# Patient Record
Sex: MALE | Race: WHITE | Employment: OTHER | URBAN - METROPOLITAN AREA
[De-identification: names, ages, dates, MRNs, and addresses within clinical notes are randomized per-mention and may not be internally consistent; named-entity substitution may affect disease eponyms.]

---

## 2024-06-16 ENCOUNTER — ANCILLARY PROCEDURE (OUTPATIENT)
Dept: ULTRASOUND IMAGING | Facility: HOSPITAL | Age: 59
End: 2024-06-16
Attending: STUDENT IN AN ORGANIZED HEALTH CARE EDUCATION/TRAINING PROGRAM
Payer: MEDICAID

## 2024-06-16 ENCOUNTER — HOSPITAL ENCOUNTER (INPATIENT)
Facility: HOSPITAL | Age: 59
LOS: 5 days | Discharge: HOME | End: 2024-06-21
Attending: STUDENT IN AN ORGANIZED HEALTH CARE EDUCATION/TRAINING PROGRAM | Admitting: FAMILY MEDICINE
Payer: MEDICAID

## 2024-06-16 ENCOUNTER — APPOINTMENT (OUTPATIENT)
Dept: RADIOLOGY | Facility: HOSPITAL | Age: 59
End: 2024-06-16
Attending: STUDENT IN AN ORGANIZED HEALTH CARE EDUCATION/TRAINING PROGRAM
Payer: MEDICAID

## 2024-06-16 DIAGNOSIS — I25.10 CORONARY ARTERY DISEASE INVOLVING NATIVE CORONARY ARTERY OF NATIVE HEART WITHOUT ANGINA PECTORIS: ICD-10-CM

## 2024-06-16 DIAGNOSIS — I50.20 SYSTOLIC CONGESTIVE HEART FAILURE, UNSPECIFIED HF CHRONICITY (H): ICD-10-CM

## 2024-06-16 DIAGNOSIS — I50.20 HEART FAILURE WITH REDUCED EJECTION FRACTION, NYHA CLASS II (H): ICD-10-CM

## 2024-06-16 DIAGNOSIS — J45.901 EXACERBATION OF ASTHMA, UNSPECIFIED ASTHMA SEVERITY, UNSPECIFIED WHETHER PERSISTENT: ICD-10-CM

## 2024-06-16 DIAGNOSIS — R06.02 SOB (SHORTNESS OF BREATH): Primary | ICD-10-CM

## 2024-06-16 DIAGNOSIS — I10 PRIMARY HYPERTENSION: ICD-10-CM

## 2024-06-16 DIAGNOSIS — I50.21 ACUTE SYSTOLIC CONGESTIVE HEART FAILURE (H): ICD-10-CM

## 2024-06-16 DIAGNOSIS — I50.9 CONGESTIVE HEART FAILURE, UNSPECIFIED HF CHRONICITY, UNSPECIFIED HEART FAILURE TYPE (H): ICD-10-CM

## 2024-06-16 LAB
ANION GAP SERPL CALCULATED.3IONS-SCNC: 9 MMOL/L (ref 7–15)
BASOPHILS # BLD AUTO: 0 10E3/UL (ref 0–0.2)
BASOPHILS NFR BLD AUTO: 0 %
BUN SERPL-MCNC: 16.3 MG/DL (ref 8–23)
CALCIUM SERPL-MCNC: 8.8 MG/DL (ref 8.8–10.2)
CHLORIDE SERPL-SCNC: 99 MMOL/L (ref 98–107)
CREAT SERPL-MCNC: 0.95 MG/DL (ref 0.67–1.17)
DEPRECATED HCO3 PLAS-SCNC: 28 MMOL/L (ref 22–29)
EGFRCR SERPLBLD CKD-EPI 2021: >90 ML/MIN/1.73M2
EOSINOPHIL # BLD AUTO: 0.2 10E3/UL (ref 0–0.7)
EOSINOPHIL NFR BLD AUTO: 2 %
ERYTHROCYTE [DISTWIDTH] IN BLOOD BY AUTOMATED COUNT: 14.1 % (ref 10–15)
GLUCOSE SERPL-MCNC: 140 MG/DL (ref 70–99)
HCT VFR BLD AUTO: 46.4 % (ref 40–53)
HGB BLD-MCNC: 15.1 G/DL (ref 13.3–17.7)
IMM GRANULOCYTES # BLD: 0 10E3/UL
IMM GRANULOCYTES NFR BLD: 0 %
LYMPHOCYTES # BLD AUTO: 1.2 10E3/UL (ref 0.8–5.3)
LYMPHOCYTES NFR BLD AUTO: 11 %
MCH RBC QN AUTO: 29.2 PG (ref 26.5–33)
MCHC RBC AUTO-ENTMCNC: 32.5 G/DL (ref 31.5–36.5)
MCV RBC AUTO: 90 FL (ref 78–100)
MONOCYTES # BLD AUTO: 0.8 10E3/UL (ref 0–1.3)
MONOCYTES NFR BLD AUTO: 7 %
NEUTROPHILS # BLD AUTO: 9.4 10E3/UL (ref 1.6–8.3)
NEUTROPHILS NFR BLD AUTO: 80 %
NRBC # BLD AUTO: 0 10E3/UL
NRBC BLD AUTO-RTO: 0 /100
NT-PROBNP SERPL-MCNC: 8796 PG/ML (ref 0–900)
PLATELET # BLD AUTO: 185 10E3/UL (ref 150–450)
POTASSIUM SERPL-SCNC: 4.1 MMOL/L (ref 3.4–5.3)
POTASSIUM SERPL-SCNC: 4.4 MMOL/L (ref 3.4–5.3)
RBC # BLD AUTO: 5.18 10E6/UL (ref 4.4–5.9)
SODIUM SERPL-SCNC: 136 MMOL/L (ref 135–145)
TROPONIN T SERPL HS-MCNC: 45 NG/L
TROPONIN T SERPL HS-MCNC: 50 NG/L
WBC # BLD AUTO: 11.7 10E3/UL (ref 4–11)

## 2024-06-16 PROCEDURE — 250N000011 HC RX IP 250 OP 636: Mod: JZ | Performed by: STUDENT IN AN ORGANIZED HEALTH CARE EDUCATION/TRAINING PROGRAM

## 2024-06-16 PROCEDURE — 250N000009 HC RX 250: Performed by: STUDENT IN AN ORGANIZED HEALTH CARE EDUCATION/TRAINING PROGRAM

## 2024-06-16 PROCEDURE — 83880 ASSAY OF NATRIURETIC PEPTIDE: CPT | Performed by: STUDENT IN AN ORGANIZED HEALTH CARE EDUCATION/TRAINING PROGRAM

## 2024-06-16 PROCEDURE — 94640 AIRWAY INHALATION TREATMENT: CPT

## 2024-06-16 PROCEDURE — 85025 COMPLETE CBC W/AUTO DIFF WBC: CPT | Performed by: STUDENT IN AN ORGANIZED HEALTH CARE EDUCATION/TRAINING PROGRAM

## 2024-06-16 PROCEDURE — 120N000001 HC R&B MED SURG/OB

## 2024-06-16 PROCEDURE — 36415 COLL VENOUS BLD VENIPUNCTURE: CPT | Performed by: STUDENT IN AN ORGANIZED HEALTH CARE EDUCATION/TRAINING PROGRAM

## 2024-06-16 PROCEDURE — 96374 THER/PROPH/DIAG INJ IV PUSH: CPT

## 2024-06-16 PROCEDURE — 84484 ASSAY OF TROPONIN QUANT: CPT | Performed by: STUDENT IN AN ORGANIZED HEALTH CARE EDUCATION/TRAINING PROGRAM

## 2024-06-16 PROCEDURE — 80048 BASIC METABOLIC PNL TOTAL CA: CPT | Performed by: STUDENT IN AN ORGANIZED HEALTH CARE EDUCATION/TRAINING PROGRAM

## 2024-06-16 PROCEDURE — 71046 X-RAY EXAM CHEST 2 VIEWS: CPT

## 2024-06-16 PROCEDURE — 999N000157 HC STATISTIC RCP TIME EA 10 MIN

## 2024-06-16 PROCEDURE — 84132 ASSAY OF SERUM POTASSIUM: CPT

## 2024-06-16 PROCEDURE — 250N000013 HC RX MED GY IP 250 OP 250 PS 637: Performed by: STUDENT IN AN ORGANIZED HEALTH CARE EDUCATION/TRAINING PROGRAM

## 2024-06-16 PROCEDURE — 76604 US EXAM CHEST: CPT

## 2024-06-16 PROCEDURE — 93005 ELECTROCARDIOGRAM TRACING: CPT | Performed by: STUDENT IN AN ORGANIZED HEALTH CARE EDUCATION/TRAINING PROGRAM

## 2024-06-16 PROCEDURE — 250N000013 HC RX MED GY IP 250 OP 250 PS 637

## 2024-06-16 PROCEDURE — 250N000011 HC RX IP 250 OP 636: Mod: JZ

## 2024-06-16 PROCEDURE — 99285 EMERGENCY DEPT VISIT HI MDM: CPT | Mod: 25

## 2024-06-16 RX ORDER — PROCHLORPERAZINE MALEATE 10 MG
10 TABLET ORAL EVERY 6 HOURS PRN
Status: DISCONTINUED | OUTPATIENT
Start: 2024-06-16 | End: 2024-06-21

## 2024-06-16 RX ORDER — CALCIUM CARBONATE 500 MG/1
1000 TABLET, CHEWABLE ORAL 4 TIMES DAILY PRN
Status: DISCONTINUED | OUTPATIENT
Start: 2024-06-16 | End: 2024-06-16

## 2024-06-16 RX ORDER — FUROSEMIDE 10 MG/ML
40 INJECTION INTRAMUSCULAR; INTRAVENOUS DAILY
Status: DISCONTINUED | OUTPATIENT
Start: 2024-06-16 | End: 2024-06-18

## 2024-06-16 RX ORDER — ONDANSETRON 4 MG/1
4 TABLET, ORALLY DISINTEGRATING ORAL EVERY 6 HOURS PRN
Status: DISCONTINUED | OUTPATIENT
Start: 2024-06-16 | End: 2024-06-21

## 2024-06-16 RX ORDER — ACETAMINOPHEN 650 MG/1
650 SUPPOSITORY RECTAL EVERY 4 HOURS PRN
Status: DISCONTINUED | OUTPATIENT
Start: 2024-06-16 | End: 2024-06-18

## 2024-06-16 RX ORDER — LIDOCAINE 40 MG/G
CREAM TOPICAL
Status: DISCONTINUED | OUTPATIENT
Start: 2024-06-16 | End: 2024-06-21

## 2024-06-16 RX ORDER — METOPROLOL SUCCINATE 25 MG/1
25 TABLET, EXTENDED RELEASE ORAL DAILY
Status: DISCONTINUED | OUTPATIENT
Start: 2024-06-16 | End: 2024-06-19

## 2024-06-16 RX ORDER — AMOXICILLIN 250 MG
1 CAPSULE ORAL 2 TIMES DAILY PRN
Status: DISCONTINUED | OUTPATIENT
Start: 2024-06-16 | End: 2024-06-21

## 2024-06-16 RX ORDER — NITROGLYCERIN 0.4 MG/1
0.4 TABLET SUBLINGUAL EVERY 5 MIN PRN
Status: DISCONTINUED | OUTPATIENT
Start: 2024-06-16 | End: 2024-06-21

## 2024-06-16 RX ORDER — ACETAMINOPHEN 325 MG/1
650 TABLET ORAL EVERY 4 HOURS PRN
Status: DISCONTINUED | OUTPATIENT
Start: 2024-06-16 | End: 2024-06-18

## 2024-06-16 RX ORDER — PROCHLORPERAZINE 25 MG
25 SUPPOSITORY, RECTAL RECTAL EVERY 12 HOURS PRN
Status: DISCONTINUED | OUTPATIENT
Start: 2024-06-16 | End: 2024-06-21

## 2024-06-16 RX ORDER — ALBUTEROL SULFATE 0.83 MG/ML
5 SOLUTION RESPIRATORY (INHALATION) ONCE
Status: COMPLETED | OUTPATIENT
Start: 2024-06-16 | End: 2024-06-16

## 2024-06-16 RX ORDER — ALBUTEROL SULFATE 90 UG/1
2 AEROSOL, METERED RESPIRATORY (INHALATION) EVERY 4 HOURS PRN
Status: DISCONTINUED | OUTPATIENT
Start: 2024-06-16 | End: 2024-06-21 | Stop reason: HOSPADM

## 2024-06-16 RX ORDER — FUROSEMIDE 10 MG/ML
40 INJECTION INTRAMUSCULAR; INTRAVENOUS ONCE
Status: COMPLETED | OUTPATIENT
Start: 2024-06-16 | End: 2024-06-16

## 2024-06-16 RX ORDER — ASPIRIN 81 MG/1
81 TABLET ORAL DAILY
Status: ON HOLD | COMMUNITY
End: 2024-07-20

## 2024-06-16 RX ORDER — ONDANSETRON 2 MG/ML
4 INJECTION INTRAMUSCULAR; INTRAVENOUS EVERY 6 HOURS PRN
Status: DISCONTINUED | OUTPATIENT
Start: 2024-06-16 | End: 2024-06-21

## 2024-06-16 RX ORDER — AMOXICILLIN 250 MG
2 CAPSULE ORAL 2 TIMES DAILY PRN
Status: DISCONTINUED | OUTPATIENT
Start: 2024-06-16 | End: 2024-06-21

## 2024-06-16 RX ORDER — METHYLPREDNISOLONE SODIUM SUCCINATE 125 MG/2ML
125 INJECTION, POWDER, LYOPHILIZED, FOR SOLUTION INTRAMUSCULAR; INTRAVENOUS ONCE
Status: COMPLETED | OUTPATIENT
Start: 2024-06-16 | End: 2024-06-16

## 2024-06-16 RX ADMIN — NITROGLYCERIN 0.4 MG: 0.4 TABLET, ORALLY DISINTEGRATING SUBLINGUAL at 16:07

## 2024-06-16 RX ADMIN — ALBUTEROL SULFATE 5 MG: 2.5 SOLUTION RESPIRATORY (INHALATION) at 14:26

## 2024-06-16 RX ADMIN — FUROSEMIDE 40 MG: 10 INJECTION, SOLUTION INTRAMUSCULAR; INTRAVENOUS at 20:40

## 2024-06-16 RX ADMIN — METHYLPREDNISOLONE SODIUM SUCCINATE 125 MG: 125 INJECTION, POWDER, FOR SOLUTION INTRAMUSCULAR; INTRAVENOUS at 14:12

## 2024-06-16 RX ADMIN — FUROSEMIDE 40 MG: 10 INJECTION, SOLUTION INTRAMUSCULAR; INTRAVENOUS at 14:55

## 2024-06-16 RX ADMIN — METOPROLOL SUCCINATE 25 MG: 25 TABLET, EXTENDED RELEASE ORAL at 20:39

## 2024-06-16 ASSESSMENT — ACTIVITIES OF DAILY LIVING (ADL)
ADLS_ACUITY_SCORE: 35
ADLS_ACUITY_SCORE: 20
ADLS_ACUITY_SCORE: 35
ADLS_ACUITY_SCORE: 35
ADLS_ACUITY_SCORE: 22
ADLS_ACUITY_SCORE: 33
ADLS_ACUITY_SCORE: 20
ADLS_ACUITY_SCORE: 20
ADLS_ACUITY_SCORE: 22

## 2024-06-16 ASSESSMENT — COLUMBIA-SUICIDE SEVERITY RATING SCALE - C-SSRS
1. IN THE PAST MONTH, HAVE YOU WISHED YOU WERE DEAD OR WISHED YOU COULD GO TO SLEEP AND NOT WAKE UP?: NO
2. HAVE YOU ACTUALLY HAD ANY THOUGHTS OF KILLING YOURSELF IN THE PAST MONTH?: NO
6. HAVE YOU EVER DONE ANYTHING, STARTED TO DO ANYTHING, OR PREPARED TO DO ANYTHING TO END YOUR LIFE?: NO

## 2024-06-16 NOTE — H&P
"[facility]    History and Physical - Hospitalist Service       Date of Admission:  6/16/2024    Assessment & Plan      Gonsalo Blakely is a 59 year old male w/PMHx of mild intermittent asthma, silicosis, and inconsistent healthcare admitted on 6/16/2024 for 2mo of worsening dyspnea, fth new CHF.     CHF exacerbation   Patient presents with 2mo of worsen dyspnea and BLE swelling. He is a  and within this past week, his has worked as much as 12h every day, worsening his sx and prompting him to come in today. He was tachycardic to 103 and satting in the low 90s on RA on admission. His breathing has not improved with more frequent use of his inhaler. Initial workup notable for BNP 8796, trop 45 and 50, and WBC 11.7. EKG notable for non-specific T-wave inversions, no signs of acute ischemic changes. CXR showed bronchial inflammation, but no other acute processes. Diuresed w/lasix 40mg IV in ED with output if ~1L and some improvements in sx. Continues to have crackles diffusely throughout lung bases and tachycardic on presentation on the floor. Family hx notable for CHF in father and MI in brother. He has several risk factors for CHF, including tobacco use and HTN. No hx of arrhythmia, but cannot r/o. In the ED there were concerns for asthma exacerbation, so he was given solumedrol and albuterol, but this seems less likely at this time.   - Oxygen as needed  - Metoprolol 25mg PO d  - Lasix 40mg IV d  - No ACEi/ARB d/t allergy   - Cardiac tele   - Echo  - Strict I/O's  - Daily weights     Leukocytosis   WBC 11.7 on admission. Afebrile. No additional si/sx of infection at this time. Will hold off on further infectious workup and abx.   - Continue to monitor fever curve   - Trend CBC    Lack of healthcare   Patient reports previously following with \"Dr. Curtis\" in the past. They had a good relationship, but Dr. Curtis retired and he has not re-established care elsewhere since " then. It has been several years since he last seen a PCP.   - Offered for patient to establish cares at [facility]. Patient will consider. His wife goes to \[facility]\"" and may consider going here.     Chronic conditions:  HTN  /118 on admission. Was diagnosed with this in the past and was on lisinopril, but developed a cough, so he was taken off of it. Resolved w/several lifestyle changes, which is why no additional medications were trialed.   - Metoprolol per above     Mild intermittent asthma   Silicosis   Patient only on albuterol and had not had to use it until the last few months. He works on old [employer] and reports being exposed to asbestos. He does wear PPE. Does not follow with pulmonology.   - Continue PTA albuterol           Diet: Fluid restriction 1800 ML FLUID  Combination Diet 2 gm NA Diet; No Caffeine Diet, Low Saturated Fat Na <2400mg Diet    DVT Prophylaxis: Pneumatic Compression Devices  Vincent Catheter: Not present  Lines: None     Cardiac Monitoring: ACTIVE order. Indication: Acute decompensated heart failure (48 hours)  Code Status: Full Code    Clinically Significant Risk Factors Present on Admission                # Drug Induced Platelet Defect: home medication list includes an antiplatelet medication                          Disposition Plan     Medically Ready for Discharge: Anticipated in 2-4 Days         The patient's care will be discussed w/primary medicine team in the AM.     Corey Beltran MD  Hospitalist Service  [facility]  Securely message with [employer] (more info)  Text page via [employer] Paging/Directory     ______________________________________________________________________    Chief Complaint   Dyspnea, BLE swelling    History is obtained from the patient and wife     History of Present Illness   Gonsalo Blakely is a 59 year old male w/PMHx of mild intermittent asthma, silicosis, and inconsistent healthcare admitted on 6/16/2024 for 2mo of  worsening dyspnea, fth CHF.     Initially started as LOREDO 2mo ago. He thought it was related to dust/mold at work. 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   LOREDO progressed - had trouble going up and down stairs, walking from house to garage. Eventually developed even dyspnea at rest most recently. Always worst at the end of the day and again attributed it to work. Endorses fatigue and BLE swelling during this time as well.   Reports diaphoresis, heart palpitations, and abdominal bloating. Denies chest pain, n/v, fevers/chills, constipation, and urinary changes.   FMHx:  Heart failure, cancer - dad  MI - brother   Smokes 3 cigarettes/d. Denies EtOH and other substance use.       Past Medical History    History reviewed. No pertinent past medical history.    Past Surgical History   History reviewed. No pertinent surgical history.    Prior to Admission Medications   Prior to Admission Medications   Prescriptions Last Dose Informant Patient Reported? Taking?   aspirin 81 MG EC tablet 6/14/2024  Yes Yes   Sig: Take 81 mg by mouth daily      Facility-Administered Medications: None        Review of Systems    The 10 point Review of Systems is negative other than noted in the HPI or here.      Physical Exam   Vital Signs: Temp: 97.8  F (36.6  C) Temp src: Oral BP: (!) 162/99 Pulse: 105   Resp: 22 SpO2: 92 % O2 Device: Nasal cannula Oxygen Delivery: 1 LPM  Weight: 186 lbs 0 oz    Physical Exam  Constitutional:       General: He is not in acute distress.     Appearance: Normal appearance. He is not toxic-appearing.   HENT:      Head: Normocephalic and atraumatic.   Neck:      Comments: JVD present   Cardiovascular:      Rate and Rhythm: Regular rhythm. Tachycardia present.      Heart sounds: No murmur heard.     No gallop.   Pulmonary:      Breath sounds: No wheezing or rales.      Comments: Mild increased WOB - gets breathless with talking. 1L NC O2. Diffuse crackles.  Chest:      Chest wall: No tenderness.    Abdominal:      General: There is distension.      Palpations: Abdomen is soft.      Tenderness: There is no abdominal tenderness.   Musculoskeletal:      Comments: BLE 1+ pitting edema    Neurological:      General: No focal deficit present.      Mental Status: He is alert and oriented to person, place, and time.             Data   ------------------------- PAST 24 HR DATA REVIEWED -----------------------------------------------

## 2024-06-16 NOTE — ED NOTES
[facility] ED Handoff Report    ED Chief Complaint: SOB leg swelling    ED Diagnosis:  (I50.20) Heart failure with reduced ejection fraction, NYHA class II (H)  Comment: None  Plan: See provider's note    (J45.901) Exacerbation of asthma, unspecified asthma severity, unspecified whether persistent  Comment: Has had SOB for a few months now getting worse  Plan: See provider's note       PMH:  History reviewed. No pertinent past medical history.     Code Status:  No Order     Falls Risk: No Band: Not applicable    Current Living Situation/Residence: lives with a significant other     Elimination Status: Continent: Yes     Activity Level: Independent    Patients Preferred Language:  English     Needed: No    Vital Signs:  BP (!) 180/117   Pulse 103   Temp 97.9  F (36.6  C) (Oral)   Resp 22   Wt 84.4 kg (186 lb)   SpO2 99%   BMI 24.54 kg/m       Cardiac Rhythm: Sinus no ectopy    Pain Score: 4/10    Is the Patient Confused:  No    Last Food or Drink: 06/16/24    Focused Assessment:  He comes in with a two month history of SOB that has been getting progressively worse. Also endorses chest tightness associated with the SOB. Found to have CHF due to poor ejection fraction. Still smokes cigarettes but less now than before. His chest tightness is better now 4 out fo 10 whereas 10 out of 10 when he came in after the neb treatment. He is alert oriented and able to make his needs known.    Tests Performed: Done: Labs and Imaging    Treatments Provided:  See MAR    Family Dynamics/Concerns: No    Family Updated On Visitor Policy: Yes    Plan of Care Communicated to Family: Yes    Who Was Updated about Plan of Care: Wife who has left at this time.    Belongings Checklist Done and Signed by Patient: No    Belongings Sent with Patient: Cell phone     Medications sent with patient: None    Covid: symptomatic, Not tested    Additional Information: None    RN: Lacho 6/16/2024 3:00 PM

## 2024-06-16 NOTE — PHARMACY-ADMISSION MEDICATION HISTORY
Pharmacy Intern Admission Medication History    Admission medication history is complete. The information provided in this note is only as accurate as the sources available at the time of the update.    Information Source(s): Patient and CareEverywhere/SureScripts via in-person    Pertinent Information: Patient takes only ASA 81 mg daily.     Changes made to PTA medication list:  Added: ASA  Deleted: Albuterol  Changed: None    Allergies reviewed with patient and updates made in EHR: yes    Medication History Completed By: Sean Villafuerte 6/16/2024 2:51 PM    PTA Med List   Medication Sig Last Dose    aspirin 81 MG EC tablet Take 81 mg by mouth daily 6/14/2024

## 2024-06-16 NOTE — ED PROVIDER NOTES
EMERGENCY DEPARTMENT ENCOUNTER      NAME: Gonsalo Blakely  AGE: 59 year old male  YOB: 1965  MRN: [id number]  EVALUATION DATE & TIME: 6/16/2024 12:27 PM    PCP: No Ref-Primary, Physician    ED PROVIDER: Micha Mcneill MD      Chief Complaint   Patient presents with    Shortness of Breath    Leg Swelling         FINAL IMPRESSION:  1. Heart failure with reduced ejection fraction, NYHA class II (H)    2. Exacerbation of asthma, unspecified asthma severity, unspecified whether persistent          ED COURSE & MEDICAL DECISION MAKING:    Pertinent Labs & Imaging studies reviewed. (See chart for details)  59 year old male presents to the Emergency Department for evaluation of shortness of breath.    ED Course as of 06/16/24 1523   Sun Jun 16, 2024   1333 EKG shows a sinus tachycardia 103 beats a minute, normal axis, normal intervals, no ST elevation but does have nonspecific ST changes in the lateral precordial leads as well as 3 and 2   1421 Patient is a 59-year-old male with history of tobacco use and asthma who presents emergency department for evaluation shortness of breath.  Patient states he has been having progressive shortness of breath for least the past several weeks and probably past several months.  Has had somewhat chronic cough as well.  Notes some intermittent feelings of chest pain as well.  Has noted some swelling in his bilateral feet.  Denies any fevers.  No history of blood clots.    On exam patient appears dyspneic and has bilateral expiratory and inspiratory wheezes.  No obvious rails.  Trace bilateral pedal edema.  Borderline tachycardic.    Initial differential includes but not limited to asthma/COPD exacerbation, heart failure, ACS or pneumonia.  Will obtain EKG, troponin as well as chest x-ray.  Will treat with albuterol and first dose steroids.       Labs reviewed and interpreted myself.  CBC shows mild leukocytosis at 11.7.  No significant metabolic derangements.  BNP is markedly  elevated at 8700 and troponin is elevated at 45.  Will repeat to further evaluate for significant increase.    My interpretation of chest x-ray do not see any signs of pneumonia or pulmonary edema.    Given patient's markedly elevated BNP did perform a bedside echocardiogram.  EF is significantly reduced and he has a plethoric IVC.  No B-lines suggest pulmonary edema.    At this point time suspect combination of asthma exacerbation as well as acute heart failure.  Although no hypoxia at this point in time or need for supplemental O2 or overt signs of pulmonary edema on chest x-ray or POCUS do believe the patient would benefit from diuresis and 40 mg Lasix ordered.  Additionally will give a dose of sublingual nitroglycerin as he is hypertensive here to see if this helps with his symptoms.  Also received a dose of prednisone and albuterol in the emergency department.    Discussed with hospitalist team who agrees to meet the patient this point in time for ongoing management.      1:30 PM I met and evaluated the patient.       Medical Decision Making  Obtained supplemental history:Supplemental history obtained?: Family Member/Significant Other  Reviewed external records: External records reviewed?: No  Care impacted by chronic illness:Other: asthma  Care significantly affected by social determinants of health:Access to Medical Care - weekend shift.  Did you consider but not order tests?: Work up considered but not performed and documented in chart, if applicable  Did you interpret images independently?: Independent interpretation of ECG and images noted in documentation, when applicable.  Consultation discussion with other provider:Did you involve another provider (consultant, , pharmacy, etc.)?: I discussed the care with another health care provider, see documentation for details.  Admit.     At the conclusion of the encounter I discussed the results of all of the tests and the disposition. The questions were  answered. The patient or family acknowledged understanding and was agreeable with the care plan.     31 minutes of critical care time     MEDICATIONS GIVEN IN THE EMERGENCY:  Medications   nitroGLYcerin (NITROSTAT) sublingual tablet 0.4 mg (has no administration in time range)   lidocaine 1 % 0.1-1 mL (has no administration in time range)   lidocaine (LMX4) cream (has no administration in time range)   sodium chloride (PF) 0.9% PF flush 3 mL (has no administration in time range)   sodium chloride (PF) 0.9% PF flush 3 mL (has no administration in time range)   senna-docusate (SENOKOT-S/PERICOLACE) 8.6-50 MG per tablet 1 tablet (has no administration in time range)     Or   senna-docusate (SENOKOT-S/PERICOLACE) 8.6-50 MG per tablet 2 tablet (has no administration in time range)   calcium carbonate (TUMS) chewable tablet 1,000 mg (has no administration in time range)   acetaminophen (TYLENOL) tablet 650 mg (has no administration in time range)     Or   acetaminophen (TYLENOL) Suppository 650 mg (has no administration in time range)   ondansetron (ZOFRAN ODT) ODT tab 4 mg (has no administration in time range)     Or   ondansetron (ZOFRAN) injection 4 mg (has no administration in time range)   prochlorperazine (COMPAZINE) injection 10 mg (has no administration in time range)     Or   prochlorperazine (COMPAZINE) tablet 10 mg (has no administration in time range)     Or   prochlorperazine (COMPAZINE) suppository 25 mg (has no administration in time range)   albuterol (PROVENTIL) neb solution 5 mg (5 mg Nebulization $Given 6/16/24 1426)   methylPREDNISolone sodium succinate (solu-MEDROL) injection 125 mg (125 mg Intravenous $Given 6/16/24 1412)   furosemide (LASIX) injection 40 mg (40 mg Intravenous $Given 6/16/24 1455)       NEW PRESCRIPTIONS STARTED AT TODAY'S ER VISIT  New Prescriptions    No medications on file        =================================================================    HPI    Patient information was  obtained from: Patient, wife    Use of : N/A     Gonsalo Blakely is a 59 year old male with a pertinent history of asthma who presents to this ED by private car for evaluation of shortness of breath.    Patient reports a 2-month history of gradually worsening shortness of breath with occasional chest tightness.  He was dry heaving last week, no episodes of emesis since.  Also having productive cough, taking Mucinex and an as needed albuterol inhaler with minimal relief. Noting new bilateral foot swelling.    Per wife, patient felt subjectively feverish yesterday, no measured.    REVIEW OF SYSTEMS   Refer to the [facility]    PAST MEDICAL HISTORY:  History reviewed. No pertinent past medical history.    PAST SURGICAL HISTORY:  History reviewed. No pertinent surgical history.    CURRENT MEDICATIONS:    aspirin 81 MG EC tablet      ALLERGIES:  No Known Allergies    FAMILY HISTORY:  No family history on file.    SOCIAL HISTORY:   Social History     Socioeconomic History    Marital status: Single   Tobacco Use    Smoking status: Former    Tobacco comments:     quit recently       VITALS:  BP (!) 181/117   Pulse 105   Temp 97.9  F (36.6  C) (Oral)   Resp 22   Wt 84.4 kg (186 lb)   SpO2 93%   BMI 24.54 kg/m      PHYSICAL EXAM    Constitutional: Well developed, Well nourished, NAD,  HENT: Normocephalic, Atraumatic,  mucous membranes moist,   Neck- trachea midline, No stridor.    Eyes:EOMI, Conjunctiva normal, No discharge.   Respiratory: Inspiratory and expiratory wheezing bilaterally, No respiratory distress.  Cardiovascular: Normal heart rate, Regular rhythm,  No murmurs,   Abdominal: Soft, No tenderness, No rebound or guarding.     Musculoskeletal: no deformity or malalignment, trace pedal edema.  Integument: Warm, Dry, No erythema, No rash.   Neurologic: Alert & oriented x 3   Psychiatric: Affect normal, Cooperative.      LAB:  All pertinent labs reviewed and interpreted.  Results for orders placed or  performed during the hospital encounter of 06/16/24   XR Chest 2 Views    Impression    IMPRESSION: Lungs are clear. No pleural effusion. Mild peribronchial thickening in both hilar regions could represent nonspecific bronchial inflammation. Cardiac silhouette and pulmonary vascularity appear more prominent though could relate to technique.     Basic metabolic panel   Result Value Ref Range    Sodium 136 135 - 145 mmol/L    Potassium 4.4 3.4 - 5.3 mmol/L    Chloride 99 98 - 107 mmol/L    Carbon Dioxide (CO2) 28 22 - 29 mmol/L    Anion Gap 9 7 - 15 mmol/L    Urea Nitrogen 16.3 8.0 - 23.0 mg/dL    Creatinine 0.95 0.67 - 1.17 mg/dL    GFR Estimate >90 >60 mL/min/1.73m2    Calcium 8.8 8.8 - 10.2 mg/dL    Glucose 140 (H) 70 - 99 mg/dL   Nt probnp inpatient   Result Value Ref Range    N terminal Pro BNP Inpatient 8,796 (H) 0 - 900 pg/mL   Result Value Ref Range    Troponin T, High Sensitivity 45 (H) <=22 ng/L   CBC with platelets and differential   Result Value Ref Range    WBC Count 11.7 (H) 4.0 - 11.0 10e3/uL    RBC Count 5.18 4.40 - 5.90 10e6/uL    Hemoglobin 15.1 13.3 - 17.7 g/dL    Hematocrit 46.4 40.0 - 53.0 %    MCV 90 78 - 100 fL    MCH 29.2 26.5 - 33.0 pg    MCHC 32.5 31.5 - 36.5 g/dL    RDW 14.1 10.0 - 15.0 %    Platelet Count 185 150 - 450 10e3/uL    % Neutrophils 80 %    % Lymphocytes 11 %    % Monocytes 7 %    % Eosinophils 2 %    % Basophils 0 %    % Immature Granulocytes 0 %    NRBCs per 100 WBC 0 <1 /100    Absolute Neutrophils 9.4 (H) 1.6 - 8.3 10e3/uL    Absolute Lymphocytes 1.2 0.8 - 5.3 10e3/uL    Absolute Monocytes 0.8 0.0 - 1.3 10e3/uL    Absolute Eosinophils 0.2 0.0 - 0.7 10e3/uL    Absolute Basophils 0.0 0.0 - 0.2 10e3/uL    Absolute Immature Granulocytes 0.0 <=0.4 10e3/uL    Absolute NRBCs 0.0 10e3/uL       RADIOLOGY:  Reviewed all pertinent imaging. Please see official radiology report.  XR Chest 2 Views   Final Result   IMPRESSION: Lungs are clear. No pleural effusion. Mild peribronchial  thickening in both hilar regions could represent nonspecific bronchial inflammation. Cardiac silhouette and pulmonary vascularity appear more prominent though could relate to technique.         POC US ECHO LIMITED    (Results Pending)       EKG:    Performed at: 12:33    Impression: EKG shows a sinus tachycardia 103 beats a minute, normal axis, normal intervals, no ST elevation but does have nonspecific ST changes in the lateral precordial leads as well as III and II.    I have independently reviewed and interpreted the EKG(s) documented above.    PROCEDURES:         PROCEDURE:    Emergency Department Limited Bedside Screening Cardiac Ultrasound   INDICATIONS: shortness of breath   PROCEDURE PROVIDER: Micha Mcneill    WINDOW AND FINDINGS:    SUB-XYPHOID     :      Cardiac activity: Hypokinetic  Pericardial effusion: No clinically significant pericardial effusion  Signs of Tamponade physiology: Absent  Plethoric IVC with minimal respiratory variation, significantly reduced ejection fraction   PARASTERNAL    :  Cardiac activity: Hypokinetic  Pericardial effusion: No clinically significant pericardial effusion  Signs of Tamponade physiology: Absent     Apical 4 Some difficulty obtaining standard apical 4 view but did obtain apical 5, left side ejection fraction appears reduced, RV nondilated   IMAGES SAVED AND STORED FOR ARCHIVE AND REVIEW: Yes        [facility] Documentation:   CMS Diagnoses:               I, Primo Chávez, am serving as a scribe to document services personally performed by Micha Mcneill MD based on my observation and the provider's statements to me. I, Micha Mcneill MD, attest that Primo Chávez is acting in a scribe capacity, has observed my performance of the services and has documented them in accordance with my direction.    Micha Mcneill MD  [facility] EMERGENCY DEPARTMENT  [address] [place] [id number]  [phone]      Micha Mcneill MD  06/16/24  [id number]

## 2024-06-16 NOTE — ED TRIAGE NOTES
Patient arrives by private car for evaluation of increasing shortness of breath x 2 weeks. Bilateral leg swelling.

## 2024-06-16 NOTE — ED NOTES
Patient is ready to go to the floor 425. Alerted the P4 charge nurse he was ready and the note was in and that no nurse was assigned as of this moment.

## 2024-06-17 ENCOUNTER — APPOINTMENT (OUTPATIENT)
Dept: CARDIOLOGY | Facility: HOSPITAL | Age: 59
End: 2024-06-17
Payer: MEDICAID

## 2024-06-17 ENCOUNTER — APPOINTMENT (OUTPATIENT)
Dept: OCCUPATIONAL THERAPY | Facility: HOSPITAL | Age: 59
End: 2024-06-17
Payer: MEDICAID

## 2024-06-17 ENCOUNTER — TELEPHONE (OUTPATIENT)
Dept: CARDIOLOGY | Facility: CLINIC | Age: 59
End: 2024-06-17

## 2024-06-17 DIAGNOSIS — I50.9 ACUTE DECOMPENSATED HEART FAILURE (H): Primary | ICD-10-CM

## 2024-06-17 LAB
ALBUMIN SERPL BCG-MCNC: 4.2 G/DL (ref 3.5–5.2)
ALP SERPL-CCNC: 100 U/L (ref 40–150)
ALT SERPL W P-5'-P-CCNC: 33 U/L (ref 0–70)
ANION GAP SERPL CALCULATED.3IONS-SCNC: 7 MMOL/L (ref 7–15)
AST SERPL W P-5'-P-CCNC: 31 U/L (ref 0–45)
BILIRUB DIRECT SERPL-MCNC: 0.26 MG/DL (ref 0–0.3)
BILIRUB SERPL-MCNC: 1.1 MG/DL
BUN SERPL-MCNC: 20 MG/DL (ref 8–23)
CALCIUM SERPL-MCNC: 9.5 MG/DL (ref 8.8–10.2)
CHLORIDE SERPL-SCNC: 98 MMOL/L (ref 98–107)
CHOLEST SERPL-MCNC: 217 MG/DL
CREAT SERPL-MCNC: 1.19 MG/DL (ref 0.67–1.17)
DEPRECATED HCO3 PLAS-SCNC: 37 MMOL/L (ref 22–29)
EGFRCR SERPLBLD CKD-EPI 2021: 69 ML/MIN/1.73M2
ERYTHROCYTE [DISTWIDTH] IN BLOOD BY AUTOMATED COUNT: 13.9 % (ref 10–15)
GLUCOSE SERPL-MCNC: 153 MG/DL (ref 70–99)
HCT VFR BLD AUTO: 50.5 % (ref 40–53)
HDLC SERPL-MCNC: 72 MG/DL
HGB BLD-MCNC: 16.5 G/DL (ref 13.3–17.7)
LDLC SERPL CALC-MCNC: 133 MG/DL
LVEF ECHO: NORMAL
MCH RBC QN AUTO: 29.3 PG (ref 26.5–33)
MCHC RBC AUTO-ENTMCNC: 32.7 G/DL (ref 31.5–36.5)
MCV RBC AUTO: 90 FL (ref 78–100)
NONHDLC SERPL-MCNC: 145 MG/DL
PLATELET # BLD AUTO: 216 10E3/UL (ref 150–450)
POTASSIUM SERPL-SCNC: 5.3 MMOL/L (ref 3.4–5.3)
PROT SERPL-MCNC: 6.9 G/DL (ref 6.4–8.3)
RBC # BLD AUTO: 5.63 10E6/UL (ref 4.4–5.9)
SODIUM SERPL-SCNC: 142 MMOL/L (ref 135–145)
TRIGL SERPL-MCNC: 61 MG/DL
WBC # BLD AUTO: 12.6 10E3/UL (ref 4–11)

## 2024-06-17 PROCEDURE — 97110 THERAPEUTIC EXERCISES: CPT | Mod: GO

## 2024-06-17 PROCEDURE — 97165 OT EVAL LOW COMPLEX 30 MIN: CPT | Mod: GO

## 2024-06-17 PROCEDURE — 97535 SELF CARE MNGMENT TRAINING: CPT | Mod: GO

## 2024-06-17 PROCEDURE — 99223 1ST HOSP IP/OBS HIGH 75: CPT | Performed by: INTERNAL MEDICINE

## 2024-06-17 PROCEDURE — 80048 BASIC METABOLIC PNL TOTAL CA: CPT

## 2024-06-17 PROCEDURE — 93306 TTE W/DOPPLER COMPLETE: CPT | Mod: 26 | Performed by: INTERNAL MEDICINE

## 2024-06-17 PROCEDURE — 93306 TTE W/DOPPLER COMPLETE: CPT

## 2024-06-17 PROCEDURE — 120N000001 HC R&B MED SURG/OB

## 2024-06-17 PROCEDURE — 83718 ASSAY OF LIPOPROTEIN: CPT | Performed by: INTERNAL MEDICINE

## 2024-06-17 PROCEDURE — 99223 1ST HOSP IP/OBS HIGH 75: CPT | Mod: AI

## 2024-06-17 PROCEDURE — 85027 COMPLETE CBC AUTOMATED: CPT

## 2024-06-17 PROCEDURE — 250N000013 HC RX MED GY IP 250 OP 250 PS 637: Performed by: INTERNAL MEDICINE

## 2024-06-17 PROCEDURE — 250N000011 HC RX IP 250 OP 636: Mod: JZ

## 2024-06-17 PROCEDURE — 36415 COLL VENOUS BLD VENIPUNCTURE: CPT

## 2024-06-17 PROCEDURE — 82248 BILIRUBIN DIRECT: CPT | Performed by: INTERNAL MEDICINE

## 2024-06-17 RX ORDER — LOSARTAN POTASSIUM 25 MG/1
25 TABLET ORAL DAILY
Status: DISCONTINUED | OUTPATIENT
Start: 2024-06-17 | End: 2024-06-21 | Stop reason: HOSPADM

## 2024-06-17 RX ORDER — LISINOPRIL 5 MG/1
5 TABLET ORAL DAILY
Status: DISCONTINUED | OUTPATIENT
Start: 2024-06-17 | End: 2024-06-17

## 2024-06-17 RX ADMIN — LOSARTAN POTASSIUM 25 MG: 25 TABLET, FILM COATED ORAL at 14:50

## 2024-06-17 RX ADMIN — FUROSEMIDE 40 MG: 10 INJECTION, SOLUTION INTRAMUSCULAR; INTRAVENOUS at 08:33

## 2024-06-17 ASSESSMENT — ACTIVITIES OF DAILY LIVING (ADL)
ADLS_ACUITY_SCORE: 22
ADLS_ACUITY_SCORE: 20
ADLS_ACUITY_SCORE: 22
DEPENDENT_IADLS:: INDEPENDENT
ADLS_ACUITY_SCORE: 20
ADLS_ACUITY_SCORE: 22
ADLS_ACUITY_SCORE: 20
ADLS_ACUITY_SCORE: 20
ADLS_ACUITY_SCORE: 22
ADLS_ACUITY_SCORE: 20
ADLS_ACUITY_SCORE: 22
ADLS_ACUITY_SCORE: 20
ADLS_ACUITY_SCORE: 22
ADLS_ACUITY_SCORE: 20

## 2024-06-17 NOTE — PROGRESS NOTES
Occupational Therapy      06/17/24 1015   Appointment Info   Signing Clinician's Name / Credentials (OT) Latricia Joy OTR/L   Living Environment   People in Home significant other   Current Living Arrangements house   Living Environment Comments Patient independent with ADLs/IADLs works full time in construction   Self-Care   Equipment Currently Used at Home none   Fall history within last six months no   General Information   Onset of Illness/Injury or Date of Surgery 06/16/24   Referring Physician Corey Beltran MD   Patient/Family Therapy Goal Statement (OT) get home   Additional Occupational Profile Info/Pertinent History of Current Problem Gonsalo Blakely is a 59 year old male w/PMHx of mild intermittent asthma, silicosis, and inconsistent healthcare admitted on 6/16/2024 for 2mo of worsening dyspnea, fth new CHF.   Existing Precautions/Restrictions fall   Cognitive Status Examination   Orientation Status orientation to person, place and time   Range of Motion Comprehensive   General Range of Motion no range of motion deficits identified   Strength Comprehensive (MMT)   General Manual Muscle Testing (MMT) Assessment no strength deficits identified   Bed Mobility   Bed Mobility supine-sit;sit-supine   Supine-Sit [place] (Bed Mobility) independent   Sit-Supine [place] (Bed Mobility) independent   Transfers   Transfers sit-stand transfer   Sit-Stand Transfer   Sit-Stand [place] (Transfers) independent   Activities of Daily Living   BADL Assessment/Intervention toileting;lower body dressing   Lower Body Dressing Assessment/Training   [place] Level (Lower Body Dressing) independent   Toileting   [place] Level (Toileting) independent   Clinical Impression   Criteria for Skilled Therapeutic Interventions Met (OT) Yes, treatment indicated   OT Diagnosis decreased endurance for ADL   Influenced by the following impairments CHF   OT Problem List-Impairments impacting ADL problems related  to;activity tolerance impaired   Assessment of Occupational Performance 1-3 Performance Deficits   Identified Performance Deficits endurance for ADLs   Planned Therapy Interventions (OT) home program guidelines;progressive activity/exercise;risk factor education   Clinical Decision Making Complexity (OT) problem focused assessment/low complexity   Risk & Benefits of therapy have been explained evaluation/treatment results reviewed;care plan/treatment goals reviewed   OT Total Evaluation Time   OT Eval, Low Complexity Minutes (06252) 15   OT Goals   Therapy Frequency (OT) Daily   OT Predicted Duration/Target Date for Goal Attainment 06/24/24   OT Goals Cardiac Phase 1   OT: Understanding of cardiac education to maximize quality of life, condition management, and health outcomes Patient;Verbalize   OT: Functional/aerobic ambulation tolerance with stable cardiovascular response in order to return to home and community environment Independent;Greater than 300 feet   OT: Navigation of stairs simulating home set up with stable cardiovascular response in order to return to home and community environment Independent;Greater than 10 stairs   Self-Care/Home Management   Self-Care/Home Mgmt/ADL, Compensatory, Meal Prep Minutes (26703) 10   Treatment Detail/Skilled Intervention see cardiac education   Therapeutic Procedures/Exercise   Therapeutic Procedure: strength, endurance, ROM, flexibillity minutes (84240) 15   Symptoms Noted During/After Treatment none   Treatment Detail/Skilled Intervention see ambulation section   Treatment Time Includes (CR Only) See specific exercise details intervention group(s);Monitoring of vital signs (see vital signs flowsheet for details)   Ambulation   Workload 400   Symptoms Denies symptoms   Cardiovascular Response Normal   Exercise Details ambulates independently   Vital Signs Details WNL-ambulating on 1L   Cardiac Education   Education Provided Daily weights;Diagnosis;Signs and  symptoms;Stop light tool   Education Packet Given to Patient Yes   All Patient Education Handouts Reviewed with Patient and/or Family Yes   OT Discharge Planning   OT Plan Stairs, ambulation, review HF education   OT Discharge Recommendation (DC Rec) home   OT Rationale for DC Rec Patient indepedent with ADLs, safe to discharge home   OT Brief overview of current status indepedent with ADLs   Total Session Time   Timed Code Treatment Minutes 25   Total Session Time (sum of timed and untimed services) 40

## 2024-06-17 NOTE — CONSULTS
Care Management Initial Consult    General Information  Assessment completed with: Patient,    Type of CM/SW Visit: Initial Assessment    Primary Care Provider verified and updated as needed: No   Readmission within the last 30 days: no previous admission in last 30 days      Reason for Consult: financial concerns, other (see comments) (no insurance)  Advance Care Planning: Advance Care Planning Reviewed: no concerns identified          Communication Assessment  Patient's communication style: spoken language (English or Bilingual)    Hearing Difficulty or Deaf: no   Wear Glasses or Blind: yes    Cognitive  Cognitive/Neuro/Behavioral: WDL                      Living Environment:   People in home: significant other  Marcy  Current living Arrangements: house      Able to return to prior arrangements: yes       Family/Social Support:  Care provided by: self  Provides care for: no one     Significant Other       Marcy  Description of Support System: Supportive, Involved         Current Resources:   Patient receiving home care services: No     Community Resources: None  Equipment currently used at home: none  Supplies currently used at home: None    Employment/Financial:  Employment Status: employed full-time        Financial Concerns: insurance, none   Referral to Financial Worker: Yes       Does the patient's insurance plan have a 3 day qualifying hospital stay waiver?  No    Lifestyle & Psychosocial Needs:  Social Determinants of Health     Food Insecurity: Not on file   Depression: Not on file   Housing Stability: Not on file   Tobacco Use: Medium Risk (6/16/2024)    Patient History     Smoking Tobacco Use: Former     Smokeless Tobacco Use: Unknown     Passive Exposure: Not on file   Financial Resource Strain: Not on file   Alcohol Use: Not on file   Transportation Needs: Not on file   Physical Activity: Not on file   Interpersonal Safety: Not on file   Stress: Not on file   Social Connections: Not on file   Health  Literacy: Not on file       Functional Status:  Prior to admission patient needed assistance:   Dependent ADLs:: Independent  Dependent IADLs:: Independent       Mental Health Status:          Chemical Dependency Status:                Values/Beliefs:  Spiritual, Cultural Beliefs, Confucianism Practices, Values that affect care:                 Additional Information:  Assessed. Pt lives with his SOMarcy. He is independent with ADLs and IADLs. Works full time. No home care, community services or equipment in the home. No active insurance but pt is working on this. Referral sent to [facility]. Goal is to discharge home. No CM needs anticipated.    RNCM to follow for medical progression, recommendations, and final discharge plan.      Yolanda Mata RN

## 2024-06-17 NOTE — CONSULTS
Thank you, Dr. Beltran, for asking the [facility] Heart Care team to see Mr. Gonsalo Blakely for evaluation of LOREDO and new onset CHF.      Assessment/Recommendations   Assessment/Plan:  Ischemic cardiomyopathy, acute systolic congestive heart failure: The patient developed dyspnea on exertion over last 2 months, gradually getting worse.  He developed bilateral leg edema over last several days.  proBNP is significantly elevated to 8796.  Echocardiogram was reported akinesis in the inferior wall, moderately reduced LV systolic function, LVEF of 35 to 40%.  We discussed the evaluation and management.  Most likely the patient's cardiomyopathy is caused by ischemia.  We discussed the coronary angiogram with possible PCI.  The patient agreed with the plan.  The patient already ate lunch, schedule coronary angiogram with possible PCI tomorrow.  The patient agreed with the plan.  Continue IV Lasix 40 mg daily, start metoprolol XL 25 mg daily, losartan 25 mg daily.  Continue to optimize CHF medication per ACC guideline.  Lipid profile and liver function test requested, start the statins as indicated.  Dyspnea on exertion: This could be angina equivalent.  The patient had akinesis EEE inferior wall, reduced LVEF to 35 to 40%.  His father had CAD.  The patient has smoked more than 20 years.  Discussed further evaluation, discussed echo findings.  As mentioned above, coronary angiogram with possible PCI tomorrow.  Chronic obstructive pulmonary disease: His breathing sounds are moderately reduced due to chronic smoking.  He still smokes 2 to 3 cigarettes a day.  Advised smoking sensation.  Please see primary team of management of chronic obstructive pulmonary disease.    Clinically Significant Risk Factors Present on Admission                # Drug Induced Platelet Defect: home medication list includes an antiplatelet medication    # Acute heart failure with reduced ejection fraction: last echo with EF <40% and  receiving IV diuretics          History of Present Illness/Subjective    It is my pleasure to see Gonsalo Blakely at [facility] for evaluation of LOREDO and newly diagnosed CHF.  Gonsalo Blakely is a 59 year old male with a medical history of long history of smoking.    The patient developed dyspnea on exertion over last 2 months, gradually getting worse, which limited his routine activities.  He also developed bilateral leg edema over last several days, improved with IV diuresis.  He denies chest pain, palpitations, dizziness, orthopnea, PND.  He had no fever chills cough.  He said he had done remodeling to his house recently and mild aspirate some dust.    On admission, his ECG showed sinus rhythm of with T wave abnormality in inferior and lateral leads.  Troponin was 45 and 50, flat.  proBNP level is significantly elevated to 8796.       Physical Examination Review of Systems   BP (!) 143/85 (BP Location: Left arm)   Pulse 94   Temp 97.7  F (36.5  C) (Oral)   Resp 20   Wt 84.4 kg (186 lb)   SpO2 90%   BMI 24.54 kg/m    Body mass index is 24.54 kg/m .  Wt Readings from Last 3 Encounters:   06/16/24 84.4 kg (186 lb)       Intake/Output Summary (Last 24 hours) at 6/17/2024 1320  Last data filed at 6/17/2024 1300  Gross per 24 hour   Intake 560 ml   Output 5080 ml   Net -4520 ml       General Appearance:   Awake, Alert, No acute distress.   HEENT:  No scleral icterus; the mucous membranes were moist.   Neck: No cervical bruits. No JVD. No thyromegaly.    Chest: The spine was straight. The chest was symmetric.   Lungs:   Diminished breathing sounds. No crackles.  No wheezing.   Cardiovascular:   Regular rhythm and rate, normal first and second heart sounds with no murmurs. No rubs or gallops.    Abdomen:  Soft. No tenderness. Bowels sounds are present   Extremities: Equal tibial pulses. Trace bilateral leg edema.   Skin: No rashes. Warm, Dry.   Musculoskeletal: No tenderness.    Neurologic: Oriented x 3. Mood and affect are appropriate.     A 12 point comprehensive review of systems was negative except as noted.        Medical History  Surgical History Family History Social History   History reviewed. No pertinent past medical history. History reviewed. No pertinent surgical history. Reviewed: father had MI at age 60s. Social History     Socioeconomic History    Marital status: Single     Spouse name: Not on file    Number of children: Not on file    Years of education: Not on file    Highest education level: Not on file   Occupational History    Not on file   Tobacco Use    Smoking status: Former    Smokeless tobacco: Not on file    Tobacco comments:     quit recently   Substance and Sexual Activity    Alcohol use: Not on file    Drug use: Not on file    Sexual activity: Not on file   Other Topics Concern    Not on file   Social History Narrative    Not on file     Social Determinants of Health     Financial Resource Strain: Not on file   Food Insecurity: Not on file   Transportation Needs: Not on file   Physical Activity: Not on file   Stress: Not on file   Social Connections: Not on file   Interpersonal Safety: Not on file   Housing Stability: Not on file          Medications  Allergies   Scheduled Meds:  Current Facility-Administered Medications   Medication Dose Route Frequency Provider Last Rate Last Admin    furosemide (LASIX) injection 40 mg  40 mg Intravenous Daily Luciana Mireles MD   40 mg at 06/17/24 0833    lisinopril (ZESTRIL) tablet 5 mg  5 mg Oral Daily Lorna Samaniego MD        [Held by provider] metoprolol succinate ER (TOPROL XL) 24 hr tablet 25 mg  25 mg Oral Daily Lorna Samaniego MD   25 mg at 06/16/24 2039    sodium chloride (PF) 0.9% PF flush 3 mL  3 mL Intracatheter Q8H Lilli Guzman MD   3 mL at 06/16/24 1609     Continuous Infusions:  Current Facility-Administered Medications   Medication Dose Route Frequency Provider Last Rate Last Admin    - MEDICATION INSTRUCTIONS -    Does not apply DOES NOT GO TO Corey Roque MD        Reason ACE/ARB/ARNI order not selected   Other DOES NOT GO TO Corey Roque MD         PRN Meds:.  Current Facility-Administered Medications   Medication Dose Route Frequency Provider Last Rate Last Admin    - MEDICATION INSTRUCTIONS -   Does not apply DOES NOT GO TO Corey Roque MD        acetaminophen (TYLENOL) tablet 650 mg  650 mg Oral Q4H PRN Lilli Guzman MD        Or    acetaminophen (TYLENOL) Suppository 650 mg  650 mg Rectal Q4H PRN Lilli Guzman MD        albuterol (PROVENTIL HFA/VENTOLIN HFA) inhaler  2 puff Inhalation Q4H PRN Corey Beltran MD        lidocaine (LMX4) cream   Topical Q1H PRN Lilli Guzman MD        lidocaine 1 % 0.1-1 mL  0.1-1 mL Other Q1H PRN Lilli Guzman MD        nitroGLYcerin (NITROSTAT) sublingual tablet 0.4 mg  0.4 mg Sublingual Q5 Min PRN Micha Mcneill MD   0.4 mg at 06/16/24 1607    ondansetron (ZOFRAN ODT) ODT tab 4 mg  4 mg Oral Q6H PRN Lilli Guzman MD        Or    ondansetron (ZOFRAN) injection 4 mg  4 mg Intravenous Q6H PRN Lilli Guzman MD        prochlorperazine (COMPAZINE) injection 10 mg  10 mg Intravenous Q6H PRN Lilli Guzman MD        Or    prochlorperazine (COMPAZINE) tablet 10 mg  10 mg Oral Q6H PRN Lilli Guzman MD        Or    prochlorperazine (COMPAZINE) suppository 25 mg  25 mg Rectal Q12H PRN Lilli Guzman MD        Reason ACE/ARB/ARNI order not selected   Other DOES NOT GO TO Corey Roque MD        senna-docusate (SENOKOT-S/PERICOLACE) 8.6-50 MG per tablet 1 tablet  1 tablet Oral BID PRN Lilli Guzman MD        Or    senna-docusate (SENOKOT-S/PERICOLACE) 8.6-50 MG per tablet 2 tablet  2 tablet Oral BID PRN Lilli Guzman MD        sodium chloride (PF) 0.9% PF flush 3 mL  3 mL Intracatheter q1 min prn Lilli Guzman MD   3 mL at 06/16/24 2042    Allergies   Allergen Reactions    Ace Inhibitors Cough         Lab Results    Chemistry/lipid CBC Cardiac Enzymes/BNP/TSH/INR   Lab Results   Component  Value Date    BUN 20.0 06/17/2024     06/17/2024    CO2 37 (H) 06/17/2024    Lab Results   Component Value Date    WBC 12.6 (H) 06/17/2024    HGB 16.5 06/17/2024    HCT 50.5 06/17/2024    MCV 90 06/17/2024     06/17/2024    Lab Results   Component Value Date    TROPONINI <0.01 01/12/2019

## 2024-06-17 NOTE — PLAN OF CARE
"  Problem: Adult Inpatient Plan of Care  Goal: Plan of Care Review  Description: The Plan of Care Review/Shift note should be completed every shift.  The Outcome Evaluation is a brief statement about your assessment that the patient is improving, declining, or no change.  This information will be displayed automatically on your shift  note.  Outcome: Progressing     Problem: Adult Inpatient Plan of Care  Goal: Patient-Specific Goal (Individualized)  Description: You can add care plan individualizations to a care plan. Examples of Individualization might be:  \"Parent requests to be called daily at 9am for status\", \"I have a hard time hearing out of my right ear\", or \"Do not touch me to wake me up as it startles  me\".  Outcome: Progressing     Problem: Comorbidity Management  Goal: Maintenance of Asthma Control  Outcome: Progressing   Goal Outcome Evaluation:       No verbal or nonverbal expressions of pain, frequent cough, O2 sats 94% 1 L, telemetry NSR, compliant with 1800 ml fluid restrictions, continue IV Lasix.                 "

## 2024-06-17 NOTE — PROGRESS NOTES
[facility]    Progress Note - Hospitalist Service       Date of Admission:  6/16/2024    Assessment & Plan   Gonsalo Blakely is a 59 year old male admitted on 6/16/2024. He has a history of  mild intermittent asthma, silicosis, HTN, and inconsistent healthcare admitted and is admitted for CHF.     CHF exacerbation   Patient presents with months of worsening dyspnea and BLE swelling. He is a  and within this past week, his has worked as much as 12h every day, worsening his symptoms and prompting him to present to the ER. Initial workup notable for BNP 8796, trop 45 and 50, and WBC 11.7. EKG notable for non-specific T-wave inversions, no signs of acute ischemic changes. CXR showed bronchial inflammation, but no other acute processes. Received dose of 40mg IV in ED with good output. On exam, noted to have crackles diffusely throughout lung bases.  - given new onset HF with reduced EF and severe inferior wall hypokinesis,  will consult cardiology, appreciate recommendations  - Echo - showing HFrEF with EF of 35-40% with severe inferior wall hypokinesis   - Oxygen as needed  - Continue diuresis with Lasix 40mg IV d  - Metoprolol 25mg PO daily  - Cardiac tele  - Strict I/O's  - Daily weights      Leukocytosis   WBC 11.7 on admission. Afebrile. No additional si/sx of infection at this time. Will hold off on further infectious workup and abx.   - Continue to monitor fever curve   - Trend CBC    Chronic conditions:  HTN  /118 on admission. Was diagnosed with this in the past and was on lisinopril, but developed a cough, so he was taken off of it. He notes his BP improved with lifestyle changes, which is why no additional medications were trialed.   - Metoprolol per above   - likely also add ARB upon discharge     Mild intermittent asthma   Silicosis   Patient only on albuterol and had not had to use it until the last few months. 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 He does wear PPE. Does not follow with pulmonology.   - Continue PTA albuterol          Diet: Fluid restriction 1800 ML FLUID  Combination Diet 2 gm NA Diet; No Caffeine Diet, Low Saturated Fat Na <2400mg Diet    DVT Prophylaxis: Pneumatic Compression Devices - consider pharmacologic ppx pending possible procedure  Vincent Catheter: Not present  Fluids: PO   Lines: None     Cardiac Monitoring: ACTIVE order. Indication: Acute decompensated heart failure (48 hours)  Code Status: Full Code      Clinically Significant Risk Factors Present on Admission                # Drug Induced Platelet Defect: home medication list includes an antiplatelet medication    # Acute heart failure with reduced ejection fraction: last echo with EF <40% and receiving IV diuretics                       Disposition Plan      Expected Discharge Date: 06/18/2024    Discharge Delays: IV Medication - consider oral or Home Infusion  PT Disposition recs needed  OT Disposition recs needed  Destination: home          The patient's care was discussed with the Attending Physician, Dr. Foss .    Amelia He, DO  Hospitalist Service  [facility]  Securely message with [employer] (more info)  Text page via [employer] Paging/Directory   ______________________________________________________________________    Interval History   No acute events overnight. Doing well this morning, feels much better. Has had good output and notes he was up all night urinating. Feels swelling has much improved. Just had echo completed this morning.     Physical Exam   Vital Signs: Temp: 97.9  F (36.6  C) Temp src: Oral BP: (!) 168/118 Pulse: 103   Resp: 20 SpO2: 94 % O2 Device: Nasal cannula Oxygen Delivery: 1 LPM  Weight: 186 lbs 0 oz    GEN: Pleasant male, in no acute distress.   HEENT: Normocephalic, atraumatic.   NECK: Supple. No cervical or supraclavicular adenopathy.   PULM: Non-labored breathing. No use of accessory muscles.  Expiratory wheezing, basilar crackles.   CV: Regular rate and rhythm. Normal S1, S2. No rubs, murmurs, or gallops.   ABDOMEN: Normoactive bowel sounds. Non-tender to palpation. Non-distended.   EXTREMITES:  No clubbing, cyanosis, or edema.    SKIN: No rash on limited skin exam  NEURO:  Awake. Oriented to person, place, time and situation. Moving all extremities.    PSYCH: Calm. Appropriate affect, insight, judgment.       Medical Decision Making             Data   ------------------------- PAST 24 HR DATA REVIEWED -----------------------------------------------    I have personally reviewed the following data over the past 24 hrs:    12.6 (H)  \   16.5   / 216     142 98 20.0 /  153 (H)   5.3 37 (H) 1.19 (H) \     Trop: 50 (H) BNP: 8,796 (H)       Imaging results reviewed over the past 24 hrs:   Recent Results (from the past 24 hour(s))   XR Chest 2 Views    Narrative    EXAM: XR CHEST 2 VIEWS  LOCATION: [facility]  DATE: 2024    INDICATION: Cough and shortness of breath, evaluate for PNA.  COMPARISON: 2019 PA and lateral CXR and 2019 portable AP CXR.      Impression    IMPRESSION: Lungs are clear. No pleural effusion. Mild peribronchial thickening in both hilar regions could represent nonspecific bronchial inflammation. Cardiac silhouette and pulmonary vascularity appear more prominent though could relate to technique.     Echocardiogram Complete   Result Value    LVEF  35-40% (moderately reduced)    Narrative    [id number]  [id number]  [id number]  [id number]^ARETHA^[place] [address]     Name: LICO PULIDO  MRN: [id number]  : 1965  Study Date: 2024 08:58 AM  Age: 59 yrs  Gender: Male  Patient Location: [facility]  Reason For Study: CHF  Ordering Physician: CASSY CARIAS  Performed By: BILL     BSA: 2.1 m2  Height: 73 in  Weight: 186 lb  HR: 103  BP: 168/118  mmHg  ______________________________________________________________________________  Procedure  Complete Portable Echo Adult.  ______________________________________________________________________________  Interpretation Summary     Left ventricular function is decreased. The ejection fraction is 35-40%  (moderately reduced).  There is mild-moderate global hypokinesia of the left ventricle.  There is severe inferior wall hypokinesis.  Normal right ventricle size and systolic function.  The left atrium is mildly dilated.  The right atrium is mildly dilated.  There is moderate trileaflet aortic sclerosis.  IVC diameter and respiratory changes fall into an intermediate range  suggesting an RA pressure of 8 mmHg.  No hemodynamically significant valvular abnormalities on 2D or color flow  imaging.  ______________________________________________________________________________  Left Ventricle  The left ventricle is normal in size. Left ventricular function is decreased.  The ejection fraction is 35-40% (moderately reduced). There is mild concentric  left ventricular hypertrophy. Left ventricular diastolic function is abnormal.  There is mild-moderate global hypokinesia of the left ventricle. There is  severe inferior wall hypokinesis.     Right Ventricle  Normal right ventricle size and systolic function.     Atria  The left atrium is mildly dilated. The right atrium is mildly dilated. There  is no color Doppler evidence of an atrial shunt.     Mitral Valve  Mitral valve leaflets appear normal. There is mild (1+) mitral regurgitation.     Tricuspid Valve  Tricuspid valve leaflets appear normal. There is no evidence of tricuspid  stenosis or clinically significant tricuspid regurgitation.     Aortic Valve  There is moderate trileaflet aortic sclerosis. No hemodynamically significant  valvular aortic stenosis.     Pulmonic Valve  The pulmonic valve is not well seen, but is grossly normal. This degree of  valvular  regurgitation is within normal limits.     Vessels  The aorta root is normal. Normal size ascending aorta. IVC diameter and  respiratory changes fall into an intermediate range suggesting an RA pressure  of 8 mmHg.     Pericardium  There is no pericardial effusion.     ______________________________________________________________________________  MMode/2D Measurements & Calculations  IVSd: 1.1 cm  LVIDd: 5.3 cm  LVIDs: 4.4 cm  LVPWd: 1.5 cm  FS: 18.6 %     LV mass(C)d: 292.2 grams  LV mass(C)dI: 140.1 grams/m2  Ao root diam: 3.3 cm  LA dimension: 2.8 cm  LA/Ao: 0.84  LVOT diam: 2.0 cm  LVOT area: 3.0 cm2  Ao root diam index Ht(cm/m): 1.8  Ao root diam index BSA (cm/m2): 1.6  EF Biplane: 40.8 %  LA Volume Indexed (AL/bp): 26.9 ml/m2  RV Base: 4.0 cm  RWT: 0.55  TAPSE: 2.0 cm     Time Measurements  MM HR: 97.0 BPM     Doppler Measurements & Calculations  MV E max george: 96.4 cm/sec  MV A max george: 65.1 cm/sec  MV E/A: 1.5  MV max P.3 mmHg  MV mean P.7 mmHg  MV V2 VTI: 30.0 cm  MVA(VTI): 1.2 cm2  MV dec slope: 608.4 cm/sec2  MV dec time: 0.16 sec  Ao V2 max: 132.7 cm/sec  Ao max P.0 mmHg  Ao V2 mean: 97.9 cm/sec  Ao mean P.4 mmHg  Ao V2 VTI: 22.4 cm  KALEY(I,D): 1.6 cm2  KALEY(V,D): 1.5 cm2  LV V1 max P.8 mmHg  LV V1 max: 67.3 cm/sec  LV V1 VTI: 12.2 cm  SV(LVOT): 36.7 ml  SI(LVOT): 17.6 ml/m2  PA acc time: 0.08 sec  AV George Ratio (DI): 0.51  KALEY Index (cm2/m2): 0.79  E/E' av.9  Lateral E/e': 12.1     Medial E/e': 21.6  RV S George: 12.2 cm/sec     ______________________________________________________________________________  Report approved by: Valerio Reed 2024 10:00 AM

## 2024-06-17 NOTE — PLAN OF CARE
Problem: Adult Inpatient Plan of Care  Goal: Plan of Care Review  Description: The Plan of Care Review/Shift note should be completed every shift.  The Outcome Evaluation is a brief statement about your assessment that the patient is improving, declining, or no change.  This information will be displayed automatically on your shift  note.  Outcome: Progressing  Goal: Absence of Hospital-Acquired Illness or Injury  Intervention: Identify and Manage Fall Risk  Recent Flowsheet Documentation  Taken 6/16/2024 2000 by Jignesh Luna RN  Safety Promotion/Fall Prevention:   activity supervised   safety round/check completed   increased rounding and observation   increase visualization of patient  Intervention: Prevent Skin Injury  Recent Flowsheet Documentation  Taken 6/16/2024 2000 by Jignesh Luna RN  Body Position: supine, head elevated     Problem: Comorbidity Management  Goal: Maintenance of Asthma Control  Outcome: Progressing  Goal: Maintenance of Heart Failure Symptom Control  Outcome: Progressing  Goal: Blood Pressure in Desired Range  Outcome: Progressing     Problem: Pain Acute  Goal: Optimal Pain Control and Function  Outcome: Progressing     Problem: Gas Exchange Impaired  Goal: Optimal Gas Exchange  Outcome: Progressing   Goal Outcome Evaluation:       Pt alert & oriented. Independent in room. 1800 fluid restriction. Telemetry showing between sinus rhythm and sinus tach. Denies any pain. No dizziness/lightheadedness. No nausea/vomitting. IV lasix as scheduled. Oxygen in use at 1-2LPM. Pulse ox continuous monitoring, O2 sats above 90%. Vital Signs stable. Will continue to monitor.

## 2024-06-17 NOTE — CONSULTS
NUTRITION EDUCATION      REASON FOR ASSESSMENT:  Consult - Heart Failure - Dietitian to instruct patient on 2 gram sodium diet     NUTRITION HISTORY:  Information obtained from pt/chart    Pt with new dx of CHF with exacerbation sx x 2 months      Pt has not had education on Low sodium diet in the past but does have some knowledge of high sodium foods.   Pt works construction    Eats 3 meals/day and snacks  Breakfast: toast with butter and jelly and coffee  Lunch: Packed lunch of sandwich and chips and Gatorade or pop  Supper: Pt cooks supper-grills meat and potatoes, homemade spaghetti  Snacks: salted nuts    Pt rarely eats out. Once a month at a restaurant and maybe once a month fast food at work  Pt reports he seasons his meat when cooking but rarely adds salt to food he is eating    CURRENT DIET:  2 g Na low saturated fat <2400 mg Na 1800 ml fluid  restriction    NUTRITION DIAGNOSIS:  Food- and nutrition-related knowledge deficit R/t new Dx CHF evidenced by no prior low Na diet ed    INTERVENTIONS:    Nutrition Prescription:  <2400 mg Na per day    Implementation:      *  Nutrition Education (Content):   A)  Provided handout Heart Failure nutrition therapy, tips for eating out low sodium    B)  Discussed Recommended and not recommended foods. Cutting portions in half when eating out. Using herbs instead of salt for seasoning. How to read a nutrition label and recommended Na amounts      *  Nutrition Education (Application):   A)  Discussed current eating habits and recommended alternative food choices      *  Anticipate good compliance - pt motivated to not have another exacerbation and reports his wife is very supportive of diet changes      *  Diet Education - refer to Education Flowsheet    Goals:      *  Patient will verbalize understanding of diet       *  All of the above goals met during the education session    Follow Up/Monitoring:      *  Provided RD contact information for future questions      *   Recommended Out-Patient Nutrition Referral, if further diet instructions are needed

## 2024-06-18 LAB
ABO/RH(D): NORMAL
ANION GAP SERPL CALCULATED.3IONS-SCNC: 6 MMOL/L (ref 7–15)
ANTIBODY SCREEN: NEGATIVE
ATRIAL RATE - MUSE: 103 BPM
BUN SERPL-MCNC: 25.7 MG/DL (ref 8–23)
CALCIUM SERPL-MCNC: 9.2 MG/DL (ref 8.8–10.2)
CHLORIDE SERPL-SCNC: 96 MMOL/L (ref 98–107)
CREAT SERPL-MCNC: 1.1 MG/DL (ref 0.67–1.17)
DEPRECATED HCO3 PLAS-SCNC: 35 MMOL/L (ref 22–29)
DIASTOLIC BLOOD PRESSURE - MUSE: NORMAL MMHG
EGFRCR SERPLBLD CKD-EPI 2021: 76 ML/MIN/1.73M2
ERYTHROCYTE [DISTWIDTH] IN BLOOD BY AUTOMATED COUNT: 13.9 % (ref 10–15)
GLUCOSE SERPL-MCNC: 130 MG/DL (ref 70–99)
HCT VFR BLD AUTO: 49.4 % (ref 40–53)
HGB BLD-MCNC: 16.3 G/DL (ref 13.3–17.7)
INTERPRETATION ECG - MUSE: NORMAL
MCH RBC QN AUTO: 29.5 PG (ref 26.5–33)
MCHC RBC AUTO-ENTMCNC: 33 G/DL (ref 31.5–36.5)
MCV RBC AUTO: 89 FL (ref 78–100)
P AXIS - MUSE: 76 DEGREES
PLATELET # BLD AUTO: 216 10E3/UL (ref 150–450)
POTASSIUM SERPL-SCNC: 4.3 MMOL/L (ref 3.4–5.3)
PR INTERVAL - MUSE: 162 MS
QRS DURATION - MUSE: 92 MS
QT - MUSE: 376 MS
QTC - MUSE: 492 MS
R AXIS - MUSE: 76 DEGREES
RBC # BLD AUTO: 5.53 10E6/UL (ref 4.4–5.9)
SODIUM SERPL-SCNC: 137 MMOL/L (ref 135–145)
SPECIMEN EXPIRATION DATE: NORMAL
SYSTOLIC BLOOD PRESSURE - MUSE: NORMAL MMHG
T AXIS - MUSE: 205 DEGREES
VENTRICULAR RATE- MUSE: 103 BPM
WBC # BLD AUTO: 15.7 10E3/UL (ref 4–11)

## 2024-06-18 PROCEDURE — 99232 SBSQ HOSP IP/OBS MODERATE 35: CPT | Performed by: INTERNAL MEDICINE

## 2024-06-18 PROCEDURE — B2111ZZ FLUOROSCOPY OF MULTIPLE CORONARY ARTERIES USING LOW OSMOLAR CONTRAST: ICD-10-PCS | Performed by: INTERNAL MEDICINE

## 2024-06-18 PROCEDURE — C1894 INTRO/SHEATH, NON-LASER: HCPCS | Performed by: INTERNAL MEDICINE

## 2024-06-18 PROCEDURE — 80048 BASIC METABOLIC PNL TOTAL CA: CPT

## 2024-06-18 PROCEDURE — 86900 BLOOD TYPING SEROLOGIC ABO: CPT | Performed by: NURSE PRACTITIONER

## 2024-06-18 PROCEDURE — 255N000002 HC RX 255 OP 636: Performed by: INTERNAL MEDICINE

## 2024-06-18 PROCEDURE — 120N000001 HC R&B MED SURG/OB

## 2024-06-18 PROCEDURE — 258N000003 HC RX IP 258 OP 636: Performed by: INTERNAL MEDICINE

## 2024-06-18 PROCEDURE — 93458 L HRT ARTERY/VENTRICLE ANGIO: CPT | Performed by: INTERNAL MEDICINE

## 2024-06-18 PROCEDURE — 93458 L HRT ARTERY/VENTRICLE ANGIO: CPT | Mod: 26 | Performed by: INTERNAL MEDICINE

## 2024-06-18 PROCEDURE — 4A023N7 MEASUREMENT OF CARDIAC SAMPLING AND PRESSURE, LEFT HEART, PERCUTANEOUS APPROACH: ICD-10-PCS | Performed by: INTERNAL MEDICINE

## 2024-06-18 PROCEDURE — 250N000011 HC RX IP 250 OP 636: Mod: JZ | Performed by: INTERNAL MEDICINE

## 2024-06-18 PROCEDURE — 250N000011 HC RX IP 250 OP 636: Mod: JZ

## 2024-06-18 PROCEDURE — 272N000001 HC OR GENERAL SUPPLY STERILE: Performed by: INTERNAL MEDICINE

## 2024-06-18 PROCEDURE — 250N000013 HC RX MED GY IP 250 OP 250 PS 637: Performed by: INTERNAL MEDICINE

## 2024-06-18 PROCEDURE — 85027 COMPLETE CBC AUTOMATED: CPT

## 2024-06-18 PROCEDURE — 99232 SBSQ HOSP IP/OBS MODERATE 35: CPT | Mod: GC

## 2024-06-18 PROCEDURE — 250N000013 HC RX MED GY IP 250 OP 250 PS 637: Performed by: NURSE PRACTITIONER

## 2024-06-18 PROCEDURE — 36415 COLL VENOUS BLD VENIPUNCTURE: CPT | Performed by: NURSE PRACTITIONER

## 2024-06-18 PROCEDURE — C1887 CATHETER, GUIDING: HCPCS | Performed by: INTERNAL MEDICINE

## 2024-06-18 PROCEDURE — 36415 COLL VENOUS BLD VENIPUNCTURE: CPT

## 2024-06-18 RX ORDER — FENTANYL CITRATE 50 UG/ML
INJECTION, SOLUTION INTRAMUSCULAR; INTRAVENOUS
Status: DISCONTINUED | OUTPATIENT
Start: 2024-06-18 | End: 2024-06-18 | Stop reason: HOSPADM

## 2024-06-18 RX ORDER — ASPIRIN 81 MG/1
81 TABLET ORAL DAILY
Status: DISCONTINUED | OUTPATIENT
Start: 2024-06-19 | End: 2024-06-21 | Stop reason: HOSPADM

## 2024-06-18 RX ORDER — OXYCODONE HYDROCHLORIDE 5 MG/1
5 TABLET ORAL EVERY 4 HOURS PRN
Status: DISCONTINUED | OUTPATIENT
Start: 2024-06-18 | End: 2024-06-21 | Stop reason: HOSPADM

## 2024-06-18 RX ORDER — LIDOCAINE 40 MG/G
CREAM TOPICAL
Status: DISCONTINUED | OUTPATIENT
Start: 2024-06-18 | End: 2024-06-18 | Stop reason: HOSPADM

## 2024-06-18 RX ORDER — ASPIRIN 81 MG/1
81 TABLET, CHEWABLE ORAL DAILY
Status: DISCONTINUED | OUTPATIENT
Start: 2024-06-18 | End: 2024-06-18

## 2024-06-18 RX ORDER — OXYCODONE HYDROCHLORIDE 5 MG/1
10 TABLET ORAL EVERY 4 HOURS PRN
Status: DISCONTINUED | OUTPATIENT
Start: 2024-06-18 | End: 2024-06-21 | Stop reason: HOSPADM

## 2024-06-18 RX ORDER — ATORVASTATIN CALCIUM 40 MG/1
80 TABLET, FILM COATED ORAL AT BEDTIME
Status: DISCONTINUED | OUTPATIENT
Start: 2024-06-18 | End: 2024-06-21 | Stop reason: HOSPADM

## 2024-06-18 RX ORDER — ASPIRIN 325 MG
325 TABLET ORAL ONCE
Status: COMPLETED | OUTPATIENT
Start: 2024-06-18 | End: 2024-06-18

## 2024-06-18 RX ORDER — HYDRALAZINE HYDROCHLORIDE 20 MG/ML
10 INJECTION INTRAMUSCULAR; INTRAVENOUS
Status: DISCONTINUED | OUTPATIENT
Start: 2024-06-18 | End: 2024-06-21 | Stop reason: HOSPADM

## 2024-06-18 RX ORDER — IODIXANOL 320 MG/ML
INJECTION, SOLUTION INTRAVASCULAR
Status: DISCONTINUED | OUTPATIENT
Start: 2024-06-18 | End: 2024-06-18 | Stop reason: HOSPADM

## 2024-06-18 RX ORDER — ACETAMINOPHEN 325 MG/1
650 TABLET ORAL EVERY 4 HOURS PRN
Status: DISCONTINUED | OUTPATIENT
Start: 2024-06-18 | End: 2024-06-21 | Stop reason: HOSPADM

## 2024-06-18 RX ORDER — SODIUM CHLORIDE 9 MG/ML
INJECTION, SOLUTION INTRAVENOUS CONTINUOUS
Status: DISCONTINUED | OUTPATIENT
Start: 2024-06-18 | End: 2024-06-18 | Stop reason: HOSPADM

## 2024-06-18 RX ORDER — SPIRONOLACTONE 25 MG/1
25 TABLET ORAL DAILY
Status: DISCONTINUED | OUTPATIENT
Start: 2024-06-18 | End: 2024-06-21 | Stop reason: HOSPADM

## 2024-06-18 RX ORDER — SODIUM CHLORIDE 9 MG/ML
75 INJECTION, SOLUTION INTRAVENOUS CONTINUOUS
Status: ACTIVE | OUTPATIENT
Start: 2024-06-18 | End: 2024-06-18

## 2024-06-18 RX ORDER — FUROSEMIDE 20 MG
20 TABLET ORAL DAILY
Status: DISCONTINUED | OUTPATIENT
Start: 2024-06-18 | End: 2024-06-21 | Stop reason: HOSPADM

## 2024-06-18 RX ORDER — FENTANYL CITRATE 50 UG/ML
25 INJECTION, SOLUTION INTRAMUSCULAR; INTRAVENOUS
Status: CANCELLED | OUTPATIENT
Start: 2024-06-18

## 2024-06-18 RX ORDER — ATORVASTATIN CALCIUM 80 MG/1
80 TABLET, FILM COATED ORAL DAILY
Qty: 90 TABLET | Refills: 3 | Status: SHIPPED | OUTPATIENT
Start: 2024-06-18 | End: 2024-09-23

## 2024-06-18 RX ORDER — FENTANYL CITRATE 50 UG/ML
25 INJECTION, SOLUTION INTRAMUSCULAR; INTRAVENOUS
Status: DISCONTINUED | OUTPATIENT
Start: 2024-06-18 | End: 2024-06-18

## 2024-06-18 RX ORDER — ATORVASTATIN CALCIUM 40 MG/1
40 TABLET, FILM COATED ORAL EVERY EVENING
Status: DISCONTINUED | OUTPATIENT
Start: 2024-06-18 | End: 2024-06-18

## 2024-06-18 RX ORDER — ASPIRIN 81 MG/1
243 TABLET, CHEWABLE ORAL ONCE
Status: DISCONTINUED | OUTPATIENT
Start: 2024-06-18 | End: 2024-06-18 | Stop reason: HOSPADM

## 2024-06-18 RX ORDER — NALOXONE HYDROCHLORIDE 0.4 MG/ML
0.4 INJECTION, SOLUTION INTRAMUSCULAR; INTRAVENOUS; SUBCUTANEOUS
Status: ACTIVE | OUTPATIENT
Start: 2024-06-18 | End: 2024-06-19

## 2024-06-18 RX ORDER — NALOXONE HYDROCHLORIDE 0.4 MG/ML
0.2 INJECTION, SOLUTION INTRAMUSCULAR; INTRAVENOUS; SUBCUTANEOUS
Status: ACTIVE | OUTPATIENT
Start: 2024-06-18 | End: 2024-06-19

## 2024-06-18 RX ORDER — ASPIRIN 325 MG
325 TABLET ORAL ONCE
Status: DISCONTINUED | OUTPATIENT
Start: 2024-06-18 | End: 2024-06-18 | Stop reason: HOSPADM

## 2024-06-18 RX ORDER — ATROPINE SULFATE 0.1 MG/ML
0.5 INJECTION INTRAVENOUS
Status: DISCONTINUED | OUTPATIENT
Start: 2024-06-18 | End: 2024-06-18

## 2024-06-18 RX ORDER — DIAZEPAM 5 MG
10 TABLET ORAL ONCE
Status: COMPLETED | OUTPATIENT
Start: 2024-06-18 | End: 2024-06-18

## 2024-06-18 RX ORDER — FLUMAZENIL 0.1 MG/ML
0.2 INJECTION, SOLUTION INTRAVENOUS
Status: ACTIVE | OUTPATIENT
Start: 2024-06-18 | End: 2024-06-19

## 2024-06-18 RX ADMIN — ASPIRIN 325 MG ORAL TABLET 325 MG: 325 PILL ORAL at 13:52

## 2024-06-18 RX ADMIN — SODIUM CHLORIDE 10 ML/HR: 9 INJECTION, SOLUTION INTRAVENOUS at 20:22

## 2024-06-18 RX ADMIN — ATORVASTATIN CALCIUM 80 MG: 40 TABLET, FILM COATED ORAL at 21:08

## 2024-06-18 RX ADMIN — DIAZEPAM 10 MG: 10 TABLET ORAL at 13:53

## 2024-06-18 RX ADMIN — LOSARTAN POTASSIUM 25 MG: 25 TABLET, FILM COATED ORAL at 08:32

## 2024-06-18 RX ADMIN — FUROSEMIDE 40 MG: 10 INJECTION, SOLUTION INTRAMUSCULAR; INTRAVENOUS at 08:32

## 2024-06-18 ASSESSMENT — ACTIVITIES OF DAILY LIVING (ADL)
ADLS_ACUITY_SCORE: 20

## 2024-06-18 ASSESSMENT — EJECTION FRACTION: EF_VALUE: .23

## 2024-06-18 NOTE — PLAN OF CARE
"  Problem: Adult Inpatient Plan of Care  Goal: Plan of Care Review  Description: The Plan of Care Review/Shift note should be completed every shift.  The Outcome Evaluation is a brief statement about your assessment that the patient is improving, declining, or no change.  This information will be displayed automatically on your shift  note.  Outcome: Progressing     Problem: Adult Inpatient Plan of Care  Goal: Patient-Specific Goal (Individualized)  Description: You can add care plan individualizations to a care plan. Examples of Individualization might be:  \"Parent requests to be called daily at 9am for status\", \"I have a hard time hearing out of my right ear\", or \"Do not touch me to wake me up as it startles  me\".  Outcome: Progressing     Problem: Comorbidity Management  Goal: Maintenance of Asthma Control  Outcome: Progressing     Problem: Comorbidity Management  Goal: Blood Pressure in Desired Range  Outcome: Progressing     Problem: Pain Acute  Goal: Optimal Pain Control and Function  Outcome: Progressing     Problem: Gas Exchange Impaired  Goal: Optimal Gas Exchange  Outcome: Progressing   Goal Outcome Evaluation:       Pt is alert and oriented x4. /97, O2 sat 91% at 2LPM. Denies pain. Coughing frequently, dry and non productive. NPO maintained. Tele reading still Sinus tach. /min. Sleeping between cares.                 "

## 2024-06-18 NOTE — DISCHARGE INSTRUCTIONS
Interventional Cardiology  Coronary Angiogram/Angioplasty/Stent/Atherectomy Discharge  Instructions - Radial (wrist) Approach   The instructions below are to help you understand how to take care of yourself. There is also information about when to call the doctor or emergency services.  **Do not stop your aspirin or platelet inhibitor unless directed by your Cardiologist.  These medications help to prevent platelets in your blood from sticking together and forming a clot.   Examples of these medications are: Ticagrelor (Brilinta), Clopidogrel (Plavix), Prasugrel (Effient)    For 24 hours after procedure:  Do not subject hand/arm to any forceful movements for 24 hours, such as supporting weight when rising from a chair or bed.  Do not drive a car for 24 hours.  The dressing on the puncture site may be removed after 24 hours and left open to air. If minor oozing, you may apply a Band-Aid and remove after 12 hours.   You may shower on the day after your procedure. Do not take a tub bath or wash dishes (no soaking wrist) with the puncture site in water for 3 days after the procedure.    For 48 hours following the procedure:  Do not operate a lawnmower, motorcycle, chainsaw or all-terrain vehicle.  Do not lift anything heavier than 5-10 pounds with affected arm for 5 days.  Avoid excessive bending (flexion/extension) wrist movement.  Do not engage in vigorous exercise (i.e. tennis, golf) using the affected arm for 5 days after discharge.  You may return to work in 72 hours if no complications and no heavy lifting.    If bleeding should occur following discharge:  Sit down and apply firm pressure with your thumb against the puncture site and fingers against back of wrist for 10 minutes.  If the bleeding stops, continue to rest, keeping your wrist still for 2 hours. Notify your doctor as soon as possible.  If bleeding does not stop after 10 minutes or if there is a large amount of bleeding or spurting, call 911  immediately. Do not drive yourself to the hospital.           Contact the Heart Clinic at [phone] if you develop:  Fever over 100.4, that lasts more than one day  Redness, heat, or pus at the puncture site  Change in color or temperature of the hand or arm    Expect mild tingling of hand and tenderness at the puncture site for up to 3 days. You may take Tylenol or a pain medicine recommended by your doctor.                      Your Procedural Physician was: Dr. Roman Florence the phone number is: [phone].    [facility]:  [phone]  If you are calling after hours, please listen to the entire voicemail, a live  will answer at the end of the message  Please make appointment to see DR Samaniego at Cardiology Clinic in 3 months for heart follow up-at [phone]  [facility] does not contact you please call them for appointment to be seen before Surgery on Wednesday.[phone].

## 2024-06-18 NOTE — PROGRESS NOTES
[facility]    Progress Note - Hospitalist Service       Date of Admission:  6/16/2024    Assessment & Plan   Gonsalo Blakely is a 59 year old male admitted on 6/16/2024. He has a history of mild intermittent asthma, ?silicosis, HTN, and inconsistent healthcare admitted and is admitted for acute systolic CHF.     Acute systolic CHF  Ischemic cardiomyopathy  Patient presents with months of worsening dyspnea and BLE swelling. He is a  and within this past week, his has worked as much as 12h every day, worsening his symptoms and prompting him to present to the ER. Initial workup notable for BNP 8796, trop 45 and 50, and WBC 11.7. EKG notable for non-specific T-wave inversions, no signs of acute ischemic changes. CXR showed bronchial inflammation, but no other acute processes. Diuresing well with IV Lasix.   - cardiology consulted, appreciate recommendations  - Echo - showing HFrEF with EF of 35-40% with severe inferior wall hypokinesis   - discussed coronary angiogram with possible PCI - planned for 6/18/24  - lipids -  > initiate atorvastatin 40 mg  - LFTs - within normal limits  - transition to PO diuresis 20 mg Lasix  - continue Metoprolol 25mg  - initiate losartan 25 mg, spironolactone 25 mg   - strict I/O's, daily weights    Leukocytosis   WBC 11.7 on admission. Afebrile. No additional si/sx of infection at this time. Will hold off on further infectious workup and abx. Has remained afebrile, hemodynamically stable. Has had slight up-trend to WBC now to 15.7.   - Trend CBC    Chronic conditions:  HTN  /118 on admission. Was diagnosed with this in the past and was on lisinopril, but developed a cough, so he was taken off of it. He notes his BP improved with lifestyle changes, which is why no additional medications were trialed.   - Losartan, Metoprolol per above      Mild intermittent asthma   ?Silicosis vs asbestosis  Patient only on albuterol and had  not had to use it until the last few months. 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 Does not follow with pulmonology.   - Continue PTA albuterol          Diet: Fluid restriction 1800 ML FLUID  NPO per Anesthesia Guidelines for Procedure/Surgery Except for: Meds    DVT Prophylaxis: Pneumatic Compression Devices - consider pharmacologic ppx pending possible procedure  Vincent Catheter: Not present  Fluids: PO   Lines: None     Cardiac Monitoring: ACTIVE order. Indication: Acute decompensated heart failure (48 hours)  Code Status: Full Code      Clinically Significant Risk Factors                   # Acute heart failure with reduced ejection fraction: last echo with EF <40% and receiving IV diuretics                          Disposition Plan      Expected Discharge Date: 06/19/2024    Discharge Delays: IV Medication - consider oral or Home Infusion  PT Disposition recs needed  Procedure Pending (enter procedure & time in comments)  Destination: home          The patient's care was discussed with the Attending Physician, Dr. Foss .    Amelia He, DO  Hospitalist Service  [facility]  Securely message with [employer] (more info)  Text page via [employer] Paging/Directory   ______________________________________________________________________    Interval History   No acute events overnight. Doing well this morning. Anticipating angiogram today. Feels like swelling is much improved - has continued to have good output.     Physical Exam   Vital Signs: Temp: 98.5  F (36.9  C) Temp src: Oral BP: 136/78 Pulse: 92   Resp: 18 SpO2: 95 % O2 Device: Nasal cannula Oxygen Delivery: 2 LPM  Weight: 186 lbs 0 oz    GEN: Pleasant male, in no acute distress.   HEENT: Normocephalic, atraumatic.   NECK: Supple. No cervical or supraclavicular adenopathy.   PULM: Non-labored breathing. No use of accessory muscles. Expiratory wheezing, crackles appreciated in bilateral lower lung  fields.    CV: Regular rate and rhythm. Normal S1, S2. No rubs, murmurs, or gallops.   ABDOMEN: Normoactive bowel sounds. Non-tender to palpation. Non-distended.   EXTREMITES:  No clubbing, cyanosis, or edema.    SKIN: No rash on limited skin exam  NEURO:  Awake. Oriented to person, place, time and situation. Moving all extremities.    PSYCH: Calm. Appropriate affect, insight, judgment.       Medical Decision Making             Data   ------------------------- PAST 24 HR DATA REVIEWED -----------------------------------------------    I have personally reviewed the following data over the past 24 hrs:    15.7 (H)  \   16.3   / 216     137 96 (L) 25.7 (H) /  130 (H)   4.3 35 (H) 1.10 \     ALT: N/A AST: N/A AP: N/A TBILI: N/A   ALB: N/A TOT PROTEIN: N/A LIPASE: N/A       Imaging results reviewed over the past 24 hrs:   No results found for this or any previous visit (from the past 24 hour(s)).

## 2024-06-18 NOTE — PLAN OF CARE
Patient is A&Ox4. He is cooperative with cares.  Denies pain. Patient is independent in room.  Tele reads sinus tach.  Patient is NPO after midnight.  Problem: Adult Inpatient Plan of Care  Goal: Plan of Care Review  Description: The Plan of Care Review/Shift note should be completed every shift.  The Outcome Evaluation is a brief statement about your assessment that the patient is improving, declining, or no change.  This information will be displayed automatically on your shift  note.  Outcome: Progressing  Flowsheets (Taken 6/17/2024 2244)  Plan of Care Reviewed With: patient  Overall Patient Progress: no change  Goal: Optimal Comfort and Wellbeing  Outcome: Progressing     Problem: Comorbidity Management  Goal: Blood Pressure in Desired Range  Outcome: Progressing     Problem: Gas Exchange Impaired  Goal: Optimal Gas Exchange  Outcome: Progressing     Problem: Adult Inpatient Plan of Care  Goal: Absence of Hospital-Acquired Illness or Injury  Intervention: Identify and Manage Fall Risk  Recent Flowsheet Documentation  Taken 6/17/2024 1600 by Betzy Frances, RN  Safety Promotion/Fall Prevention:   activity supervised   assistive device/personal items within reach   clutter free environment maintained   nonskid shoes/slippers when out of bed  Intervention: Prevent Skin Injury  Recent Flowsheet Documentation  Taken 6/17/2024 1600 by Betzy Frances, RN  Body Position: position changed independently   Goal Outcome Evaluation:      Plan of Care Reviewed With: patient    Overall Patient Progress: no changeOverall Patient Progress: no change

## 2024-06-18 NOTE — PROGRESS NOTES
Pt from [facility] to [facility] for angiogram, poss PCI.Labs noted, checklist complete. Ready for procedure. Pt aware of emergency that has delayed his procedure start time. Family at bedside.

## 2024-06-18 NOTE — PLAN OF CARE
"  Problem: Adult Inpatient Plan of Care  Goal: Plan of Care Review  Description: The Plan of Care Review/Shift note should be completed every shift.  The Outcome Evaluation is a brief statement about your assessment that the patient is improving, declining, or no change.  This information will be displayed automatically on your shift  note.  Outcome: Progressing     Problem: Adult Inpatient Plan of Care  Goal: Patient-Specific Goal (Individualized)  Description: You can add care plan individualizations to a care plan. Examples of Individualization might be:  \"Parent requests to be called daily at 9am for status\", \"I have a hard time hearing out of my right ear\", or \"Do not touch me to wake me up as it startles  me\".  Outcome: Progressing     Problem: Adult Inpatient Plan of Care  Goal: Absence of Hospital-Acquired Illness or Injury  Outcome: Progressing     Problem: Adult Inpatient Plan of Care  Goal: Absence of Hospital-Acquired Illness or Injury  Intervention: Prevent Skin Injury  Recent Flowsheet Documentation  Taken 6/18/2024 0849 by Thomas Botello, RN  Body Position: sitting up in bed  Taken 6/18/2024 0800 by Thomas Botello, RN  Body Position: supine   Goal Outcome Evaluation:       No verbal or nonverbal expressions of pain, 95% 2 L O2, Pulse 105, able to express needs, Telemetry tachy, spouse at bedside, NPO for angiogram.                 "

## 2024-06-18 NOTE — PROGRESS NOTES
"Pt c/o muscle cramps x 2. Was found to have gotten out of foot of bed  to relieve them w/o using call light after receiving Valium 10 mg.earlier. Pt felt very \"high\", first slow to state where he was and waiting to go to the emergency room. Currently alert and oriented x 4. Reported to Radha PAUL.  And at handoff to Tahmina RN. Family present.  "

## 2024-06-18 NOTE — PROGRESS NOTES
Assessment/Plan:  Ischemic cardiomyopathy, acute systolic congestive heart failure: The patient developed dyspnea on exertion over last 2 months, gradually getting worse.  He developed bilateral leg edema over last several days.  proBNP is significantly elevated to 8796.  Echocardiogram was reported akinesis in the inferior wall, moderately reduced LV systolic function, LVEF of 35 to 40%.  Coronary angiogram with possible PCI today.  Discontinue IV Lasix 40 mg daily, start oral Lasix 20 mg daily and spironolactone 25 mg daily.  Start metoprolol XL 25 mg daily, losartan 25 mg daily.    Dyslipidemia: LDL is 133.  Start atorvastatin 40 mg at bedtime.  Repeat lipid profile and liver function in 2 months.  Dyspnea on exertion: This could be angina equivalent.  The patient had akinesis EEE inferior wall, reduced LVEF to 35 to 40%.  Coronary angiogram with possible PCI today.      Chronic obstructive pulmonary disease: His breathing sounds are moderately reduced due to chronic smoking.  He still smokes 2 to 3 cigarettes a day.  The patient has smoked more than 20 years.  Most likely the patient has emphysema, deferred to primary team evaluation.       Subjective:  Interval History:  Has no complaints of chest pain. Improved shortness of breath.  Leg edema is resolved.  No acute events overnight.    Current Medications:  Scheduled Meds:  Current Facility-Administered Medications   Medication Dose Route Frequency Provider Last Rate Last Admin    aspirin (ASA) chewable tablet 81 mg  81 mg Oral Daily Lorna Samaniego MD        atorvastatin (LIPITOR) tablet 40 mg  40 mg Oral QPM Lorna Samaniego MD        furosemide (LASIX) injection 40 mg  40 mg Intravenous Daily Luciana Mireles MD   40 mg at 06/18/24 0832    losartan (COZAAR) tablet 25 mg  25 mg Oral Daily Lorna Samaniego MD   25 mg at 06/18/24 0832    metoprolol succinate ER (TOPROL XL) 24 hr tablet 25 mg  25 mg Oral Daily Lorna Samaniego MD   25 mg at 06/16/24 2039    sodium chloride  (PF) 0.9% PF flush 3 mL  3 mL Intracatheter Q8H Lilli Guzman MD   3 mL at 06/18/24 0614     Continuous Infusions:  Current Facility-Administered Medications   Medication Dose Route Frequency Provider Last Rate Last Admin    - MEDICATION INSTRUCTIONS -   Does not apply DOES NOT GO TO Corey Roque MD        Reason ACE/ARB/ARNI order not selected   Other DOES NOT GO TO Corey Roque MD         PRN Meds:.  Current Facility-Administered Medications   Medication Dose Route Frequency Provider Last Rate Last Admin    - MEDICATION INSTRUCTIONS -   Does not apply DOES NOT GO TO Corey Roque MD        acetaminophen (TYLENOL) tablet 650 mg  650 mg Oral Q4H PRN Lilli Guzman MD        Or    acetaminophen (TYLENOL) Suppository 650 mg  650 mg Rectal Q4H PRN Lilli Guzman MD        albuterol (PROVENTIL HFA/VENTOLIN HFA) inhaler  2 puff Inhalation Q4H PRCorey Albright MD        lidocaine (LMX4) cream   Topical Q1H PRN Lilli Guzman MD        lidocaine 1 % 0.1-1 mL  0.1-1 mL Other Q1H PRN Lilli Guzman MD        nitroGLYcerin (NITROSTAT) sublingual tablet 0.4 mg  0.4 mg Sublingual Q5 Min PRN Micha Mcneill MD   0.4 mg at 06/16/24 1607    ondansetron (ZOFRAN ODT) ODT tab 4 mg  4 mg Oral Q6H PRN Lilli Guzman MD        Or    ondansetron (ZOFRAN) injection 4 mg  4 mg Intravenous Q6H PRN Lilli Guzman MD        prochlorperazine (COMPAZINE) injection 10 mg  10 mg Intravenous Q6H PRN Lilli Guzman MD        Or    prochlorperazine (COMPAZINE) tablet 10 mg  10 mg Oral Q6H PRN Lilli Guzman MD        Or    prochlorperazine (COMPAZINE) suppository 25 mg  25 mg Rectal Q12H PRN Lilli Guzman MD        Reason ACE/ARB/ARNI order not selected   Other DOES NOT GO TO Corey Roque MD        senna-docusate (SENOKOT-S/PERICOLACE) 8.6-50 MG per tablet 1 tablet  1 tablet Oral BID PRN Lilli Guzman MD        Or    senna-docusate (SENOKOT-S/PERICOLACE) 8.6-50 MG per tablet 2 tablet  2 tablet Oral BID PRN Lilli Guzman MD        sodium chloride  (PF) 0.9% PF flush 3 mL  3 mL Intracatheter q1 min prLilli Miller MD   3 mL at 06/16/24 2042       Objective:   Vital signs in last 24 hours:  Temp:  [97.7  F (36.5  C)-98.5  F (36.9  C)] 98.5  F (36.9  C)  Pulse:  [] 92  Resp:  [18-20] 18  BP: (136-164)/() 136/78  SpO2:  [90 %-95 %] 95 %  Weight:   [unfilled]     Physical Exam:  General Appearance:   Awake, Alert, No acute distress.   HEENT:  No scleral icterus; the mucous membranes were moist.   Neck: No cervical bruits. No jugular venous distention   Lungs:   Diminished breathing sounds. No crackles. No wheezing.   Cardiovascular:   RRR, normal first and second heart sounds with no murmurs. No rubs or gallops.     Abdomen:  Soft. No tenderness. Bowels sounds are present   Extremities: No leg edema. Equal posterior tibial pulsese.   Skin: Warm, Dry. No rashes.   Neurologic: Mood and affect are appropriate. No focal deficits.         Cardiographics:   Report: personally reviewed. .      Tele monitoring -   Sinus rhythm    Echocardiogram 6-:  Left ventricular function is decreased. The ejection fraction is 35-40%  (moderately reduced).  There is mild-moderate global hypokinesia of the left ventricle.  There is severe inferior wall hypokinesis.  Normal right ventricle size and systolic function.  The left atrium is mildly dilated.  The right atrium is mildly dilated.  There is moderate trileaflet aortic sclerosis.  IVC diameter and respiratory changes fall into an intermediate range  suggesting an RA pressure of 8 mmHg.  No hemodynamically significant valvular abnormalities on 2D or color flow  imaging.      Lab Results:   personally reviewed.     Lab Results   Component Value Date     06/18/2024    CO2 35 06/18/2024    CO2 26 01/12/2019    BUN 25.7 06/18/2024    BUN 14 01/12/2019     Lab Results   Component Value Date    TROPONINI <0.01 01/12/2019     Lab Results   Component Value Date    WBC 15.7 06/18/2024    HGB 16.3 06/18/2024    HCT  49.4 06/18/2024    MCV 89 06/18/2024     06/18/2024     Lab Results   Component Value Date    CHOL 217 06/17/2024    TRIG 61 06/17/2024    HDL 72 06/17/2024     06/17/2024

## 2024-06-18 NOTE — PRE-PROCEDURE
GENERAL PRE-PROCEDURE:   Procedure:  Coronary angiogram with possible PCI  Date/Time:  6/18/2024 1:27 PM    Written consent obtained?: Yes    Risks and benefits: Risks, benefits and alternatives were discussed    Consent given by:  Patient  Patient states understanding of procedure being performed: Yes    Patient's understanding of procedure matches consent: Yes    Procedure consent matches procedure scheduled: Yes    Expected level of sedation:  Moderate  Appropriately NPO:  Yes  ASA Class:  4 (HFrEF, new inferior WMA on echocardiogram, cardiomyopathy; EF 35-40%, dyslipidemia, COPD)  Mallampati  :  Grade 1- soft palate, uvula, tonsillar pillars, and posterior pharyngeal wall visible  Lungs:  Lungs clear with good breath sounds bilaterally  Heart:  Normal heart sounds and rate  History & Physical reviewed:  History and physical reviewed and updates made (see comment)  H&P Comments:  Clinically Significant Risk Factors Present on Admission    Cardiovascular : HFrEF, new inferior WMA on echocardiogram, cardiomyopathy; EF 35-40%, dyslipidemia    Fluid & Electrolyte Disorders : Not present on admission    Gastroenterology : Not present on admission    Hematology/Oncology : Not present on admission    Nephrology : Not present on admission    Neurology : Not present on admission    Pulmonology :  COPD    Systemic : Not present on admission    Statement of review:  I have reviewed the lab findings, diagnostic data, medications, and the plan for sedation

## 2024-06-19 ENCOUNTER — APPOINTMENT (OUTPATIENT)
Dept: ULTRASOUND IMAGING | Facility: HOSPITAL | Age: 59
End: 2024-06-19
Attending: PHYSICIAN ASSISTANT
Payer: MEDICAID

## 2024-06-19 LAB
ANION GAP SERPL CALCULATED.3IONS-SCNC: 8 MMOL/L (ref 7–15)
BUN SERPL-MCNC: 26.5 MG/DL (ref 8–23)
CALCIUM SERPL-MCNC: 9 MG/DL (ref 8.8–10.2)
CHLORIDE SERPL-SCNC: 97 MMOL/L (ref 98–107)
CREAT SERPL-MCNC: 1.11 MG/DL (ref 0.67–1.17)
DEPRECATED HCO3 PLAS-SCNC: 33 MMOL/L (ref 22–29)
EGFRCR SERPLBLD CKD-EPI 2021: 76 ML/MIN/1.73M2
ERYTHROCYTE [DISTWIDTH] IN BLOOD BY AUTOMATED COUNT: 14 % (ref 10–15)
GLUCOSE SERPL-MCNC: 127 MG/DL (ref 70–99)
HCT VFR BLD AUTO: 51.6 % (ref 40–53)
HGB BLD-MCNC: 16.8 G/DL (ref 13.3–17.7)
MCH RBC QN AUTO: 28.5 PG (ref 26.5–33)
MCHC RBC AUTO-ENTMCNC: 32.6 G/DL (ref 31.5–36.5)
MCV RBC AUTO: 88 FL (ref 78–100)
PLATELET # BLD AUTO: 228 10E3/UL (ref 150–450)
POTASSIUM SERPL-SCNC: 4.5 MMOL/L (ref 3.4–5.3)
RBC # BLD AUTO: 5.89 10E6/UL (ref 4.4–5.9)
SODIUM SERPL-SCNC: 138 MMOL/L (ref 135–145)
WBC # BLD AUTO: 10.3 10E3/UL (ref 4–11)

## 2024-06-19 PROCEDURE — 120N000001 HC R&B MED SURG/OB

## 2024-06-19 PROCEDURE — 85027 COMPLETE CBC AUTOMATED: CPT | Performed by: INTERNAL MEDICINE

## 2024-06-19 PROCEDURE — 250N000013 HC RX MED GY IP 250 OP 250 PS 637: Performed by: INTERNAL MEDICINE

## 2024-06-19 PROCEDURE — 80048 BASIC METABOLIC PNL TOTAL CA: CPT | Performed by: INTERNAL MEDICINE

## 2024-06-19 PROCEDURE — 99232 SBSQ HOSP IP/OBS MODERATE 35: CPT | Mod: GC

## 2024-06-19 PROCEDURE — 36415 COLL VENOUS BLD VENIPUNCTURE: CPT | Performed by: INTERNAL MEDICINE

## 2024-06-19 PROCEDURE — 250N000011 HC RX IP 250 OP 636: Mod: JZ

## 2024-06-19 PROCEDURE — 93880 EXTRACRANIAL BILAT STUDY: CPT

## 2024-06-19 PROCEDURE — 99255 IP/OBS CONSLTJ NEW/EST HI 80: CPT | Mod: FS | Performed by: THORACIC SURGERY (CARDIOTHORACIC VASCULAR SURGERY)

## 2024-06-19 PROCEDURE — 99232 SBSQ HOSP IP/OBS MODERATE 35: CPT | Performed by: INTERNAL MEDICINE

## 2024-06-19 RX ORDER — ENOXAPARIN SODIUM 100 MG/ML
40 INJECTION SUBCUTANEOUS EVERY 24 HOURS
Status: DISCONTINUED | OUTPATIENT
Start: 2024-06-19 | End: 2024-06-21 | Stop reason: HOSPADM

## 2024-06-19 RX ORDER — METOPROLOL SUCCINATE 25 MG/1
25 TABLET, EXTENDED RELEASE ORAL ONCE
Status: COMPLETED | OUTPATIENT
Start: 2024-06-19 | End: 2024-06-19

## 2024-06-19 RX ORDER — METOPROLOL SUCCINATE 50 MG/1
50 TABLET, EXTENDED RELEASE ORAL DAILY
Status: DISCONTINUED | OUTPATIENT
Start: 2024-06-20 | End: 2024-06-21 | Stop reason: HOSPADM

## 2024-06-19 RX ADMIN — ATORVASTATIN CALCIUM 80 MG: 40 TABLET, FILM COATED ORAL at 20:58

## 2024-06-19 RX ADMIN — METOPROLOL SUCCINATE 25 MG: 25 TABLET, EXTENDED RELEASE ORAL at 12:44

## 2024-06-19 RX ADMIN — ASPIRIN 81 MG: 81 TABLET, COATED ORAL at 08:43

## 2024-06-19 RX ADMIN — LOSARTAN POTASSIUM 25 MG: 25 TABLET, FILM COATED ORAL at 08:43

## 2024-06-19 RX ADMIN — ENOXAPARIN SODIUM 40 MG: 40 INJECTION SUBCUTANEOUS at 11:47

## 2024-06-19 RX ADMIN — SPIRONOLACTONE 25 MG: 25 TABLET ORAL at 08:44

## 2024-06-19 RX ADMIN — FUROSEMIDE 20 MG: 20 TABLET ORAL at 08:44

## 2024-06-19 RX ADMIN — METOPROLOL SUCCINATE 25 MG: 25 TABLET, EXTENDED RELEASE ORAL at 08:44

## 2024-06-19 ASSESSMENT — ACTIVITIES OF DAILY LIVING (ADL)
ADLS_ACUITY_SCORE: 22
ADLS_ACUITY_SCORE: 20
ADLS_ACUITY_SCORE: 22
ADLS_ACUITY_SCORE: 20
ADLS_ACUITY_SCORE: 22
ADLS_ACUITY_SCORE: 20
ADLS_ACUITY_SCORE: 22
ADLS_ACUITY_SCORE: 20
ADLS_ACUITY_SCORE: 22
ADLS_ACUITY_SCORE: 20
ADLS_ACUITY_SCORE: 22

## 2024-06-19 NOTE — CONSULTS
"      Cardiothoracic Surgery Consult    Date of Service: 6/19/2024    REFERRING CARDIOLOGIST: Dr. Florence    REASON FOR CONSULTATION: Consideration for surgical revascularization     HISTORY OF PRESENT ILLNESS: Gonsalo Blakely is a 59 year old year-old male who has a PMH of asthma and HTN who presented to [facility] ED on 06/16 for a 2-month history of shortness of breath which was not improving with use of his home inhaler. He was ultimately diagnosed with ischemic cardiomyopathy/CHF exacerbation. Echo with EF 35-40% (visually appears less). BNP elevated significantly. Troponin very slightly elevated. Coronary angiogram performed yesterday demonstrates severe multi-vessel coronary artery disease as below. He is requiring 2L supplemental oxygen in the hospital; does not use O2 at home. He is being diuresed. CV Surgery is consulted for possible coronary artery bypass surgery.    Patient reports a 2-month history of worsening shortness of breath, particularly impacting his job as a . He does not confirm or deny chest pain when he thinks about it. Leg swelling earlier this admission, now resolved. No nausea, dizziness, palpitations. The patient is left-handed. He is a current smoker of 2-3 cigarettes per day, down from ~1 pack a couple of years ago (20 year smoking history total). Reports feeling \"better\" in the hospital.    The patient's coronary artery disease risk factors include HTN, +family history of CAD, smoking. Patient is generally very active. Patient denies history of bleeding/clotting issues and issues with anesthesia in the past (shoulder surgery). He denies surgery on his chest and legs.      PAST MEDICAL HISTORY:   History reviewed. No pertinent past medical history.    PAST SURGICAL HISTORY:   Past Surgical History:   Procedure Laterality Date    CV CORONARY ANGIOGRAM N/A 6/18/2024    Procedure: CV CORONARY ANGIOGRAM;  Surgeon: Roman Florence MD;  Location: [facility] CATH [facility] " CV    CV LEFT HEART CATH N/A 6/18/2024    Procedure: Left Heart Catheterization;  Surgeon: Roman Florence MD;  Location: [facility] CV       ALLERGIES:   Allergies   Allergen Reactions    Ace Inhibitors Cough          CURRENT MEDICATIONS:  Current Facility-Administered Medications   Medication Dose Route Frequency Provider Last Rate Last Admin    - MEDICATION INSTRUCTIONS -   Does not apply DOES NOT GO TO Roman Cool MD        acetaminophen (TYLENOL) tablet 650 mg  650 mg Oral Q4H PRN Roman Florence MD        albuterol (PROVENTIL HFA/VENTOLIN HFA) inhaler  2 puff Inhalation Q4H PRN Roman Florence MD        aspirin EC tablet 81 mg  81 mg Oral Daily Roman Florence MD   81 mg at 06/19/24 0843    atorvastatin (LIPITOR) tablet 80 mg  80 mg Oral At Bedtime Roman Florence MD   80 mg at 06/18/24 2108    enoxaparin ANTICOAGULANT (LOVENOX) injection 40 mg  40 mg Subcutaneous Q24H Amelia He DO        furosemide (LASIX) tablet 20 mg  20 mg Oral Daily Roman Florence MD   20 mg at 06/19/24 0844    HOLD:  Metformin and metformin containing medications if patient received IV contrast with acute kidney injury or severe chronic kidney disease (stage IV or stage V; i.e., eGFR less than 30)   Does not apply HOLD Roman Florence MD        hydrALAZINE (APRESOLINE) injection 10 mg  10 mg Intravenous Once PRN Roman Florence MD        lidocaine (LMX4) cream   Topical Q1H PRN Roman Florence MD        lidocaine 1 % 0.1-1 mL  0.1-1 mL Other Q1H PRN Roman Florence MD        losartan (COZAAR) tablet 25 mg  25 mg Oral Daily Roman Florence MD   25 mg at 06/19/24 0843    metoprolol succinate ER (TOPROL XL) 24 hr tablet 25 mg  25 mg Oral Daily Roman Florence MD   25 mg at 06/19/24 0844    nitroGLYcerin (NITROSTAT) sublingual tablet 0.4 mg  0.4 mg Sublingual Q5 Min PRN Roman Florence MD   0.4 mg at 06/16/24 1607    ondansetron (ZOFRAN ODT) ODT tab 4 mg  4 mg Oral Q6H PRN  Roman Florence MD        Or    ondansetron (ZOFRAN) injection 4 mg  4 mg Intravenous Q6H PRRoman Grider MD        oxyCODONE (ROXICODONE) tablet 5 mg  5 mg Oral Q4H PRN Roman Florence MD        Or    oxyCODONE (ROXICODONE) tablet 10 mg  10 mg Oral Q4H PRN Roman Florence MD        prochlorperazine (COMPAZINE) injection 10 mg  10 mg Intravenous Q6H PRN Roman Florence MD        Or    prochlorperazine (COMPAZINE) tablet 10 mg  10 mg Oral Q6H PRN Roman Florence MD        Or    prochlorperazine (COMPAZINE) suppository 25 mg  25 mg Rectal Q12H PRN Roman Florence MD        Reason ACE/ARB/ARNI order not selected   Other DOES NOT GO TO Roman Cool MD        senna-docusate (SENOKOT-S/PERICOLACE) 8.6-50 MG per tablet 1 tablet  1 tablet Oral BID PRN Roman Florence MD        Or    senna-docusate (SENOKOT-S/PERICOLACE) 8.6-50 MG per tablet 2 tablet  2 tablet Oral BID PRRoman Grider MD        sodium chloride (PF) 0.9% PF flush 3 mL  3 mL Intracatheter Q8H Roman Florence MD   3 mL at 06/19/24 0414    sodium chloride (PF) 0.9% PF flush 3 mL  3 mL Intracatheter q1 min prRoman Grider MD   3 mL at 06/16/24 2042    spironolactone (ALDACTONE) tablet 25 mg  25 mg Oral Daily Roman Florence MD   25 mg at 06/19/24 0844         FAMILY HISTORY:   No family history on file.      SOCIAL HISTORY:   Social History     Socioeconomic History    Marital status: Single     Spouse name: Not on file    Number of children: Not on file    Years of education: Not on file    Highest education level: Not on file   Occupational History    Not on file   Tobacco Use    Smoking status: Every Day     Types: Cigarettes    Smokeless tobacco: Not on file    Tobacco comments:     quit recently   Substance and Sexual Activity    Alcohol use: Yes     Comment: 2 beers per month    Drug use: Never    Sexual activity: Not on file   Other Topics Concern    Not on file   Social History Narrative    Not on  "file     Social Determinants of Health     Financial Resource Strain: Not on file   Food Insecurity: Not on file   Transportation Needs: Not on file   Physical Activity: Not on file   Stress: Not on file   Social Connections: Not on file   Interpersonal Safety: Not on file   Housing Stability: Not on file       REVIEW OF SYSTEMS:  CONSTITUTIONAL: No weight loss, fever   SKIN: No rash  CARDIOVASCULAR: No chest pain or chest discomfort. No palpitations or edema.   RESPIRATORY: No shortness of breath on O2 or cough.  GASTROINTESTINAL: No nausea, vomiting or diarrhea. No abdominal pain.  NEUROLOGICAL: No headache, dizziness, syncope  HEMATOLOGIC: No anemia, bleeding or bruising.     PHYSICAL EXAMINATION:   Vitals: BP (!) 160/95 (BP Location: Left arm)   Pulse 103   Temp 97.6  F (36.4  C) (Oral)   Resp 20   Ht 1.854 m (6' 1\")   Wt 75.6 kg (166 lb 9.6 oz)   SpO2 90%   BMI 21.98 kg/m    GENERAL:  Well developed and well nourished   CARDIOVASCULAR: RRR on monitor; no appreciable edema.  RESPIRATIONS: Non-labored breathing on 2L NC.  ABDOMEN: Soft, non-distended, non-tender  EXTREMITIES: No deformity, no edema  NEUROLOGIC: Intact and symmetric with no focal deficits.   PSYCHIATRIC: Alert and oriented x3, pleasant     LABORATORY STUDIES:   Lab Results   Component Value Date    WBC 10.3 06/19/2024    HGB 16.8 06/19/2024    HCT 51.6 06/19/2024    MCV 88 06/19/2024     06/19/2024      Lab Results   Component Value Date    BUN 26.5 (H) 06/19/2024     06/19/2024    CO2 33 (H) 06/19/2024     No results found for: \"HGBA1C\"    EKG 6/16/2024:   Sinus tachycardia   Possible Left atrial enlargement   Nonspecific T wave abnormality   Abnormal ECG   When compared with ECG of 12-JAN-2019 00:31,   Nonspecific T wave abnormality now evident in Inferior leads   Nonspecific T wave abnormality now evident in Anterolateral leads     CARDIAC CATHETERIZATION  6/18/2024:   Left Main   The vessel was visualized by selective " angiography. There was 0% vessel disease.      Left Anterior Descending   The vessel was visualized by selective angiography. There was 75% vessel disease.      Ramus Intermedius   The vessel was visualized by selective angiography. There was 85% vessel disease.      Left Circumflex   The vessel was visualized by selective angiography. There was 45% vessel disease.      Right Coronary Artery   The vessel was visualized by selective angiography. There was 90% vessel disease.          TRANSTHORACIC ECHOCARDIOGRAM 6/16/2024:   Left ventricular function is decreased. The ejection fraction is 35-40%  (moderately reduced).  There is mild-moderate global hypokinesia of the left ventricle.  There is severe inferior wall hypokinesis.  Normal right ventricle size and systolic function.  The left atrium is mildly dilated.  The right atrium is mildly dilated.  There is moderate trileaflet aortic sclerosis.  IVC diameter and respiratory changes fall into an intermediate range  suggesting an RA pressure of 8 mmHg.  No hemodynamically significant valvular abnormalities on 2D or color flow  imaging.    CAROTID ULTRASOUND:  Pending    CXR 06/16/2024:  Lungs are clear. No pleural effusion. Mild peribronchial thickening in both hilar regions could represent nonspecific bronchial inflammation. Cardiac silhouette and pulmonary vascularity appear more prominent though could relate to technique.     IMPRESSION AND PLAN: Gonsalo Blakely is a 59 year old with severe multi-vessel coronary artery disease and ischemic cardiomyopathy}. Echocardiography demonstrates left ventricular systolic dysfunction with an LVEF of 35-40%. After review of the above information, we believe that he is a reasonable surgical candidate. Our team discussed the technical details of coronary artery bypass grafting with the patient, as well as the expected postoperative course and recovery. The risks include but are not limited to bleeding, infection, stroke, heart  or graft failure, dysrhythmia, respiratory failure, kidney or liver injury, bowel or limb ischemia, and death. His calculated STS risk for mortality is ~1%. The calculated risk of major morbidity or mortality is 7% with risk of permanent stroke being <1%. This was calculated without carotid ultrasound; would need to adjust for significant carotid disease.     - Tentative plan for CABG pending patient consent and further testing.  - Timing TBD as patient is currently being medically optimized   - Remainder of cares per primary team.       Thank you very much for this referral.       Patient and plan discussed with attending.      STS RISK:        _______  Justine Rey PA-C  Cardiothoracic Surgery  [phone]

## 2024-06-19 NOTE — PROGRESS NOTES
Assessment/Plan:  Ischemic cardiomyopathy, acute systolic congestive heart failure: The patient responded to IV diuresis well.  Continue oral Lasix 20 mg daily and spironolactone 25 mg daily.  Increase metoprolol XL to 50 mg daily, continue losartan 25 mg daily.  Continue to optimize his CHF medications based on ACC guideline.  Multiple vessel coronary artery disease: He had a coronary angiogram yesterday which was reported to multiple vessel disease, LAD proximal segment 50%, mid to distal 75%, 80 to 90% in D1 and D2, 85% in proximal ramus, 90% in right posterior lateral branch.  CV surgery service was consulted for CABG.  Continue aspirin, atorvastatin and metoprolol.  Dyslipidemia: LDL is 133.  Continue atorvastatin 80 mg at bedtime.  Repeat lipid profile and liver function in 2 months.  Chronic obstructive pulmonary disease: His breathing sounds are moderately reduced due to chronic smoking.  He still smokes 2 to 3 cigarettes a day.  The patient has smoked more than 20 years.  Most likely the patient has emphysema, deferred to primary team evaluation.       Subjective:  Interval History:  Has no complaints of chest pain. Improved shortness of breath.  Leg edema is resolved.  No acute events overnight.    Current Medications:  Scheduled Meds:  Current Facility-Administered Medications   Medication Dose Route Frequency Provider Last Rate Last Admin    aspirin EC tablet 81 mg  81 mg Oral Daily Roman Florence MD   81 mg at 06/19/24 0843    atorvastatin (LIPITOR) tablet 80 mg  80 mg Oral At Bedtime Roman Florence MD   80 mg at 06/18/24 2108    enoxaparin ANTICOAGULANT (LOVENOX) injection 40 mg  40 mg Subcutaneous Q24H Amelia He DO   40 mg at 06/19/24 1147    furosemide (LASIX) tablet 20 mg  20 mg Oral Daily Roman Florence MD   20 mg at 06/19/24 0844    losartan (COZAAR) tablet 25 mg  25 mg Oral Daily Roman Florence MD   25 mg at 06/19/24 0843    metoprolol succinate ER (TOPROL XL) 24 hr  tablet 50 mg  50 mg Oral Daily Lorna Samaniego MD        sodium chloride (PF) 0.9% PF flush 3 mL  3 mL Intracatheter Q8H Roman Florence MD   3 mL at 06/19/24 0414    spironolactone (ALDACTONE) tablet 25 mg  25 mg Oral Daily Roman Florence MD   25 mg at 06/19/24 0844     Continuous Infusions:  Current Facility-Administered Medications   Medication Dose Route Frequency Provider Last Rate Last Admin    - MEDICATION INSTRUCTIONS -   Does not apply DOES NOT GO TO Roman Cool MD        Reason ACE/ARB/ARNI order not selected   Other DOES NOT GO TO Roman Cool MD         PRN Meds:.  Current Facility-Administered Medications   Medication Dose Route Frequency Provider Last Rate Last Admin    - MEDICATION INSTRUCTIONS -   Does not apply DOES NOT GO TO Roman Cool MD        acetaminophen (TYLENOL) tablet 650 mg  650 mg Oral Q4H PRN Roman Florence MD        albuterol (PROVENTIL HFA/VENTOLIN HFA) inhaler  2 puff Inhalation Q4H PRN Roman Florence MD        HOLD:  Metformin and metformin containing medications if patient received IV contrast with acute kidney injury or severe chronic kidney disease (stage IV or stage V; i.e., eGFR less than 30)   Does not apply HOLD Roman Florence MD        hydrALAZINE (APRESOLINE) injection 10 mg  10 mg Intravenous Once PRN Roman Florence MD        lidocaine (LMX4) cream   Topical Q1H PRN Roman Florence MD        lidocaine 1 % 0.1-1 mL  0.1-1 mL Other Q1H PRN Roman Florecne MD        nitroGLYcerin (NITROSTAT) sublingual tablet 0.4 mg  0.4 mg Sublingual Q5 Min PRN Roman Florence MD   0.4 mg at 06/16/24 1607    ondansetron (ZOFRAN ODT) ODT tab 4 mg  4 mg Oral Q6H PRN Roman Florence MD        Or    ondansetron (ZOFRAN) injection 4 mg  4 mg Intravenous Q6H PRN Roman Florence MD        oxyCODONE (ROXICODONE) tablet 5 mg  5 mg Oral Q4H PRN Roman Florence MD        Or    oxyCODONE (ROXICODONE) tablet 10 mg  10 mg Oral Q4H PRN  Roman Florence MD        prochlorperazine (COMPAZINE) injection 10 mg  10 mg Intravenous Q6H PRRoman Grider MD        Or    prochlorperazine (COMPAZINE) tablet 10 mg  10 mg Oral Q6H PRRoman Grider MD        Or    prochlorperazine (COMPAZINE) suppository 25 mg  25 mg Rectal Q12H PRRoman Grider MD        Reason ACE/ARB/ARNI order not selected   Other DOES NOT GO TO Roman Cool MD        senna-docusate (SENOKOT-S/PERICOLACE) 8.6-50 MG per tablet 1 tablet  1 tablet Oral BID Roman Greene MD        Or    senna-docusate (SENOKOT-S/PERICOLACE) 8.6-50 MG per tablet 2 tablet  2 tablet Oral BID Roman Greene MD        sodium chloride (PF) 0.9% PF flush 3 mL  3 mL Intracatheter q1 min prRoman Grider MD   3 mL at 06/16/24 2042       Objective:   Vital signs in last 24 hours:  Temp:  [97.6  F (36.4  C)-98.4  F (36.9  C)] 97.7  F (36.5  C)  Pulse:  [] 87  Resp:  [14-28] 20  BP: (115-160)/(58-95) 130/82  SpO2:  [90 %-96 %] 92 %  Weight:   [unfilled]     Physical Exam:  General Appearance:   Awake, Alert, No acute distress.   HEENT:  No scleral icterus; the mucous membranes were moist.   Neck: No cervical bruits. No jugular venous distention   Lungs:   Diminished breathing sounds. No crackles. No wheezing.   Cardiovascular:   RRR, normal first and second heart sounds with no murmurs. No rubs or gallops.     Abdomen:  Soft. No tenderness. Bowels sounds are present   Extremities: No leg edema. Equal posterior tibial pulsese.   Skin: Warm, Dry. No rashes.   Neurologic: Mood and affect are appropriate. No focal deficits.         Cardiographics:   Report: personally reviewed. .      Tele monitoring -   Sinus rhythm    Echocardiogram 6-:  Left ventricular function is decreased. The ejection fraction is 35-40%  (moderately reduced).  There is mild-moderate global hypokinesia of the left ventricle.  There is severe inferior wall hypokinesis.  Normal right  ventricle size and systolic function.  The left atrium is mildly dilated.  The right atrium is mildly dilated.  There is moderate trileaflet aortic sclerosis.  IVC diameter and respiratory changes fall into an intermediate range  suggesting an RA pressure of 8 mmHg.  No hemodynamically significant valvular abnormalities on 2D or color flow imaging.    Coronary angiogram on 6-:  LM:no obstruction  LAD:proximal 50% narrowing, 75% mid-distal, large D1 and D2 w/ 80-90% narrowing  RI: large vessel, 85% proximal narrowing  Lcx:40-50% narrowing  RCA:dominant, rPLV w/ a 90% narrowing    Lab Results:   personally reviewed.     Lab Results   Component Value Date     06/18/2024    CO2 35 06/18/2024    CO2 26 01/12/2019    BUN 25.7 06/18/2024    BUN 14 01/12/2019     Lab Results   Component Value Date    TROPONINI <0.01 01/12/2019     Lab Results   Component Value Date    WBC 15.7 06/18/2024    HGB 16.3 06/18/2024    HCT 49.4 06/18/2024    MCV 89 06/18/2024     06/18/2024     Lab Results   Component Value Date    CHOL 217 06/17/2024    TRIG 61 06/17/2024    HDL 72 06/17/2024     06/17/2024

## 2024-06-19 NOTE — PROGRESS NOTES
Patient is kept comfortable during post-procedure stay.VSS. Patient remains on 02 @ 2 lpm. O2 saturations between 92-94% at 2 liters. Denies pain. Right radial access site remains dry & free from signs of bleeding even post TR band removal. Tolerated food and fluids. Dr. Florence was able to speak with patient and family post procedure. In-basket message sent to CV surgery to set up appointment for CV consult.  Verbalized no concerns.  Transferred to  in stable condition. Report given to Betzy ANDERSON.

## 2024-06-19 NOTE — PLAN OF CARE
"Patient is A&Ox4. He arrived on unit from IR at 8 PM.  Wrist board removed at 9 pm. Barbeau test done. CMS intact. Patient denies pain. On 2L O2 via NC.  Eating and drinking and denies nausea.  Problem: Adult Inpatient Plan of Care  Goal: Patient-Specific Goal (Individualized)  Description: You can add care plan individualizations to a care plan. Examples of Individualization might be:  \"Parent requests to be called daily at 9am for status\", \"I have a hard time hearing out of my right ear\", or \"Do not touch me to wake me up as it startles  me\".  Outcome: Progressing     Problem: Adult Inpatient Plan of Care  Goal: Absence of Hospital-Acquired Illness or Injury  Intervention: Identify and Manage Fall Risk  Recent Flowsheet Documentation  Taken 6/18/2024 2100 by Betzy Frances RN  Safety Promotion/Fall Prevention:   activity supervised   assistive device/personal items within reach  Intervention: Prevent Skin Injury  Recent Flowsheet Documentation  Taken 6/18/2024 2100 by Betzy Frances RN  Body Position: supine, head elevated     Problem: Comorbidity Management  Goal: Maintenance of Asthma Control  Intervention: Maintain Asthma Symptom Control  Recent Flowsheet Documentation  Taken 6/18/2024 2100 by Betzy Frances RN  Medication Review/Management: medications reviewed  Goal: Maintenance of Heart Failure Symptom Control  Intervention: Maintain Heart Failure Management  Recent Flowsheet Documentation  Taken 6/18/2024 2100 by Betzy Frances RN  Medication Review/Management: medications reviewed  Intervention: Maintain Heart Failure Management  Recent Flowsheet Documentation  Taken 6/18/2024 2100 by Betzy Frances RN  Medication Review/Management: medications reviewed  Goal: Blood Pressure in Desired Range  Intervention: Maintain Blood Pressure Management  Recent Flowsheet Documentation  Taken 6/18/2024 2100 by Betzy Frances RN  Medication Review/Management: medications reviewed     Problem: Pain Acute  Goal: " Optimal Pain Control and Function  Intervention: Prevent or Manage Pain  Recent Flowsheet Documentation  Taken 6/18/2024 2100 by Betzy Frances, RN  Medication Review/Management: medications reviewed     Problem: Gas Exchange Impaired  Goal: Optimal Gas Exchange  Intervention: Optimize Oxygenation and Ventilation  Recent Flowsheet Documentation  Taken 6/18/2024 2100 by Betzy Frances, RN  Head of Bed (HOB) Positioning: HOB at 15 degrees   Goal Outcome Evaluation:      Plan of Care Reviewed With: patient    Overall Patient Progress: no changeOverall Patient Progress: no change

## 2024-06-19 NOTE — PLAN OF CARE
"  Problem: Adult Inpatient Plan of Care  Goal: Plan of Care Review  Description: The Plan of Care Review/Shift note should be completed every shift.  The Outcome Evaluation is a brief statement about your assessment that the patient is improving, declining, or no change.  This information will be displayed automatically on your shift  note.  Outcome: Progressing     Problem: Adult Inpatient Plan of Care  Goal: Patient-Specific Goal (Individualized)  Description: You can add care plan individualizations to a care plan. Examples of Individualization might be:  \"Parent requests to be called daily at 9am for status\", \"I have a hard time hearing out of my right ear\", or \"Do not touch me to wake me up as it startles  me\".  Outcome: Progressing     Problem: Adult Inpatient Plan of Care  Goal: Absence of Hospital-Acquired Illness or Injury  Intervention: Identify and Manage Fall Risk  Recent Flowsheet Documentation  Taken 6/19/2024 0800 by Thomas Botello, RN  Safety Promotion/Fall Prevention:   activity supervised   assistive device/personal items within reach     Problem: Adult Inpatient Plan of Care  Goal: Absence of Hospital-Acquired Illness or Injury  Intervention: Prevent Skin Injury  Recent Flowsheet Documentation  Taken 6/19/2024 0849 by Thomas Botello, RN  Body Position: sitting up in bed  Taken 6/19/2024 0800 by Thomas Botello, RN  Body Position: supine   Goal Outcome Evaluation:         No verbal or nonverbal expressions of pain, telemetry Sinus Tachy, CMS intact, LS congested expiratory wheezing, NC 2 L.                "

## 2024-06-19 NOTE — PROGRESS NOTES
[facility]    Progress Note - Hospitalist Service       Date of Admission:  6/16/2024    Assessment & Plan   Gonsalo Blakely is a 59 year old male admitted on 6/16/2024. He has a history of mild intermittent asthma, ?silicosis, HTN, and inconsistent healthcare admitted and is admitted for acute systolic CHF.     Acute systolic CHF  Ischemic cardiomyopathy  Multi-vessel CAD   Patient presents with months of worsening dyspnea and BLE swelling. He is a  and within this past week, his has worked as much as 12h every day, worsening his symptoms and prompting him to present to the ER. Initial workup notable for BNP 8796, trop 45 and 50, and WBC 11.7. EKG notable for non-specific T-wave inversions, no signs of acute ischemic changes. CXR showed bronchial inflammation, but no other acute processes.   - cardiology consulted, appreciate recommendations  - Echo - showing HFrEF with EF of 35-40% with severe inferior wall hypokinesis   - coronary angiogram 6/18/24 - found to have multivessel CAD   - CT surgery consulted    - continue ASA 81 mg  - lipids -  - atorvastatin 80 mg  - LFTs - within normal limits  - transition to PO diuresis 20 mg Lasix  - Metoprolol 25mg, Losartan 25 mg, spironolactone 25 mg   - strict I/O's, daily weights    Leukocytosis, improved  WBC 11.7 on admission. Afebrile. No additional si/sx of infection at this time. Will hold off on further infectious workup and abx. Has remained afebrile, hemodynamically stable. Highest WBC 15.7. Now normalized.   - Trend CBC    Hypertension  /118 on admission. Was diagnosed with this in the past and was on lisinopril, but developed a cough, so he was taken off of it. He notes his BP improved with lifestyle changes, which is why no additional medications were trialed.   - Losartan, Metoprolol per above      Mild intermittent asthma   ?Silicosis vs asbestosis  Patient only on albuterol and had not had to use  it until the last few months. 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 Does not follow with pulmonology.   - Continue PTA albuterol          Diet: Fluid restriction 1800 ML FLUID  Combination Diet 2 gm NA Diet; No Caffeine Diet, Low Saturated Fat Na <2400mg Diet    DVT Prophylaxis: Lovenox  Vincent Catheter: Not present  Fluids: PO   Lines: None     Cardiac Monitoring: ACTIVE order. Indication: Post- PCI/Angiogram (24 hours)  Code Status: Full Code      Clinically Significant Risk Factors                   # Acute heart failure with reduced ejection fraction: last echo with EF <40% and receiving IV diuretics                          Disposition Plan      Expected Discharge Date: 06/20/2024    Discharge Delays: Oxygen Needs - Arrange Home O2  Destination: home          The patient's care was discussed with the Attending Physician, Dr. Foss .    Amelia He, DO  Hospitalist Service  [facility]  Securely message with [employer] (more info)  Text page via [email or web address] Paging/Directory   ______________________________________________________________________    Interval History   No acute events overnight. Had some questions this morning about angiogram and plan moving forward. Unfortunately was delayed and didn't have angiogram until yesterday evening.     Significant other present this morning, and we were able to discuss angiogram results with photos that he had. Plan for CT surgery to see today.     Patient overwhelmed with all of the new diagnoses - processing appropriately, but overwhelmed.  Down 20 lbs from admission.     Physical Exam   Vital Signs: Temp: 97.6  F (36.4  C) Temp src: Oral BP: (!) 160/95 Pulse: 103   Resp: 20 SpO2: 90 % O2 Device: Nasal cannula Oxygen Delivery: 2 LPM  Weight: 166 lbs 9.6 oz    GEN: Pleasant male, in no acute distress.   HEENT: Normocephalic, atraumatic.   NECK: Supple. No cervical or supraclavicular adenopathy.   PULM:  Non-labored breathing. No use of accessory muscles. Expiratory wheezing.     CV: Regular rate and rhythm. Normal S1, S2. No rubs, murmurs, or gallops.   ABDOMEN: Normoactive bowel sounds. Non-tender to palpation. Non-distended.  EXTREMITES:  No clubbing, cyanosis, or edema.    SKIN: No rash on limited skin exam  NEURO:  Awake. Oriented to person, place, time and situation. Moving all extremities.    PSYCH: Calm. Appropriate affect, insight, judgment.       Medical Decision Making             Data   ------------------------- PAST 24 HR DATA REVIEWED -----------------------------------------------    I have personally reviewed the following data over the past 24 hrs:    10.3  \   16.8   / 228     138 97 (L) 26.5 (H) /  127 (H)   4.5 33 (H) 1.11 \       Imaging results reviewed over the past 24 hrs:   Recent Results (from the past 24 hour(s))   Cardiac Catheterization    Narrative    Images from the original result were not included.  Gonsalo Blakely is a 59 year old old male with HFrEF, new inferior WMA   on echocardiogram, cardiomyopathy; EF 35-40%, dyslipidemia, COPD who is   here for w/up of cardiomyopathy.    - multi-vessel native CAD; elevated L-sided filling pressures  - will stop to obtain CTS consult  - continue ASA 81mg daily indefinitely, add atorva 80  - continue aggressive risk factor modification          Findings:  LM:no obstruction  LAD:proximal 50% narrowing, 75% mid-distal, large D1 and D2 w/ 80-90%   narrowing  RI: large vessel, 85% proximal narrowing  Lcx:40-50% narrowing  RCA:dominant, rPLV w/ a 90% narrowing    LVEDP:23    Access:  R Radial artery    Closure:   Vasc Band

## 2024-06-19 NOTE — PROGRESS NOTES
Care Management Follow Up    Length of Stay (days): 3    Expected Discharge Date: 06/21/2024     Concerns to be Addressed: 2L O2    Patient plan of care discussed at interdisciplinary rounds: Yes    Anticipated Discharge Disposition:  home     Anticipated Discharge Services:    Anticipated Discharge DME:      Patient/family educated on Medicare website which has current facility and service quality ratings:    Education Provided on the Discharge Plan:    Patient/Family in Agreement with the Plan:      Referrals Placed by CM/SW:  none at this time  Private pay costs discussed: Not applicable    Additional Information:  Social history:  Pt lives with his SO, Marcy. He is independent with ADLs and IADLs. Works full time. No home care, community services or equipment in the home. No active insurance but pt is working on this. Referral sent to Financial SW. Goal is to discharge home.     Therapy recommendation is home.    Chart reviewed: pt not medically ready to discharge. Pt had angio yesterday. Remains on 2L O2. Possible plan for CABG.    RNCM to follow for medical progression, recommendations, and final discharge plan.      Yolanda Mata RN

## 2024-06-19 NOTE — PLAN OF CARE
Problem: Comorbidity Management  Goal: Maintenance of Heart Failure Symptom Control  Outcome: Progressing  Intervention: Maintain Heart Failure Management  Recent Flowsheet Documentation  Taken 6/19/2024 0100 by Carlee Paulino RN  Medication Review/Management: medications reviewed  Intervention: Maintain Heart Failure Management  Recent Flowsheet Documentation  Taken 6/19/2024 0100 by Carlee Paulino RN  Medication Review/Management: medications reviewed     Problem: Pain Acute  Goal: Optimal Pain Control and Function  Outcome: Progressing  Intervention: Prevent or Manage Pain  Recent Flowsheet Documentation  Taken 6/19/2024 0100 by Carlee Paulino RN  Medication Review/Management: medications reviewed   Goal Outcome Evaluation:  Pt slept between cares this shift.  Pleasant despite interruptions for assessments.  Denied pain this shift.  CMS intact in right arm.  No bleeding or bruising noted.  Pulse palpable.  Covered with bandaid. Pt with noted congested sounding cough.  Pt reports not new finding and has not worsened.  Lung sounds with expiratory wheezes.  Voiding in urinal.                         Tazorac Pregnancy And Lactation Text: This medication is not safe during pregnancy. It is unknown if this medication is excreted in breast milk.

## 2024-06-20 LAB
ANION GAP SERPL CALCULATED.3IONS-SCNC: 6 MMOL/L (ref 7–15)
BUN SERPL-MCNC: 25.4 MG/DL (ref 8–23)
CALCIUM SERPL-MCNC: 9.1 MG/DL (ref 8.8–10.2)
CHLORIDE SERPL-SCNC: 99 MMOL/L (ref 98–107)
CREAT SERPL-MCNC: 1.05 MG/DL (ref 0.67–1.17)
DEPRECATED HCO3 PLAS-SCNC: 34 MMOL/L (ref 22–29)
EGFRCR SERPLBLD CKD-EPI 2021: 81 ML/MIN/1.73M2
ERYTHROCYTE [DISTWIDTH] IN BLOOD BY AUTOMATED COUNT: 14 % (ref 10–15)
GLUCOSE SERPL-MCNC: 134 MG/DL (ref 70–99)
HCT VFR BLD AUTO: 53.6 % (ref 40–53)
HGB BLD-MCNC: 17.3 G/DL (ref 13.3–17.7)
MCH RBC QN AUTO: 28.8 PG (ref 26.5–33)
MCHC RBC AUTO-ENTMCNC: 32.3 G/DL (ref 31.5–36.5)
MCV RBC AUTO: 89 FL (ref 78–100)
PLATELET # BLD AUTO: 231 10E3/UL (ref 150–450)
POTASSIUM SERPL-SCNC: 4.5 MMOL/L (ref 3.4–5.3)
RBC # BLD AUTO: 6 10E6/UL (ref 4.4–5.9)
SODIUM SERPL-SCNC: 139 MMOL/L (ref 135–145)
WBC # BLD AUTO: 9.4 10E3/UL (ref 4–11)

## 2024-06-20 PROCEDURE — 99232 SBSQ HOSP IP/OBS MODERATE 35: CPT | Performed by: INTERNAL MEDICINE

## 2024-06-20 PROCEDURE — 250N000013 HC RX MED GY IP 250 OP 250 PS 637: Performed by: INTERNAL MEDICINE

## 2024-06-20 PROCEDURE — 250N000011 HC RX IP 250 OP 636: Mod: JZ

## 2024-06-20 PROCEDURE — 36415 COLL VENOUS BLD VENIPUNCTURE: CPT | Performed by: INTERNAL MEDICINE

## 2024-06-20 PROCEDURE — 120N000001 HC R&B MED SURG/OB

## 2024-06-20 PROCEDURE — 85027 COMPLETE CBC AUTOMATED: CPT | Performed by: INTERNAL MEDICINE

## 2024-06-20 PROCEDURE — 99232 SBSQ HOSP IP/OBS MODERATE 35: CPT | Mod: GC

## 2024-06-20 PROCEDURE — 80048 BASIC METABOLIC PNL TOTAL CA: CPT | Performed by: INTERNAL MEDICINE

## 2024-06-20 RX ADMIN — ENOXAPARIN SODIUM 40 MG: 40 INJECTION SUBCUTANEOUS at 11:50

## 2024-06-20 RX ADMIN — ASPIRIN 81 MG: 81 TABLET, COATED ORAL at 08:43

## 2024-06-20 RX ADMIN — ATORVASTATIN CALCIUM 80 MG: 40 TABLET, FILM COATED ORAL at 20:57

## 2024-06-20 RX ADMIN — METOPROLOL SUCCINATE 50 MG: 50 TABLET, EXTENDED RELEASE ORAL at 08:43

## 2024-06-20 RX ADMIN — SPIRONOLACTONE 25 MG: 25 TABLET ORAL at 08:44

## 2024-06-20 RX ADMIN — EMPAGLIFLOZIN 10 MG: 10 TABLET, FILM COATED ORAL at 13:11

## 2024-06-20 RX ADMIN — LOSARTAN POTASSIUM 25 MG: 25 TABLET, FILM COATED ORAL at 08:44

## 2024-06-20 RX ADMIN — FUROSEMIDE 20 MG: 20 TABLET ORAL at 08:44

## 2024-06-20 ASSESSMENT — ACTIVITIES OF DAILY LIVING (ADL)
ADLS_ACUITY_SCORE: 20
ADLS_ACUITY_SCORE: 22
ADLS_ACUITY_SCORE: 22
ADLS_ACUITY_SCORE: 20
ADLS_ACUITY_SCORE: 22
ADLS_ACUITY_SCORE: 22
ADLS_ACUITY_SCORE: 20
ADLS_ACUITY_SCORE: 22
ADLS_ACUITY_SCORE: 20
ADLS_ACUITY_SCORE: 22
ADLS_ACUITY_SCORE: 20
ADLS_ACUITY_SCORE: 22
ADLS_ACUITY_SCORE: 20
ADLS_ACUITY_SCORE: 22
ADLS_ACUITY_SCORE: 20
ADLS_ACUITY_SCORE: 20
ADLS_ACUITY_SCORE: 22
ADLS_ACUITY_SCORE: 20
ADLS_ACUITY_SCORE: 20

## 2024-06-20 NOTE — PLAN OF CARE
Problem: Adult Inpatient Plan of Care  Goal: Plan of Care Review  Outcome: Progressing  Flowsheets (Taken 6/19/2024 2152)  Plan of Care Reviewed With: patient     Problem: Adult Inpatient Plan of Care  Goal: Patient-Specific Goal (Individualized)  Outcome: Progressing     Problem: Adult Inpatient Plan of Care  Goal: Absence of Hospital-Acquired Illness or Injury  Outcome: Progressing  Intervention: Identify and Manage Fall Risk  Recent Flowsheet Documentation  Taken 6/19/2024 1545 by Adegun, Oluwadamilola, RN  Safety Promotion/Fall Prevention:   assistive device/personal items within reach   clutter free environment maintained   nonskid shoes/slippers when out of bed  Intervention: Prevent Skin Injury  Recent Flowsheet Documentation  Taken 6/19/2024 1545 by Adegun, Oluwadamilola, RN  Body Position: position changed independently     Problem: Adult Inpatient Plan of Care  Goal: Optimal Comfort and Wellbeing  Outcome: Progressing     Problem: Comorbidity Management  Goal: Maintenance of Asthma Control  Outcome: Progressing  Intervention: Maintain Asthma Symptom Control  Recent Flowsheet Documentation  Taken 6/19/2024 1545 by Adegun, Oluwadamilola, RN  Medication Review/Management: medications reviewed     Problem: Comorbidity Management  Goal: Maintenance of Heart Failure Symptom Control  Intervention: Maintain Heart Failure Management  Recent Flowsheet Documentation  Taken 6/19/2024 1545 by Adegun, Oluwadamilola, RN  Medication Review/Management: medications reviewed     Problem: Comorbidity Management  Goal: Blood Pressure in Desired Range  Outcome: Progressing  Intervention: Maintain Blood Pressure Management  Recent Flowsheet Documentation  Taken 6/19/2024 1545 by Adegun, Oluwadamilola, RN  Medication Review/Management: medications reviewed     Problem: Pain Acute  Goal: Optimal Pain Control and Function  Outcome: Progressing  Intervention: Prevent or Manage Pain  Recent Flowsheet Documentation  Taken 6/19/2024  1545 by Adegun, Oluwadamilola, RN  Medication Review/Management: medications reviewed     Problem: Gas Exchange Impaired  Goal: Optimal Gas Exchange  Outcome: Progressing  Intervention: Optimize Oxygenation and Ventilation  Recent Flowsheet Documentation  Taken 6/19/2024 2062 by Adegun, Oluwadamilola, RN  Head of Bed (HOB) Positioning: HOB at 20 degrees     Problem: Cardiac Catheterization (Diagnostic/Interventional)  Goal: Absence of Bleeding  Outcome: Progressing     Problem: Cardiac Catheterization (Diagnostic/Interventional)  Goal: Stable Heart Rate and Rhythm  Outcome: Progressing     Problem: Cardiac Catheterization (Diagnostic/Interventional)  Goal: Absence of Embolism Signs and Symptoms  Outcome: Progressing     Problem: Cardiac Catheterization (Diagnostic/Interventional)  Goal: Optimal Pain Control and Function  Outcome: Progressing   Goal Outcome Evaluation: Pt is alert and oriented, able to make needs known. VSS, on O2 2L via NC, denies pain. Pt had US bilateral carotid. Pt on tele, NSR on this shift. Pt on 1800ml fluid rxr, pt voiding appropriately.       Plan of Care Reviewed With: patient

## 2024-06-20 NOTE — PLAN OF CARE
Problem: Adult Inpatient Plan of Care  Goal: Optimal Comfort and Wellbeing  Outcome: Progressing  Intervention: Monitor Pain and Promote Comfort  Recent Flowsheet Documentation  Taken 6/20/2024 0810 by Mario Coley, RN  Pain Management Interventions:   repositioned   rest     Problem: Comorbidity Management  Goal: Blood Pressure in Desired Range  Outcome: Progressing  Intervention: Maintain Blood Pressure Management  Recent Flowsheet Documentation  Taken 6/20/2024 0900 by Mario Coley, RN  Medication Review/Management: medications reviewed   Goal Outcome Evaluation:    Patient A&Ox4, vitally stable on room air. Initially, patient was on 2L nasal cannula, but patient maintaining oxygen saturations on room air currently. Denies pain. Denies dizziness, shortness of breath, and light headedness. Patient ambulating up ad sil independently in room and in halls. Remote telemetry discontinued. Ate 100% of meals. Voiding appropriately.

## 2024-06-20 NOTE — PLAN OF CARE
Cardiac Rehab Discharge Summary    Reason for therapy discharge:    Discharged to remains in hospital    Progress towards therapy goal(s). See goals on Care Plan in [facility] electronic health record for goal details.  Goals met    Therapy recommendation(s):    Still deciding on CABG this hospital stay. Re-order appropriate after surgery.

## 2024-06-20 NOTE — PLAN OF CARE
Problem: Adult Inpatient Plan of Care  Goal: Optimal Comfort and Wellbeing  Outcome: Progressing   Goal Outcome Evaluation:  Quiet night for this pt.  Denied pain.  Reported he slept fairly well.  Right wrist cms intact.  Continues on oxygen 2 liters with sats in the mid 90's.  Still has a congested sounding cough which pt states is productive though nurse has not witnessed.  Lung sounds with expiratory wheezing though improved from yesterday.

## 2024-06-20 NOTE — PROGRESS NOTES
Assessment/Plan:  Ischemic cardiomyopathy, acute systolic congestive heart failure: The patient responded to IV diuresis well.  Continue oral Lasix 20 mg daily and spironolactone 25 mg daily.  Increased metoprolol XL to 50 mg daily, continue losartan 25 mg daily.  Add Jardiance 10 mg daily.  Continue to optimize his CHF medications based on ACC guideline.  Multiple vessel coronary artery disease: He had a coronary angiogram yesterday which was reported to multiple vessel disease, LAD proximal segment 50%, mid to distal 75%, 80 to 90% in D1 and D2, 85% in proximal ramus, 90% in right posterior lateral branch.  CV surgery service was consulted for CABG.  Continue aspirin, atorvastatin and metoprolol.  Dyslipidemia: LDL is 133.  Continue atorvastatin 80 mg at bedtime.  Repeat lipid profile and liver function in 2 months.  Chronic obstructive pulmonary disease: Stable.       Subjective:  Interval History:  Has no complaints of chest pain. Improved shortness of breath.  Leg edema is resolved.  No acute events overnight.    Current Medications:  Scheduled Meds:  Current Facility-Administered Medications   Medication Dose Route Frequency Provider Last Rate Last Admin    aspirin EC tablet 81 mg  81 mg Oral Daily Roman Florence MD   81 mg at 06/20/24 0843    atorvastatin (LIPITOR) tablet 80 mg  80 mg Oral At Bedtime Roman Florence MD   80 mg at 06/19/24 2058    empagliflozin (JARDIANCE) tablet 10 mg  10 mg Oral Daily Lorna Samaniego MD        enoxaparin ANTICOAGULANT (LOVENOX) injection 40 mg  40 mg Subcutaneous Q24H Amelia He DO   40 mg at 06/20/24 1150    furosemide (LASIX) tablet 20 mg  20 mg Oral Daily Roman Florence MD   20 mg at 06/20/24 0844    losartan (COZAAR) tablet 25 mg  25 mg Oral Daily Roman Florence MD   25 mg at 06/20/24 0844    metoprolol succinate ER (TOPROL XL) 24 hr tablet 50 mg  50 mg Oral Daily Lorna Samaniego MD   50 mg at 06/20/24 0843    sodium chloride (PF) 0.9% PF flush 3 mL  3 mL  Intracatheter Q8H Roman Florence MD   3 mL at 06/20/24 0553    spironolactone (ALDACTONE) tablet 25 mg  25 mg Oral Daily Roman Florence MD   25 mg at 06/20/24 0844     Continuous Infusions:  Current Facility-Administered Medications   Medication Dose Route Frequency Provider Last Rate Last Admin    - MEDICATION INSTRUCTIONS -   Does not apply DOES NOT GO TO Roman Cool MD        Reason ACE/ARB/ARNI order not selected   Other DOES NOT GO TO Roman Cool MD         Objective:   Vital signs in last 24 hours:  Temp:  [97.9  F (36.6  C)-98.5  F (36.9  C)] 98.3  F (36.8  C)  Pulse:  [71-93] 91  Resp:  [16-20] 18  BP: (116-162)/(68-98) 145/90  SpO2:  [92 %-98 %] 95 %  Weight:   [unfilled]     Physical Exam:  General Appearance:   Awake, Alert, No acute distress.   HEENT:  No scleral icterus; the mucous membranes were moist.   Neck: No cervical bruits. No jugular venous distention   Lungs:   Diminished breathing sounds. No crackles. No wheezing.   Cardiovascular:   RRR, normal first and second heart sounds with no murmurs. No rubs or gallops.     Abdomen:  Soft. No tenderness. Bowels sounds are present   Extremities: No leg edema. Equal posterior tibial pulsese.   Skin: Warm, Dry. No rashes.   Neurologic: Mood and affect are appropriate. No focal deficits.         Cardiographics:   Report: personally reviewed. .      Tele monitoring -   Sinus rhythm    Echocardiogram 6-:  Left ventricular function is decreased. The ejection fraction is 35-40%  (moderately reduced).  There is mild-moderate global hypokinesia of the left ventricle.  There is severe inferior wall hypokinesis.  Normal right ventricle size and systolic function.  The left atrium is mildly dilated.  The right atrium is mildly dilated.  There is moderate trileaflet aortic sclerosis.  IVC diameter and respiratory changes fall into an intermediate range  suggesting an RA pressure of 8 mmHg.  No hemodynamically significant  valvular abnormalities on 2D or color flow imaging.    Coronary angiogram on 6-:  LM:no obstruction  LAD:proximal 50% narrowing, 75% mid-distal, large D1 and D2 w/ 80-90% narrowing  RI: large vessel, 85% proximal narrowing  Lcx:40-50% narrowing  RCA:dominant, rPLV w/ a 90% narrowing    Lab Results:   personally reviewed.     Lab Results   Component Value Date     06/18/2024    CO2 35 06/18/2024    CO2 26 01/12/2019    BUN 25.7 06/18/2024    BUN 14 01/12/2019     Lab Results   Component Value Date    TROPONINI <0.01 01/12/2019     Lab Results   Component Value Date    WBC 15.7 06/18/2024    HGB 16.3 06/18/2024    HCT 49.4 06/18/2024    MCV 89 06/18/2024     06/18/2024     Lab Results   Component Value Date    CHOL 217 06/17/2024    TRIG 61 06/17/2024    HDL 72 06/17/2024     06/17/2024

## 2024-06-20 NOTE — PROGRESS NOTES
[facility]    Progress Note - Hospitalist Service       Date of Admission:  6/16/2024    Assessment & Plan   Gonsalo Blakely is a 59 year old male admitted on 6/16/2024. He has a history of mild intermittent asthma, ?silicosis, HTN, and inconsistent healthcare admitted and is admitted for acute systolic CHF found to have multivessel CAD and now undergoing work up for potential CABG.     Acute systolic CHF  Ischemic cardiomyopathy  Multi-vessel CAD   Patient presents with months of worsening dyspnea and BLE swelling. He is a  and within this past week, his has worked as much as 12h every day, worsening his symptoms and prompting him to present to the ER. Initial workup notable for BNP 8796, trop 45 and 50, and WBC 11.7. EKG notable for non-specific T-wave inversions, no signs of acute ischemic changes. CXR showed bronchial inflammation, but no other acute processes.  - cardiology consulted, appreciate recommendations  - Echo - showing HFrEF with EF of 35-40% with severe inferior wall hypokinesis   - coronary angiogram 6/18/24 - found to have multivessel CAD   - CT surgery consulted    - continue ASA 81 mg  - lipids -  - atorvastatin 80 mg  - Metoprolol 50mg, Losartan 25 mg, spironolactone 25 mg, Lasix 20 mg  - strict I/O's, daily weights    Leukocytosis, improved  WBC 11.7 on admission. Afebrile. No additional si/sx of infection at this time. Will hold off on further infectious workup and abx. Has remained afebrile, hemodynamically stable. Highest WBC 15.7. Now normalized.   - Trend CBC    Hypertension  /118 on admission. Was diagnosed with this in the past and was on lisinopril, but developed a cough, so he was taken off of it. He notes his BP improved with lifestyle changes, which is why no additional medications were trialed.   - Losartan, Metoprolol as above      Mild intermittent asthma   ?Silicosis vs asbestosis  Patient only on albuterol and had  not had to use it until the last few months. 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   - Continue PTA albuterol PRN        Diet: Fluid restriction 1800 ML FLUID  Combination Diet 2 gm NA Diet; No Caffeine Diet, Low Saturated Fat Na <2400mg Diet    DVT Prophylaxis: Lovenox  Vincent Catheter: Not present  Fluids: PO   Lines: None     Cardiac Monitoring: None  Code Status: Full Code      Clinically Significant Risk Factors                   # Acute heart failure with reduced ejection fraction: last echo with EF <40% and receiving IV diuretics                          Disposition Plan      Expected Discharge Date: 06/24/2024    Discharge Delays: Oxygen Needs - Arrange Home O2  Procedure Pending (enter procedure & time in comments)  Destination: home          The patient's care was discussed with the Attending Physician, Dr. Foss .    Amelia He DO  Hospitalist Service  [facility]  Securely message with [email or web address] (more info)  Text page via [employer] Paging/Directory   ______________________________________________________________________    Interval History   No acute events overnight. No reported chest pain or shortness of breath. Fluid status has improved significantly since admission, and patient notices significant difference. Patient wanting to walk the halls and get some fresh air for which I encouraged. Awaiting further recs/plans from CT surgery.     Physical Exam   Vital Signs: Temp: 98.3  F (36.8  C) Temp src: Oral BP: (!) 145/90 Pulse: 91   Resp: 18 SpO2: 95 % O2 Device: None (Room air) Oxygen Delivery: 2 LPM  Weight: 166 lbs 9.6 oz    GEN: Pleasant male, in no acute distress.   HEENT: Normocephalic, atraumatic.   NECK: Supple. No cervical or supraclavicular adenopathy.   PULM: Non-labored breathing. No use of accessory muscles. Expiratory wheezing on appreciated in right upper lung.   CV: Regular rate and rhythm. Normal S1, S2. No rubs, murmurs,  or gallops.   ABDOMEN: Normoactive bowel sounds. Non-tender to palpation. Non-distended.  EXTREMITES:  No clubbing, cyanosis, or edema.    SKIN: No rash on limited skin exam  NEURO:  Awake. Oriented to person, place, time and situation. Moving all extremities.    PSYCH: Calm. Appropriate affect, insight, judgment.       Medical Decision Making             Data   ------------------------- PAST 24 HR DATA REVIEWED -----------------------------------------------    I have personally reviewed the following data over the past 24 hrs:    9.4  \   17.3   / 231     139 99 25.4 (H) /  134 (H)   4.5 34 (H) 1.05 \       Imaging results reviewed over the past 24 hrs:   Recent Results (from the past 24 hour(s))   US Carotid Bilateral    Narrative    EXAM: US CAROTID BILATERAL  LOCATION: [facility]  DATE: 6/19/2024    INDICATION: pre op CABG  COMPARISON: None.  TECHNIQUE: Duplex exam performed utilizing 2D gray-scale imaging, Doppler interrogation with color-flow and spectral waveform analysis. The percent diameter stenosis is determined using Updated Recommendations for Carotid Stenosis Interpretation Criteria   from [employer].    FINDINGS:    RIGHT: Mild plaque at the bifurcation. The peak systolic velocity in the ICA is less than 180 cm/sec, consistent with less than 50% stenosis. Normal velocities in the ECA. Antegrade flow within the vertebral artery.     LEFT: Mild plaque at the bifurcation. The peak systolic velocity in the ICA is less than 180 cm/sec, consistent with less than 50% stenosis. Normal velocities in the ECA. Antegrade flow within the vertebral artery.    VELOCITY CHART:  CCA   Right: 71/19 cm/s   Left: 103/20 cm/s  ICA   Right: 100/48 cm/s   Left: 63/25 cm/s  ECA   Right: 86 cm/s   Left: 99 cm/s  ICA/CCA PSV Ratio   Right: 1.4   Left: 0.6      Impression    IMPRESSION:  1.  Mild plaque formation, velocities consistent with less than 50% stenosis in the right internal carotid  artery.  2.  Mild plaque formation, velocities consistent with less than 50% stenosis in the left internal carotid artery.  3.  Flow within the vertebral arteries is antegrade.

## 2024-06-21 ENCOUNTER — PATIENT OUTREACH (OUTPATIENT)
Dept: CARE COORDINATION | Facility: CLINIC | Age: 59
End: 2024-06-21

## 2024-06-21 VITALS
RESPIRATION RATE: 18 BRPM | DIASTOLIC BLOOD PRESSURE: 82 MMHG | SYSTOLIC BLOOD PRESSURE: 121 MMHG | HEIGHT: 73 IN | HEART RATE: 88 BPM | OXYGEN SATURATION: 93 % | BODY MASS INDEX: 21.79 KG/M2 | TEMPERATURE: 98.1 F | WEIGHT: 164.4 LBS

## 2024-06-21 LAB
ANION GAP SERPL CALCULATED.3IONS-SCNC: 6 MMOL/L (ref 7–15)
BUN SERPL-MCNC: 25 MG/DL (ref 8–23)
CALCIUM SERPL-MCNC: 9.3 MG/DL (ref 8.8–10.2)
CHLORIDE SERPL-SCNC: 99 MMOL/L (ref 98–107)
CREAT SERPL-MCNC: 1.08 MG/DL (ref 0.67–1.17)
DEPRECATED HCO3 PLAS-SCNC: 33 MMOL/L (ref 22–29)
EGFRCR SERPLBLD CKD-EPI 2021: 78 ML/MIN/1.73M2
ERYTHROCYTE [DISTWIDTH] IN BLOOD BY AUTOMATED COUNT: 13.8 % (ref 10–15)
GLUCOSE SERPL-MCNC: 127 MG/DL (ref 70–99)
HCT VFR BLD AUTO: 53.9 % (ref 40–53)
HGB BLD-MCNC: 17.4 G/DL (ref 13.3–17.7)
MCH RBC QN AUTO: 28.5 PG (ref 26.5–33)
MCHC RBC AUTO-ENTMCNC: 32.3 G/DL (ref 31.5–36.5)
MCV RBC AUTO: 88 FL (ref 78–100)
PLATELET # BLD AUTO: 235 10E3/UL (ref 150–450)
POTASSIUM SERPL-SCNC: 4.7 MMOL/L (ref 3.4–5.3)
RBC # BLD AUTO: 6.1 10E6/UL (ref 4.4–5.9)
SODIUM SERPL-SCNC: 138 MMOL/L (ref 135–145)
WBC # BLD AUTO: 8.7 10E3/UL (ref 4–11)

## 2024-06-21 PROCEDURE — 99238 HOSP IP/OBS DSCHRG MGMT 30/<: CPT | Mod: GC

## 2024-06-21 PROCEDURE — 250N000011 HC RX IP 250 OP 636: Mod: JZ

## 2024-06-21 PROCEDURE — 36415 COLL VENOUS BLD VENIPUNCTURE: CPT | Performed by: INTERNAL MEDICINE

## 2024-06-21 PROCEDURE — 99232 SBSQ HOSP IP/OBS MODERATE 35: CPT | Performed by: INTERNAL MEDICINE

## 2024-06-21 PROCEDURE — 250N000013 HC RX MED GY IP 250 OP 250 PS 637: Performed by: INTERNAL MEDICINE

## 2024-06-21 PROCEDURE — 85027 COMPLETE CBC AUTOMATED: CPT | Performed by: INTERNAL MEDICINE

## 2024-06-21 PROCEDURE — 80048 BASIC METABOLIC PNL TOTAL CA: CPT | Performed by: INTERNAL MEDICINE

## 2024-06-21 RX ORDER — LOSARTAN POTASSIUM 25 MG/1
25 TABLET ORAL DAILY
Qty: 90 TABLET | Refills: 0 | Status: ON HOLD | OUTPATIENT
Start: 2024-06-22 | End: 2024-07-20

## 2024-06-21 RX ORDER — SPIRONOLACTONE 25 MG/1
25 TABLET ORAL DAILY
Qty: 90 TABLET | Refills: 0 | Status: SHIPPED | OUTPATIENT
Start: 2024-06-22 | End: 2024-09-23

## 2024-06-21 RX ORDER — METOPROLOL SUCCINATE 50 MG/1
50 TABLET, EXTENDED RELEASE ORAL DAILY
Qty: 90 TABLET | Refills: 0 | Status: ON HOLD | OUTPATIENT
Start: 2024-06-22 | End: 2024-07-20

## 2024-06-21 RX ORDER — FUROSEMIDE 20 MG
20 TABLET ORAL DAILY
Qty: 90 TABLET | Refills: 0 | Status: ON HOLD | OUTPATIENT
Start: 2024-06-22 | End: 2024-07-20

## 2024-06-21 RX ADMIN — ENOXAPARIN SODIUM 40 MG: 40 INJECTION SUBCUTANEOUS at 12:22

## 2024-06-21 RX ADMIN — SPIRONOLACTONE 25 MG: 25 TABLET ORAL at 08:22

## 2024-06-21 RX ADMIN — LOSARTAN POTASSIUM 25 MG: 25 TABLET, FILM COATED ORAL at 08:22

## 2024-06-21 RX ADMIN — ASPIRIN 81 MG: 81 TABLET, COATED ORAL at 08:22

## 2024-06-21 RX ADMIN — EMPAGLIFLOZIN 10 MG: 10 TABLET, FILM COATED ORAL at 08:26

## 2024-06-21 RX ADMIN — FUROSEMIDE 20 MG: 20 TABLET ORAL at 08:22

## 2024-06-21 RX ADMIN — METOPROLOL SUCCINATE 50 MG: 50 TABLET, EXTENDED RELEASE ORAL at 08:22

## 2024-06-21 ASSESSMENT — ACTIVITIES OF DAILY LIVING (ADL)
DEPENDENT_IADLS:: INDEPENDENT
ADLS_ACUITY_SCORE: 20

## 2024-06-21 NOTE — PROGRESS NOTES
[facility]    Progress Note - Hospitalist Service       Date of Admission:  6/16/2024    Assessment & Plan   Gonsalo Blakely is a 59 year old male admitted on 6/16/2024. He has a history of mild intermittent asthma, ?silicosis, HTN, and inconsistent healthcare admitted and is admitted for acute systolic CHF found to have multivessel CAD and now undergoing work up for potential CABG.     Acute systolic CHF  Ischemic cardiomyopathy  Multi-vessel CAD   Patient presents with months of worsening dyspnea and BLE swelling. He is a  and within this past week, his has worked as much as 12h every day, worsening his symptoms and prompting him to present to the ER. Initial workup notable for BNP 8796, trop 45 and 50, and WBC 11.7. EKG notable for non-specific T-wave inversions, no signs of acute ischemic changes. CXR showed bronchial inflammation, but no other acute processes.  - cardiology consulted, appreciate recommendations  - Echo - showing HFrEF with EF of 35-40% with severe inferior wall hypokinesis   - coronary angiogram 6/18/24 - found to have multivessel CAD  - CT surgery consulted > patient medically optimized - plan to discuss with surgeon for CAGB date (Wednesday/Thursday next week)   - continue ASA 81 mg  - lipids -  - atorvastatin 80 mg  - Metoprolol 50mg, Losartan 25 mg, spironolactone 25 mg, Lasix 20 mg, Jardiance 10 mg  - strict I/O's, daily weights    Leukocytosis, improved  WBC 11.7 on admission. Afebrile. No additional si/sx of infection at this time. Will hold off on further infectious workup and abx. Has remained afebrile, hemodynamically stable. Highest WBC 15.7. Now normalized.   - Trend CBC    Hypertension  /118 on admission. Was diagnosed with this in the past and was on lisinopril, but developed a cough, so he was taken off of it. He notes his BP improved with lifestyle changes, which is why no additional medications were trialed.  -  continue GDMT as above     Mild intermittent asthma   ?Silicosis vs asbestosis  Patient only on albuterol and had not had to use it until the last few months. 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   - Continue PTA albuterol PRN        Diet: Fluid restriction 1800 ML FLUID  Combination Diet 2 gm NA Diet; No Caffeine Diet, Low Saturated Fat Na <2400mg Diet    DVT Prophylaxis: Lovenox  Vincent Catheter: Not present  Fluids: PO   Lines: None     Cardiac Monitoring: None  Code Status: Full Code      Clinically Significant Risk Factors                   # Chronic heart failure with reduced ejection fraction: last echo with EF <40%                          Disposition Plan      Expected Discharge Date: 06/24/2024    Discharge Delays: Oxygen Needs - Arrange Home O2  Procedure Pending (enter procedure & time in comments)  Destination: home          The patient's care was discussed with the Attending Physician, Dr. Calle .    Amelia He, DO  Hospitalist Service  [facility]  Securely message with [employer] (more info)  Text page via [employer] Paging/Directory   ______________________________________________________________________    Interval History   No acute events overnight. No reported chest pain or shortness of breath. No concerns. Inquiring about CT surgery and plan moving forward. Was able to get up more yesterday and walk around and get some fresh air.      Physical Exam   Vital Signs: Temp: 98.1  F (36.7  C) Temp src: Oral BP: 121/82 Pulse: 88   Resp: 18 SpO2: 93 % O2 Device: None (Room air)    Weight: 164 lbs 6.4 oz    GEN: Pleasant male, in no acute distress.   HEENT: Normocephalic, atraumatic.   NECK: Supple. No cervical or supraclavicular adenopathy.   PULM: Non-labored breathing. No use of accessory muscles. Mild expiratory wheezing on appreciated in right upper lung.   CV: Regular rate and rhythm. Normal S1, S2. No rubs, murmurs, or gallops.    ABDOMEN: Normoactive bowel sounds. Non-tender to palpation. Non-distended.  EXTREMITES:  No clubbing, cyanosis, or edema.    SKIN: No rash on limited skin exam  NEURO:  Awake. Oriented to person, place, time and situation. Moving all extremities.    PSYCH: Calm. Appropriate affect, insight, judgment.       Medical Decision Making             Data   ------------------------- PAST 24 HR DATA REVIEWED -----------------------------------------------    I have personally reviewed the following data over the past 24 hrs:    8.7  \   17.4   / 235     138 99 25.0 (H) /  127 (H)   4.7 33 (H) 1.08 \       Imaging results reviewed over the past 24 hrs:   No results found for this or any previous visit (from the past 24 hour(s)).

## 2024-06-21 NOTE — PROGRESS NOTES
Care Management Discharge Note    Discharge Date: 06/21/2024       Discharge Disposition:  Return home with s/o    Discharge Services:  Assist of S/O prn    Discharge DME: Per Care Team    Discharge Transportation: S/O providing    Private pay costs discussed: Not applicable as Pt declines any home health care.    Does the patient's insurance plan have a 3 day qualifying hospital stay waiver?  No    PAS Confirmation Code:  N/A  Patient/family educated on Medicare website which has current facility and service quality ratings:  N/A as Pt declines any home health care    Education Provided on the Discharge Plan:  Yes  Persons Notified of Discharge Plans: Pt and S/O  Patient/Family in Agreement with the Plan:  Yes    Handoff Referral Completed: Prn    Additional Information:  CM informed Pt discharging home today.  Noted Pt is self pay and working on insurance issue identified per previous CM with Financial SW referral sent.  CM met with Pt and s/o Marcy.   Pt was previously independent with ADLs and IADLs.   Works full time and had No home care, community services or equipment in the home previously.   Pt is not interested in having Home skilled nurse upon discharge and prior to the scheduled surgery.  Per MD, no oxygen needs at discharge.    No further CM services.    Daxa Coley RN

## 2024-06-21 NOTE — PLAN OF CARE
Problem: Adult Inpatient Plan of Care  Goal: Plan of Care Review  Description: The Plan of Care Review/Shift note should be completed every shift.  The Outcome Evaluation is a brief statement about your assessment that the patient is improving, declining, or no change.  This information will be displayed automatically on your shift  note.  Outcome: Adequate for Care Transition   Goal Outcome Evaluation:  Pt is alert and oriented-pleasant and cooperative with cares. Vitals are stable. Pt denies chest pain. Weight is down-no swelling of legs noted. Pt has been voiding well -tolerated cardiac diet. Up independently in halls. Angio site dry and intact to right wrist. Pt understands need to check weight every day.

## 2024-06-21 NOTE — PLAN OF CARE
Goal Outcome Evaluation:  Pt  given discharge instructions- and followup phone numbers for Cardiology and Cardio Thoracic Surgery and he will call them.

## 2024-06-21 NOTE — PLAN OF CARE
Problem: Adult Inpatient Plan of Care  Goal: Plan of Care Review  Description: The Plan of Care Review/Shift note should be completed every shift.  The Outcome Evaluation is a brief statement about your assessment that the patient is improving, declining, or no change.  This information will be displayed automatically on your shift  note.  Outcome: Progressing  Goal: Absence of Hospital-Acquired Illness or Injury  Intervention: Identify and Manage Fall Risk  Recent Flowsheet Documentation  Taken 6/21/2024 0004 by Jignesh Luna RN  Safety Promotion/Fall Prevention:   assistive device/personal items within reach   clutter free environment maintained   nonskid shoes/slippers when out of bed   safety round/check completed  Intervention: Prevent Skin Injury  Recent Flowsheet Documentation  Taken 6/21/2024 0004 by Jignesh Luna RN  Body Position: position changed independently     Problem: Comorbidity Management  Goal: Maintenance of Heart Failure Symptom Control  Outcome: Progressing  Intervention: Maintain Heart Failure Management  Recent Flowsheet Documentation  Taken 6/21/2024 0004 by Jignesh Luna RN  Medication Review/Management: medications reviewed  Intervention: Maintain Heart Failure Management  Recent Flowsheet Documentation  Taken 6/21/2024 0004 by Jignesh Luna RN  Medication Review/Management: medications reviewed     Problem: Comorbidity Management  Goal: Blood Pressure in Desired Range  Outcome: Progressing  Intervention: Maintain Blood Pressure Management  Recent Flowsheet Documentation  Taken 6/21/2024 0004 by Jignesh Luna RN  Medication Review/Management: medications reviewed     Problem: Pain Acute  Goal: Optimal Pain Control and Function  Outcome: Progressing  Intervention: Prevent or Manage Pain  Recent Flowsheet Documentation  Taken 6/21/2024 0004 by Jignesh Luna RN  Medication Review/Management: medications reviewed   Goal  Outcome Evaluation:       Pt alert & oriented. Independent in room. Denies pain. No shortness of breath. 1800ml fluid restriction. On room air, Vital Signs stable. Will continue to monitor.

## 2024-06-21 NOTE — PLAN OF CARE
Problem: Cardiac Catheterization (Diagnostic/Interventional)  Goal: Anesthesia/Sedation Recovery  Outcome: Met  Goal: Absence of Vascular Access Complication  Outcome: Met   Goal Outcome Evaluation:             No problems from anesthesia 3 days ago and vascular access doing well with no complications

## 2024-06-21 NOTE — PROGRESS NOTES
Clinic Care Coordination Contact  Transitions of Care Outreach    Chief Complaint   Patient presents with    Clinic Care Coordination - Post Hospital     New Patient/TCM-Hospital Follow up       Most Recent Admission Date:    Most Recent Admission Diagnosis:      Most Recent Discharge Date:    Most Recent Discharge Diagnosis: Bronchitis - J40  Mild intermittent reactive airway disease with acute exacerbation - J45.21     Transitions of Care Assessment                       Follow up Plan          Future Appointments   Date Time Provider Department Center   7/2/2024  8:20 AM [facility]   7/12/2024  2:50 PM Trneton Ennis MD [facility]       Outpatient Plan as outlined on AVS reviewed with patient.      For any urgent concerns, please contact our 24 hour nurse triage line: [phone]       EULOGIO Rivera

## 2024-06-21 NOTE — PLAN OF CARE
"  Problem: Adult Inpatient Plan of Care  Goal: Plan of Care Review  Description: The Plan of Care Review/Shift note should be completed every shift.  The Outcome Evaluation is a brief statement about your assessment that the patient is improving, declining, or no change.  This information will be displayed automatically on your shift  note.  Outcome: Progressing  Flowsheets (Taken 6/20/2024 2206)  Plan of Care Reviewed With: patient     Problem: Adult Inpatient Plan of Care  Goal: Patient-Specific Goal (Individualized)  Description: You can add care plan individualizations to a care plan. Examples of Individualization might be:  \"Parent requests to be called daily at 9am for status\", \"I have a hard time hearing out of my right ear\", or \"Do not touch me to wake me up as it startles  me\".  Outcome: Progressing     Problem: Adult Inpatient Plan of Care  Goal: Absence of Hospital-Acquired Illness or Injury  Outcome: Progressing  Intervention: Identify and Manage Fall Risk  Recent Flowsheet Documentation  Taken 6/20/2024 1603 by Adegun, Oluwadamilola, RN  Safety Promotion/Fall Prevention:   assistive device/personal items within reach   clutter free environment maintained   nonskid shoes/slippers when out of bed  Intervention: Prevent Skin Injury  Recent Flowsheet Documentation  Taken 6/20/2024 1800 by Adegun, Oluwadamilola, RN  Body Position: (position changed independtly) other (see comments)  Taken 6/20/2024 1603 by Adegun, Oluwadamilola, RN  Body Position: position changed independently     Problem: Adult Inpatient Plan of Care  Goal: Optimal Comfort and Wellbeing  Outcome: Progressing     Problem: Comorbidity Management  Goal: Maintenance of Heart Failure Symptom Control  Outcome: Progressing  Intervention: Maintain Heart Failure Management  Recent Flowsheet Documentation  Taken 6/20/2024 1603 by Adegun, Oluwadamilola, RN  Medication Review/Management: medications reviewed  Intervention: Maintain Heart Failure " Management  Recent Flowsheet Documentation  Taken 6/20/2024 1603 by Adegun, Oluwadamilola, RN  Medication Review/Management: medications reviewed     Problem: Comorbidity Management  Goal: Blood Pressure in Desired Range  Outcome: Progressing  Intervention: Maintain Blood Pressure Management  Recent Flowsheet Documentation  Taken 6/20/2024 1603 by Adegun, Oluwadamilola, RN  Medication Review/Management: medications reviewed     Problem: Pain Acute  Goal: Optimal Pain Control and Function  Outcome: Progressing  Intervention: Prevent or Manage Pain  Recent Flowsheet Documentation  Taken 6/20/2024 1603 by Adegun, Oluwadamilola, RN  Medication Review/Management: medications reviewed     Problem: Gas Exchange Impaired  Goal: Optimal Gas Exchange  Outcome: Progressing  Intervention: Optimize Oxygenation and Ventilation  Recent Flowsheet Documentation  Taken 6/20/2024 1800 by Adegun, Oluwadamilola, RN  Head of Bed (HOB) Positioning: HOB at 20 degrees  Taken 6/20/2024 1603 by Adegun, Oluwadamilola, RN  Head of Bed (HOB) Positioning: HOB at 20 degrees     Problem: Cardiac Catheterization (Diagnostic/Interventional)  Goal: Absence of Bleeding  Outcome: Progressing     Problem: Cardiac Catheterization (Diagnostic/Interventional)  Goal: Optimal Pain Control and Function  Outcome: Progressing     Problem: Cardiac Catheterization (Diagnostic/Interventional)  Goal: Absence of Vascular Access Complication  Outcome: Progressing  Intervention: Prevent and Manage Access Complications  Recent Flowsheet Documentation  Taken 6/20/2024 1800 by Adegun, Oluwadamilola, RN  Head of Bed (HOB) Positioning: HOB at 20 degrees  Taken 6/20/2024 1603 by Adegun, Oluwadamilola, RN  Activity Management: activity adjusted per tolerance  Head of Bed (HOB) Positioning: HOB at 20 degrees   Goal Outcome Evaluation: Pt is alert and oriented, able to make needs known. VSS, on RA, denies SOB, pain. Pt on 1800m fluid restriction. Pt independent in the room.          Plan of Care Reviewed With: patient

## 2024-06-21 NOTE — DISCHARGE SUMMARY
[facility]  Discharge Summary - Medicine & Pediatrics       Date of Admission:  6/16/2024  Date of Discharge:  6/21/2024  Discharging Provider: Dr. Rock STOCK, Dr. Alva IBARRA  Discharge Service: Hospitalist Service    Discharge Diagnoses   The primary encounter diagnosis was SOB (shortness of breath). Diagnoses of Heart failure with reduced ejection fraction, NYHA class II (H), Exacerbation of asthma, unspecified asthma severity, unspecified whether persistent, Systolic congestive heart failure, unspecified HF chronicity (H), Primary hypertension, Coronary artery disease involving native coronary artery of native heart without angina pectoris, Acute systolic congestive heart failure (H), and Congestive heart failure, unspecified HF chronicity, unspecified heart failure type (H) were also pertinent to this visit.      Clinically Significant Risk Factors          Follow-ups Needed After Discharge   Follow-up Appointments     Follow-up and recommended labs and tests       Follow up with primary care provider, Physician No Ref-Primary, within 7   days for hospital follow- up.            Unresulted Labs Ordered in the Past 30 Days of this Admission       No orders found from 5/17/2024 to 6/17/2024.            Discharge Disposition   Discharged to home  Condition at discharge: Stable    Hospital Course   Gonsalo Blakely was admitted on 6/16/2024 for new onset heart failure.  The following problems were addressed during his hospitalization:    Acute systolic CHF  Ischemic cardiomyopathy  Multi-vessel CAD   Patient presented with months of worsening dyspnea and BLE swelling. He is a  and within this past week, his has worked as much as 12h every day, worsening his symptoms and prompting him to present to the ER. Initial workup notable for BNP 8796, trop 45 and 50, and WBC 11.7. EKG notable for non-specific T-wave inversions, no signs of acute ischemic changes. CXR showed bronchial  inflammation, but no other acute processes.  Cardiology was consulted.  Echocardiogram showed EF of 35 to 40% with severe inferior wall hypokinesis.  Patient underwent coronary angiogram and found to have multivessel coronary artery disease.  Cardiothoracic surgery was consulted, with plans for CABG. Patient to continue goal-directed medical therapy in anticipation of CABG sometime next (tentatively Wednesday or Thursday).  - cardiology follow up in 3 months  - continue Metoprolol 50mg, Losartan 25 mg, spironolactone 25 mg, Lasix 20 mg, Jardiance 10 mg  - continue atorvastatin 80mg     Leukocytosis, improved  WBC 11.7 on admission. Afebrile. No additional si/sx of infection at this time. Will hold off on further infectious workup and abx. Has remained afebrile, hemodynamically stable. Highest WBC 15.7. Normalized upon discharge.      Hypertension  /118 on admission. Was diagnosed with this in the past and was on lisinopril, but developed a cough, so he was taken off of it. He notes his BP improved with lifestyle changes, which is why no additional medications were trialed. Continue GDMT as above      Consultations This Hospital Stay   CORE CLINIC EVALUATION IP CONSULT  OCCUPATIONAL THERAPY ADULT IP CONSULT  NUTRITION SERVICES ADULT IP CONSULT  CARDIOLOGY IP CONSULT  CARE MANAGEMENT / SOCIAL WORK IP CONSULT  PHARMACY IP CONSULT  CARDIOTHORACIC SURGERY IP CONSULT  PHARMACY IP CONSULT  SMOKING CESSATION PROGRAM IP CONSULT    Code Status   Full Code       The patient was discussed with Dr. Alva He, [address] [address]  Phone: [phone]  Fax: [phone]  ______________________________________________________________________    Physical Exam   Vital Signs: Temp: 98.1  F (36.7  C) Temp src: Oral BP: 121/82 Pulse: 88   Resp: 18 SpO2: 93 % O2 Device: None (Room air)    Weight: 164 lbs 6.4 oz  GEN: Pleasant male, in no acute  distress.   HEENT: Normocephalic, atraumatic.   NECK: Supple. No cervical or supraclavicular adenopathy.   PULM: Non-labored breathing. No use of accessory muscles. Mild expiratory wheezing on appreciated in right upper lung.   CV: Regular rate and rhythm. Normal S1, S2. No rubs, murmurs, or gallops.   ABDOMEN: Normoactive bowel sounds. Non-tender to palpation. Non-distended.  EXTREMITES:  No clubbing, cyanosis, or edema.    SKIN: No rash on limited skin exam  NEURO:  Awake. Oriented to person, place, time and situation. Moving all extremities.    PSYCH: Calm. Appropriate affect, insight, judgment      Primary Care Physician   Amelia He    Discharge Orders      Medication Therapy Management Referral      Reason for your hospital stay    You were hospitalized for new onset heart failure. You underwent an angiogram which showed disease in multiple vessels. We consulted cardiology and cardiothoracic surgery who are recommended doing a CABG procedure. They will work on scheduling this for you and update you on timing.    We'd be happy to see you in clinic. Please call the [facility] at [phone].     Follow-up and recommended labs and tests     Follow up with primary care provider, Physician No Ref-Primary, within 7 days for hospital follow- up.     Activity    Your activity upon discharge: activity as tolerated     Diet    Follow this diet upon discharge: Orders Placed This Encounter      Combination Diet 2 gm NA Diet; No Caffeine Diet, Low Saturated Fat Na <2400mg Diet       Significant Results and Procedures   Most Recent 3 CBC's:  Recent Labs   Lab Test 06/21/24  0541 06/20/24  0505 06/19/24  0531   WBC 8.7 9.4 10.3   HGB 17.4 17.3 16.8   MCV 88 89 88    231 228     Most Recent 3 BMP's:  Recent Labs   Lab Test 06/21/24  0541 06/20/24  0505 06/19/24  0531    139 138   POTASSIUM 4.7 4.5 4.5   CHLORIDE 99 99 97*   CO2 33* 34* 33*   BUN 25.0* 25.4* 26.5*   CR 1.08 1.05 1.11   ANIONGAP 6*  6* 8   JAIME 9.3 9.1 9.0   * 134* 127*       Discharge Medications   Current Discharge Medication List        START taking these medications    Details   atorvastatin (LIPITOR) 80 MG tablet Take 1 tablet (80 mg) by mouth daily  Qty: 90 tablet, Refills: 3    Associated Diagnoses: Heart failure with reduced ejection fraction, NYHA class II (H); Exacerbation of asthma, unspecified asthma severity, unspecified whether persistent; SOB (shortness of breath); Systolic congestive heart failure, unspecified HF chronicity (H)      empagliflozin (JARDIANCE) 10 MG TABS tablet Take 1 tablet (10 mg) by mouth daily  Qty: 90 tablet, Refills: 1    Associated Diagnoses: Acute systolic congestive heart failure (H)      furosemide (LASIX) 20 MG tablet Take 1 tablet (20 mg) by mouth daily  Qty: 90 tablet, Refills: 0    Associated Diagnoses: Acute systolic congestive heart failure (H)      losartan (COZAAR) 25 MG tablet Take 1 tablet (25 mg) by mouth daily  Qty: 90 tablet, Refills: 0    Associated Diagnoses: Acute systolic congestive heart failure (H)      metoprolol succinate ER (TOPROL XL) 50 MG 24 hr tablet Take 1 tablet (50 mg) by mouth daily  Qty: 90 tablet, Refills: 0    Associated Diagnoses: Acute systolic congestive heart failure (H)      spironolactone (ALDACTONE) 25 MG tablet Take 1 tablet (25 mg) by mouth daily  Qty: 90 tablet, Refills: 0    Associated Diagnoses: Acute systolic congestive heart failure (H)           CONTINUE these medications which have NOT CHANGED    Details   aspirin 81 MG EC tablet Take 81 mg by mouth daily           Allergies   Allergies   Allergen Reactions    Ace Inhibitors Cough

## 2024-06-21 NOTE — PROGRESS NOTES
CVTS Progress Note    Please see CVTS consultation 6/19/2024 with Dr. Virk.     Briefly: Gonsalo Blakely is a 59 year old with severe multi-vessel coronary artery disease and newly found ischemic cardiomyopathy with acute heart failure, and leukocytosis. Has a history of progressive LOREDO, intermittent asthma, tobacco use and asbestos exposure, but does report using PPE. Echocardiography demonstrates left ventricular systolic dysfunction with an LVEF of 35-40%. He was deemed to be a reasonable surgical candidate after medical optimization.     Review of chart shows that he is medically optimized with normal WBC, afebrile, weight is down 10 kg from admission. Normotensive to mildly hypertensive, on good HF GDT and tolerating well.     Carotid US with <50% carotid stenosis.   CXR 6/16/24 with no acute findings.     Recent Results (from the past 24 hour(s))   Basic metabolic panel    Collection Time: 06/21/24  5:41 AM   Result Value Ref Range    Sodium 138 135 - 145 mmol/L    Potassium 4.7 3.4 - 5.3 mmol/L    Chloride 99 98 - 107 mmol/L    Carbon Dioxide (CO2) 33 (H) 22 - 29 mmol/L    Anion Gap 6 (L) 7 - 15 mmol/L    Urea Nitrogen 25.0 (H) 8.0 - 23.0 mg/dL    Creatinine 1.08 0.67 - 1.17 mg/dL    GFR Estimate 78 >60 mL/min/1.73m2    Calcium 9.3 8.8 - 10.2 mg/dL    Glucose 127 (H) 70 - 99 mg/dL   CBC with platelets    Collection Time: 06/21/24  5:41 AM   Result Value Ref Range    WBC Count 8.7 4.0 - 11.0 10e3/uL    RBC Count 6.10 (H) 4.40 - 5.90 10e6/uL    Hemoglobin 17.4 13.3 - 17.7 g/dL    Hematocrit 53.9 (H) 40.0 - 53.0 %    MCV 88 78 - 100 fL    MCH 28.5 26.5 - 33.0 pg    MCHC 32.3 31.5 - 36.5 g/dL    RDW 13.8 10.0 - 15.0 %    Platelet Count 235 150 - 450 10e3/uL      Recommendations:     Will review with surgeon and could possibly schedule for CABG next Wednesday or Thursday.   If patient discharges before then please let the CVTS office now and we will plan to see in clinic prior to surgery. Thank you.      Opal Burton, DANIELA, CNP  [facility] Cardiothoracic Surgery  Pgr [phone]

## 2024-06-21 NOTE — PROGRESS NOTES
Assessment/Plan:  Ischemic cardiomyopathy, acute systolic congestive heart failure: The patient responded to IV diuresis well.  Continue oral Lasix 20 mg daily and spironolactone 25 mg daily.  Increased metoprolol XL to 50 mg daily, continue losartan 25 mg daily.  Add Jardiance 10 mg daily.  Continue to optimize his CHF medications based on ACC guideline.  Multiple vessel coronary artery disease: He had a coronary angiogram yesterday which was reported to multiple vessel disease, LAD proximal segment 50%, mid to distal 75%, 80 to 90% in D1 and D2, 85% in proximal ramus, 90% in right posterior lateral branch.  CV surgery service was consulted for CABG.  Continue aspirin, atorvastatin and metoprolol.  Dyslipidemia: LDL is 133.  Continue atorvastatin 80 mg at bedtime.  Repeat lipid profile and liver function in 2 months.  Chronic obstructive pulmonary disease: Stable.     Patient is stable.  He has a planned CABG next Wednesday or Thursday.  He would like to go home.  CV surgery service is informed.  The patient is a scheduled to follow-up with me in cardiology clinic in 3 months.      Subjective:  Interval History:  Has no complaints of chest pain. Improved shortness of breath.  Leg edema is resolved.  No acute events overnight.  The patient states that he feels good enough to go home on the weekend and had a CABG next week.    Current Medications:  Scheduled Meds:  Current Facility-Administered Medications   Medication Dose Route Frequency Provider Last Rate Last Admin    aspirin EC tablet 81 mg  81 mg Oral Daily Roman Florence MD   81 mg at 06/21/24 0822    atorvastatin (LIPITOR) tablet 80 mg  80 mg Oral At Bedtime Roman Florence MD   80 mg at 06/20/24 2057    empagliflozin (JARDIANCE) tablet 10 mg  10 mg Oral Daily Lorna Samaniego MD   10 mg at 06/21/24 0826    enoxaparin ANTICOAGULANT (LOVENOX) injection 40 mg  40 mg Subcutaneous Q24H Amelia He DO   40 mg at 06/21/24 1222    furosemide (LASIX) tablet  20 mg  20 mg Oral Daily Roman Florence MD   20 mg at 06/21/24 0822    losartan (COZAAR) tablet 25 mg  25 mg Oral Daily Roman Florence MD   25 mg at 06/21/24 0822    metoprolol succinate ER (TOPROL XL) 24 hr tablet 50 mg  50 mg Oral Daily Lorna Samaniego MD   50 mg at 06/21/24 0822    sodium chloride (PF) 0.9% PF flush 3 mL  3 mL Intracatheter Q8H Roman Florence MD   3 mL at 06/20/24 2100    spironolactone (ALDACTONE) tablet 25 mg  25 mg Oral Daily Roman Florence MD   25 mg at 06/21/24 0822     Continuous Infusions:  Current Facility-Administered Medications   Medication Dose Route Frequency Provider Last Rate Last Admin    - MEDICATION INSTRUCTIONS -   Does not apply DOES NOT GO TO Roman Cool MD        Reason ACE/ARB/ARNI order not selected   Other DOES NOT GO TO Roman Cool MD         Objective:   Vital signs in last 24 hours:  Temp:  [98.1  F (36.7  C)-98.2  F (36.8  C)] 98.1  F (36.7  C)  Pulse:  [76-90] 88  Resp:  [18] 18  BP: (121-142)/(79-90) 121/82  SpO2:  [93 %-96 %] 93 %  Weight:   [unfilled]     Physical Exam:  General Appearance:   Awake, Alert, No acute distress.   HEENT:  No scleral icterus; the mucous membranes were moist.   Neck: No cervical bruits. No jugular venous distention   Lungs:   Diminished breathing sounds. No crackles. No wheezing.   Cardiovascular:   RRR, normal first and second heart sounds with no murmurs. No rubs or gallops.     Abdomen:  Soft. No tenderness. Bowels sounds are present   Extremities: No leg edema. Equal posterior tibial pulsese.   Skin: Warm, Dry. No rashes.   Neurologic: Mood and affect are appropriate. No focal deficits.         Cardiographics:   Report: personally reviewed. .      Tele monitoring -   Sinus rhythm    Echocardiogram 6-:  Left ventricular function is decreased. The ejection fraction is 35-40%  (moderately reduced).  There is mild-moderate global hypokinesia of the left ventricle.  There is severe inferior wall  hypokinesis.  Normal right ventricle size and systolic function.  The left atrium is mildly dilated.  The right atrium is mildly dilated.  There is moderate trileaflet aortic sclerosis.  IVC diameter and respiratory changes fall into an intermediate range  suggesting an RA pressure of 8 mmHg.  No hemodynamically significant valvular abnormalities on 2D or color flow imaging.    Coronary angiogram on 6-:  LM:no obstruction  LAD:proximal 50% narrowing, 75% mid-distal, large D1 and D2 w/ 80-90% narrowing  RI: large vessel, 85% proximal narrowing  Lcx:40-50% narrowing  RCA:dominant, rPLV w/ a 90% narrowing    Lab Results:   personally reviewed.     Lab Results   Component Value Date     06/18/2024    CO2 35 06/18/2024    CO2 26 01/12/2019    BUN 25.7 06/18/2024    BUN 14 01/12/2019     Lab Results   Component Value Date    TROPONINI <0.01 01/12/2019     Lab Results   Component Value Date    WBC 15.7 06/18/2024    HGB 16.3 06/18/2024    HCT 49.4 06/18/2024    MCV 89 06/18/2024     06/18/2024     Lab Results   Component Value Date    CHOL 217 06/17/2024    TRIG 61 06/17/2024    HDL 72 06/17/2024     06/17/2024

## 2024-06-24 ENCOUNTER — TELEPHONE (OUTPATIENT)
Dept: CARDIOLOGY | Facility: CLINIC | Age: 59
End: 2024-06-24

## 2024-06-24 ENCOUNTER — PREP FOR PROCEDURE (OUTPATIENT)
Dept: ADMINISTRATIVE | Facility: CLINIC | Age: 59
End: 2024-06-24

## 2024-06-24 ENCOUNTER — TELEPHONE (OUTPATIENT)
Dept: FAMILY MEDICINE | Facility: CLINIC | Age: 59
End: 2024-06-24

## 2024-06-24 DIAGNOSIS — I25.10 CAD (CORONARY ARTERY DISEASE): Primary | ICD-10-CM

## 2024-06-24 NOTE — TELEPHONE ENCOUNTER
Pt currently waiting to get insurance coverage for surgery. CAB with Dr. Virk to be scheduled on June 27 with IABP placed prior to surgery on 6/27.    Pt will call CV RN as soon as he secures insurance. Pt would like to have surgery ASAP, and he quit smoking on 6/16 when he was admitted to the hospital. Prior to be admitted he was only smoking 2 cigarettes a day.      ----- Message from Leeanna CANALES sent at 6/24/2024  9:20 AM CDT -----    Sounds good!   Thank you,  Leeanna    ----- Message -----  From: Eulalia Lucas RN  Sent: 6/24/2024   9:17 AM CDT  To: Leeanna Townsend    Thank you! He was on my list to call. [facility] wants to wait to do surgery until he quits smoking so I will call him and have that discussion.    Marina Parson    ----- Message -----  From: Leeanna Townsend  Sent: 6/24/2024   8:49 AM CDT  To: Eulalia Lucas RN    Pt is calling and said he was going to be scheduled for surgery for this week sometime. He was a little confused about things. He was in the hospital and discharged. Please reach out to pt at [phone] to clarify things and what is needed.  Thank you,  Leeanna

## 2024-06-24 NOTE — TELEPHONE ENCOUNTER
MTM referral from: Transitions of Care (recent hospital discharge or ED visit)    MT referral outreach attempt #1 on June 24, 2024 at 11:54 AM      Outcome: Patient is not interested at this time because nothing has changed    Use RANI for the carrier/Plan on the flowsheet          See Tano GUADARRAMA   [phone]

## 2024-06-25 ENCOUNTER — ALLIED HEALTH/NURSE VISIT (OUTPATIENT)
Dept: RESEARCH | Facility: CLINIC | Age: 59
End: 2024-06-25

## 2024-06-25 DIAGNOSIS — Z00.6 EXAMINATION OF PARTICIPANT OR CONTROL IN CLINICAL RESEARCH: Primary | ICD-10-CM

## 2024-06-25 LAB
ABO/RH(D): NORMAL
ANTIBODY SCREEN: NEGATIVE
SPECIMEN EXPIRATION DATE: NORMAL

## 2024-06-25 PROCEDURE — 99207 PR DROP WITH A PROCEDURE: CPT | Mod: 25

## 2024-06-25 ASSESSMENT — ACTIVITIES OF DAILY LIVING (ADL): DEPENDENT_IADLS:: INDEPENDENT

## 2024-06-25 NOTE — PROGRESS NOTES
Assessment & Plan     Hospital discharge follow-up  Encounter to establish care  Hospitalized 6/16 - 6/21 for new onset HFrEF, found to have severe coronary artery disease and had workup done for CAGB. Ultimately, was discharged with plans for outpatient CABG. See below for new diagnoses and therapy.     Systolic congestive heart failure, unspecified HF chronicity (H)  Ischemic cardiomyopathy  Coronary artery disease involving native coronary artery of native heart without angina pectoris  Acute systolic congestive heart failure (H)  Patient presented with months of worsening dyspnea and BLE swelling. Initial workup notable for BNP 8796, trop 45 and 50, and WBC 11.7. EKG notable for non-specific T-wave inversions, no signs of acute ischemic changes Echocardiogram showed EF of 35 to 40% with severe inferior wall hypokinesis. Patient underwent coronary angiogram and found to have multivessel coronary artery disease. Cardiothoracic surgery was consulted, with plans for CABG   - cardiology follow up - scheduled 7/12/24  - CT surgery planned CABG 7/15/24  - continue Metoprolol 50mg, Losartan 25 mg, spironolactone 25 mg, Lasix 20 mg, Jardiance 10 mg  - continue atorvastatin 80mg    SOB (shortness of breath)  Asthma, unspecified asthma severity, unspecified whether complicated, unspecified whether persistent  Patient with history of ?asthma vs. silicosis/asbestosis. 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 Currently has albuterol that he uses PRN. No prior reported PFTs. ACT of 9 today. Spent majority of visit on hospital follow up in anticipating of CABG in 2 weeks. Recommend follow up to further discuss asthma after procedure and recovery.  However, will place order for PFTs to be completed after planned procedure.   - General PFT Lab (Please always keep checked); Future  - Pulmonary Function Test; Future    Encounter for tobacco use cessation counseling  History of tobacco use. At most, was  smoking 1/2 packs per day. Had weaned down to 3 cigarettes/day prior to hospital admission. Since discharge from the hospital, has not been using any cigarettes.  Discussed options for treatment.  Patient feels that at this time he is able to have cessation of tobacco use without the need for any nicotine replacement or medications to help with cravings.  Discussed that if patient is struggling with cravings, can initiate treatment options.    Health Maintenance  Patient due for other preventative measures.  Discussed that first patient undergo CABG for known multivessel coronary disease in systolic heart failure.  Following procedure, plan to follow-up with patient to further discuss preventative measures to include: Colon cancer screening, lung cancer screening, immunizations, HIV/hepatitis C screening.        MED REC REQUIRED  Post Medication Reconciliation Status:     Nicotine/Tobacco Cessation  He reports that he quit smoking 7 days ago. His smoking use included cigarettes. He has been exposed to tobacco smoke. He quit smokeless tobacco use about 44 years ago.  His smokeless tobacco use included chew.  Nicotine/Tobacco Cessation Plan  Information offered: Patient not interested at this time      Return in about 4 weeks (around 7/30/2024) for Follow up.    Marty Brush is a 59 year old, presenting for the following health issues:  Hospital F/U         No data to display              HPI        Hospital Follow-up Visit:    Hospital/Nursing Home/IP Rehab Facility: [facility]  Date of Admission: 6/16/24  Date of Discharge: 6/21/24  Reason(s) for Admission: new onset HFrEF found to have multivessel CAD  Was the patient in the ICU or did the patient experience delirium during hospitalization?  No  Do you have any other stressors you would like to discuss with your provider? No    Problems taking medications regularly:  None  Medication changes since discharge: Metoprolol 50mg,  "Losartan 25 mg, spironolactone 25 mg, Lasix 20 mg, Jardiance 10 mg, Atorvastatin 80 mg  Problems adhering to non-medication therapy:  None    Summary of hospitalization:  [facility] discharge summary reviewed  Diagnostic Tests/Treatments reviewed.  Follow up needed: planned CABG with CT surgery  Other Healthcare Providers Involved in Patient s Care:         Specialist appointment - Cardiology, CT surgery   Update since discharge: stable.       Plan of care communicated with patient               Review of Systems  Constitutional, neuro, ENT, endocrine, pulmonary, cardiac, gastrointestinal, genitourinary, musculoskeletal, integument and psychiatric systems are negative, except as otherwise noted.      Objective    /82 (BP Location: Right arm, Patient Position: Sitting, Cuff Size: Adult Regular)   Pulse 72   Temp 97.9  F (36.6  C)   Resp 18   Ht 1.84 m (6' 0.44\")   Wt 77.2 kg (170 lb 1.9 oz)   BMI 22.79 kg/m    Body mass index is 22.79 kg/m .  Physical Exam   GENERAL: alert and no distress  NECK: no adenopathy, no asymmetry, masses, or scars  RESP: lungs clear to auscultation - no rales, rhonchi or wheezes  CV: regular rate and rhythm, normal S1 S2, no S3 or S4, no murmur, click or rub, no peripheral edema  SKIN: urticarial appearing rash under right arm along triceps area  ABDOMEN: soft, nontender, no hepatosplenomegaly, no masses and bowel sounds normal  MS: no gross musculoskeletal defects noted, no edema        Signed Electronically by: Amelia He, DO    "

## 2024-06-25 NOTE — PROGRESS NOTES
Surgery Clinical Trials Office Informed Consent Process Documentation  [employer] of Postoperative Complications and Near-Term Adverse Outcomes in Patients Undergoing Coronary Artery Bypass Graft Surgery  IRB#[id number]    ICF Version Date 05/16/2024  IRB Approval Date: 05/22/2024     Date of Consent : 06/25/24    The subject was screened and meets all of the inclusion criteria and none of the exclusion criteria is met.This note is documenting that the patient was contacted by the study team and consented to the study.    The subject was told:  -that the study involves research   -the purpose of the research study  -the expected duration of the study and the approximate number of subject sought  -of procedures that are identified as experimental  -of reasonably foreseeable risks or discomforts to the subject  -of any benefits to the subject or others that may be expected from the research  -of alternative procedures and/or treatment  -how the confidentiality of records would be maintained  -whether or not compensation and medical treatments are available should injury occur as a result of the study  -who to contact if they have questions related to the research study or questions regarding research subjects' rights  -that participation is completely voluntary and that their decision to or not to participate will have no impact on their relationships with the N and the staff    No study procedures were completed prior to the consent being obtained.  The use of historical information (lab or assessments) used for the purpose of the study was approved by subject.  The subject was fully aware that we would be reviewing their medical record for the study.  The subject demonstrated an understanding of what the study involved.  Specifically, how this study differed from standard of care at our center and what was required of the subject as part of the study.  The subject reviewed the consent form and was given the  opportunity to ask questions before signing.  Questions and concerns were answered by the study staff and/or study physician.    A copy of the signed informed consent document was offered to the subject:  [x] Yes [] No     The consent required the use of a :   [] Yes [x] No     A 'short form' consent was used:     [] Yes [x] No   If yes, provide name of witness:     This subject was previously mailed a consent form and contacted by the research team via phone:  [] Yes [x] No     An eConsent was used: [x] Yes [] No     Questions to Evaluate Subject Comprehension of Study:     Question: Adequate Response? If No, explain what actions were taken   What is being studied? [x] Yes  [] No   If you participate, what will be different than if you decide not to participate?  [x] Yes  [] No   How long will the study last; will you be required to return for visits? [x] Yes  [] No   What kinds of risks are there? [x] Yes  [] No      Do you understand that you can withdraw consent at any time and for any reason while participating in the study? [x] Yes  [] No      Study Coordinators:  Primo Lorenzana  [phone]  [email or web address]  Lennie Garcia  [phone]  [email or web address]     : Quan Castro        [phone]     [email or web address]   PI: Leonardo Zuñiga, PhD, [facility] [phone]  [email or web address]    Note:       Sign: Primo Lorenzana on 6/25/2024 at 2:31 PM

## 2024-06-25 NOTE — PROGRESS NOTES
Clinic Care Coordination Contact  Transitions of Care Outreach    Chief Complaint   Patient presents with    Clinic Care Coordination - Post Hospital     New Patient/TCM-Hospital Follow up       Most Recent Admission Date: 6/16/2024   Most Recent Admission Diagnosis: Heart failure with reduced ejection fraction, NYHA class II (H) - I50.20  Exacerbation of asthma, unspecified asthma severity, unspecified whether persistent - J45.901     Most Recent Discharge Date: 6/21/2024   Most Recent Discharge Diagnosis: Heart failure with reduced ejection fraction, NYHA class II (H) - I50.20  Exacerbation of asthma, unspecified asthma severity, unspecified whether persistent - J45.901  SOB (shortness of breath) - R06.02  Systolic congestive heart failure, unspecified HF chronicity (H) - I50.20  Primary hypertension - I10  Coronary artery disease involving native coronary artery of native heart without angina pectoris - I25.10  Acute systolic congestive heart failure (H) - I50.21  Congestive heart failure, unspecified HF chronicity, unspecified heart failure type (H) - I50.9     Transitions of Care Assessment    Discharge Assessment  How are you doing now that you are home?: Pt is doing better  How are your symptoms? (Red Flag symptoms escalate to triage hotline per guidelines): Improved  Do you know how to contact your clinic care team if you have future questions or changes to your health status? : Yes  Does the patient have their discharge instructions? : Yes  Does the patient have questions regarding their discharge instructions? : No  Were you started on any new medications or were there changes to any of your previous medications? : Yes  Does the patient have all of their medications?: Yes  Do you have questions regarding any of your medications? : No  Do you have all of your needed medical supplies or equipment (DME)?  (i.e. oxygen tank, CPAP, cane, etc.): No - What equipment or supplies are needed?                  Follow  up Plan     Discharge Follow-Up  Discharge follow up appointment scheduled in alignment with recommended follow up timeframe or Transitions of Risk Category? (Low = within 30 days; Moderate= within 14 days; High= within 7 days): Yes  Discharge Follow Up Appointment Date: 07/02/24  Discharge Follow Up Appointment Scheduled with?: Primary Care Provider    Future Appointments   Date Time Provider Department Center   7/2/2024  8:20 AM Amelia He DO [facility]   7/12/2024  2:50 PM Trenton Ennis MD [facility]       Outpatient Plan as outlined on AVS reviewed with patient.      For any urgent concerns, please contact our 24 hour nurse triage line: [phone]       EULOGIO Rivera

## 2024-06-26 ENCOUNTER — ANESTHESIA EVENT (OUTPATIENT)
Dept: SURGERY | Facility: HOSPITAL | Age: 59
End: 2024-06-26
Payer: MEDICAID

## 2024-06-26 ENCOUNTER — LAB (OUTPATIENT)
Dept: LAB | Facility: HOSPITAL | Age: 59
End: 2024-06-26
Payer: MEDICAID

## 2024-06-26 DIAGNOSIS — I25.10 CORONARY ARTERY DISEASE INVOLVING NATIVE CORONARY ARTERY OF NATIVE HEART, UNSPECIFIED WHETHER ANGINA PRESENT: ICD-10-CM

## 2024-06-26 DIAGNOSIS — I50.9 ACUTE DECOMPENSATED HEART FAILURE (H): ICD-10-CM

## 2024-06-26 DIAGNOSIS — I50.9 ACUTE DECOMPENSATED HEART FAILURE (H): Primary | ICD-10-CM

## 2024-06-26 LAB
ALBUMIN UR-MCNC: NEGATIVE MG/DL
APPEARANCE UR: CLEAR
APTT PPP: 28 SECONDS (ref 22–38)
BILIRUB UR QL STRIP: NEGATIVE
COLOR UR AUTO: ABNORMAL
GLUCOSE UR STRIP-MCNC: >1000 MG/DL
HBA1C MFR BLD: 7 %
HGB UR QL STRIP: NEGATIVE
INR PPP: 1.01 (ref 0.85–1.15)
KETONES UR STRIP-MCNC: NEGATIVE MG/DL
LEUKOCYTE ESTERASE UR QL STRIP: NEGATIVE
MAGNESIUM SERPL-MCNC: 2.2 MG/DL (ref 1.7–2.3)
NITRATE UR QL: NEGATIVE
PH UR STRIP: 5.5 [PH] (ref 5–7)
RBC URINE: 0 /HPF
SP GR UR STRIP: 1.02 (ref 1–1.03)
UROBILINOGEN UR STRIP-MCNC: <2 MG/DL
WBC URINE: <1 /HPF

## 2024-06-26 PROCEDURE — 83735 ASSAY OF MAGNESIUM: CPT

## 2024-06-26 PROCEDURE — 85610 PROTHROMBIN TIME: CPT

## 2024-06-26 PROCEDURE — 81003 URINALYSIS AUTO W/O SCOPE: CPT

## 2024-06-26 PROCEDURE — 86900 BLOOD TYPING SEROLOGIC ABO: CPT

## 2024-06-26 PROCEDURE — 84134 ASSAY OF PREALBUMIN: CPT

## 2024-06-26 PROCEDURE — 36415 COLL VENOUS BLD VENIPUNCTURE: CPT

## 2024-06-26 PROCEDURE — 85730 THROMBOPLASTIN TIME PARTIAL: CPT

## 2024-06-26 PROCEDURE — 83036 HEMOGLOBIN GLYCOSYLATED A1C: CPT

## 2024-06-26 RX ORDER — CEFAZOLIN SODIUM/WATER 2 G/20 ML
2 SYRINGE (ML) INTRAVENOUS SEE ADMIN INSTRUCTIONS
Status: CANCELLED | OUTPATIENT
Start: 2024-06-27

## 2024-06-26 RX ORDER — CHLORHEXIDINE GLUCONATE ORAL RINSE 1.2 MG/ML
10 SOLUTION DENTAL ONCE
Status: CANCELLED | OUTPATIENT
Start: 2024-06-27 | End: 2024-06-26

## 2024-06-26 RX ORDER — ASPIRIN 81 MG/1
81 TABLET, CHEWABLE ORAL
Status: CANCELLED | OUTPATIENT
Start: 2024-06-27

## 2024-06-26 RX ORDER — METHADONE HYDROCHLORIDE 10 MG/ML
20 INJECTION, SOLUTION INTRAMUSCULAR; INTRAVENOUS; SUBCUTANEOUS ONCE
Status: CANCELLED | OUTPATIENT
Start: 2024-06-27 | End: 2024-06-26

## 2024-06-26 RX ORDER — DEXMEDETOMIDINE HYDROCHLORIDE 4 UG/ML
.2-1.2 INJECTION, SOLUTION INTRAVENOUS CONTINUOUS
Status: CANCELLED | OUTPATIENT
Start: 2024-06-27

## 2024-06-26 RX ORDER — ASPIRIN 81 MG/1
162 TABLET, CHEWABLE ORAL
Status: CANCELLED | OUTPATIENT
Start: 2024-06-27

## 2024-06-26 RX ORDER — SODIUM CHLORIDE, SODIUM GLUCONATE, SODIUM ACETATE, POTASSIUM CHLORIDE AND MAGNESIUM CHLORIDE 526; 502; 368; 37; 30 MG/100ML; MG/100ML; MG/100ML; MG/100ML; MG/100ML
1000 INJECTION, SOLUTION INTRAVENOUS
Status: CANCELLED | OUTPATIENT
Start: 2024-06-27 | End: 2024-06-27

## 2024-06-26 RX ORDER — LIDOCAINE 40 MG/G
CREAM TOPICAL
Status: CANCELLED | OUTPATIENT
Start: 2024-06-26

## 2024-06-26 RX ORDER — DEXMEDETOMIDINE HYDROCHLORIDE 4 UG/ML
.2-.7 INJECTION, SOLUTION INTRAVENOUS CONTINUOUS
Status: CANCELLED | OUTPATIENT
Start: 2024-06-26

## 2024-06-26 RX ORDER — PHENYLEPHRINE HCL IN 0.9% NACL 50MG/250ML
.1-6 PLASTIC BAG, INJECTION (ML) INTRAVENOUS CONTINUOUS
Status: CANCELLED | OUTPATIENT
Start: 2024-06-27

## 2024-06-26 RX ORDER — CEFAZOLIN SODIUM/WATER 2 G/20 ML
2 SYRINGE (ML) INTRAVENOUS
Status: CANCELLED | OUTPATIENT
Start: 2024-06-27

## 2024-06-27 ENCOUNTER — HOSPITAL ENCOUNTER (OUTPATIENT)
Facility: HOSPITAL | Age: 59
Discharge: HOME | End: 2024-06-27
Attending: INTERNAL MEDICINE | Admitting: INTERNAL MEDICINE
Payer: MEDICAID

## 2024-06-27 ENCOUNTER — PREP FOR PROCEDURE (OUTPATIENT)
Dept: ADMINISTRATIVE | Facility: CLINIC | Age: 59
End: 2024-06-27

## 2024-06-27 ENCOUNTER — TELEPHONE (OUTPATIENT)
Dept: CARDIOLOGY | Facility: CLINIC | Age: 59
End: 2024-06-27

## 2024-06-27 ENCOUNTER — TELEPHONE (OUTPATIENT)
Dept: ADMINISTRATIVE | Facility: CLINIC | Age: 59
End: 2024-06-27

## 2024-06-27 ENCOUNTER — ANESTHESIA (OUTPATIENT)
Dept: SURGERY | Facility: HOSPITAL | Age: 59
End: 2024-06-27
Payer: MEDICAID

## 2024-06-27 VITALS
SYSTOLIC BLOOD PRESSURE: 140 MMHG | HEIGHT: 73 IN | OXYGEN SATURATION: 92 % | DIASTOLIC BLOOD PRESSURE: 80 MMHG | BODY MASS INDEX: 22.36 KG/M2 | RESPIRATION RATE: 16 BRPM | WEIGHT: 168.7 LBS | HEART RATE: 88 BPM | TEMPERATURE: 98 F

## 2024-06-27 DIAGNOSIS — I25.10 CORONARY ARTERY DISEASE INVOLVING NATIVE CORONARY ARTERY OF NATIVE HEART, UNSPECIFIED WHETHER ANGINA PRESENT: ICD-10-CM

## 2024-06-27 DIAGNOSIS — I25.10 CAD (CORONARY ARTERY DISEASE): Primary | ICD-10-CM

## 2024-06-27 DIAGNOSIS — I50.9 ACUTE DECOMPENSATED HEART FAILURE (H): ICD-10-CM

## 2024-06-27 DIAGNOSIS — I50.9 ACUTE DECOMPENSATED HEART FAILURE (H): Primary | ICD-10-CM

## 2024-06-27 LAB
BLD PROD TYP BPU: NORMAL
BLD PROD TYP BPU: NORMAL
BLOOD COMPONENT TYPE: NORMAL
BLOOD COMPONENT TYPE: NORMAL
CODING SYSTEM: NORMAL
CODING SYSTEM: NORMAL
CROSSMATCH: NORMAL
CROSSMATCH: NORMAL
GLUCOSE BLDC GLUCOMTR-MCNC: 85 MG/DL (ref 70–99)
PREALB SERPL-MCNC: 24.5 MG/DL (ref 20–40)
UNIT ABO/RH: NORMAL
UNIT ABO/RH: NORMAL
UNIT NUMBER: NORMAL
UNIT NUMBER: NORMAL
UNIT STATUS: NORMAL
UNIT STATUS: NORMAL
UNIT TYPE ISBT: 5100
UNIT TYPE ISBT: 5100

## 2024-06-27 PROCEDURE — 86923 COMPATIBILITY TEST ELECTRIC: CPT | Performed by: THORACIC SURGERY (CARDIOTHORACIC VASCULAR SURGERY)

## 2024-06-27 PROCEDURE — 999N000141 HC STATISTIC PRE-PROCEDURE NURSING ASSESSMENT: Performed by: THORACIC SURGERY (CARDIOTHORACIC VASCULAR SURGERY)

## 2024-06-27 RX ORDER — ASPIRIN 81 MG/1
162 TABLET, CHEWABLE ORAL
Status: CANCELLED | OUTPATIENT
Start: 2024-07-15

## 2024-06-27 RX ORDER — LIDOCAINE 40 MG/G
CREAM TOPICAL
Status: DISCONTINUED | OUTPATIENT
Start: 2024-06-27 | End: 2024-06-27 | Stop reason: HOSPADM

## 2024-06-27 RX ORDER — PHENYLEPHRINE HCL IN 0.9% NACL 50MG/250ML
.1-6 PLASTIC BAG, INJECTION (ML) INTRAVENOUS CONTINUOUS
Status: CANCELLED | OUTPATIENT
Start: 2024-07-15

## 2024-06-27 RX ORDER — SODIUM CHLORIDE, SODIUM LACTATE, POTASSIUM CHLORIDE, CALCIUM CHLORIDE 600; 310; 30; 20 MG/100ML; MG/100ML; MG/100ML; MG/100ML
INJECTION, SOLUTION INTRAVENOUS CONTINUOUS
Status: DISCONTINUED | OUTPATIENT
Start: 2024-06-27 | End: 2024-06-27 | Stop reason: HOSPADM

## 2024-06-27 RX ORDER — LIDOCAINE 40 MG/G
CREAM TOPICAL
Status: CANCELLED | OUTPATIENT
Start: 2024-06-27

## 2024-06-27 RX ORDER — CEFAZOLIN SODIUM/WATER 2 G/20 ML
2 SYRINGE (ML) INTRAVENOUS
Status: CANCELLED | OUTPATIENT
Start: 2024-07-15

## 2024-06-27 RX ORDER — DEXMEDETOMIDINE HYDROCHLORIDE 4 UG/ML
.2-1.2 INJECTION, SOLUTION INTRAVENOUS CONTINUOUS
Status: CANCELLED | OUTPATIENT
Start: 2024-07-15

## 2024-06-27 RX ORDER — ASPIRIN 81 MG/1
81 TABLET, CHEWABLE ORAL
Status: CANCELLED | OUTPATIENT
Start: 2024-07-15

## 2024-06-27 RX ORDER — METHADONE HYDROCHLORIDE 10 MG/ML
INJECTION, SOLUTION INTRAMUSCULAR; INTRAVENOUS; SUBCUTANEOUS
Status: DISCONTINUED
Start: 2024-06-27 | End: 2024-06-27 | Stop reason: WASHOUT

## 2024-06-27 RX ORDER — ASPIRIN 81 MG/1
162 TABLET, CHEWABLE ORAL
Status: DISCONTINUED | OUTPATIENT
Start: 2024-06-27 | End: 2024-06-27 | Stop reason: HOSPADM

## 2024-06-27 RX ORDER — MAGNESIUM SULFATE 4 G/50ML
4 INJECTION INTRAVENOUS ONCE
Status: DISCONTINUED | OUTPATIENT
Start: 2024-06-27 | End: 2024-06-27 | Stop reason: HOSPADM

## 2024-06-27 RX ORDER — SODIUM CHLORIDE, SODIUM GLUCONATE, SODIUM ACETATE, POTASSIUM CHLORIDE AND MAGNESIUM CHLORIDE 526; 502; 368; 37; 30 MG/100ML; MG/100ML; MG/100ML; MG/100ML; MG/100ML
1000 INJECTION, SOLUTION INTRAVENOUS
Status: CANCELLED | OUTPATIENT
Start: 2024-07-15 | End: 2024-07-15

## 2024-06-27 RX ORDER — CEFAZOLIN SODIUM/WATER 2 G/20 ML
2 SYRINGE (ML) INTRAVENOUS SEE ADMIN INSTRUCTIONS
Status: CANCELLED | OUTPATIENT
Start: 2024-07-15

## 2024-06-27 RX ORDER — CHLORHEXIDINE GLUCONATE ORAL RINSE 1.2 MG/ML
10 SOLUTION DENTAL ONCE
Status: CANCELLED | OUTPATIENT
Start: 2024-07-15 | End: 2024-06-27

## 2024-06-27 RX ORDER — CHLORHEXIDINE GLUCONATE ORAL RINSE 1.2 MG/ML
10 SOLUTION DENTAL ONCE
Status: DISCONTINUED | OUTPATIENT
Start: 2024-06-27 | End: 2024-06-27 | Stop reason: HOSPADM

## 2024-06-27 RX ORDER — ASPIRIN 81 MG/1
81 TABLET, CHEWABLE ORAL
Status: DISCONTINUED | OUTPATIENT
Start: 2024-06-27 | End: 2024-06-27 | Stop reason: HOSPADM

## 2024-06-27 ASSESSMENT — LIFESTYLE VARIABLES: TOBACCO_USE: 1

## 2024-06-27 ASSESSMENT — ACTIVITIES OF DAILY LIVING (ADL)
ADLS_ACUITY_SCORE: 20
ADLS_ACUITY_SCORE: 20

## 2024-06-27 ASSESSMENT — COPD QUESTIONNAIRES: COPD: 1

## 2024-06-27 NOTE — ANESTHESIA PREPROCEDURE EVALUATION
Anesthesia Pre-Procedure Evaluation    Patient: Gonsalo Blakely   MRN: [id number] : 1965        Procedure : Procedure(s):  CORONARY ARTERY BYPASS GRAFT, INTERNAL MAMMARY ARTERY HARVEST, ENDOSCOPIC VESSEL PROCUREMENT,  ANESTHESIA TRANSESOPHAGEAL ECHOCARDIOGRAM  POSSIBLE LEFT RADIAL ARTERY HARVEST  LIGATION, LEFT ATRIAL APPENDAGE, OPEN          Past Medical History:   Diagnosis Date    Asthma     CAD (coronary artery disease)     Chronic obstructive pulmonary disease (H)     Dyslipidemia     Heart failure with reduced ejection fraction, NYHA class I (H)     HTN (hypertension)     Ischemic cardiomyopathy       Past Surgical History:   Procedure Laterality Date    CV CORONARY ANGIOGRAM N/A 2024    Procedure: CV CORONARY ANGIOGRAM;  Surgeon: Roman Florence MD;  Location: [facility] CATH LAB CV    CV LEFT HEART CATH N/A 2024    Procedure: Left Heart Catheterization;  Surgeon: Roman Florence MD;  Location: [facility] CATH LAB CV    SHOULDER SURGERY Right       Allergies   Allergen Reactions    Ace Inhibitors Cough      Social History     Tobacco Use    Smoking status: Former     Current packs/day: 0.00     Types: Cigarettes     Quit date: 2024    Smokeless tobacco: Former     Types: Chew     Quit date:     Tobacco comments:     quit recently   Substance Use Topics    Alcohol use: Yes     Comment: 2 beers per month      Wt Readings from Last 1 Encounters:   24 76.5 kg (168 lb 11.2 oz)        Anesthesia Evaluation   Pt has had prior anesthetic.     No history of anesthetic complications       ROS/MED HX  ENT/Pulmonary:     (+)                tobacco use, Current use,     asthma    COPD,              Neurologic:  - neg neurologic ROS     Cardiovascular:     (+) Dyslipidemia hypertension- -  CAD -  - -      CHF  Last EF: 35-40                              METS/Exercise Tolerance: >4 METS    Hematologic:  - neg hematologic  ROS     Musculoskeletal:  - neg musculoskeletal ROS    "  GI/Hepatic:  - neg GI/hepatic ROS     Renal/Genitourinary:  - neg Renal ROS     Endo:  - neg endo ROS     Psychiatric/Substance Use:  - neg psychiatric ROS     Infectious Disease:  - neg infectious disease ROS     Malignancy:  - neg malignancy ROS     Other:  - neg other ROS          Physical Exam    Airway  airway exam normal      Mallampati: I   TM distance: > 3 FB   Neck ROM: full   Mouth opening: > 3 cm    Respiratory Devices and Support         Dental  no notable dental history     (+) Modest Abnormalities - crowns, retainers, 1 or 2 missing teeth and Removable bridges or other hardware      Cardiovascular   cardiovascular exam normal          Pulmonary   pulmonary exam normal                OUTSIDE LABS:  CBC:   Lab Results   Component Value Date    WBC 8.7 06/21/2024    WBC 9.4 06/20/2024    HGB 17.4 06/21/2024    HGB 17.3 06/20/2024    HCT 53.9 (H) 06/21/2024    HCT 53.6 (H) 06/20/2024     06/21/2024     06/20/2024     BMP:   Lab Results   Component Value Date     06/21/2024     06/20/2024    POTASSIUM 4.7 06/21/2024    POTASSIUM 4.5 06/20/2024    CHLORIDE 99 06/21/2024    CHLORIDE 99 06/20/2024    CO2 33 (H) 06/21/2024    CO2 34 (H) 06/20/2024    BUN 25.0 (H) 06/21/2024    BUN 25.4 (H) 06/20/2024    CR 1.08 06/21/2024    CR 1.05 06/20/2024    GLC 85 06/27/2024     (H) 06/21/2024     COAGS:   Lab Results   Component Value Date    PTT 28 06/26/2024    INR 1.01 06/26/2024     POC: No results found for: \"BGM\", \"HCG\", \"HCGS\"  HEPATIC:   Lab Results   Component Value Date    ALBUMIN 4.2 06/17/2024    PROTTOTAL 6.9 06/17/2024    ALT 33 06/17/2024    AST 31 06/17/2024    ALKPHOS 100 06/17/2024    BILITOTAL 1.1 06/17/2024     OTHER:   Lab Results   Component Value Date    A1C 7.0 (H) 06/26/2024    JAIME 9.3 06/21/2024    MAG 2.2 06/26/2024       Anesthesia Plan    ASA Status:  4    NPO Status:  NPO Appropriate    Anesthesia Type: General.     - Airway: ETT   Induction: Intravenous. "   Maintenance: Balanced.   Techniques and Equipment:     - Lines/Monitors: LORA, Arterial Line, Central Line, PAC, CVP, BIS, NIRS            LORA Absolute Contra-indication: NONE            LORA Relative Contra-indication: NONE     - Blood: Blood in Room, Cell Saver, PRBC     - Drips/Meds: Dexmed. infusion, Ketamine, Fentanyl, Phenylephrine, Norepi, Vasopressin, Epinephrine, Nicardipine     Consents    Anesthesia Plan(s) and associated risks, benefits, and realistic alternatives discussed. Questions answered and patient/representative(s) expressed understanding.     - Discussed:     - Discussed with:  Patient      - Extended Intubation/Ventilatory Support Discussed: Yes.      - Patient is DNR/DNI Status: No     Use of blood products discussed: Yes.     - Discussed with: Patient.     - Consented: consented to blood products            Reason for refusal: other.     Postoperative Care    Pain management: IV analgesics.     - Plan for long acting post-op opioid use   PONV prophylaxis: Ondansetron (or other 5HT-3)     Comments:    Other Comments: 50 mg ketamine IV on induction.  4 grams magnesium IV.  20 mg methadone IV on induction.           Lacho Kiran MD    I have reviewed the pertinent notes and labs in the chart from the past 30 days and (re)examined the patient.  Any updates or changes from those notes are reflected in this note.             # Drug Induced Platelet Defect: home medication list includes an antiplatelet medication  # DMII: A1C = 7.0 % (Ref range: <5.7 %) within past 6 months

## 2024-06-27 NOTE — TELEPHONE ENCOUNTER
Per task, pt needs to r/s their 6/27 surgery with Dr. Virk. LM asking pt to call back to schedule. Will try again later

## 2024-06-27 NOTE — PLAN OF CARE
Patient was seen and examined by me.  He has a new rash involving his abdomen, right arm and back.  It is itchy.  We will discharge him home and he will take some benedryl and try to see a dermatologist.  He is hemodynamically stable and his heart failure symptoms have vanished.  We will contact him next week to reschedule.

## 2024-06-27 NOTE — PHARMACY-ADMISSION MEDICATION HISTORY
Pharmacist Admission Medication History    Admission medication history is complete. The information provided in this note is only as accurate as the sources available at the time of the update.    Information Source(s): Patient via in-person    Pertinent Information: Patient has not had any medications today 6/27/24.    Changes made to PTA medication list:  Added: None  Deleted: None  Changed: None    Allergies reviewed with patient and updates made in EHR: yes    Medication History Completed By: LYLA GOLD RPH 6/27/2024 7:35 AM    PTA Med List   Medication Sig Last Dose    aspirin 81 MG EC tablet Take 81 mg by mouth daily 6/26/2024 at am    atorvastatin (LIPITOR) 80 MG tablet Take 1 tablet (80 mg) by mouth daily 6/26/2024 at pm    empagliflozin (JARDIANCE) 10 MG TABS tablet Take 1 tablet (10 mg) by mouth daily Past Week at am    furosemide (LASIX) 20 MG tablet Take 1 tablet (20 mg) by mouth daily 6/26/2024 at am    losartan (COZAAR) 25 MG tablet Take 1 tablet (25 mg) by mouth daily 6/26/2024 at am    metoprolol succinate ER (TOPROL XL) 50 MG 24 hr tablet Take 1 tablet (50 mg) by mouth daily 6/26/2024 at am    spironolactone (ALDACTONE) 25 MG tablet Take 1 tablet (25 mg) by mouth daily 6/26/2024 at am

## 2024-06-27 NOTE — OR NURSING
Pt. came in with red raised rash right bicep, bilat. lateral abdomen, lower abdomen and lower back to buttocks. Itchy-red raised bumps, no drainage.Dr. Virk called at 0600 to discuss. Dr. Virk here to assess at 0640 and it was determined to cancel surgery due to rash. Dr. Virk instructed pt. to see Dermatologist if able to get in. Surgery will be delayed for about 2 weeks or until rash is resolved and case can be rescheduled. Pt. and S.O. verbalized understanding. CHG skin soap sent home with pt. for when surgery is rescheduled. Instructions given on Showering before surgery-verbalized understanding.

## 2024-07-02 ENCOUNTER — OFFICE VISIT (OUTPATIENT)
Dept: FAMILY MEDICINE | Facility: CLINIC | Age: 59
End: 2024-07-02
Payer: MEDICAID

## 2024-07-02 VITALS
TEMPERATURE: 97.9 F | WEIGHT: 170.12 LBS | BODY MASS INDEX: 23.04 KG/M2 | SYSTOLIC BLOOD PRESSURE: 134 MMHG | HEART RATE: 72 BPM | HEIGHT: 72 IN | DIASTOLIC BLOOD PRESSURE: 82 MMHG | RESPIRATION RATE: 18 BRPM

## 2024-07-02 DIAGNOSIS — I25.10 CORONARY ARTERY DISEASE INVOLVING NATIVE CORONARY ARTERY OF NATIVE HEART WITHOUT ANGINA PECTORIS: ICD-10-CM

## 2024-07-02 DIAGNOSIS — R06.02 SOB (SHORTNESS OF BREATH): ICD-10-CM

## 2024-07-02 DIAGNOSIS — Z09 HOSPITAL DISCHARGE FOLLOW-UP: Primary | ICD-10-CM

## 2024-07-02 DIAGNOSIS — I50.21 ACUTE SYSTOLIC CONGESTIVE HEART FAILURE (H): ICD-10-CM

## 2024-07-02 DIAGNOSIS — I50.20 SYSTOLIC CONGESTIVE HEART FAILURE, UNSPECIFIED HF CHRONICITY (H): ICD-10-CM

## 2024-07-02 DIAGNOSIS — Z71.6 ENCOUNTER FOR TOBACCO USE CESSATION COUNSELING: ICD-10-CM

## 2024-07-02 DIAGNOSIS — I25.5 ISCHEMIC CARDIOMYOPATHY: ICD-10-CM

## 2024-07-02 DIAGNOSIS — Z76.89 ENCOUNTER TO ESTABLISH CARE: ICD-10-CM

## 2024-07-02 DIAGNOSIS — J45.909 ASTHMA, UNSPECIFIED ASTHMA SEVERITY, UNSPECIFIED WHETHER COMPLICATED, UNSPECIFIED WHETHER PERSISTENT: ICD-10-CM

## 2024-07-02 PROCEDURE — 99214 OFFICE O/P EST MOD 30 MIN: CPT | Mod: GC

## 2024-07-02 ASSESSMENT — ASTHMA QUESTIONNAIRES
QUESTION_1 LAST FOUR WEEKS HOW MUCH OF THE TIME DID YOUR ASTHMA KEEP YOU FROM GETTING AS MUCH DONE AT WORK, SCHOOL OR AT HOME: SOME OF THE TIME
QUESTION_4 LAST FOUR WEEKS HOW OFTEN HAVE YOU USED YOUR RESCUE INHALER OR NEBULIZER MEDICATION (SUCH AS ALBUTEROL): ONE OR TWO TIMES PER DAY
ACT_TOTALSCORE: 9
QUESTION_3 LAST FOUR WEEKS HOW OFTEN DID YOUR ASTHMA SYMPTOMS (WHEEZING, COUGHING, SHORTNESS OF BREATH, CHEST TIGHTNESS OR PAIN) WAKE YOU UP AT NIGHT OR EARLIER THAN USUAL IN THE MORNING: FOUR OR MORE NIGHTS A WEEK
QUESTION_5 LAST FOUR WEEKS HOW WOULD YOU RATE YOUR ASTHMA CONTROL: POORLY CONTROLLED
ACT_TOTALSCORE: 9
HOSPITALIZATION_OVERNIGHT_LAST_YEAR_TOTAL: ONE
EMERGENCY_ROOM_LAST_YEAR_TOTAL: ONE
QUESTION_2 LAST FOUR WEEKS HOW OFTEN HAVE YOU HAD SHORTNESS OF BREATH: MORE THAN ONCE A DAY

## 2024-07-02 NOTE — PROGRESS NOTES
Preceptor Attestation:   Patient seen, evaluated and discussed with the resident Dr. Amelia He. I have verified the content of the note, which accurately reflects my assessment of the patient and the plan of care.    Supervising Physician:  Benjamin Rosenstein, MD, MA  [facility]  [facility]

## 2024-07-03 DIAGNOSIS — R06.02 SOB (SHORTNESS OF BREATH): ICD-10-CM

## 2024-07-03 DIAGNOSIS — I25.5 ISCHEMIC CARDIOMYOPATHY: ICD-10-CM

## 2024-07-03 DIAGNOSIS — I50.9 ACUTE DECOMPENSATED HEART FAILURE (H): Primary | ICD-10-CM

## 2024-07-07 ENCOUNTER — HEALTH MAINTENANCE LETTER (OUTPATIENT)
Age: 59
End: 2024-07-07

## 2024-07-07 LAB
ABO/RH(D): NORMAL
ANTIBODY SCREEN: NEGATIVE
SPECIMEN EXPIRATION DATE: NORMAL

## 2024-07-08 ENCOUNTER — LAB (OUTPATIENT)
Dept: LAB | Facility: HOSPITAL | Age: 59
End: 2024-07-08
Payer: MEDICAID

## 2024-07-08 ENCOUNTER — TELEPHONE (OUTPATIENT)
Dept: FAMILY MEDICINE | Facility: CLINIC | Age: 59
End: 2024-07-08

## 2024-07-08 DIAGNOSIS — I25.10 CORONARY ARTERY DISEASE INVOLVING NATIVE CORONARY ARTERY OF NATIVE HEART, UNSPECIFIED WHETHER ANGINA PRESENT: ICD-10-CM

## 2024-07-08 DIAGNOSIS — I50.9 ACUTE DECOMPENSATED HEART FAILURE (H): ICD-10-CM

## 2024-07-08 PROCEDURE — 86900 BLOOD TYPING SEROLOGIC ABO: CPT

## 2024-07-08 PROCEDURE — 36415 COLL VENOUS BLD VENIPUNCTURE: CPT

## 2024-07-08 NOTE — TELEPHONE ENCOUNTER
Called patient and talked with him regarding prescription and cost of medication. Discussed some strategies with good rx/other coupon/reward programs. Advised that I will reach out to our staff to see if there are any other strategies to reduce cost.

## 2024-07-08 NOTE — TELEPHONE ENCOUNTER
[facility] phone call message- medication clarification/question:    Full Medication Name: empagliflozin (JARDIANCE) 10 MG TABS tablet     Question: Patient called concerning the cost for this medication, would like a more affordable option. Please call and advise.    Pharmacy confirmed as      [id number] [place] [id number] AT [facility] [address]: Yes    OK to leave a message on voice mail? Yes    Primary language: English      needed? No    Call taken on July 8, 2024 at 10:40 AM by Toni Hong

## 2024-07-09 ENCOUNTER — TELEPHONE (OUTPATIENT)
Dept: FAMILY MEDICINE | Facility: CLINIC | Age: 59
End: 2024-07-09

## 2024-07-09 DIAGNOSIS — I50.20 SYSTOLIC CONGESTIVE HEART FAILURE, UNSPECIFIED HF CHRONICITY (H): ICD-10-CM

## 2024-07-09 DIAGNOSIS — I50.21 ACUTE SYSTOLIC CONGESTIVE HEART FAILURE (H): Primary | ICD-10-CM

## 2024-07-09 DIAGNOSIS — I25.5 ISCHEMIC CARDIOMYOPATHY: ICD-10-CM

## 2024-07-09 RX ORDER — DAPAGLIFLOZIN 10 MG/1
10 TABLET, FILM COATED ORAL DAILY
Qty: 90 TABLET | Refills: 1 | Status: SHIPPED | OUTPATIENT
Start: 2024-07-09 | End: 2024-08-19

## 2024-07-14 ENCOUNTER — APPOINTMENT (OUTPATIENT)
Dept: CT IMAGING | Facility: HOSPITAL | Age: 59
End: 2024-07-14
Attending: EMERGENCY MEDICINE
Payer: MEDICAID

## 2024-07-14 ENCOUNTER — HOSPITAL ENCOUNTER (EMERGENCY)
Facility: HOSPITAL | Age: 59
Discharge: HOME | End: 2024-07-14
Attending: EMERGENCY MEDICINE | Admitting: EMERGENCY MEDICINE
Payer: MEDICAID

## 2024-07-14 ENCOUNTER — ANESTHESIA EVENT (OUTPATIENT)
Dept: SURGERY | Facility: HOSPITAL | Age: 59
End: 2024-07-14
Payer: MEDICAID

## 2024-07-14 VITALS
SYSTOLIC BLOOD PRESSURE: 143 MMHG | RESPIRATION RATE: 16 BRPM | BODY MASS INDEX: 22.75 KG/M2 | OXYGEN SATURATION: 98 % | WEIGHT: 169.8 LBS | HEART RATE: 71 BPM | TEMPERATURE: 98.4 F | DIASTOLIC BLOOD PRESSURE: 83 MMHG

## 2024-07-14 DIAGNOSIS — S01.81XA FACIAL LACERATION, INITIAL ENCOUNTER: ICD-10-CM

## 2024-07-14 DIAGNOSIS — S00.83XA FACIAL CONTUSION, INITIAL ENCOUNTER: ICD-10-CM
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
## 2024-07-14 PROCEDURE — 70486 CT MAXILLOFACIAL W/O DYE: CPT

## 2024-07-14 PROCEDURE — 70450 CT HEAD/BRAIN W/O DYE: CPT

## 2024-07-14 PROCEDURE — 99284 EMERGENCY DEPT VISIT MOD MDM: CPT | Mod: 25

## 2024-07-14 ASSESSMENT — COLUMBIA-SUICIDE SEVERITY RATING SCALE - C-SSRS
2. HAVE YOU ACTUALLY HAD ANY THOUGHTS OF KILLING YOURSELF IN THE PAST MONTH?: NO
6. HAVE YOU EVER DONE ANYTHING, STARTED TO DO ANYTHING, OR PREPARED TO DO ANYTHING TO END YOUR LIFE?: NO
1. IN THE PAST MONTH, HAVE YOU WISHED YOU WERE DEAD OR WISHED YOU COULD GO TO SLEEP AND NOT WAKE UP?: NO

## 2024-07-14 ASSESSMENT — ENCOUNTER SYMPTOMS
LIGHT-HEADEDNESS: 0
HEADACHES: 0
DIZZINESS: 0
WOUND: 1

## 2024-07-14 ASSESSMENT — ACTIVITIES OF DAILY LIVING (ADL)
ADLS_ACUITY_SCORE: 35
ADLS_ACUITY_SCORE: 33
ADLS_ACUITY_SCORE: 35
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
## 2024-07-14 NOTE — ED NOTES
Tech cleaned pts left eye socket lacerations and face with sterile water. Wound left exposed as instructed by MONICA Olivas

## 2024-07-14 NOTE — DISCHARGE INSTRUCTIONS
The CT scan of your head and facial bones does not show evidence of intracranial bleeding or fractures.  It is recommended that you keep the superficial lacerations as clean and dry as possible over the next few days.  You can apply a topical antibiotic ointment to the lacerations twice a day for the next week.  Follow-up with your primary care provider for reevaluation as needed or return back to ED sooner for any new or concerning symptoms.
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
## 2024-07-15 ENCOUNTER — APPOINTMENT (OUTPATIENT)
Dept: CARDIOLOGY | Facility: HOSPITAL | Age: 59
End: 2024-07-15
Attending: ANESTHESIOLOGY

## 2024-07-15 ENCOUNTER — HOSPITAL ENCOUNTER (INPATIENT)
Facility: HOSPITAL | Age: 59
LOS: 5 days | Discharge: HOME | End: 2024-07-20
Attending: INTERNAL MEDICINE | Admitting: THORACIC SURGERY (CARDIOTHORACIC VASCULAR SURGERY)
Payer: MEDICAID

## 2024-07-15 ENCOUNTER — APPOINTMENT (OUTPATIENT)
Dept: RADIOLOGY | Facility: HOSPITAL | Age: 59
End: 2024-07-15
Attending: PHYSICIAN ASSISTANT
Payer: MEDICAID

## 2024-07-15 ENCOUNTER — ANCILLARY PROCEDURE (OUTPATIENT)
Dept: ULTRASOUND IMAGING | Facility: HOSPITAL | Age: 59
End: 2024-07-15
Attending: ANESTHESIOLOGY

## 2024-07-15 ENCOUNTER — ANESTHESIA (OUTPATIENT)
Dept: SURGERY | Facility: HOSPITAL | Age: 59
End: 2024-07-15
Payer: MEDICAID

## 2024-07-15 ENCOUNTER — PATIENT OUTREACH (OUTPATIENT)
Dept: CARE COORDINATION | Facility: CLINIC | Age: 59
End: 2024-07-15

## 2024-07-15 DIAGNOSIS — I25.10 CORONARY ARTERY DISEASE INVOLVING NATIVE CORONARY ARTERY OF NATIVE HEART, UNSPECIFIED WHETHER ANGINA PRESENT: ICD-10-CM

## 2024-07-15 DIAGNOSIS — I25.5 ISCHEMIC CARDIOMYOPATHY: ICD-10-CM

## 2024-07-15 DIAGNOSIS — Z95.1 S/P CABG (CORONARY ARTERY BYPASS GRAFT): Primary | ICD-10-CM

## 2024-07-15 DIAGNOSIS — I50.9 ACUTE DECOMPENSATED HEART FAILURE (H): Primary | ICD-10-CM

## 2024-07-15 DIAGNOSIS — I50.9 ACUTE DECOMPENSATED HEART FAILURE (H): ICD-10-CM

## 2024-07-15 LAB
ALBUMIN SERPL BCG-MCNC: 3.1 G/DL (ref 3.5–5.2)
ALP SERPL-CCNC: 76 U/L (ref 40–150)
ALT SERPL W P-5'-P-CCNC: 17 U/L (ref 0–70)
ANION GAP SERPL CALCULATED.3IONS-SCNC: 11 MMOL/L (ref 7–15)
APTT PPP: 64 SECONDS (ref 22–38)
APTT PPP: 65 SECONDS (ref 22–38)
AST SERPL W P-5'-P-CCNC: 24 U/L (ref 0–45)
BASE EXCESS BLDA CALC-SCNC: -0.9 MMOL/L (ref -3–3)
BASE EXCESS BLDA CALC-SCNC: -3 MMOL/L (ref -3–3)
BASE EXCESS BLDA CALC-SCNC: 0.1 MMOL/L (ref -3–3)
BASE EXCESS BLDA CALC-SCNC: 2.2 MMOL/L (ref -3–3)
BASE EXCESS BLDA CALC-SCNC: 2.8 MMOL/L (ref -3–3)
BASE EXCESS BLDA CALC-SCNC: 2.8 MMOL/L (ref -3–3)
BASE EXCESS BLDA CALC-SCNC: 2.9 MMOL/L (ref -3–3)
BASE EXCESS BLDV CALC-SCNC: -0.8 MMOL/L (ref -3–3)
BASE EXCESS BLDV CALC-SCNC: 2.7 MMOL/L (ref -3–3)
BILIRUB SERPL-MCNC: 0.4 MG/DL
BLD PROD TYP BPU: NORMAL
BLD PROD TYP BPU: NORMAL
BLOOD COMPONENT TYPE: NORMAL
BLOOD COMPONENT TYPE: NORMAL
BUN SERPL-MCNC: 16.9 MG/DL (ref 8–23)
CA-I BLD-MCNC: 4.2 MG/DL (ref 4.4–5.2)
CA-I BLD-MCNC: 4.3 MG/DL (ref 4.4–5.2)
CA-I BLD-MCNC: 4.5 MG/DL (ref 4.4–5.2)
CA-I BLD-MCNC: 4.8 MG/DL (ref 4.4–5.2)
CA-I BLD-MCNC: 5.3 MG/DL (ref 4.4–5.2)
CALCIUM SERPL-MCNC: 8.4 MG/DL (ref 8.8–10.2)
CHLORIDE SERPL-SCNC: 108 MMOL/L (ref 98–107)
CODING SYSTEM: NORMAL
CODING SYSTEM: NORMAL
COHGB MFR BLD: 98 % (ref 96–97)
CPB POCT: NO
CREAT SERPL-MCNC: 1.03 MG/DL (ref 0.67–1.17)
CROSSMATCH: NORMAL
CROSSMATCH: NORMAL
EGFRCR SERPLBLD CKD-EPI 2021: 83 ML/MIN/1.73M2
ERYTHROCYTE [DISTWIDTH] IN BLOOD BY AUTOMATED COUNT: 13.2 % (ref 10–15)
ERYTHROCYTE [DISTWIDTH] IN BLOOD BY AUTOMATED COUNT: 13.2 % (ref 10–15)
FIBRINOGEN PPP-MCNC: 387 MG/DL (ref 170–490)
GLUCOSE BLD-MCNC: 106 MG/DL (ref 70–99)
GLUCOSE BLD-MCNC: 142 MG/DL (ref 70–99)
GLUCOSE BLD-MCNC: 142 MG/DL (ref 70–99)
GLUCOSE BLD-MCNC: 143 MG/DL (ref 70–99)
GLUCOSE BLD-MCNC: 152 MG/DL (ref 70–99)
GLUCOSE BLD-MCNC: 156 MG/DL (ref 70–99)
GLUCOSE BLDC GLUCOMTR-MCNC: 142 MG/DL (ref 70–99)
GLUCOSE BLDC GLUCOMTR-MCNC: 149 MG/DL (ref 70–99)
GLUCOSE BLDC GLUCOMTR-MCNC: 158 MG/DL (ref 70–99)
GLUCOSE BLDC GLUCOMTR-MCNC: 164 MG/DL (ref 70–99)
GLUCOSE BLDC GLUCOMTR-MCNC: 165 MG/DL (ref 70–99)
GLUCOSE BLDC GLUCOMTR-MCNC: 96 MG/DL (ref 70–99)
GLUCOSE SERPL-MCNC: 153 MG/DL (ref 70–99)
HCO3 BLD-SCNC: 25 MMOL/L (ref 21–28)
HCO3 BLDA-SCNC: 23 MMOL/L (ref 21–28)
HCO3 BLDA-SCNC: 24 MMOL/L (ref 21–28)
HCO3 BLDA-SCNC: 26 MMOL/L (ref 21–28)
HCO3 BLDA-SCNC: 26 MMOL/L (ref 21–28)
HCO3 BLDA-SCNC: 27 MMOL/L (ref 21–28)
HCO3 BLDA-SCNC: 27 MMOL/L (ref 21–28)
HCO3 BLDV-SCNC: 25 MMOL/L (ref 21–28)
HCO3 BLDV-SCNC: 26 MMOL/L (ref 21–28)
HCO3 SERPL-SCNC: 21 MMOL/L (ref 22–29)
HCT VFR BLD AUTO: 33.6 % (ref 40–53)
HCT VFR BLD AUTO: 39 % (ref 40–53)
HCT VFR BLD CALC: 36 % (ref 40–54)
HGB BLD-MCNC: 10 G/DL (ref 13.3–17.7)
HGB BLD-MCNC: 10.2 G/DL (ref 13.3–17.7)
HGB BLD-MCNC: 10.8 G/DL (ref 13.3–17.7)
HGB BLD-MCNC: 10.8 G/DL (ref 13.3–17.7)
HGB BLD-MCNC: 11 G/DL (ref 13.3–17.7)
HGB BLD-MCNC: 11.3 G/DL (ref 13.3–17.7)
HGB BLD-MCNC: 12.2 G/DL (ref 13.3–17.7)
HGB BLD-MCNC: 12.9 G/DL (ref 13.3–17.7)
HGB BLD-MCNC: 13.5 G/DL (ref 13.3–17.7)
INR PPP: 1.26 (ref 0.85–1.15)
INR PPP: 1.37 (ref 0.85–1.15)
ISSUE DATE AND TIME: NORMAL
ISSUE DATE AND TIME: NORMAL
LACTATE BLD-SCNC: 0.6 MMOL/L (ref 0.7–2)
LACTATE BLD-SCNC: 0.7 MMOL/L (ref 0.7–2)
LACTATE BLD-SCNC: 0.8 MMOL/L (ref 0.7–2)
LACTATE BLD-SCNC: 0.9 MMOL/L (ref 0.7–2)
LACTATE BLD-SCNC: 1 MMOL/L (ref 0.7–2)
LACTATE BLD-SCNC: 2.1 MMOL/L (ref 0.7–2)
LACTATE SERPL-SCNC: 2.2 MMOL/L (ref 0.7–2)
MAGNESIUM SERPL-MCNC: 3 MG/DL (ref 1.7–2.3)
MCH RBC QN AUTO: 29.3 PG (ref 26.5–33)
MCH RBC QN AUTO: 29.3 PG (ref 26.5–33)
MCHC RBC AUTO-ENTMCNC: 32.7 G/DL (ref 31.5–36.5)
MCHC RBC AUTO-ENTMCNC: 33.1 G/DL (ref 31.5–36.5)
MCV RBC AUTO: 88 FL (ref 78–100)
MCV RBC AUTO: 89 FL (ref 78–100)
O2/TOTAL GAS SETTING VFR VENT: 30 %
O2/TOTAL GAS SETTING VFR VENT: 50 %
OXYHGB MFR BLDA: 95 % (ref 92–100)
OXYHGB MFR BLDA: 95 % (ref 92–100)
OXYHGB MFR BLDA: 97 % (ref 92–100)
OXYHGB MFR BLDV: 66 % (ref 70–75)
OXYHGB MFR BLDV: 81 % (ref 70–75)
OXYHGB MFR BLDV: 81 % (ref 70–75)
PCO2 BLD: 49 MM HG (ref 35–45)
PCO2 BLDA: 44 MM HG (ref 35–45)
PCO2 BLDA: 44 MM HG (ref 35–45)
PCO2 BLDA: 45 MM HG (ref 35–45)
PCO2 BLDA: 47 MM HG (ref 35–45)
PCO2 BLDA: 48 MM HG (ref 35–45)
PCO2 BLDA: 56 MM HG (ref 35–45)
PCO2 BLDV: 47 MM HG (ref 40–50)
PCO2 BLDV: 63 MM HG (ref 40–50)
PH BLD: 7.32 [PH] (ref 7.35–7.45)
PH BLDA: 7.29 [PH] (ref 7.35–7.45)
PH BLDA: 7.33 [PH] (ref 7.35–7.45)
PH BLDA: 7.37 [PH] (ref 7.35–7.45)
PH BLDA: 7.38 [PH] (ref 7.35–7.45)
PH BLDA: 7.39 [PH] (ref 7.35–7.45)
PH BLDA: 7.41 [PH] (ref 7.35–7.45)
PH BLDV: 7.28 [PH] (ref 7.32–7.43)
PH BLDV: 7.34 [PH] (ref 7.32–7.43)
PHOSPHATE SERPL-MCNC: 2.3 MG/DL (ref 2.5–4.5)
PLATELET # BLD AUTO: 148 10E3/UL (ref 150–450)
PLATELET # BLD AUTO: 156 10E3/UL (ref 150–450)
PO2 BLD: 99 MM HG (ref 80–105)
PO2 BLDA: 105 MM HG (ref 80–105)
PO2 BLDA: 144 MM HG (ref 80–105)
PO2 BLDA: 310 MM HG (ref 80–105)
PO2 BLDA: 361 MM HG (ref 80–105)
PO2 BLDA: 441 MM HG (ref 80–105)
PO2 BLDA: 493 MM HG (ref 80–105)
PO2 BLDV: 38 MM HG (ref 25–47)
PO2 BLDV: 53 MM HG (ref 25–47)
POTASSIUM BLD-SCNC: 4 MMOL/L (ref 3.4–5.3)
POTASSIUM BLD-SCNC: 4 MMOL/L (ref 3.4–5.3)
POTASSIUM BLD-SCNC: 4.4 MMOL/L (ref 3.4–5.3)
POTASSIUM BLD-SCNC: 5.3 MMOL/L (ref 3.4–5.3)
POTASSIUM BLD-SCNC: 5.3 MMOL/L (ref 3.4–5.3)
POTASSIUM BLD-SCNC: 5.5 MMOL/L (ref 3.4–5.3)
POTASSIUM BLD-SCNC: 5.6 MMOL/L (ref 3.4–5.3)
POTASSIUM SERPL-SCNC: 4.2 MMOL/L (ref 3.4–5.3)
POTASSIUM SERPL-SCNC: 4.2 MMOL/L (ref 3.4–5.3)
PROT SERPL-MCNC: 5.2 G/DL (ref 6.4–8.3)
RBC # BLD AUTO: 3.76 10E6/UL (ref 4.4–5.9)
RBC # BLD AUTO: 4.41 10E6/UL (ref 4.4–5.9)
SAO2 % BLDA: 100 % (ref 96–97)
SAO2 % BLDA: 96 % (ref 92–100)
SAO2 % BLDA: 98 % (ref 96–97)
SAO2 % BLDA: 99 % (ref 92–100)
SAO2 % BLDV: 67.3 % (ref 70–75)
SAO2 % BLDV: 84 % (ref 70–75)
SODIUM BLD-SCNC: 135 MMOL/L (ref 135–145)
SODIUM BLD-SCNC: 136 MMOL/L (ref 135–145)
SODIUM BLD-SCNC: 137 MMOL/L (ref 135–145)
SODIUM BLD-SCNC: 139 MMOL/L (ref 135–145)
SODIUM SERPL-SCNC: 140 MMOL/L (ref 135–145)
UNIT ABO/RH: NORMAL
UNIT ABO/RH: NORMAL
UNIT NUMBER: NORMAL
UNIT NUMBER: NORMAL
UNIT STATUS: NORMAL
UNIT STATUS: NORMAL
UNIT TYPE ISBT: 5100
UNIT TYPE ISBT: 5100
WBC # BLD AUTO: 14.4 10E3/UL (ref 4–11)
WBC # BLD AUTO: 16.2 10E3/UL (ref 4–11)

## 2024-07-15 PROCEDURE — 250N000011 HC RX IP 250 OP 636

## 2024-07-15 PROCEDURE — 85730 THROMBOPLASTIN TIME PARTIAL: CPT | Performed by: INTERNAL MEDICINE

## 2024-07-15 PROCEDURE — 200N000001 HC R&B ICU

## 2024-07-15 PROCEDURE — 999N000253 HC STATISTIC WEANING TRIALS

## 2024-07-15 PROCEDURE — 370N000017 HC ANESTHESIA TECHNICAL FEE, PER MIN: Performed by: THORACIC SURGERY (CARDIOTHORACIC VASCULAR SURGERY)

## 2024-07-15 PROCEDURE — 02L70CK OCCLUSION OF LEFT ATRIAL APPENDAGE WITH EXTRALUMINAL DEVICE, OPEN APPROACH: ICD-10-PCS | Performed by: THORACIC SURGERY (CARDIOTHORACIC VASCULAR SURGERY)

## 2024-07-15 PROCEDURE — 33268 EXCL LAA OPN OTH PX ANY METH: CPT | Performed by: THORACIC SURGERY (CARDIOTHORACIC VASCULAR SURGERY)

## 2024-07-15 PROCEDURE — 82330 ASSAY OF CALCIUM: CPT

## 2024-07-15 PROCEDURE — 250N000009 HC RX 250

## 2024-07-15 PROCEDURE — 85027 COMPLETE CBC AUTOMATED: CPT | Performed by: INTERNAL MEDICINE

## 2024-07-15 PROCEDURE — 999N000055 HC STATISTIC END TITIAL CO2 MONITORING

## 2024-07-15 PROCEDURE — 410N000004: Performed by: THORACIC SURGERY (CARDIOTHORACIC VASCULAR SURGERY)

## 2024-07-15 PROCEDURE — 94002 VENT MGMT INPAT INIT DAY: CPT

## 2024-07-15 PROCEDURE — 278N000051 HC OR IMPLANT GENERAL: Performed by: THORACIC SURGERY (CARDIOTHORACIC VASCULAR SURGERY)

## 2024-07-15 PROCEDURE — 36415 COLL VENOUS BLD VENIPUNCTURE: CPT | Performed by: PHYSICIAN ASSISTANT

## 2024-07-15 PROCEDURE — 250N000011 HC RX IP 250 OP 636: Performed by: NURSE ANESTHETIST, CERTIFIED REGISTERED

## 2024-07-15 PROCEDURE — 250N000011 HC RX IP 250 OP 636: Performed by: INTERNAL MEDICINE

## 2024-07-15 PROCEDURE — 99152 MOD SED SAME PHYS/QHP 5/>YRS: CPT | Performed by: INTERNAL MEDICINE

## 2024-07-15 PROCEDURE — 250N000013 HC RX MED GY IP 250 OP 250 PS 637: Performed by: ANESTHESIOLOGY

## 2024-07-15 PROCEDURE — 06BQ4ZZ EXCISION OF LEFT SAPHENOUS VEIN, PERCUTANEOUS ENDOSCOPIC APPROACH: ICD-10-PCS | Performed by: THORACIC SURGERY (CARDIOTHORACIC VASCULAR SURGERY)

## 2024-07-15 PROCEDURE — 84295 ASSAY OF SERUM SODIUM: CPT

## 2024-07-15 PROCEDURE — 33533 CABG ARTERIAL SINGLE: CPT | Performed by: NURSE ANESTHETIST, CERTIFIED REGISTERED

## 2024-07-15 PROCEDURE — 250N000013 HC RX MED GY IP 250 OP 250 PS 637: Performed by: THORACIC SURGERY (CARDIOTHORACIC VASCULAR SURGERY)

## 2024-07-15 PROCEDURE — 02HQ32Z INSERTION OF MONITORING DEVICE INTO RIGHT PULMONARY ARTERY, PERCUTANEOUS APPROACH: ICD-10-PCS | Performed by: THORACIC SURGERY (CARDIOTHORACIC VASCULAR SURGERY)

## 2024-07-15 PROCEDURE — 33967 INSERT I-AORT PERCUT DEVICE: CPT | Performed by: INTERNAL MEDICINE

## 2024-07-15 PROCEDURE — 02100Z9 BYPASS CORONARY ARTERY, ONE ARTERY FROM LEFT INTERNAL MAMMARY, OPEN APPROACH: ICD-10-PCS | Performed by: THORACIC SURGERY (CARDIOTHORACIC VASCULAR SURGERY)

## 2024-07-15 PROCEDURE — 82805 BLOOD GASES W/O2 SATURATION: CPT

## 2024-07-15 PROCEDURE — 272N000004 HC RX 272

## 2024-07-15 PROCEDURE — 33519 CABG ARTERY-VEIN THREE: CPT | Performed by: THORACIC SURGERY (CARDIOTHORACIC VASCULAR SURGERY)

## 2024-07-15 PROCEDURE — 250N000011 HC RX IP 250 OP 636: Performed by: ANESTHESIOLOGY

## 2024-07-15 PROCEDURE — 4A133B3 MONITORING OF ARTERIAL PRESSURE, PULMONARY, PERCUTANEOUS APPROACH: ICD-10-PCS | Performed by: THORACIC SURGERY (CARDIOTHORACIC VASCULAR SURGERY)

## 2024-07-15 PROCEDURE — 272N000004 HC RX 272: Performed by: THORACIC SURGERY (CARDIOTHORACIC VASCULAR SURGERY)

## 2024-07-15 PROCEDURE — 250N000011 HC RX IP 250 OP 636: Performed by: THORACIC SURGERY (CARDIOTHORACIC VASCULAR SURGERY)

## 2024-07-15 PROCEDURE — 410N000003 HC PER-PERFUSION 1ST 30 MIN: Performed by: THORACIC SURGERY (CARDIOTHORACIC VASCULAR SURGERY)

## 2024-07-15 PROCEDURE — 85027 COMPLETE CBC AUTOMATED: CPT | Performed by: PHYSICIAN ASSISTANT

## 2024-07-15 PROCEDURE — 83735 ASSAY OF MAGNESIUM: CPT | Performed by: PHYSICIAN ASSISTANT

## 2024-07-15 PROCEDURE — P9016 RBC LEUKOCYTES REDUCED: HCPCS | Performed by: THORACIC SURGERY (CARDIOTHORACIC VASCULAR SURGERY)

## 2024-07-15 PROCEDURE — C1894 INTRO/SHEATH, NON-LASER: HCPCS | Performed by: INTERNAL MEDICINE

## 2024-07-15 PROCEDURE — P9045 ALBUMIN (HUMAN), 5%, 250 ML: HCPCS | Performed by: THORACIC SURGERY (CARDIOTHORACIC VASCULAR SURGERY)

## 2024-07-15 PROCEDURE — 250N000012 HC RX MED GY IP 250 OP 636 PS 637: Performed by: THORACIC SURGERY (CARDIOTHORACIC VASCULAR SURGERY)

## 2024-07-15 PROCEDURE — 82330 ASSAY OF CALCIUM: CPT | Performed by: PHYSICIAN ASSISTANT

## 2024-07-15 PROCEDURE — 82805 BLOOD GASES W/O2 SATURATION: CPT | Performed by: PHYSICIAN ASSISTANT

## 2024-07-15 PROCEDURE — 360N000079 HC SURGERY LEVEL 6, PER MIN: Performed by: THORACIC SURGERY (CARDIOTHORACIC VASCULAR SURGERY)

## 2024-07-15 PROCEDURE — 258N000003 HC RX IP 258 OP 636: Performed by: THORACIC SURGERY (CARDIOTHORACIC VASCULAR SURGERY)

## 2024-07-15 PROCEDURE — C1725 CATH, TRANSLUMIN NON-LASER: HCPCS | Performed by: INTERNAL MEDICINE

## 2024-07-15 PROCEDURE — 36415 COLL VENOUS BLD VENIPUNCTURE: CPT | Performed by: INTERNAL MEDICINE

## 2024-07-15 PROCEDURE — 999N000099 HC STATISTIC MODERATE SEDATION < 10 MIN

## 2024-07-15 PROCEDURE — 250N000013 HC RX MED GY IP 250 OP 250 PS 637: Performed by: PHYSICIAN ASSISTANT

## 2024-07-15 PROCEDURE — 85610 PROTHROMBIN TIME: CPT | Performed by: PHYSICIAN ASSISTANT

## 2024-07-15 PROCEDURE — 99291 CRITICAL CARE FIRST HOUR: CPT | Performed by: STUDENT IN AN ORGANIZED HEALTH CARE EDUCATION/TRAINING PROGRAM

## 2024-07-15 PROCEDURE — 84100 ASSAY OF PHOSPHORUS: CPT | Performed by: PHYSICIAN ASSISTANT

## 2024-07-15 PROCEDURE — 85610 PROTHROMBIN TIME: CPT | Performed by: INTERNAL MEDICINE

## 2024-07-15 PROCEDURE — 83605 ASSAY OF LACTIC ACID: CPT | Performed by: PHYSICIAN ASSISTANT

## 2024-07-15 PROCEDURE — 5A02210 ASSISTANCE WITH CARDIAC OUTPUT USING BALLOON PUMP, CONTINUOUS: ICD-10-PCS | Performed by: THORACIC SURGERY (CARDIOTHORACIC VASCULAR SURGERY)

## 2024-07-15 PROCEDURE — 272N000001 HC OR GENERAL SUPPLY STERILE: Performed by: THORACIC SURGERY (CARDIOTHORACIC VASCULAR SURGERY)

## 2024-07-15 PROCEDURE — 33533 CABG ARTERIAL SINGLE: CPT | Performed by: THORACIC SURGERY (CARDIOTHORACIC VASCULAR SURGERY)

## 2024-07-15 PROCEDURE — 82805 BLOOD GASES W/O2 SATURATION: CPT | Performed by: THORACIC SURGERY (CARDIOTHORACIC VASCULAR SURGERY)

## 2024-07-15 PROCEDURE — C1898 LEAD, PMKR, OTHER THAN TRANS: HCPCS | Performed by: THORACIC SURGERY (CARDIOTHORACIC VASCULAR SURGERY)

## 2024-07-15 PROCEDURE — 250N000009 HC RX 250: Performed by: INTERNAL MEDICINE

## 2024-07-15 PROCEDURE — 999N000065 XR CHEST PORT 1 VIEW

## 2024-07-15 PROCEDURE — 258N000003 HC RX IP 258 OP 636: Performed by: INTERNAL MEDICINE

## 2024-07-15 PROCEDURE — 250N000011 HC RX IP 250 OP 636: Performed by: PHYSICIAN ASSISTANT

## 2024-07-15 PROCEDURE — 82962 GLUCOSE BLOOD TEST: CPT

## 2024-07-15 PROCEDURE — 250N000025 HC SEVOFLURANE, PER MIN: Performed by: THORACIC SURGERY (CARDIOTHORACIC VASCULAR SURGERY)

## 2024-07-15 PROCEDURE — 82810 BLOOD GASES O2 SAT ONLY: CPT

## 2024-07-15 PROCEDURE — 999N000157 HC STATISTIC RCP TIME EA 10 MIN

## 2024-07-15 PROCEDURE — 4A1239Z MONITORING OF CARDIAC OUTPUT, PERCUTANEOUS APPROACH: ICD-10-PCS | Performed by: THORACIC SURGERY (CARDIOTHORACIC VASCULAR SURGERY)

## 2024-07-15 PROCEDURE — 250N000009 HC RX 250: Performed by: THORACIC SURGERY (CARDIOTHORACIC VASCULAR SURGERY)

## 2024-07-15 PROCEDURE — 86923 COMPATIBILITY TEST ELECTRIC: CPT | Performed by: THORACIC SURGERY (CARDIOTHORACIC VASCULAR SURGERY)

## 2024-07-15 PROCEDURE — 82803 BLOOD GASES ANY COMBINATION: CPT

## 2024-07-15 PROCEDURE — 021209W BYPASS CORONARY ARTERY, THREE ARTERIES FROM AORTA WITH AUTOLOGOUS VENOUS TISSUE, OPEN APPROACH: ICD-10-PCS | Performed by: THORACIC SURGERY (CARDIOTHORACIC VASCULAR SURGERY)

## 2024-07-15 PROCEDURE — C1760 CLOSURE DEV, VASC: HCPCS | Performed by: THORACIC SURGERY (CARDIOTHORACIC VASCULAR SURGERY)

## 2024-07-15 PROCEDURE — 272N000001 HC OR GENERAL SUPPLY STERILE: Performed by: INTERNAL MEDICINE

## 2024-07-15 PROCEDURE — 999N000259 HC STATISTIC EXTUBATION

## 2024-07-15 PROCEDURE — 258N000003 HC RX IP 258 OP 636: Performed by: ANESTHESIOLOGY

## 2024-07-15 PROCEDURE — 85384 FIBRINOGEN ACTIVITY: CPT | Performed by: INTERNAL MEDICINE

## 2024-07-15 PROCEDURE — 84295 ASSAY OF SERUM SODIUM: CPT | Performed by: PHYSICIAN ASSISTANT

## 2024-07-15 PROCEDURE — 33533 CABG ARTERIAL SINGLE: CPT | Performed by: ANESTHESIOLOGY

## 2024-07-15 PROCEDURE — 84132 ASSAY OF SERUM POTASSIUM: CPT | Performed by: PHYSICIAN ASSISTANT

## 2024-07-15 PROCEDURE — 999N000141 HC STATISTIC PRE-PROCEDURE NURSING ASSESSMENT: Performed by: THORACIC SURGERY (CARDIOTHORACIC VASCULAR SURGERY)

## 2024-07-15 PROCEDURE — 5A1221Z PERFORMANCE OF CARDIAC OUTPUT, CONTINUOUS: ICD-10-PCS | Performed by: THORACIC SURGERY (CARDIOTHORACIC VASCULAR SURGERY)

## 2024-07-15 PROCEDURE — 85730 THROMBOPLASTIN TIME PARTIAL: CPT | Performed by: PHYSICIAN ASSISTANT

## 2024-07-15 PROCEDURE — 300N000004 RESEARCH KIT COLLECTION: Performed by: PHYSICIAN ASSISTANT

## 2024-07-15 PROCEDURE — 250N000009 HC RX 250: Performed by: ANESTHESIOLOGY

## 2024-07-15 DEVICE — IMP ATRICLIP FLEX V DEVICE LAA EXLUSION 50MM ACHV50: Type: IMPLANTABLE DEVICE | Site: CHEST | Status: FUNCTIONAL

## 2024-07-15 RX ORDER — CEFAZOLIN SODIUM 1 G/3ML
1 INJECTION, POWDER, FOR SOLUTION INTRAMUSCULAR; INTRAVENOUS EVERY 8 HOURS
Status: COMPLETED | OUTPATIENT
Start: 2024-07-15 | End: 2024-07-16

## 2024-07-15 RX ORDER — ACETAMINOPHEN 325 MG/1
975 TABLET ORAL EVERY 8 HOURS
Status: DISCONTINUED | OUTPATIENT
Start: 2024-07-15 | End: 2024-07-20

## 2024-07-15 RX ORDER — EPHEDRINE SULFATE 50 MG/ML
INJECTION, SOLUTION INTRAMUSCULAR; INTRAVENOUS; SUBCUTANEOUS PRN
Status: DISCONTINUED | OUTPATIENT
Start: 2024-07-15 | End: 2024-07-15

## 2024-07-15 RX ORDER — ASPIRIN 81 MG/1
162 TABLET, CHEWABLE ORAL ONCE
Status: COMPLETED | OUTPATIENT
Start: 2024-07-15 | End: 2024-07-15

## 2024-07-15 RX ORDER — POLYETHYLENE GLYCOL 3350 17 G/17G
17 POWDER, FOR SOLUTION ORAL DAILY
Status: DISCONTINUED | OUTPATIENT
Start: 2024-07-16 | End: 2024-07-20 | Stop reason: HOSPADM

## 2024-07-15 RX ORDER — AMOXICILLIN 250 MG
1 CAPSULE ORAL 2 TIMES DAILY
Status: DISCONTINUED | OUTPATIENT
Start: 2024-07-15 | End: 2024-07-20 | Stop reason: HOSPADM

## 2024-07-15 RX ORDER — PHENYLEPHRINE HCL IN 0.9% NACL 50MG/250ML
.1-4 PLASTIC BAG, INJECTION (ML) INTRAVENOUS CONTINUOUS PRN
Status: DISCONTINUED | OUTPATIENT
Start: 2024-07-15 | End: 2024-07-16

## 2024-07-15 RX ORDER — CEFAZOLIN SODIUM/WATER 2 G/20 ML
2 SYRINGE (ML) INTRAVENOUS SEE ADMIN INSTRUCTIONS
Status: DISCONTINUED | OUTPATIENT
Start: 2024-07-15 | End: 2024-07-15 | Stop reason: HOSPADM

## 2024-07-15 RX ORDER — MAGNESIUM SULFATE 4 G/50ML
4 INJECTION INTRAVENOUS ONCE
Status: COMPLETED | OUTPATIENT
Start: 2024-07-15 | End: 2024-07-15

## 2024-07-15 RX ORDER — ASPIRIN 300 MG/1
300 SUPPOSITORY RECTAL ONCE
Status: COMPLETED | OUTPATIENT
Start: 2024-07-15 | End: 2024-07-15

## 2024-07-15 RX ORDER — HYDROMORPHONE HCL IN WATER/PF 6 MG/30 ML
0.2 PATIENT CONTROLLED ANALGESIA SYRINGE INTRAVENOUS
Status: DISCONTINUED | OUTPATIENT
Start: 2024-07-15 | End: 2024-07-18

## 2024-07-15 RX ORDER — PANTOPRAZOLE SODIUM 40 MG/1
40 TABLET, DELAYED RELEASE ORAL DAILY
Status: DISCONTINUED | OUTPATIENT
Start: 2024-07-15 | End: 2024-07-20 | Stop reason: HOSPADM

## 2024-07-15 RX ORDER — HYDRALAZINE HYDROCHLORIDE 20 MG/ML
10 INJECTION INTRAMUSCULAR; INTRAVENOUS EVERY 30 MIN PRN
Status: DISCONTINUED | OUTPATIENT
Start: 2024-07-15 | End: 2024-07-20 | Stop reason: HOSPADM

## 2024-07-15 RX ORDER — KETAMINE HYDROCHLORIDE 10 MG/ML
INJECTION INTRAMUSCULAR; INTRAVENOUS PRN
Status: DISCONTINUED | OUTPATIENT
Start: 2024-07-15 | End: 2024-07-15

## 2024-07-15 RX ORDER — CALCIUM GLUCONATE 20 MG/ML
1 INJECTION, SOLUTION INTRAVENOUS
Status: DISCONTINUED | OUTPATIENT
Start: 2024-07-15 | End: 2024-07-20 | Stop reason: HOSPADM

## 2024-07-15 RX ORDER — CHLORHEXIDINE GLUCONATE ORAL RINSE 1.2 MG/ML
10 SOLUTION DENTAL ONCE
Status: COMPLETED | OUTPATIENT
Start: 2024-07-15 | End: 2024-07-15

## 2024-07-15 RX ORDER — ATORVASTATIN CALCIUM 40 MG/1
80 TABLET, FILM COATED ORAL DAILY
Status: DISCONTINUED | OUTPATIENT
Start: 2024-07-15 | End: 2024-07-20 | Stop reason: HOSPADM

## 2024-07-15 RX ORDER — FENTANYL CITRATE 50 UG/ML
INJECTION, SOLUTION INTRAMUSCULAR; INTRAVENOUS PRN
Status: DISCONTINUED | OUTPATIENT
Start: 2024-07-15 | End: 2024-07-15

## 2024-07-15 RX ORDER — ALBUTEROL SULFATE 90 UG/1
AEROSOL, METERED RESPIRATORY (INHALATION) PRN
Status: DISCONTINUED | OUTPATIENT
Start: 2024-07-15 | End: 2024-07-15

## 2024-07-15 RX ORDER — GLYCOPYRROLATE 0.2 MG/ML
INJECTION, SOLUTION INTRAMUSCULAR; INTRAVENOUS PRN
Status: DISCONTINUED | OUTPATIENT
Start: 2024-07-15 | End: 2024-07-15

## 2024-07-15 RX ORDER — HYDROMORPHONE HCL IN WATER/PF 6 MG/30 ML
0.4 PATIENT CONTROLLED ANALGESIA SYRINGE INTRAVENOUS
Status: DISCONTINUED | OUTPATIENT
Start: 2024-07-15 | End: 2024-07-18

## 2024-07-15 RX ORDER — ACETAMINOPHEN 325 MG/1
650 TABLET ORAL EVERY 4 HOURS PRN
Status: DISCONTINUED | OUTPATIENT
Start: 2024-07-18 | End: 2024-07-20 | Stop reason: HOSPADM

## 2024-07-15 RX ORDER — ASPIRIN 81 MG/1
162 TABLET, CHEWABLE ORAL
Status: COMPLETED | OUTPATIENT
Start: 2024-07-15 | End: 2024-07-15

## 2024-07-15 RX ORDER — LIDOCAINE 40 MG/G
CREAM TOPICAL
Status: DISCONTINUED | OUTPATIENT
Start: 2024-07-15 | End: 2024-07-15 | Stop reason: HOSPADM

## 2024-07-15 RX ORDER — NALOXONE HYDROCHLORIDE 0.4 MG/ML
0.4 INJECTION, SOLUTION INTRAMUSCULAR; INTRAVENOUS; SUBCUTANEOUS
Status: DISCONTINUED | OUTPATIENT
Start: 2024-07-15 | End: 2024-07-20 | Stop reason: HOSPADM

## 2024-07-15 RX ORDER — VANCOMYCIN HYDROCHLORIDE 1 G/20ML
INJECTION, POWDER, LYOPHILIZED, FOR SOLUTION INTRAVENOUS PRN
Status: DISCONTINUED | OUTPATIENT
Start: 2024-07-15 | End: 2024-07-15 | Stop reason: HOSPADM

## 2024-07-15 RX ORDER — LIDOCAINE 4 G/G
1-2 PATCH TOPICAL EVERY 24 HOURS
Status: DISCONTINUED | OUTPATIENT
Start: 2024-07-15 | End: 2024-07-20 | Stop reason: HOSPADM

## 2024-07-15 RX ORDER — ONDANSETRON 4 MG/1
4 TABLET, ORALLY DISINTEGRATING ORAL EVERY 6 HOURS PRN
Status: DISCONTINUED | OUTPATIENT
Start: 2024-07-15 | End: 2024-07-20 | Stop reason: HOSPADM

## 2024-07-15 RX ORDER — HEPARIN SODIUM 200 [USP'U]/100ML
INJECTION, SOLUTION INTRAVENOUS
Status: DISCONTINUED | OUTPATIENT
Start: 2024-07-15 | End: 2024-07-15 | Stop reason: HOSPADM

## 2024-07-15 RX ORDER — DEXMEDETOMIDINE HYDROCHLORIDE 4 UG/ML
INJECTION, SOLUTION INTRAVENOUS CONTINUOUS PRN
Status: DISCONTINUED | OUTPATIENT
Start: 2024-07-15 | End: 2024-07-15

## 2024-07-15 RX ORDER — ASPIRIN 300 MG/1
300 SUPPOSITORY RECTAL DAILY
Status: DISCONTINUED | OUTPATIENT
Start: 2024-07-16 | End: 2024-07-20

## 2024-07-15 RX ORDER — SODIUM CHLORIDE, SODIUM GLUCONATE, SODIUM ACETATE, POTASSIUM CHLORIDE AND MAGNESIUM CHLORIDE 526; 502; 368; 37; 30 MG/100ML; MG/100ML; MG/100ML; MG/100ML; MG/100ML
1000 INJECTION, SOLUTION INTRAVENOUS
Status: DISCONTINUED | OUTPATIENT
Start: 2024-07-15 | End: 2024-07-15

## 2024-07-15 RX ORDER — PROPOFOL 10 MG/ML
INJECTION, EMULSION INTRAVENOUS PRN
Status: DISCONTINUED | OUTPATIENT
Start: 2024-07-15 | End: 2024-07-15

## 2024-07-15 RX ORDER — CALCIUM GLUCONATE 20 MG/ML
2 INJECTION, SOLUTION INTRAVENOUS
Status: DISCONTINUED | OUTPATIENT
Start: 2024-07-15 | End: 2024-07-20 | Stop reason: HOSPADM

## 2024-07-15 RX ORDER — BISACODYL 10 MG
10 SUPPOSITORY, RECTAL RECTAL DAILY PRN
Status: DISCONTINUED | OUTPATIENT
Start: 2024-07-15 | End: 2024-07-20 | Stop reason: HOSPADM

## 2024-07-15 RX ORDER — SPIRONOLACTONE 25 MG/1
25 TABLET ORAL DAILY
Status: DISCONTINUED | OUTPATIENT
Start: 2024-07-15 | End: 2024-07-20 | Stop reason: HOSPADM

## 2024-07-15 RX ORDER — METHADONE HYDROCHLORIDE 10 MG/ML
INJECTION, SOLUTION INTRAMUSCULAR; INTRAVENOUS; SUBCUTANEOUS
Status: DISCONTINUED
Start: 2024-07-15 | End: 2024-07-15 | Stop reason: HOSPADM

## 2024-07-15 RX ORDER — ONDANSETRON 2 MG/ML
4 INJECTION INTRAMUSCULAR; INTRAVENOUS EVERY 6 HOURS PRN
Status: DISCONTINUED | OUTPATIENT
Start: 2024-07-15 | End: 2024-07-20 | Stop reason: HOSPADM

## 2024-07-15 RX ORDER — METHADONE HYDROCHLORIDE 10 MG/ML
INJECTION, SOLUTION INTRAMUSCULAR; INTRAVENOUS; SUBCUTANEOUS PRN
Status: DISCONTINUED | OUTPATIENT
Start: 2024-07-15 | End: 2024-07-15

## 2024-07-15 RX ORDER — PROTAMINE SULFATE 10 MG/ML
INJECTION, SOLUTION INTRAVENOUS PRN
Status: DISCONTINUED | OUTPATIENT
Start: 2024-07-15 | End: 2024-07-15

## 2024-07-15 RX ORDER — CEFAZOLIN SODIUM 1 G/3ML
INJECTION, POWDER, FOR SOLUTION INTRAMUSCULAR; INTRAVENOUS PRN
Status: DISCONTINUED | OUTPATIENT
Start: 2024-07-15 | End: 2024-07-15

## 2024-07-15 RX ORDER — LIDOCAINE HYDROCHLORIDE 10 MG/ML
INJECTION, SOLUTION INFILTRATION; PERINEURAL PRN
Status: DISCONTINUED | OUTPATIENT
Start: 2024-07-15 | End: 2024-07-15

## 2024-07-15 RX ORDER — METHADONE HYDROCHLORIDE 10 MG/ML
20 INJECTION, SOLUTION INTRAMUSCULAR; INTRAVENOUS; SUBCUTANEOUS ONCE
Status: DISCONTINUED | OUTPATIENT
Start: 2024-07-15 | End: 2024-07-15 | Stop reason: HOSPADM

## 2024-07-15 RX ORDER — DEXMEDETOMIDINE HYDROCHLORIDE 4 UG/ML
.2-1.2 INJECTION, SOLUTION INTRAVENOUS CONTINUOUS
Status: DISCONTINUED | OUTPATIENT
Start: 2024-07-15 | End: 2024-07-15 | Stop reason: HOSPADM

## 2024-07-15 RX ORDER — DEXAMETHASONE SODIUM PHOSPHATE 4 MG/ML
INJECTION, SOLUTION INTRA-ARTICULAR; INTRALESIONAL; INTRAMUSCULAR; INTRAVENOUS; SOFT TISSUE PRN
Status: DISCONTINUED | OUTPATIENT
Start: 2024-07-15 | End: 2024-07-15

## 2024-07-15 RX ORDER — HEPARIN SODIUM 5000 [USP'U]/.5ML
5000 INJECTION, SOLUTION INTRAVENOUS; SUBCUTANEOUS EVERY 8 HOURS
Status: DISCONTINUED | OUTPATIENT
Start: 2024-07-16 | End: 2024-07-20 | Stop reason: HOSPADM

## 2024-07-15 RX ORDER — OXYCODONE HYDROCHLORIDE 5 MG/1
5 TABLET ORAL EVERY 4 HOURS PRN
Status: DISCONTINUED | OUTPATIENT
Start: 2024-07-15 | End: 2024-07-20

## 2024-07-15 RX ORDER — DEXTROSE MONOHYDRATE 25 G/50ML
25-50 INJECTION, SOLUTION INTRAVENOUS
Status: DISCONTINUED | OUTPATIENT
Start: 2024-07-15 | End: 2024-07-20 | Stop reason: HOSPADM

## 2024-07-15 RX ORDER — SODIUM CHLORIDE, SODIUM LACTATE, POTASSIUM CHLORIDE, CALCIUM CHLORIDE 600; 310; 30; 20 MG/100ML; MG/100ML; MG/100ML; MG/100ML
INJECTION, SOLUTION INTRAVENOUS CONTINUOUS
Status: DISCONTINUED | OUTPATIENT
Start: 2024-07-15 | End: 2024-07-15 | Stop reason: HOSPADM

## 2024-07-15 RX ORDER — HEPARIN SODIUM 1000 [USP'U]/ML
INJECTION, SOLUTION INTRAVENOUS; SUBCUTANEOUS PRN
Status: DISCONTINUED | OUTPATIENT
Start: 2024-07-15 | End: 2024-07-15

## 2024-07-15 RX ORDER — SODIUM CHLORIDE 9 MG/ML
INJECTION, SOLUTION INTRAVENOUS CONTINUOUS PRN
Status: DISCONTINUED | OUTPATIENT
Start: 2024-07-15 | End: 2024-07-15

## 2024-07-15 RX ORDER — NALOXONE HYDROCHLORIDE 0.4 MG/ML
0.2 INJECTION, SOLUTION INTRAMUSCULAR; INTRAVENOUS; SUBCUTANEOUS
Status: DISCONTINUED | OUTPATIENT
Start: 2024-07-15 | End: 2024-07-20 | Stop reason: HOSPADM

## 2024-07-15 RX ORDER — NEOSTIGMINE METHYLSULFATE 1 MG/ML
VIAL (ML) INJECTION PRN
Status: DISCONTINUED | OUTPATIENT
Start: 2024-07-15 | End: 2024-07-15

## 2024-07-15 RX ORDER — DAPAGLIFLOZIN 5 MG/1
10 TABLET, FILM COATED ORAL DAILY
Status: DISCONTINUED | OUTPATIENT
Start: 2024-07-15 | End: 2024-07-18

## 2024-07-15 RX ORDER — ONDANSETRON 2 MG/ML
INJECTION INTRAMUSCULAR; INTRAVENOUS PRN
Status: DISCONTINUED | OUTPATIENT
Start: 2024-07-15 | End: 2024-07-15

## 2024-07-15 RX ORDER — PROCHLORPERAZINE MALEATE 5 MG
10 TABLET ORAL EVERY 6 HOURS PRN
Status: DISCONTINUED | OUTPATIENT
Start: 2024-07-15 | End: 2024-07-20 | Stop reason: HOSPADM

## 2024-07-15 RX ORDER — NICOTINE POLACRILEX 4 MG
15-30 LOZENGE BUCCAL
Status: DISCONTINUED | OUTPATIENT
Start: 2024-07-15 | End: 2024-07-20 | Stop reason: HOSPADM

## 2024-07-15 RX ORDER — PAPAVERINE HYDROCHLORIDE 30 MG/ML
INJECTION INTRAMUSCULAR; INTRAVENOUS PRN
Status: DISCONTINUED | OUTPATIENT
Start: 2024-07-15 | End: 2024-07-15 | Stop reason: HOSPADM

## 2024-07-15 RX ORDER — FENTANYL CITRATE 50 UG/ML
INJECTION, SOLUTION INTRAMUSCULAR; INTRAVENOUS
Status: DISCONTINUED | OUTPATIENT
Start: 2024-07-15 | End: 2024-07-15 | Stop reason: HOSPADM

## 2024-07-15 RX ORDER — CEFAZOLIN SODIUM/WATER 2 G/20 ML
2 SYRINGE (ML) INTRAVENOUS
Status: DISCONTINUED | OUTPATIENT
Start: 2024-07-15 | End: 2024-07-15 | Stop reason: HOSPADM

## 2024-07-15 RX ORDER — ASPIRIN 81 MG/1
81 TABLET, CHEWABLE ORAL
Status: COMPLETED | OUTPATIENT
Start: 2024-07-15 | End: 2024-07-15

## 2024-07-15 RX ORDER — NITROGLYCERIN 10 MG/100ML
INJECTION INTRAVENOUS PRN
Status: DISCONTINUED | OUTPATIENT
Start: 2024-07-15 | End: 2024-07-15

## 2024-07-15 RX ORDER — PHENYLEPHRINE HCL IN 0.9% NACL 50MG/250ML
.1-6 PLASTIC BAG, INJECTION (ML) INTRAVENOUS CONTINUOUS
Status: DISCONTINUED | OUTPATIENT
Start: 2024-07-15 | End: 2024-07-15 | Stop reason: HOSPADM

## 2024-07-15 RX ORDER — OXYCODONE HYDROCHLORIDE 5 MG/1
10 TABLET ORAL EVERY 4 HOURS PRN
Status: DISCONTINUED | OUTPATIENT
Start: 2024-07-15 | End: 2024-07-20

## 2024-07-15 RX ORDER — IPRATROPIUM BROMIDE AND ALBUTEROL SULFATE 2.5; .5 MG/3ML; MG/3ML
3 SOLUTION RESPIRATORY (INHALATION) EVERY 4 HOURS PRN
Status: DISCONTINUED | OUTPATIENT
Start: 2024-07-15 | End: 2024-07-20 | Stop reason: HOSPADM

## 2024-07-15 RX ORDER — ASPIRIN 81 MG/1
162 TABLET, CHEWABLE ORAL DAILY
Status: DISCONTINUED | OUTPATIENT
Start: 2024-07-16 | End: 2024-07-20

## 2024-07-15 RX ORDER — SODIUM CHLORIDE, SODIUM LACTATE, POTASSIUM CHLORIDE, CALCIUM CHLORIDE 600; 310; 30; 20 MG/100ML; MG/100ML; MG/100ML; MG/100ML
INJECTION, SOLUTION INTRAVENOUS CONTINUOUS PRN
Status: DISCONTINUED | OUTPATIENT
Start: 2024-07-15 | End: 2024-07-15

## 2024-07-15 RX ORDER — DEXMEDETOMIDINE HYDROCHLORIDE 4 UG/ML
.1-1.2 INJECTION, SOLUTION INTRAVENOUS CONTINUOUS
Status: DISCONTINUED | OUTPATIENT
Start: 2024-07-15 | End: 2024-07-16

## 2024-07-15 RX ORDER — ACETAMINOPHEN 650 MG/1
650 SUPPOSITORY RECTAL EVERY 8 HOURS
Status: DISCONTINUED | OUTPATIENT
Start: 2024-07-15 | End: 2024-07-16

## 2024-07-15 RX ADMIN — PROTAMINE SULFATE 100 MG: 10 INJECTION, SOLUTION INTRAVENOUS at 17:29

## 2024-07-15 RX ADMIN — PROTAMINE SULFATE 20 MG: 10 INJECTION, SOLUTION INTRAVENOUS at 17:25

## 2024-07-15 RX ADMIN — PHENYLEPHRINE HYDROCHLORIDE 100 MCG: 10 INJECTION INTRAVENOUS at 17:16

## 2024-07-15 RX ADMIN — DEXAMETHASONE SODIUM PHOSPHATE 4 MG: 4 INJECTION, SOLUTION INTRA-ARTICULAR; INTRALESIONAL; INTRAMUSCULAR; INTRAVENOUS; SOFT TISSUE at 14:04

## 2024-07-15 RX ADMIN — ROCURONIUM BROMIDE 10 MG: 50 INJECTION, SOLUTION INTRAVENOUS at 17:00

## 2024-07-15 RX ADMIN — AMINOCAPROIC ACID 7.5 G: 250 INJECTION, SOLUTION INTRAVENOUS at 14:10

## 2024-07-15 RX ADMIN — ALBUMIN HUMAN 12.5 G: 0.05 INJECTION, SOLUTION INTRAVENOUS at 19:12

## 2024-07-15 RX ADMIN — SENNOSIDES AND DOCUSATE SODIUM 1 TABLET: 50; 8.6 TABLET ORAL at 23:47

## 2024-07-15 RX ADMIN — PANTOPRAZOLE SODIUM 40 MG: 40 TABLET, DELAYED RELEASE ORAL at 23:47

## 2024-07-15 RX ADMIN — Medication 20 MG: at 13:41

## 2024-07-15 RX ADMIN — NITROGLYCERIN 10 MCG: 10 INJECTION INTRAVENOUS at 14:44

## 2024-07-15 RX ADMIN — GLYCOPYRROLATE 0.3 MG: 0.2 INJECTION INTRAMUSCULAR; INTRAVENOUS at 18:48

## 2024-07-15 RX ADMIN — NITROGLYCERIN 20 MCG: 10 INJECTION INTRAVENOUS at 14:40

## 2024-07-15 RX ADMIN — PHENYLEPHRINE HYDROCHLORIDE 100 MCG: 10 INJECTION INTRAVENOUS at 14:20

## 2024-07-15 RX ADMIN — KETAMINE HYDROCHLORIDE 50 MG: 10 INJECTION INTRAMUSCULAR; INTRAVENOUS at 13:41

## 2024-07-15 RX ADMIN — FENTANYL CITRATE 50 MCG: 50 INJECTION INTRAMUSCULAR; INTRAVENOUS at 13:39

## 2024-07-15 RX ADMIN — PROTAMINE SULFATE 80 MG: 10 INJECTION, SOLUTION INTRAVENOUS at 17:27

## 2024-07-15 RX ADMIN — ASPIRIN 81 MG CHEWABLE TABLET 81 MG: 81 TABLET CHEWABLE at 11:04

## 2024-07-15 RX ADMIN — EPINEPHRINE 0.03 MCG/KG/MIN: 1 INJECTION INTRAMUSCULAR; INTRAVENOUS; SUBCUTANEOUS at 17:14

## 2024-07-15 RX ADMIN — ASPIRIN 300 MG: 300 SUPPOSITORY RECTAL at 20:39

## 2024-07-15 RX ADMIN — DEXMEDETOMIDINE HYDROCHLORIDE 0.2 MCG/KG/HR: 400 INJECTION INTRAVENOUS at 14:10

## 2024-07-15 RX ADMIN — FENTANYL CITRATE 50 MCG: 50 INJECTION INTRAMUSCULAR; INTRAVENOUS at 13:30

## 2024-07-15 RX ADMIN — ONDANSETRON 4 MG: 2 INJECTION INTRAMUSCULAR; INTRAVENOUS at 17:47

## 2024-07-15 RX ADMIN — CEFAZOLIN 1 G: 1 INJECTION, POWDER, FOR SOLUTION INTRAMUSCULAR; INTRAVENOUS at 21:57

## 2024-07-15 RX ADMIN — Medication 5 MG: at 14:20

## 2024-07-15 RX ADMIN — CEFAZOLIN 2 G: 1 INJECTION, POWDER, FOR SOLUTION INTRAMUSCULAR; INTRAVENOUS at 14:15

## 2024-07-15 RX ADMIN — CHLORHEXIDINE GLUCONATE 10 ML: 1.2 RINSE ORAL at 11:04

## 2024-07-15 RX ADMIN — ROCURONIUM BROMIDE 70 MG: 50 INJECTION, SOLUTION INTRAVENOUS at 13:42

## 2024-07-15 RX ADMIN — DESMOPRESSIN ACETATE 22.96 MCG: 4 INJECTION, SOLUTION INTRAVENOUS; SUBCUTANEOUS at 17:41

## 2024-07-15 RX ADMIN — ATORVASTATIN CALCIUM 80 MG: 40 TABLET, FILM COATED ORAL at 23:47

## 2024-07-15 RX ADMIN — MAGNESIUM SULFATE HEPTAHYDRATE 4 G: 80 INJECTION, SOLUTION INTRAVENOUS at 11:03

## 2024-07-15 RX ADMIN — PROTAMINE SULFATE 50 MG: 10 INJECTION, SOLUTION INTRAVENOUS at 18:48

## 2024-07-15 RX ADMIN — PHENYLEPHRINE HYDROCHLORIDE 100 MCG: 10 INJECTION INTRAVENOUS at 13:53

## 2024-07-15 RX ADMIN — SODIUM PHOSPHATE, MONOBASIC, MONOHYDRATE AND SODIUM PHOSPHATE, DIBASIC, ANHYDROUS 9 MMOL: 142; 276 INJECTION, SOLUTION INTRAVENOUS at 22:40

## 2024-07-15 RX ADMIN — Medication 5 MG: at 13:39

## 2024-07-15 RX ADMIN — HEPARIN SODIUM 30000 UNITS: 1000 INJECTION INTRAVENOUS; SUBCUTANEOUS at 15:25

## 2024-07-15 RX ADMIN — PHENYLEPHRINE HYDROCHLORIDE 100 MCG: 10 INJECTION INTRAVENOUS at 13:58

## 2024-07-15 RX ADMIN — PHENYLEPHRINE HYDROCHLORIDE 0.3 MCG/KG/MIN: 10 INJECTION INTRAVENOUS at 14:20

## 2024-07-15 RX ADMIN — PHENYLEPHRINE HYDROCHLORIDE 100 MCG: 10 INJECTION INTRAVENOUS at 17:22

## 2024-07-15 RX ADMIN — Medication 5 MG: at 17:21

## 2024-07-15 RX ADMIN — NEOSTIGMINE METHYLSULFATE 4 MG: 1 INJECTION, SOLUTION INTRAVENOUS at 18:48

## 2024-07-15 RX ADMIN — PHENYLEPHRINE HYDROCHLORIDE 100 MCG: 10 INJECTION INTRAVENOUS at 15:25

## 2024-07-15 RX ADMIN — INSULIN HUMAN 3 UNITS/HR: 1 INJECTION, SOLUTION INTRAVENOUS at 17:03

## 2024-07-15 RX ADMIN — PROPOFOL 150 MG: 10 INJECTION, EMULSION INTRAVENOUS at 13:41

## 2024-07-15 RX ADMIN — AMINOCAPROIC ACID 1 G/HR: 250 INJECTION, SOLUTION INTRAVENOUS at 15:40

## 2024-07-15 RX ADMIN — PHENYLEPHRINE HYDROCHLORIDE 150 MCG: 10 INJECTION INTRAVENOUS at 14:05

## 2024-07-15 RX ADMIN — PHENYLEPHRINE HYDROCHLORIDE 50 MCG: 10 INJECTION INTRAVENOUS at 13:42

## 2024-07-15 RX ADMIN — ROCURONIUM BROMIDE 20 MG: 50 INJECTION, SOLUTION INTRAVENOUS at 15:32

## 2024-07-15 RX ADMIN — SODIUM CHLORIDE: 9 INJECTION, SOLUTION INTRAVENOUS at 14:07

## 2024-07-15 RX ADMIN — SODIUM CHLORIDE, POTASSIUM CHLORIDE, SODIUM LACTATE AND CALCIUM CHLORIDE: 600; 310; 30; 20 INJECTION, SOLUTION INTRAVENOUS at 17:21

## 2024-07-15 RX ADMIN — Medication 5 MG: at 14:07

## 2024-07-15 RX ADMIN — PHENYLEPHRINE HYDROCHLORIDE 100 MCG: 10 INJECTION INTRAVENOUS at 17:25

## 2024-07-15 RX ADMIN — PHENYLEPHRINE HYDROCHLORIDE 100 MCG: 10 INJECTION INTRAVENOUS at 17:44

## 2024-07-15 RX ADMIN — LIDOCAINE HYDROCHLORIDE 5 ML: 10 INJECTION, SOLUTION INFILTRATION; PERINEURAL at 13:41

## 2024-07-15 RX ADMIN — Medication 5 MG: at 17:16

## 2024-07-15 RX ADMIN — NITROGLYCERIN 40 MCG: 10 INJECTION INTRAVENOUS at 17:31

## 2024-07-15 RX ADMIN — SODIUM CHLORIDE, POTASSIUM CHLORIDE, SODIUM LACTATE AND CALCIUM CHLORIDE: 600; 310; 30; 20 INJECTION, SOLUTION INTRAVENOUS at 13:36

## 2024-07-15 RX ADMIN — LIDOCAINE 1 PATCH: 246 PATCH TOPICAL at 19:30

## 2024-07-15 RX ADMIN — ALBUTEROL SULFATE 6 PUFF: 90 AEROSOL, METERED RESPIRATORY (INHALATION) at 17:20

## 2024-07-15 RX ADMIN — ACETAMINOPHEN 975 MG: 325 TABLET ORAL at 23:47

## 2024-07-15 ASSESSMENT — ACTIVITIES OF DAILY LIVING (ADL)
ADLS_ACUITY_SCORE: 18
ADLS_ACUITY_SCORE: 35
ADLS_ACUITY_SCORE: 18
ADLS_ACUITY_SCORE: 35
ADLS_ACUITY_SCORE: 18

## 2024-07-15 ASSESSMENT — LIFESTYLE VARIABLES: TOBACCO_USE: 1

## 2024-07-15 ASSESSMENT — COPD QUESTIONNAIRES: COPD: 1

## 2024-07-15 NOTE — ANESTHESIA PROCEDURE NOTES
Perioperative LORA Procedure Note    Staff -        Anesthesiologist:  Charles Recinos MD       Performed By: anesthesiologist  Preanesthesia Checklist:  Patient identified, IV assessed, risks and benefits discussed, monitors and equipment assessed, procedure being performed at surgeon's request and anesthesia consent obtained.    LORA Probe Insertion    Probe Status PRE Insertion: NO obvious damage  Probe type:  Adult 2D  Bite block used:   Soft  Insertion Technique: Easy, no oropharyngeal manipulation  Insertion complications: None obvious  Billing Report:A LORA report is NOT being generated.  Probe Status POST Removal: NO obvious damage

## 2024-07-15 NOTE — ANESTHESIA PROCEDURE NOTES
Central Line/PA Catheter Placement    Pre-Procedure   Staff -        Anesthesiologist:  Charles Recinos MD       Performed By: anesthesiologist       Location: OR       Pre-Anesthestic Checklist: patient identified, IV checked, site marked, risks and benefits discussed, informed consent, monitors and equipment checked and pre-op evaluation  Timeout:       Correct Patient: Yes        Correct Procedure: Yes        Correct Site: Yes        Correct Position: Yes        Correct Laterality: Yes   Line Placement:   This line was placed Post Induction starting at 7/15/2024 1:55 PM and ending at 7/15/2024 2:15 PM    Procedure   Procedure: central line       Laterality: right       Insertion Site: internal jugular.       Patient Position: Trendelenburg  Sterile Prep        All elements of maximal sterile barrier technique followed       Patient Prep/Sterile Barriers: draped, hand hygiene, gloves , hat , mask , draped, gown, sterile gel and probe cover       Skin prep: Chloraprep  Insertion/Injection        Technique: ultrasound guided and Seldinger Technique        1. Ultrasound was used to evaluate the access site.       2. Vein evaluated via ultrasound for patency/adequacy.       3. Using real-time ultrasound the needle/catheter was observed entering the artery/vein.       4. Permanent image was captured and entered into the patient's record.       5. The visualized structures were anatomically normal.       6. There were no apparent abnormal pathologic findings.       Introducer Type: 9 Fr, 2-lumen MAC        Type: PA/CVC with Introducer       Catheter Size: 9 Fr       Catheter Length: 12       Number of Lumens: double lumen       PA Catheter Type: CCO         Appropriate RV, RA and PA waveforms noted:  Yes            Withdrawn and Locked at cm: 64            Balloon down at end of the procedure:   Narrative         Secured by: suture       Tegaderm and Biopatch dressing used.       Complications: None apparent,         blood aspirated from all lumens,        All lumens flushed: Yes       Verification method: Ultrasound, Placement to be verified post-op and LORA

## 2024-07-15 NOTE — ANESTHESIA POSTPROCEDURE EVALUATION
Patient: Gonsalo Blakely    Procedure: Procedure(s):  CORONARY ARTERY BYPASS GRAFT TIMES FOUR, INTERNAL MAMMARY ARTERY HARVEST,  LEFT LEG ENDOSCOPIC VESSEL PROCUREMENT , EPIAORTIC ULTRASOUND, ANESTHEIA TRANSESOPHAGEAL ECHOCARDIOGRAM.  LIGATION, LEFT ATRIAL APPENDAGE, OPEN       Anesthesia Type:  General    Note:  Disposition: Inpatient   Postop Pain Control: Uneventful            Sign Out: Well controlled pain   PONV: No   Neuro/Psych: Uneventful            Sign Out: PLANNED postop sedation   Airway/Respiratory: Uneventful            Sign Out: AIRWAY IN SITU/Resp. Support               Airway in situ/Resp. Support: ETT                 Reason: Planned Pre-op   CV/Hemodynamics: Uneventful            Sign Out: Acceptable CV status; No obvious hypovolemia; No obvious fluid overload   Other NRE: NONE   DID A NON-ROUTINE EVENT OCCUR? No    Event details/Postop Comments:  Patient delivered to ICU directly from OR. Sedated, intubated, and ventilated. Patient's hemodynamic parameters achieved with Epi at 0.03.  CT output minimal and no signs of clinical bleeding prior to departing from OR.  Patient's intrinsic cardiac rhythm is SR in the 80's and pacing is set to DDI with a rate of 54 and A/V Sensitivity at 1.  Hgb prior to departing from OR was 11.8. Ventilator settings reviewed with RT.  Full report to ICU team completed with surgeon and OR team.       Last vitals:  Vitals:    07/15/24 1215 07/15/24 1230 07/15/24 1245   BP: 137/63 (!) 144/76 139/83   Pulse: 64 62 61   Resp:      Temp: 36.5  C (97.7  F)     SpO2: 93% 94% 95%       Electronically Signed By: Charles Recinos MD  July 15, 2024  6:47 PM
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
## 2024-07-15 NOTE — PRE-PROCEDURE
IABP Placement Pre CABG:    GENERAL PRE-PROCEDURE:     Risks and benefits: Risks, benefits and alternatives were discussed    Consent given by:  Patient  Patient states understanding of procedure being performed: Yes    Patient's understanding of procedure matches consent: Yes    Procedure consent matches procedure scheduled: Yes    Expected level of sedation:  Minimal  Appropriately NPO:  Yes  ASA Class:  3  Mallampati  :  Grade 2- soft palate, base of uvula, tonsillar pillars, and portion of posterior pharyngeal wall visible  Lungs:  Lungs clear with good breath sounds bilaterally  Heart:  Normal heart sounds and rate  History & Physical reviewed:  History and physical reviewed and no updates needed  Statement of review:  I have reviewed the lab findings, diagnostic data, medications, and the plan for sedation

## 2024-07-15 NOTE — ANESTHESIA PREPROCEDURE EVALUATION
Anesthesia Pre-Procedure Evaluation    Patient: Gonsalo Blakely   MRN: [id number] : 1965        Procedure : Procedure(s):  CORONARY ARTERY BYPASS GRAFT, INTERNAL MAMMARY ARTERY HARVEST, ENDOSCOPIC VESSEL PROCUREMENT,  ANESTHESIA TRANSESOPHAGEAL ECHOCARDIOGRAM  POSSIBLE LEFT RADIAL ARTERY HARVEST  LIGATION, LEFT ATRIAL APPENDAGE, OPEN          Past Medical History:   Diagnosis Date     Asthma      CAD (coronary artery disease)      Chronic obstructive pulmonary disease (H)      Dyslipidemia      Heart failure with reduced ejection fraction, NYHA class I (H)      HTN (hypertension)      Ischemic cardiomyopathy       Past Surgical History:   Procedure Laterality Date     CV CORONARY ANGIOGRAM N/A 2024    Procedure: CV CORONARY ANGIOGRAM;  Surgeon: Roman Florence MD;  Location: [facility] CATH LAB CV     CV LEFT HEART CATH N/A 2024    Procedure: Left Heart Catheterization;  Surgeon: Roman Florence MD;  Location: [facility] CATH LAB CV     SHOULDER SURGERY Right       Allergies   Allergen Reactions     Ace Inhibitors Cough      Social History     Tobacco Use     Smoking status: Former     Current packs/day: 0.00     Types: Cigarettes     Quit date: 2024     Years since quittin.0     Passive exposure: Current     Smokeless tobacco: Former     Types: Chew     Quit date:      Tobacco comments:     quit recently   Substance Use Topics     Alcohol use: Yes     Comment: 2 beers per month      Wt Readings from Last 1 Encounters:   24 77 kg (169 lb 12.8 oz)        Anesthesia Evaluation   Pt has had prior anesthetic.     No history of anesthetic complications       ROS/MED HX  ENT/Pulmonary:     (+)                tobacco use, Current use,     asthma    COPD,              Neurologic:  - neg neurologic ROS     Cardiovascular:     (+) Dyslipidemia hypertension- -  CAD -  - -      CHF  Last EF: 35-40                              METS/Exercise Tolerance: >4 METS    Hematologic:  - neg  What Is The Reason For Today's Visit?: Surveillance against skin cancer recurrences "hematologic  ROS     Musculoskeletal:  - neg musculoskeletal ROS     GI/Hepatic:  - neg GI/hepatic ROS     Renal/Genitourinary:  - neg Renal ROS     Endo:  - neg endo ROS     Psychiatric/Substance Use:  - neg psychiatric ROS     Infectious Disease:  - neg infectious disease ROS     Malignancy:  - neg malignancy ROS     Other:  - neg other ROS          Physical Exam    Airway  airway exam normal      Mallampati: I   TM distance: > 3 FB   Neck ROM: full   Mouth opening: > 3 cm    Respiratory Devices and Support         Dental  no notable dental history     (+) Modest Abnormalities - crowns, retainers, 1 or 2 missing teeth and Removable bridges or other hardware      Cardiovascular   cardiovascular exam normal          Pulmonary   pulmonary exam normal              OUTSIDE LABS:  CBC:   Lab Results   Component Value Date    WBC 8.7 06/21/2024    WBC 9.4 06/20/2024    HGB 17.4 06/21/2024    HGB 17.3 06/20/2024    HCT 53.9 (H) 06/21/2024    HCT 53.6 (H) 06/20/2024     06/21/2024     06/20/2024     BMP:   Lab Results   Component Value Date     06/21/2024     06/20/2024    POTASSIUM 4.7 06/21/2024    POTASSIUM 4.5 06/20/2024    CHLORIDE 99 06/21/2024    CHLORIDE 99 06/20/2024    CO2 33 (H) 06/21/2024    CO2 34 (H) 06/20/2024    BUN 25.0 (H) 06/21/2024    BUN 25.4 (H) 06/20/2024    CR 1.08 06/21/2024    CR 1.05 06/20/2024    GLC 85 06/27/2024     (H) 06/21/2024     COAGS:   Lab Results   Component Value Date    PTT 28 06/26/2024    INR 1.01 06/26/2024     POC: No results found for: \"BGM\", \"HCG\", \"HCGS\"  HEPATIC:   Lab Results   Component Value Date    ALBUMIN 4.2 06/17/2024    PROTTOTAL 6.9 06/17/2024    ALT 33 06/17/2024    AST 31 06/17/2024    ALKPHOS 100 06/17/2024    BILITOTAL 1.1 06/17/2024     OTHER:   Lab Results   Component Value Date    A1C 7.0 (H) 06/26/2024    JAIME 9.3 06/21/2024    MAG 2.2 06/26/2024       Anesthesia Plan    ASA Status:  4    NPO Status:  NPO Appropriate  " Year Excised?: 50+ yrs ago   Anesthesia Type: General.     - Airway: ETT   Induction: Intravenous.   Maintenance: Balanced.   Techniques and Equipment:     - Lines/Monitors: LORA, Arterial Line, Central Line, PAC, CVP, BIS, NIRS            LORA Absolute Contra-indication: NONE            LORA Relative Contra-indication: NONE     - Blood: Blood in Room, Cell Saver, PRBC     - Drips/Meds: Dexmed. infusion, Ketamine, Fentanyl, Phenylephrine, Norepi, Vasopressin, Epinephrine, Nicardipine     Consents    Anesthesia Plan(s) and associated risks, benefits, and realistic alternatives discussed. Questions answered and patient/representative(s) expressed understanding.     - Discussed:     - Discussed with:  Patient      - Extended Intubation/Ventilatory Support Discussed: Yes.      - Patient is DNR/DNI Status: No     Use of blood products discussed: Yes.     - Discussed with: Patient.     - Consented: consented to blood products            Reason for refusal: other.     Postoperative Care    Pain management: IV analgesics.     - Plan for long acting post-op opioid use   PONV prophylaxis: Ondansetron (or other 5HT-3)     Comments:    Other Comments: 50 mg ketamine IV on induction.  4 grams magnesium IV.  20 mg methadone IV on induction.           Charles Recinos MD    I have reviewed the pertinent notes and labs in the chart from the past 30 days and (re)examined the patient.  Any updates or changes from those notes are reflected in this note.              # DMII: A1C = 7.0 % (Ref range: <5.7 %) within past 6 months

## 2024-07-15 NOTE — ANESTHESIA CARE TRANSFER NOTE
Patient: Gonsalo Blakely    Procedure: Procedure(s):  CORONARY ARTERY BYPASS GRAFT TIMES FOUR, INTERNAL MAMMARY ARTERY HARVEST,  LEFT LEG ENDOSCOPIC VESSEL PROCUREMENT , EPIAORTIC ULTRASOUND, ANESTHEIA TRANSESOPHAGEAL ECHOCARDIOGRAM.  LIGATION, LEFT ATRIAL APPENDAGE, OPEN       Diagnosis: CAD (coronary artery disease) [I25.10]  Diagnosis Additional Information: No value filed.    Anesthesia Type:   General     Note:    Oropharynx: endotracheal tube in place  Level of Consciousness: unresponsive      Independent Airway: airway patency not satisfactory and stable  Dentition: dentition unchanged  Vital Signs Stable: post-procedure vital signs reviewed and stable  Report to RN Given: handoff report given  Patient transferred to: ICU  Comments: Patient received by ICU team. Vitals stable. Report given. All questions answered.  ICU Handoff: Call for PAUSE to initiate/utilize ICU HANDOFF, Identified Patient, Identified Responsible Provider, Reviewed the Pertinent Medical History, Discussed Surgical Course, Reviewed Intra-OP Anesthesia Management and Issues during Anesthesia, Set Expectations for Post Procedure Period and Allowed Opportunity for Questions and Acknowledgement of Understanding      Vitals:  Vitals Value Taken Time   BP     Temp     Pulse 83 07/15/24 1849   Resp 18 07/15/24 1835   SpO2 100 % 07/15/24 1849   Vitals shown include unfiled device data.    Electronically Signed By: ASHLEY Cottrell CRNA  July 15, 2024  6:51 PM

## 2024-07-15 NOTE — ANESTHESIA PROCEDURE NOTES
Airway       Patient location during procedure: OR       Procedure Start/Stop Times: 7/15/2024 1:46 PM  Staff -        Performed By: CRNAIndications and Patient Condition       Indications for airway management: pedro-procedural       Induction type:intravenous       Mask difficulty assessment: 2 - vent by mask + OA or adjuvant +/- NMBA    Final Airway Details       Final airway type: endotracheal airway       Successful airway: ETT - single  Endotracheal Airway Details        ETT size (mm): 7.5       Cuffed: yes       Successful intubation technique: direct laryngoscopy       DL Blade Type: Ortega 2       Grade View of Cords: 1       Position: Left       Measured from: gums/teeth       Secured at (cm): 23    Post intubation assessment        Placement verified by: capnometry, equal breath sounds and chest rise        Number of attempts at approach: 1       Number of other approaches attempted: 0       Ease of procedure: easy    Medication(s) Administered   Medication Administration Time: 7/15/2024 1:46 PM

## 2024-07-15 NOTE — PHARMACY-ADMISSION MEDICATION HISTORY
Pharmacist Admission Medication History    Admission medication history is complete. The information provided in this note is only as accurate as the sources available at the time of the update.    Information Source(s): Patient and CareEverywhere/SureScripts via in-person    Pertinent Information: none    Changes made to PTA medication list:  Added: none  Deleted: jardiance (switching to farxiga)  Changed: none    Allergies reviewed with patient and updates made in EHR: yes    Medication History Completed By: ALYSHA SMITH [facility] 7/15/2024 10:14 AM    PTA Med List   Medication Sig Last Dose    aspirin 81 MG EC tablet Take 81 mg by mouth daily 7/15/2024 at AM    atorvastatin (LIPITOR) 80 MG tablet Take 1 tablet (80 mg) by mouth daily 7/12/2024 at AM    dapagliflozin (FARXIGA) 10 MG TABS tablet Take 1 tablet (10 mg) by mouth daily  at has not picked up from pharmacy yet    furosemide (LASIX) 20 MG tablet Take 1 tablet (20 mg) by mouth daily 7/12/2024 at AM    losartan (COZAAR) 25 MG tablet Take 1 tablet (25 mg) by mouth daily 7/12/2024 at AM    metoprolol succinate ER (TOPROL XL) 50 MG 24 hr tablet Take 1 tablet (50 mg) by mouth daily 7/15/2024 at AM    spironolactone (ALDACTONE) 25 MG tablet Take 1 tablet (25 mg) by mouth daily 7/12/2024 at AM

## 2024-07-15 NOTE — OP NOTE
OPERATIVE REPORT     OPERATING ROOM:  Room 1    July 15, 2024    PROCEDURES PERFORMED:   Median sternotomy   Take down of the left internal mammary artery    Endoscopic greater saphenous vein procurement from the left lower extremity    Epiaortic ultrasound of the ascending aorta    Placement on central cardiopulmonary bypass    Quadruple vessel coronary artery bypass grafting;   -  Left internal mammary artery to the left anterior descending coronary artery  -  Separate reversed saphenous vein grafts to left anterior descending diagonal   Branch 1, the ramus intermedius and the right posterolateral coronary arteries  7.     Placement of a Flex V AtriClip on the left atrial appendage  8.     Placement of temporary atrial and ventricular pacing wires     SURGEONS:    Attending Surgeon:  Shannan Virk MD  First Assistant: Tiffany Rapp. STSAC  Resident Surgeon: Romain Ryan MD  Physician Assistant:  Justine Rey PA-C     ANESTHESIA:  General endotracheal    SKIN PREP:  Betadine and Duraprep    INCISION:  Median sternotomy, skip incision left upper leg     DRAINS: One 32 Fr mediastinal and one 24 Fr Byron drain left pleural space  CULTURES: None  SPECIMENS: None  CLOSURE: Routine     PREOPERATIVE DIAGNOSES:  Dyspnea on exertion  Ischemic cardiomyopathy  Severe multivessel coronary artery disease  CHF exacerbation  COPD  6.   Hypertension        POSTOPERATIVE DIAGNOSES:  Same     BRIEF HISTORY:    Gonsalo Blakely is a 59 year old year old gentleman who presented with dyspnea on exertion and was found to have an ischemic cardiomyopathy and severe triple vessel coronary artery disease.  His left atrium was enlarged. The above procedures were planned.       FINDINGS AT OPERATION:  An intra-aortic balloon pump was placed prior to his arrival in the operating room. The left radial artery was not usable.  His lungs were hyperexpanded.  The left internal mammary artery was a 2 mm in diameter conduit with excellent flow.   The greater saphenous vein graft measured 4 to 5 mm in diameter and was of good quality.  The left anterior descending coronary artery in its apical third was a 2 mm in diameter target vessel, an excellent vessel for bypass grafting. The left anterior descending diagonal branch 1 was a 1.75 mm in diameter target vessel, an excellent vessel for bypass grafting. The ramus intermedius was a 2 mm intramyocardial target vessel, an excellent vessel for bypass grafting.  The right posterolateral coronary artery was a 1.5 mm in diameter target vessel, a good vessel for bypass grafting.  His heart was globally enlarged.  His ascending aorta was elongated and measured about 4 cm in its greatest dimension.  It was free of any plaque seen on epiaortic ultrasound.     PROCEDURES:  The patient arrived in the operating room and was positioned supine.  An arterial line was placed.  Satisfactory general anesthesia was induced.  A transesophageal probe, Toledo-Irlanda catheter and Vincent catheter were inserted.  The patient's neck, chest, abdomen, both groins and lower extremities were prepped and draped in a standard sterile fashion.      A complete median sternotomy was made.  With the aid of the Rultract retractor, the left internal mammary artery was taken down from the xiphoid process to the subclavian vein.  At the same time Justine Rey made an incision in the left upper leg and 5 cm of the greater saphenous vein was exposed. The endoscope was then passed proximally and distally and the greater saphenous vein was dissected out circumferentially.  Its branches were clipped or cauterized.  It was clipped proximally and distally and extracted. It was cannulated and distended.  Its branches were controlled with Ligaclips.  The leg wound was rendered hemostatic and closed in layers using running 2-0 and 3-0 Vicryl.  Dermabond was applied to the skin.     Heparin was administered.  The left internal mammary artery was prepared in the  usual fashion.  A Plata retractor was inserted.  The pericardium was opened and tented up on pericardial sutures.  The aorta was  from the pulmonary artery.  Epiaortic ultrasound of the ascending aorta was carried out.  Pursestring sutures were placed in the ascending aorta and right atrium for cannulation.  The intra-aortic balloon pump was turned off.  When the ACT was appropriate cannulation was performed and central cardiopulmonary bypass was established.  The patient's temperature was allowed to drift.  The aorta was cross-clamped and 1500 mL of cold blood cardioplegia was administered antegrade.  A good arrest was achieved.  Cold topical saline and slush was applied to the heart intermittently during the period of aortic cross-clamp.      Attention was first turned to the right posterolateral coronary artery.  It was dissected out for a distance of 1 cm and then opened for a distance of 8 mm.  It was bypassed in an end to side fashion using reversed saphenous vein graft and running 7-0 Prolene.  The vein graft was flushed with cold blood cardioplegia and sized to the ascending aorta.  The patient received 500 mL of cold blood cardioplegia in an antegrade fashion. Next attention was turned to the ramus intermedius coronary artery.  It was dissected out for a distance of 1 cm and then opened for a distance of 8 mm.  It was bypassed in an end to side fashion using reversed saphenous vein graft and running 7-0 Prolene.  The vein graft was flushed with cold blood cardioplegia and sized to the ascending aorta and the patient received another 500 mL of cold blood cardioplegia in an antegrade fashion. Next attention was turned to the left anterior descending diagonal branch 1 coronary artery.  It was dissected out for a distance of 1 cm and then opened for a distance of 8 mm.  It was bypassed in an end to side fashion using reversed saphenous vein graft and running 7-0 Prolene.  The vein graft was flushed  with cold blood cardioplegia and sized to the ascending aorta and the patient received another 500 mL of cold blood cardioplegia in an antegrade fashion.  Finally attention was turned to the left anterior descending coronary artery in the interventricular groove.  It was dissected out in its apical third for a distance of 1 cm and then opened for a distance of 8 mm.  A pleural rent was created for passage of the left internal mammary artery and then the final distal anastomosis was performed between the left internal mammary artery and the left anterior descending coronary artery in an end to side fashion using running 7-0 Prolene. As this last distal anastomosis was being performed gentle warming was begun.  The gray occluder was briefly released from the left internal mammary artery and good flow was seen down the left anterior descending coronary artery.  The gray occluder was once more applied to the left internal mammary artery and the patient received a final dose of cold blood cardioplegia in an antegrade fashion.  It had been decided to perform the 3 proximal anastomoses with the aortic cross-clamp in place.  Therefore 3, 4 mm punch aortotomies were created.  The proximal aortosaphenous anastomoses were performed in an end to side fashion using running 6-0 Prolene.     The gray occluder was released from the left internal mammary artery and a hot shot was delivered in an antegrade fashion.  The aortic cross-clamp was released.  The aortic cross-clamp time was 1 hour and 31 minutes.  The proximal and distal anastomoses were examined and found to be hemostatic.  Ventricular and atrial pacing wires were applied and the patient was A-V sequentially paced at a rate of 90.  The left pleural space was drained of any accumulated blood and gentle ventilation resumed. The root vent was removed and that site repaired with two sets of plegeted 4-0 Prolene.      The patient was placed on low dose epinephrine and the  intra-aortic balloon pump was placed on 1:1 at full augmentation.  When he was warm the heart was allowed to fill and eject and the patient  from cardiopulmonary bypass without difficulty. Total time on cardiopulmonary bypass was 1 hour and 44 minutes.  Protamine was administered to reverse the heparin.  The patient was decannulated and the cannulation sites were repaired with 4-0 Prolene.  Hemostasis was satisfactory.    The drains were placed and secured to the skin. The sternum was approximated with a combination of single and double stainless steel sternal wires.   The sternal wound was irrigated with warm antibiotic-containing solution.  It was closed in 4 layers using 0 Stratafix for 2 layers, 2-0 Stratafix for the next layer and a subcuticular stitch of 4-0 Monocryl.      The sponge, needle and instrument counts were reported as correct.      The estimated blood loss was 300 mL.      Gonsalo Blakely was brought to the Intensive Care Unit in critical but stable condition.    Complications: None     Shannan Virk MD on 7/15/2024 at 6:21 PM

## 2024-07-15 NOTE — BRIEF OP NOTE
[facility]    Brief Operative Note    Pre-operative diagnosis: CAD (coronary artery disease) [I25.10]  Post-operative diagnosis Same as pre-operative diagnosis    Procedure: CORONARY ARTERY BYPASS GRAFT TIMES FOUR, INTERNAL MAMMARY ARTERY HARVEST,  LEFT LEG ENDOSCOPIC VESSEL PROCUREMENT , EPIAORTIC ULTRASOUND, ANESTHEIA TRANSESOPHAGEAL ECHOCARDIOGRAM., N/A - Chest  LIGATION, LEFT ATRIAL APPENDAGE, OPEN, N/A - Chest    Surgeon: Surgeons and Role:     * Shannan Virk MD - Primary     * Romain Ryan MD - Fellow - Assisting  Anesthesia: General   Estimated Blood Loss: 500 ml    Drains: 1 med, 1 left pleural  Specimens: * No specimens in log *  Findings:   Good targets, postop EF~50% .  Complications: None.  Implants:   Implant Name Type Inv. Item Serial No.  Lot No. LRB No. Used Action   IMP ATRICLIP FLEX V DEVICE NING EXLUSION 50MM [id number] - [id number] Clip IMP ATRICLIP FLEX V DEVICE NING EXLUSION 50MM [id number]  [employer] [id number] N/A 1 Implanted           Romain Ryan MD  Cardiothoracic Surgery Fellow  Pager: [phone]

## 2024-07-15 NOTE — ANESTHESIA PROCEDURE NOTES
Arterial Line Procedure Note    Pre-Procedure   Staff -        Anesthesiologist:  Charles Recinos MD       Performed By: anesthesiologist       Location: OR       Pre-Anesthestic Checklist: patient identified, IV checked, risks and benefits discussed, informed consent, monitors and equipment checked and pre-op evaluation  Timeout:       Correct Patient: Yes        Correct Procedure: Yes        Correct Site: Yes        Correct Position: Yes   Line Placement:   This line was placed Pre Induction starting at 7/15/2024 1:25 PM and ending at 7/15/2024 1:35 PM  Procedure   Procedure: arterial line       Laterality: right       Insertion Site: brachial.  Sterile Prep        Standard elements of sterile barrier followed       Skin prep: Chloraprep  Insertion/Injection        Technique: ultrasound guided and Seldinger Technique        1. Ultrasound was used to evaluate the access site.       2. Artery evaluated via ultrasound for patency/adequacy.       3. Using real-time ultrasound the needle/catheter was observed entering the artery/vein.       4. Permanent image was captured and entered into the patient's record.       5. The visualized structures were anatomically normal.       6. There were no apparent abnormal pathologic findings.       Catheter Type/Size: 20 G, 12 cm  Narrative         Secured by: suture       Tegaderm and Biopatch dressing used.       Complications: None apparent,        Arterial waveform: Yes        IBP within 10% of NIBP: Yes

## 2024-07-15 NOTE — PROGRESS NOTES
Patient admitted to ICU s/p CABGx4 and placed on full mechanical ventilator support with the following initial vent settings.     Vent Mode: CMV/AC  (Continuous Mandatory Ventilation/ Assist Control)  FiO2 (%): 60 %  Resp Rate (Set): 18 breaths/min  Tidal Volume (Set, mL): 600 mL  PEEP (cm H2O): 5 cmH2O  Resp: 18    Plan for fast track extubation.     Lili Michaels, RT

## 2024-07-15 NOTE — PRE-PROCEDURE
Patient was seen and examined by me.  There has been no interval change in history or physical examination.  He has severe triple vessel coronary artery disease and will benefit from a multi-vessel coronary artery bypass grafting procedure.  For conduit we will use the left internal mammary artery, the left radial artery and reversed saphenous vein graft. He also has an ischemic cardiomyopathy and asthma.  He understands that he will receive a pre-operative intra-aortic balloon pump.  He further understands that the risks for this procedure include: bleeding, infection, stroke, sternal dehiscence, myocardial infarction, arrhythmias, the need for a pacemaker, prolonged ventilation, pneumonia, liver/renal failure, aortic dissection, and an operative mortality of 4 to 8 percent.  He accepts these risks and is ready to proceed today.  Informed consent has been obtained.

## 2024-07-16 ENCOUNTER — APPOINTMENT (OUTPATIENT)
Dept: RADIOLOGY | Facility: HOSPITAL | Age: 59
End: 2024-07-16
Attending: PHYSICIAN ASSISTANT
Payer: MEDICAID

## 2024-07-16 LAB
ALBUMIN SERPL BCG-MCNC: 3.4 G/DL (ref 3.5–5.2)
ALP SERPL-CCNC: 77 U/L (ref 40–150)
ALT SERPL W P-5'-P-CCNC: 17 U/L (ref 0–70)
ANION GAP SERPL CALCULATED.3IONS-SCNC: 8 MMOL/L (ref 7–15)
AST SERPL W P-5'-P-CCNC: 33 U/L (ref 0–45)
ATRIAL RATE - MUSE: 90 BPM
BASE EXCESS BLDA CALC-SCNC: -0.5 MMOL/L (ref -3–3)
BASE EXCESS BLDV CALC-SCNC: 0.6 MMOL/L (ref -3–3)
BILIRUB SERPL-MCNC: 0.4 MG/DL
BUN SERPL-MCNC: 17.9 MG/DL (ref 8–23)
CA-I BLD-MCNC: 4.6 MG/DL (ref 4.4–5.2)
CA-I BLD-MCNC: 4.8 MG/DL (ref 4.4–5.2)
CALCIUM SERPL-MCNC: 8.2 MG/DL (ref 8.8–10.2)
CHLORIDE SERPL-SCNC: 106 MMOL/L (ref 98–107)
COHGB MFR BLD: 99.2 % (ref 96–97)
CREAT SERPL-MCNC: 1.06 MG/DL (ref 0.67–1.17)
DIASTOLIC BLOOD PRESSURE - MUSE: NORMAL MMHG
EGFRCR SERPLBLD CKD-EPI 2021: 80 ML/MIN/1.73M2
ERYTHROCYTE [DISTWIDTH] IN BLOOD BY AUTOMATED COUNT: 13.2 % (ref 10–15)
GLUCOSE BLDC GLUCOMTR-MCNC: 106 MG/DL (ref 70–99)
GLUCOSE BLDC GLUCOMTR-MCNC: 107 MG/DL (ref 70–99)
GLUCOSE BLDC GLUCOMTR-MCNC: 107 MG/DL (ref 70–99)
GLUCOSE BLDC GLUCOMTR-MCNC: 110 MG/DL (ref 70–99)
GLUCOSE BLDC GLUCOMTR-MCNC: 112 MG/DL (ref 70–99)
GLUCOSE BLDC GLUCOMTR-MCNC: 115 MG/DL (ref 70–99)
GLUCOSE BLDC GLUCOMTR-MCNC: 132 MG/DL (ref 70–99)
GLUCOSE BLDC GLUCOMTR-MCNC: 143 MG/DL (ref 70–99)
GLUCOSE BLDC GLUCOMTR-MCNC: 91 MG/DL (ref 70–99)
GLUCOSE SERPL-MCNC: 148 MG/DL (ref 70–99)
HCO3 BLD-SCNC: 26 MMOL/L (ref 21–28)
HCO3 BLDV-SCNC: 28 MMOL/L (ref 21–28)
HCO3 SERPL-SCNC: 25 MMOL/L (ref 22–29)
HCT VFR BLD AUTO: 38.6 % (ref 40–53)
HGB BLD-MCNC: 12.4 G/DL (ref 13.3–17.7)
INTERPRETATION ECG - MUSE: NORMAL
MAGNESIUM SERPL-MCNC: 2.3 MG/DL (ref 1.7–2.3)
MAGNESIUM SERPL-MCNC: 2.5 MG/DL (ref 1.7–2.3)
MCH RBC QN AUTO: 28.4 PG (ref 26.5–33)
MCHC RBC AUTO-ENTMCNC: 32.1 G/DL (ref 31.5–36.5)
MCV RBC AUTO: 89 FL (ref 78–100)
O2/TOTAL GAS SETTING VFR VENT: 40 %
O2/TOTAL GAS SETTING VFR VENT: 40 %
OXYHGB MFR BLDV: 68 % (ref 70–75)
P AXIS - MUSE: 78 DEGREES
PCO2 BLD: 53 MM HG (ref 35–45)
PCO2 BLDV: 60 MM HG (ref 40–50)
PH BLD: 7.3 [PH] (ref 7.35–7.45)
PH BLDV: 7.27 [PH] (ref 7.32–7.43)
PHOSPHATE SERPL-MCNC: 4.3 MG/DL (ref 2.5–4.5)
PHOSPHATE SERPL-MCNC: 4.4 MG/DL (ref 2.5–4.5)
PLATELET # BLD AUTO: 124 10E3/UL (ref 150–450)
PO2 BLD: 120 MM HG (ref 80–105)
PO2 BLDV: 41 MM HG (ref 25–47)
POTASSIUM SERPL-SCNC: 4.8 MMOL/L (ref 3.4–5.3)
POTASSIUM SERPL-SCNC: 5 MMOL/L (ref 3.4–5.3)
POTASSIUM SERPL-SCNC: 5.1 MMOL/L (ref 3.4–5.3)
PR INTERVAL - MUSE: 168 MS
PROT SERPL-MCNC: 5.6 G/DL (ref 6.4–8.3)
QRS DURATION - MUSE: 138 MS
QT - MUSE: 422 MS
QTC - MUSE: 516 MS
R AXIS - MUSE: 62 DEGREES
RBC # BLD AUTO: 4.36 10E6/UL (ref 4.4–5.9)
SAO2 % BLDA: 98 % (ref 92–100)
SAO2 % BLDV: 69.1 % (ref 70–75)
SODIUM SERPL-SCNC: 139 MMOL/L (ref 135–145)
SYSTOLIC BLOOD PRESSURE - MUSE: NORMAL MMHG
T AXIS - MUSE: 63 DEGREES
VENTRICULAR RATE- MUSE: 90 BPM
WBC # BLD AUTO: 12.5 10E3/UL (ref 4–11)

## 2024-07-16 PROCEDURE — 999N000065 XR CHEST PORT 1 VIEW

## 2024-07-16 PROCEDURE — 94660 CPAP INITIATION&MGMT: CPT

## 2024-07-16 PROCEDURE — 80053 COMPREHEN METABOLIC PANEL: CPT | Performed by: PHYSICIAN ASSISTANT

## 2024-07-16 PROCEDURE — 200N000001 HC R&B ICU

## 2024-07-16 PROCEDURE — 36415 COLL VENOUS BLD VENIPUNCTURE: CPT | Performed by: PHYSICIAN ASSISTANT

## 2024-07-16 PROCEDURE — 82805 BLOOD GASES W/O2 SATURATION: CPT | Performed by: THORACIC SURGERY (CARDIOTHORACIC VASCULAR SURGERY)

## 2024-07-16 PROCEDURE — 84132 ASSAY OF SERUM POTASSIUM: CPT | Performed by: PHYSICIAN ASSISTANT

## 2024-07-16 PROCEDURE — 5A09357 ASSISTANCE WITH RESPIRATORY VENTILATION, LESS THAN 24 CONSECUTIVE HOURS, CONTINUOUS POSITIVE AIRWAY PRESSURE: ICD-10-PCS | Performed by: PHYSICIAN ASSISTANT

## 2024-07-16 PROCEDURE — 83735 ASSAY OF MAGNESIUM: CPT | Performed by: PHYSICIAN ASSISTANT

## 2024-07-16 PROCEDURE — 84132 ASSAY OF SERUM POTASSIUM: CPT | Performed by: THORACIC SURGERY (CARDIOTHORACIC VASCULAR SURGERY)

## 2024-07-16 PROCEDURE — 93010 ELECTROCARDIOGRAM REPORT: CPT | Performed by: INTERNAL MEDICINE

## 2024-07-16 PROCEDURE — 999N000157 HC STATISTIC RCP TIME EA 10 MIN

## 2024-07-16 PROCEDURE — 250N000009 HC RX 250: Performed by: THORACIC SURGERY (CARDIOTHORACIC VASCULAR SURGERY)

## 2024-07-16 PROCEDURE — 82330 ASSAY OF CALCIUM: CPT | Performed by: PHYSICIAN ASSISTANT

## 2024-07-16 PROCEDURE — 93005 ELECTROCARDIOGRAM TRACING: CPT

## 2024-07-16 PROCEDURE — 250N000013 HC RX MED GY IP 250 OP 250 PS 637: Performed by: PHYSICIAN ASSISTANT

## 2024-07-16 PROCEDURE — 82805 BLOOD GASES W/O2 SATURATION: CPT | Performed by: PHYSICIAN ASSISTANT

## 2024-07-16 PROCEDURE — 250N000011 HC RX IP 250 OP 636: Performed by: PHYSICIAN ASSISTANT

## 2024-07-16 PROCEDURE — 999N000156 HC STATISTIC RCP CONSULT EA 30 MIN

## 2024-07-16 PROCEDURE — 84100 ASSAY OF PHOSPHORUS: CPT | Performed by: PHYSICIAN ASSISTANT

## 2024-07-16 PROCEDURE — 85014 HEMATOCRIT: CPT | Performed by: PHYSICIAN ASSISTANT

## 2024-07-16 RX ORDER — METOPROLOL TARTRATE 25 MG/1
25 TABLET, FILM COATED ORAL ONCE
Status: COMPLETED | OUTPATIENT
Start: 2024-07-16 | End: 2024-07-16

## 2024-07-16 RX ORDER — NICOTINE POLACRILEX 4 MG
15-30 LOZENGE BUCCAL
Status: DISCONTINUED | OUTPATIENT
Start: 2024-07-16 | End: 2024-07-19

## 2024-07-16 RX ORDER — DEXTROSE MONOHYDRATE 25 G/50ML
25-50 INJECTION, SOLUTION INTRAVENOUS
Status: DISCONTINUED | OUTPATIENT
Start: 2024-07-16 | End: 2024-07-19

## 2024-07-16 RX ORDER — METOPROLOL TARTRATE 25 MG/1
25 TABLET, FILM COATED ORAL 2 TIMES DAILY
Status: DISCONTINUED | OUTPATIENT
Start: 2024-07-16 | End: 2024-07-16

## 2024-07-16 RX ORDER — METOPROLOL TARTRATE 25 MG/1
50 TABLET, FILM COATED ORAL 2 TIMES DAILY
Status: DISCONTINUED | OUTPATIENT
Start: 2024-07-16 | End: 2024-07-17

## 2024-07-16 RX ORDER — METOPROLOL TARTRATE 1 MG/ML
INJECTION, SOLUTION INTRAVENOUS
Status: COMPLETED
Start: 2024-07-16 | End: 2024-07-16

## 2024-07-16 RX ORDER — METOPROLOL TARTRATE 1 MG/ML
5 INJECTION, SOLUTION INTRAVENOUS ONCE
Status: COMPLETED | OUTPATIENT
Start: 2024-07-16 | End: 2024-07-16

## 2024-07-16 RX ORDER — FUROSEMIDE 10 MG/ML
40 INJECTION INTRAMUSCULAR; INTRAVENOUS ONCE
Status: COMPLETED | OUTPATIENT
Start: 2024-07-16 | End: 2024-07-16

## 2024-07-16 RX ORDER — FUROSEMIDE 10 MG/ML
40 INJECTION INTRAMUSCULAR; INTRAVENOUS 3 TIMES DAILY
Status: DISCONTINUED | OUTPATIENT
Start: 2024-07-16 | End: 2024-07-19

## 2024-07-16 RX ADMIN — LIDOCAINE 1 PATCH: 246 PATCH TOPICAL at 20:43

## 2024-07-16 RX ADMIN — POLYETHYLENE GLYCOL 3350 17 G: 17 POWDER, FOR SOLUTION ORAL at 09:49

## 2024-07-16 RX ADMIN — HYDROMORPHONE HYDROCHLORIDE 0.2 MG: 0.2 INJECTION, SOLUTION INTRAMUSCULAR; INTRAVENOUS; SUBCUTANEOUS at 10:07

## 2024-07-16 RX ADMIN — HEPARIN SODIUM 5000 UNITS: 10000 INJECTION, SOLUTION INTRAVENOUS; SUBCUTANEOUS at 20:43

## 2024-07-16 RX ADMIN — SENNOSIDES AND DOCUSATE SODIUM 1 TABLET: 50; 8.6 TABLET ORAL at 20:42

## 2024-07-16 RX ADMIN — OXYCODONE HYDROCHLORIDE 5 MG: 5 TABLET ORAL at 23:27

## 2024-07-16 RX ADMIN — METOPROLOL TARTRATE 25 MG: 25 TABLET, FILM COATED ORAL at 14:02

## 2024-07-16 RX ADMIN — METOPROLOL TARTRATE 50 MG: 25 TABLET, FILM COATED ORAL at 20:42

## 2024-07-16 RX ADMIN — SENNOSIDES AND DOCUSATE SODIUM 1 TABLET: 50; 8.6 TABLET ORAL at 09:49

## 2024-07-16 RX ADMIN — HYDROMORPHONE HYDROCHLORIDE 0.2 MG: 0.2 INJECTION, SOLUTION INTRAMUSCULAR; INTRAVENOUS; SUBCUTANEOUS at 15:40

## 2024-07-16 RX ADMIN — PANTOPRAZOLE SODIUM 40 MG: 40 TABLET, DELAYED RELEASE ORAL at 09:49

## 2024-07-16 RX ADMIN — CEFAZOLIN 1 G: 1 INJECTION, POWDER, FOR SOLUTION INTRAMUSCULAR; INTRAVENOUS at 14:03

## 2024-07-16 RX ADMIN — METOPROLOL TARTRATE 25 MG: 25 TABLET, FILM COATED ORAL at 09:47

## 2024-07-16 RX ADMIN — ACETAMINOPHEN 975 MG: 325 TABLET ORAL at 06:07

## 2024-07-16 RX ADMIN — ATORVASTATIN CALCIUM 80 MG: 40 TABLET, FILM COATED ORAL at 09:48

## 2024-07-16 RX ADMIN — ASPIRIN 81 MG CHEWABLE TABLET 162 MG: 81 TABLET CHEWABLE at 10:06

## 2024-07-16 RX ADMIN — FUROSEMIDE 40 MG: 10 INJECTION, SOLUTION INTRAMUSCULAR; INTRAVENOUS at 07:56

## 2024-07-16 RX ADMIN — FUROSEMIDE 40 MG: 10 INJECTION, SOLUTION INTRAMUSCULAR; INTRAVENOUS at 12:52

## 2024-07-16 RX ADMIN — METOPROLOL TARTRATE 12.5 MG: 25 TABLET, FILM COATED ORAL at 17:02

## 2024-07-16 RX ADMIN — FUROSEMIDE 40 MG: 10 INJECTION, SOLUTION INTRAMUSCULAR; INTRAVENOUS at 17:03

## 2024-07-16 RX ADMIN — HYDROMORPHONE HYDROCHLORIDE 0.2 MG: 0.2 INJECTION, SOLUTION INTRAMUSCULAR; INTRAVENOUS; SUBCUTANEOUS at 07:57

## 2024-07-16 RX ADMIN — METOPROLOL TARTRATE 5 MG: 5 INJECTION INTRAVENOUS at 09:18

## 2024-07-16 RX ADMIN — CEFAZOLIN 1 G: 1 INJECTION, POWDER, FOR SOLUTION INTRAMUSCULAR; INTRAVENOUS at 05:57

## 2024-07-16 RX ADMIN — HYDROMORPHONE HYDROCHLORIDE 0.2 MG: 0.2 INJECTION, SOLUTION INTRAMUSCULAR; INTRAVENOUS; SUBCUTANEOUS at 20:53

## 2024-07-16 RX ADMIN — ACETAMINOPHEN 975 MG: 325 TABLET ORAL at 21:33

## 2024-07-16 RX ADMIN — HEPARIN SODIUM 5000 UNITS: 10000 INJECTION, SOLUTION INTRAVENOUS; SUBCUTANEOUS at 12:52

## 2024-07-16 RX ADMIN — ACETAMINOPHEN 975 MG: 325 TABLET ORAL at 14:02

## 2024-07-16 RX ADMIN — METOPROLOL TARTRATE 5 MG: 1 INJECTION, SOLUTION INTRAVENOUS at 09:18

## 2024-07-16 ASSESSMENT — ACTIVITIES OF DAILY LIVING (ADL)
ADLS_ACUITY_SCORE: 26
ADLS_ACUITY_SCORE: 26
ADLS_ACUITY_SCORE: 18
ADLS_ACUITY_SCORE: 26
ADLS_ACUITY_SCORE: 18
ADLS_ACUITY_SCORE: 26
ADLS_ACUITY_SCORE: 18
ADLS_ACUITY_SCORE: 18
ADLS_ACUITY_SCORE: 26
ADLS_ACUITY_SCORE: 26
ADLS_ACUITY_SCORE: 18
ADLS_ACUITY_SCORE: 26
ADLS_ACUITY_SCORE: 18
DEPENDENT_IADLS:: INDEPENDENT
ADLS_ACUITY_SCORE: 26
ADLS_ACUITY_SCORE: 18
ADLS_ACUITY_SCORE: 26
ADLS_ACUITY_SCORE: 26

## 2024-07-16 NOTE — PLAN OF CARE
Goal Outcome Evaluation:         Problem: Adult Inpatient Plan of Care  Goal: Absence of Hospital-Acquired Illness or Injury  Intervention: Identify and Manage Fall Risk  Recent Flowsheet Documentation  Taken 7/16/2024 1200 by Samaria Meek RN  Safety Promotion/Fall Prevention:   room door open   room near nurse's station   bedside attendant  Taken 7/16/2024 0800 by Samaria Meek RN  Safety Promotion/Fall Prevention:   room door open   room near nurse's station   bedside attendant  Intervention: Prevent Skin Injury  Recent Flowsheet Documentation  Taken 7/16/2024 1200 by Samaria Meek RN  Body Position:   turned   supine   reverse trendelenburg  Taken 7/16/2024 1000 by Samaria Meek RN  Body Position:   turned   right   reverse trendelenburg  Taken 7/16/2024 0800 by Samaria Meek RN  Body Position:   turned   supine   reverse trendelenburg  Intervention: Prevent and Manage VTE (Venous Thromboembolism) Risk  Recent Flowsheet Documentation  Taken 7/16/2024 1200 by Samaria Meek RN  VTE Prevention/Management: SCDs on (sequential compression devices)  Intervention: Prevent Infection  Recent Flowsheet Documentation  Taken 7/16/2024 1200 by Samaria Meek RN  Infection Prevention:   hand hygiene promoted   single patient room provided  Taken 7/16/2024 0800 by Samaria Meek RN  Infection Prevention:   hand hygiene promoted   single patient room provided  Goal: Optimal Comfort and Wellbeing  Intervention: Monitor Pain and Promote Comfort  Recent Flowsheet Documentation  Taken 7/16/2024 1200 by Samaria Meek RN  Pain Management Interventions: declines  Taken 7/16/2024 1007 by Samaria Meek RN  Pain Management Interventions: (per sj Fletcher d/c'd)   medication (see MAR)   Provider notified (comment)  Taken 7/16/2024 0800 by Samaria Meek RN  Pain Management Interventions: declines  Intervention: Provide Person-Centered Care  Recent Flowsheet Documentation  Taken 7/16/2024 1200 by Samaria Meek  RN  Trust Relationship/Rapport:   care explained   reassurance provided   questions answered   questions encouraged   empathic listening provided  Taken 7/16/2024 0800 by Samaria Meek RN  Trust Relationship/Rapport:   care explained   reassurance provided   questions answered   questions encouraged   empathic listening provided     [facility] - ICU    RN Progress Note:            Pertinent Assessments:      Please refer to flowsheet rows for full assessment     IABP d/c'd, pressors off, lines pulled.           Key Events - This Shift:       Epinephrine stopped at 0730 and has remained off, lines pulled at 1045 per order.     IABP pulled at 0830, patient remained flat bedrest until 1430, site soft, no hematoma noted, no drainage noted.     Patient remained on BiPap most of this shift, was very lethargic and stated it was difficult to deep breathe while on flat bedrest, currently on 3L via NC.    Pain has been well controlled with scheduled Tylenol. PRN dilaudid 0.2 mg given x2 due to pain related to removal of IABP.        SJN SAT (Sedation Awakening Trial): For use ONLY if intubated    SAT Safety Screen Not Applicable   If FAILED why?    SAT Performed Not Applicable   If FAILED why?               Barriers to Discharge / Downgrade:     Per MD, patient will likely downgrade tomorrow.         Point of Contact Update YES-OR-NO: Yes    Name:Marcy    Summary of Conversation: Significant other updated at the bedside.

## 2024-07-16 NOTE — PROGRESS NOTES
CVTS Daily Progress Note   POD#1 s/p CABGx4 / LAAL   Attending: Sully  LOS: 1    SUBJECTIVE/INTERVAL EVENTS:    Patient arrived to ICU from OR yesterday afternoon. He was subsequently extubated and remains on BiPAP this AM. Weaned from pressors this morning during rounds; normotensive. NSR. IABP remains at 1:1. Patient progressing overall well. Remains in bed this AM secondary to the above. Pain well controlled. - BM / flatus. Tolerating limited diet without nausea. UOP adequate. Chest tube output appropriate. Hgb 12.4. Patient denies new chest pain, shortness of breath, abdominal pain, calf pain, nausea. Patient has no questions today.     OBJECTIVE:  Temp:  [97.7  F (36.5  C)-99.5  F (37.5  C)] 97.7  F (36.5  C)  Pulse:  [] 76  Resp:  [15-22] 18  BP: (137-145)/(63-88) 140/87  MAP:  [74 mmHg-238 mmHg] 97 mmHg  Arterial Line BP: (108-238)/() 142/77  FiO2 (%):  [30 %-60 %] 30 %  SpO2:  [74 %-100 %] 98 %  Vitals:    07/15/24 0943 07/15/24 1230 07/16/24 0500   Weight: 77.5 kg (170 lb 14.4 oz) 78.8 kg (173 lb 12.8 oz) 79.9 kg (176 lb 3.2 oz)       Clinically Significant Risk Factors Present on Admission        # Hyperkalemia: Highest K = 5.6 mmol/L in last 2 days, will monitor as appropriate   # Hypocalcemia: Lowest iCa = 4.2 mg/dL in last 2 days, will monitor and replace as appropriate  # Hypercalcemia: Highest iCa = 5.3 mg/dL in last 2 days, will monitor as appropriate    # Hypoalbuminemia: Lowest albumin = 3.1 g/dL at 7/15/2024  6:48 PM, will monitor as appropriate  # Coagulation Defect: INR = 1.26 (Ref range: 0.85 - 1.15) and/or PTT = 65 Seconds (Ref range: 22 - 38 Seconds), will monitor for bleeding  # Drug Induced Platelet Defect: home medication list includes an antiplatelet medication   # Hypertension: Home medication list includes antihypertensive(s)  # Acute heart failure with reduced ejection fraction: last echo with EF <40% and receiving IV diuretics  # Non-Invasive mechanical ventilation:  current O2 Device: BiPAP/CPAP  # Acute hypoxic respiratory failure: continue supplemental O2 as needed         #Precipitous drop in Hgb/Hct: Lowest Hgb this hospitalization: 10 g/dL. Will continue to monitor and treat/transfuse as appropriate.    # DMII: A1C = 7.0 % (Ref range: <5.7 %) within past 6 months         # History of CABG: noted on surgical history                Current Medications:    Scheduled Meds:  Current Facility-Administered Medications   Medication Dose Route Frequency Provider Last Rate Last Admin    acetaminophen (TYLENOL) Suppository 650 mg  650 mg Rectal Q8H Justine Rey PA-C        acetaminophen (TYLENOL) tablet 975 mg  975 mg Oral Q8H Justine Rey PA-C   975 mg at 07/16/24 0607    aspirin (ASA) chewable tablet 162 mg  162 mg Oral or NG Tube Daily Justine Rey PA-C        Or    aspirin (ASA) Suppository 300 mg  300 mg Rectal Daily Justine Rey PA-C        atorvastatin (LIPITOR) tablet 80 mg  80 mg Oral Daily Justine Rey PA-C   80 mg at 07/15/24 2347    ceFAZolin (ANCEF) 1 g vial to attach to  ml bag for ADULT or 50 ml bag for PEDS  1 g Intravenous Q8H Justine Rey PA-C   1 g at 07/16/24 0557    [Held by provider] dapagliflozin (FARXIGA) tablet 10 mg  10 mg Oral Daily Justine Rey PA-C        furosemide (LASIX) injection 40 mg  40 mg Intravenous TID Justine Rey PA-C        heparin ANTICOAGULANT injection 5,000 Units  5,000 Units Subcutaneous Q8H Justine Rey PA-C        insulin aspart (NovoLOG) injection (RAPID ACTING)  1-7 Units Subcutaneous TID AC Justine Rey PA-C        insulin aspart (NovoLOG) injection (RAPID ACTING)  1-5 Units Subcutaneous At Bedtime Justine Rey PA-C        Lidocaine (LIDOCARE) 4 % Patch 1-2 patch  1-2 patch Transdermal Q24H Justine Rey PA-C   1 patch at 07/15/24 1930    metoprolol tartrate (LOPRESSOR) half-tab 12.5 mg  12.5 mg  Oral TID Justine Rey PA-C        metoprolol tartrate (LOPRESSOR) tablet 25 mg  25 mg Oral BID Justine Rey PA-C        pantoprazole (PROTONIX) 2 mg/mL suspension 40 mg  40 mg Oral or NG Tube Daily Justine Rey PA-C        Or    pantoprazole (PROTONIX) EC tablet 40 mg  40 mg Oral Daily Justine Rey PA-C   40 mg at 07/15/24 2347    polyethylene glycol (MIRALAX) Packet 17 g  17 g Oral Daily Justine Rey PA-C        senna-docusate (SENOKOT-S/PERICOLACE) 8.6-50 MG per tablet 1 tablet  1 tablet Oral BID Justine Rey PA-C   1 tablet at 07/15/24 2347    [Held by provider] spironolactone (ALDACTONE) tablet 25 mg  25 mg Oral Daily Justine Rey PA-C         Continuous Infusions:  Current Facility-Administered Medications   Medication Dose Route Frequency Provider Last Rate Last Admin    niCARdipine 40 mg in 200 mL NS (CARDENE) infusion  0.5-15 mg/hr Intravenous Continuous PRN Justine Rey PA-C         PRN Meds:.  Current Facility-Administered Medications   Medication Dose Route Frequency Provider Last Rate Last Admin    [START ON 7/18/2024] acetaminophen (TYLENOL) tablet 650 mg  650 mg Oral Q4H PRN Justine Rey PA-C        bisacodyl (DULCOLAX) suppository 10 mg  10 mg Rectal Daily PRN Justine Rey PA-C        calcium gluconate 1 g in 50 mL in sodium chloride intermittent infusion  1 g Intravenous Once PRN Justine Rey PA-C        calcium gluconate 2 g in  mL intermittent infusion  2 g Intravenous Once PRN Justine Rey PA-C        calcium gluconate 3 g in sodium chloride 0.9 % 100 mL intermittent infusion  3 g Intravenous Once PRN Justine Rey PA-C        glucose gel 15-30 g  15-30 g Oral Q15 Min PRN uJstine Rey PA-C        Or    dextrose 50 % injection 25-50 mL  25-50 mL Intravenous Q15 Min PRN Justine Rey PA-C        Or    glucagon injection 1 mg  1 mg  Subcutaneous Q15 Min PRN Justine Rey PA-C        glucose gel 15-30 g  15-30 g Oral Q15 Min PRN Justine Rey PA-C        Or    dextrose 50 % injection 25-50 mL  25-50 mL Intravenous Q15 Min PRN Justine Rey PA-C        Or    glucagon injection 1 mg  1 mg Subcutaneous Q15 Min PRN Justine Rey PA-C        hydrALAZINE (APRESOLINE) injection 10 mg  10 mg Intravenous Q30 Min PRN Justine Rey PA-C        HYDROmorphone (DILAUDID) injection 0.2 mg  0.2 mg Intravenous Q2H PRN Justine Rey PA-C   0.2 mg at 07/16/24 0757    Or    HYDROmorphone (DILAUDID) injection 0.4 mg  0.4 mg Intravenous Q2H PRN Justine Rey PA-C        ipratropium - albuterol 0.5 mg/2.5 mg/3 mL (DUONEB) neb solution 3 mL  3 mL Nebulization Q4H PRN Mariusz Ross MD        lactated ringers BOLUS 250 mL  250 mL Intravenous Q15 Min PRN Justine Rey PA-C        magnesium hydroxide (MILK OF MAGNESIA) suspension 30 mL  30 mL Oral Daily PRN Justine Rey PA-C        naloxone (NARCAN) injection 0.2 mg  0.2 mg Intravenous Q2 Min PRN Valerio Virk MD        Or    naloxone (NARCAN) injection 0.4 mg  0.4 mg Intravenous Q2 Min PRN Valerio Virk MD        Or    naloxone (NARCAN) injection 0.2 mg  0.2 mg Intramuscular Q2 Min PRN Valerio Virk MD        Or    naloxone (NARCAN) injection 0.4 mg  0.4 mg Intramuscular Q2 Min PRN Valerio Virk MD        niCARdipine 40 mg in 200 mL NS (CARDENE) infusion  0.5-15 mg/hr Intravenous Continuous PRN Justine Rey PA-C        ondansetron (ZOFRAN ODT) ODT tab 4 mg  4 mg Oral Q6H PRN Justine Rey PA-C        Or    ondansetron (ZOFRAN) injection 4 mg  4 mg Intravenous Q6H PRN Justine Rey PA-C        oxyCODONE (ROXICODONE) tablet 5 mg  5 mg Oral Q4H PRN Justine Rey PA-C        Or    oxyCODONE (ROXICODONE) tablet 10 mg  10 mg Oral Q4H PRN Justine Rey PA-C         prochlorperazine (COMPAZINE) injection 10 mg  10 mg Intravenous Q6H PRN Justine Rey PA-C        Or    prochlorperazine (COMPAZINE) tablet 10 mg  10 mg Oral Q6H PRN Justine Rey PA-C           Cardiographics:    Telemetry monitoring demonstrates NSR with rates in the 70s per my personal review.    Imaging:  Results for orders placed or performed during the hospital encounter of 07/15/24   XR Chest Port 1 View    Impression    IMPRESSION: Sternal wires with mediastinal and left chest drains, atrial appendage closure device and pulmonary arterial catheter with its tip in the outflow tract are again observed. Vascular volume is normal and the lungs are clear. No pneumothorax or   pleural effusion.   XR Chest Port 1 View    Impression    IMPRESSION: Sternal wires, mediastinal clips and left atrial appendage clip now present following CABG. ET tube, [place]a catheter, as well as mediastinal and pleural drains appear in good position.  Heart size and pulmonary vessels are normal. Lungs clear, no pneumothorax.       Labs, personally reviewed.  Hemoglobin   Date Value Ref Range Status   07/16/2024 12.4 (L) 13.3 - 17.7 g/dL Final   07/15/2024 12.9 (L) 13.3 - 17.7 g/dL Final   07/15/2024 11.0 (L) 13.3 - 17.7 g/dL Final     Hemoglobin POCT   Date Value Ref Range Status   07/15/2024 12.2 (L) 13.3 - 17.7 g/dL Final   07/15/2024 11.3 (L) 13.3 - 17.7 g/dL Final   07/15/2024 10.8 (L) 13.3 - 17.7 g/dL Final     WBC Count   Date Value Ref Range Status   07/16/2024 12.5 (H) 4.0 - 11.0 10e3/uL Final   07/15/2024 14.4 (H) 4.0 - 11.0 10e3/uL Final   07/15/2024 16.2 (H) 4.0 - 11.0 10e3/uL Final     Platelet Count   Date Value Ref Range Status   07/16/2024 124 (L) 150 - 450 10e3/uL Final   07/15/2024 156 150 - 450 10e3/uL Final   07/15/2024 148 (L) 150 - 450 10e3/uL Final     Creatinine   Date Value Ref Range Status   07/16/2024 1.06 0.67 - 1.17 mg/dL Final   07/15/2024 1.03 0.67 - 1.17 mg/dL Final   06/21/2024 1.08  0.67 - 1.17 mg/dL Final     Potassium   Date Value Ref Range Status   07/16/2024 5.1 3.4 - 5.3 mmol/L Final   07/16/2024 5.0 3.4 - 5.3 mmol/L Final   07/15/2024 4.2 3.4 - 5.3 mmol/L Final   07/15/2024 4.2 3.4 - 5.3 mmol/L Final   01/12/2019 3.8 3.5 - 5.0 mmol/L Final     Potassium POCT   Date Value Ref Range Status   07/15/2024 4.0 3.4 - 5.3 mmol/L Final   07/15/2024 4.4 3.4 - 5.3 mmol/L Final   07/15/2024 5.6 (H) 3.4 - 5.3 mmol/L Final     Magnesium   Date Value Ref Range Status   07/16/2024 2.5 (H) 1.7 - 2.3 mg/dL Final   07/15/2024 3.0 (H) 1.7 - 2.3 mg/dL Final   06/26/2024 2.2 1.7 - 2.3 mg/dL Final          I/O:  I/O last 3 completed shifts:  In: 4375.68 [P.O.:1200; I.V.:2750.68; Other:425]  Out: 2971 [Urine:1236; Blood:1425; Chest Tube:310]       Physical Exam:    General: Patient seen in bed. AD. Conversant. Pleasant.   CV: RRR on monitor. 2+ peripheral pulses in all extremities. Mild edema. IABP in place.  Pulm: Non-labored effort on BiPAP. Chest tubes in place, serosanguinous output, no air leak. Incision C/D/I.  Abd: Soft, NT, ND  : Vincent with clayton urine  Ext: Mild pedal edema, SCDs in place, warm, distal pulses intact  Neuro: CNs grossly intact.       ASSESSMENT/PLAN:    Gonsalo Blakely is a 59 year old male with a history of CAD and a-fib who is s/p CABGx4 / LAAL.    Active Problems:    Acute decompensated heart failure (H)    Ischemic cardiomyopathy    Coronary artery disease involving native coronary artery of native heart, unspecified whether angina present        NEURO:   - Scheduled Tylenol/lidocaine patches and PRN Tylenol/oxycodone/dilaudid for pain    CV:   - Pre-op EF 25-30%  - Echo close to discharge; consider anticoagulation per surgeon if EF remains low  - Consider heart failure regimen/follow-up  - Normotensive off pressors  - IABP removed this AM  - Metoprolol 25mg two times a day   - ASA 162mg  - Atorvastatin 80mg daily  - Chest tubes to remain today    PULM:   - Maintaining oxygen  saturations on BiPAP; wean as able  - Encourage pulmonary toilet  - PRN nebs per RT    FEN/GI:  - Continue electrolyte replacement protocol  - Diet: Cardiac, ADAT   - Bowel regimen    RENAL:  - Adequate UOP/hr. Continue to monitor closely.  - Cr 1.06  - Vincent to remain in for close monitoring of I/O and during period of diuresis/relative immobility.   - Diuresis with 40mg IV Lasix three times a day    HEME:  - Acute blood loss anemia post-op.   - Hgb stable, no bleeding concerns. Hep SQ, ASA    ID:  - Marcelle op ppx complete, afebrile. No concerns for infection    ENDO:   - Transition to SSI  - Restart PTA Farxiga when able    PPx:   - DVT: SCDs, SQ heparin TID, ambulation   - GI: Protonix 40mg PO daily    DISPO:   - Continue critical care in ICU  - Medically Ready for Discharge: Anticipated in 4-5 days         Patient discussed with Dr. Virk.        _______  NEVIN ConradC  Cardiothoracic Surgery  [phone]

## 2024-07-16 NOTE — TREATMENT PLAN
RCAT Treatment Plan    Patient Score: 11  Patient Acuity: 3    Clinical Indication for Therapy: productive cough and prevent atelectasis    Therapy Ordered: flutter qid, IS    Assessment Summary: Pt resting on bipap most of morning.  Pt now sitting upright in bed on 3lnc.  Pt c/o pain but no respiratory issues.  No signs of distress.  Pt and I did aerobika together.  Pt remains weak and having pain.  Pt able to get 500 on IS.  BRS: clear after treatments and cough.  Pt has good productive cough and swallows secretions.  Continue current therapy at this time.  Reevaluate RCAT tomorrow.    Yessica Jin, RT  7/16/2024
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
## 2024-07-16 NOTE — PLAN OF CARE
Patient extubated to 5 liter NC at 2320    Goal time for extubation was 0030 on 07/16/2024.    Albino Funez RN.

## 2024-07-16 NOTE — PROGRESS NOTES
Patient extubated at 2320, placed on 6 lpm NC, SPO2 94%, BS clear, patient cough is weak, productive, patient is able to verbalize upon extubation, without complications at this time.  Will monitor.  Venus Yung, RT

## 2024-07-16 NOTE — PLAN OF CARE
[facility] - ICU    RN Progress Note:            Pertinent Assessments:      Please refer to flowsheet rows for full assessment     To ICU at 1830.  Opening eyes and following commands at 1915.  Able to move all extremities.  Initial wean failed.  Wean again from a hour and was able to extubate pain at 2320, within goal time.  Alert and orientated and able to make needs known.           Key Events - This Shift:       Report of swan catheter at 65 cm.  Took down manifold at 2000 and inadvertently tugged at lines.  Check swan placement at it was at 55 cm.  Discussed with provider and SAT CXR was performed.  Adequate placement per CXR.    Pacer rate set at 90 BPM per provider.  At 2210 patient appeared to go into A-fib with rates in the low 100's.  Placed pacer on standby and patient was in sinus rhythm and was rohit to maintain HR in the 90's.  At 0600 heart rate began dipping into the low 80's with CI dipping into the 2.2 range.  Pacer with rate of 90 was resumed at 0630.    Extubated at 2330 to 5L NC.  Was able to maintain SATs in the mid to low 90's on 4 to 5 liter.  Appeared to have quality breathing with rate of 20-22.  Check ABG in the AM.  pH was 7.3 and pCO2 was 53.  SAT still maintain mid 90's on 5 liters.  BiAPAP was started.                  Barriers to Discharge / Downgrade:     PAC in place.  Currently on epinephrine infusion.        Albino Funez, RN

## 2024-07-16 NOTE — CONSULTS
Care Management Initial Consult    General Information  Assessment completed with: Spouse or significant other,    Type of CM/SW Visit: Initial Assessment    Primary Care Provider verified and updated as needed: Yes   Readmission within the last 30 days: planned readmission         Advance Care Planning: Advance Care Planning Reviewed:  (no HCD)          Communication Assessment  Patient's communication style: spoken language (English or Bilingual)    Hearing Difficulty or Deaf: no   Wear Glasses or Blind: no    Cognitive  Cognitive/Neuro/Behavioral: .WDL except, level of consciousness  Level of Consciousness: alert, lethargic  Arousal Level: opens eyes spontaneously  Orientation: oriented x 4  Mood/Behavior: calm, cooperative  Best Language: 0 - No aphasia  Speech: hoarse    Living Environment:   People in home: significant other     Current living Arrangements: house      Able to return to prior arrangements: yes       Family/Social Support:  Care provided by: self  Provides care for: no one  Marital Status: Lives with Significant Other  Significant Other, Children          Description of Support System: Supportive, Involved         Current Resources:   Patient receiving home care services: No     Community Resources: None  Equipment currently used at home: none  Supplies currently used at home: None    Employment/Financial:  Employment Status: employed full-time            Does the patient's insurance plan have a 3 day qualifying hospital stay waiver?  Yes has MA    Lifestyle & Psychosocial Needs:  Social Determinants of Health     Food Insecurity: Unknown (7/2/2024)    Food Insecurity     Within the past 12 months, did you worry that your food would run out before you got money to buy more?: Patient declined     Within the past 12 months, did the food you bought just not last and you didn t have money to get more?: Patient declined   Depression: Not at risk (7/2/2024)    PHQ-2     PHQ-2 Score: 0   Housing  Stability: Unknown (7/2/2024)    Housing Stability     Do you have housing? : Patient declined     Are you worried about losing your housing?: Patient declined   Tobacco Use: Medium Risk (7/14/2024)    Patient History     Smoking Tobacco Use: Former     Smokeless Tobacco Use: Former     Passive Exposure: Current   Financial Resource Strain: Unknown (7/2/2024)    Financial Resource Strain     Within the past 12 months, have you or your family members you live with been unable to get utilities (heat, electricity) when it was really needed?: Patient declined   Alcohol Use: Not on file   Transportation Needs: Unknown (7/2/2024)    Transportation Needs     Within the past 12 months, has lack of transportation kept you from medical appointments, getting your medicines, non-medical meetings or appointments, work, or from getting things that you need?: Patient declined   Physical Activity: Not on file   Interpersonal Safety: Unknown (7/2/2024)    Interpersonal Safety     Do you feel physically and emotionally safe where you currently live?: Patient declined     Within the past 12 months, have you been hit, slapped, kicked or otherwise physically hurt by someone?: Patient declined     Within the past 12 months, have you been humiliated or emotionally abused in other ways by your partner or ex-partner?: Patient declined   Stress: Not on file   Social Connections: Not on file   Health Literacy: Not on file       Functional Status:  Prior to admission patient needed assistance:   Dependent ADLs:: Independent  Dependent IADLs:: Independent         Additional Information:    Patient is intubated. Assessment completed with patient's significant other Marcy. Patient lives in Marcy's house. He is independent with ADLs/IADLs, ambulates without devices and works. Marcy is primary family contact and willing to transport at discharge.     Lianna Suarez RN

## 2024-07-17 ENCOUNTER — APPOINTMENT (OUTPATIENT)
Dept: OCCUPATIONAL THERAPY | Facility: HOSPITAL | Age: 59
End: 2024-07-17
Attending: PHYSICIAN ASSISTANT
Payer: MEDICAID

## 2024-07-17 ENCOUNTER — APPOINTMENT (OUTPATIENT)
Dept: OCCUPATIONAL THERAPY | Facility: HOSPITAL | Age: 59
End: 2024-07-17
Attending: INTERNAL MEDICINE
Payer: MEDICAID

## 2024-07-17 LAB
CA-I BLD-MCNC: 4.7 MG/DL (ref 4.4–5.2)
CREAT SERPL-MCNC: 1.22 MG/DL (ref 0.67–1.17)
EGFRCR SERPLBLD CKD-EPI 2021: 67 ML/MIN/1.73M2
GLUCOSE BLDC GLUCOMTR-MCNC: 110 MG/DL (ref 70–99)
GLUCOSE BLDC GLUCOMTR-MCNC: 127 MG/DL (ref 70–99)
GLUCOSE BLDC GLUCOMTR-MCNC: 150 MG/DL (ref 70–99)
GLUCOSE BLDC GLUCOMTR-MCNC: 166 MG/DL (ref 70–99)
MAGNESIUM SERPL-MCNC: 2.2 MG/DL (ref 1.7–2.3)
PHOSPHATE SERPL-MCNC: 4.2 MG/DL (ref 2.5–4.5)
POTASSIUM SERPL-SCNC: 4.7 MMOL/L (ref 3.4–5.3)

## 2024-07-17 PROCEDURE — 250N000012 HC RX MED GY IP 250 OP 636 PS 637: Performed by: PHYSICIAN ASSISTANT

## 2024-07-17 PROCEDURE — 83735 ASSAY OF MAGNESIUM: CPT | Performed by: THORACIC SURGERY (CARDIOTHORACIC VASCULAR SURGERY)

## 2024-07-17 PROCEDURE — 250N000011 HC RX IP 250 OP 636: Performed by: PHYSICIAN ASSISTANT

## 2024-07-17 PROCEDURE — 97110 THERAPEUTIC EXERCISES: CPT | Mod: GO

## 2024-07-17 PROCEDURE — 999N000157 HC STATISTIC RCP TIME EA 10 MIN

## 2024-07-17 PROCEDURE — 84132 ASSAY OF SERUM POTASSIUM: CPT | Performed by: THORACIC SURGERY (CARDIOTHORACIC VASCULAR SURGERY)

## 2024-07-17 PROCEDURE — P9045 ALBUMIN (HUMAN), 5%, 250 ML: HCPCS | Performed by: PHYSICIAN ASSISTANT

## 2024-07-17 PROCEDURE — 82330 ASSAY OF CALCIUM: CPT | Performed by: THORACIC SURGERY (CARDIOTHORACIC VASCULAR SURGERY)

## 2024-07-17 PROCEDURE — 82565 ASSAY OF CREATININE: CPT | Performed by: PHYSICIAN ASSISTANT

## 2024-07-17 PROCEDURE — 210N000001 HC R&B IMCU HEART CARE

## 2024-07-17 PROCEDURE — 97165 OT EVAL LOW COMPLEX 30 MIN: CPT | Mod: GO

## 2024-07-17 PROCEDURE — 36415 COLL VENOUS BLD VENIPUNCTURE: CPT | Performed by: THORACIC SURGERY (CARDIOTHORACIC VASCULAR SURGERY)

## 2024-07-17 PROCEDURE — 97535 SELF CARE MNGMENT TRAINING: CPT | Mod: GO

## 2024-07-17 PROCEDURE — 250N000013 HC RX MED GY IP 250 OP 250 PS 637: Performed by: PHYSICIAN ASSISTANT

## 2024-07-17 PROCEDURE — 84100 ASSAY OF PHOSPHORUS: CPT | Performed by: THORACIC SURGERY (CARDIOTHORACIC VASCULAR SURGERY)

## 2024-07-17 PROCEDURE — 999N000156 HC STATISTIC RCP CONSULT EA 30 MIN

## 2024-07-17 RX ORDER — KETOROLAC TROMETHAMINE 30 MG/ML
30 INJECTION, SOLUTION INTRAMUSCULAR; INTRAVENOUS ONCE
Status: COMPLETED | OUTPATIENT
Start: 2024-07-17 | End: 2024-07-17

## 2024-07-17 RX ORDER — METOPROLOL TARTRATE 25 MG/1
25 TABLET, FILM COATED ORAL 2 TIMES DAILY
Status: DISCONTINUED | OUTPATIENT
Start: 2024-07-17 | End: 2024-07-19

## 2024-07-17 RX ADMIN — AMIODARONE HYDROCHLORIDE 0.5 MG/MIN: 1.8 INJECTION, SOLUTION INTRAVENOUS at 13:25

## 2024-07-17 RX ADMIN — LIDOCAINE 1 PATCH: 246 PATCH TOPICAL at 20:18

## 2024-07-17 RX ADMIN — PANTOPRAZOLE SODIUM 40 MG: 40 TABLET, DELAYED RELEASE ORAL at 08:14

## 2024-07-17 RX ADMIN — KETOROLAC TROMETHAMINE 30 MG: 30 INJECTION, SOLUTION INTRAMUSCULAR at 08:14

## 2024-07-17 RX ADMIN — METOPROLOL TARTRATE 50 MG: 25 TABLET, FILM COATED ORAL at 07:07

## 2024-07-17 RX ADMIN — SENNOSIDES AND DOCUSATE SODIUM 1 TABLET: 50; 8.6 TABLET ORAL at 08:14

## 2024-07-17 RX ADMIN — FUROSEMIDE 40 MG: 10 INJECTION, SOLUTION INTRAMUSCULAR; INTRAVENOUS at 17:07

## 2024-07-17 RX ADMIN — ASPIRIN 81 MG CHEWABLE TABLET 162 MG: 81 TABLET CHEWABLE at 08:14

## 2024-07-17 RX ADMIN — POLYETHYLENE GLYCOL 3350 17 G: 17 POWDER, FOR SOLUTION ORAL at 08:14

## 2024-07-17 RX ADMIN — ONDANSETRON 4 MG: 2 INJECTION INTRAMUSCULAR; INTRAVENOUS at 13:05

## 2024-07-17 RX ADMIN — ACETAMINOPHEN 975 MG: 325 TABLET ORAL at 13:27

## 2024-07-17 RX ADMIN — HEPARIN SODIUM 5000 UNITS: 10000 INJECTION, SOLUTION INTRAVENOUS; SUBCUTANEOUS at 20:19

## 2024-07-17 RX ADMIN — AMIODARONE HYDROCHLORIDE 1 MG/MIN: 1.8 INJECTION, SOLUTION INTRAVENOUS at 07:37

## 2024-07-17 RX ADMIN — INSULIN ASPART 1 UNITS: 100 INJECTION, SOLUTION INTRAVENOUS; SUBCUTANEOUS at 11:50

## 2024-07-17 RX ADMIN — AMIODARONE HYDROCHLORIDE 150 MG: 1.5 INJECTION, SOLUTION INTRAVENOUS at 07:36

## 2024-07-17 RX ADMIN — INSULIN ASPART 1 UNITS: 100 INJECTION, SOLUTION INTRAVENOUS; SUBCUTANEOUS at 08:27

## 2024-07-17 RX ADMIN — METOPROLOL TARTRATE 12.5 MG: 25 TABLET, FILM COATED ORAL at 03:35

## 2024-07-17 RX ADMIN — HEPARIN SODIUM 5000 UNITS: 10000 INJECTION, SOLUTION INTRAVENOUS; SUBCUTANEOUS at 11:50

## 2024-07-17 RX ADMIN — FUROSEMIDE 40 MG: 10 INJECTION, SOLUTION INTRAMUSCULAR; INTRAVENOUS at 11:50

## 2024-07-17 RX ADMIN — ACETAMINOPHEN 975 MG: 325 TABLET ORAL at 21:48

## 2024-07-17 RX ADMIN — FUROSEMIDE 40 MG: 10 INJECTION, SOLUTION INTRAMUSCULAR; INTRAVENOUS at 06:10

## 2024-07-17 RX ADMIN — ALBUMIN HUMAN 12.5 G: 0.05 INJECTION, SOLUTION INTRAVENOUS at 13:18

## 2024-07-17 RX ADMIN — OXYCODONE HYDROCHLORIDE 5 MG: 5 TABLET ORAL at 05:25

## 2024-07-17 RX ADMIN — HEPARIN SODIUM 5000 UNITS: 10000 INJECTION, SOLUTION INTRAVENOUS; SUBCUTANEOUS at 03:35

## 2024-07-17 RX ADMIN — ATORVASTATIN CALCIUM 80 MG: 40 TABLET, FILM COATED ORAL at 08:14

## 2024-07-17 RX ADMIN — ACETAMINOPHEN 975 MG: 325 TABLET ORAL at 05:26

## 2024-07-17 ASSESSMENT — ACTIVITIES OF DAILY LIVING (ADL)
ADLS_ACUITY_SCORE: 28
ADLS_ACUITY_SCORE: 27
ADLS_ACUITY_SCORE: 27
ADLS_ACUITY_SCORE: 28
ADLS_ACUITY_SCORE: 27
ADLS_ACUITY_SCORE: 28
ADLS_ACUITY_SCORE: 27
ADLS_ACUITY_SCORE: 28
ADLS_ACUITY_SCORE: 27
ADLS_ACUITY_SCORE: 28
ADLS_ACUITY_SCORE: 27
ADLS_ACUITY_SCORE: 28
ADLS_ACUITY_SCORE: 28
ADLS_ACUITY_SCORE: 27
ADLS_ACUITY_SCORE: 27
ADLS_ACUITY_SCORE: 28
ADLS_ACUITY_SCORE: 27

## 2024-07-17 NOTE — PROGRESS NOTES
RCAT Treatment Plan    Patient Score: 9  Patient Acuity: 4    Clinical Indication for Therapy: history of mucous producing disease and prevent atelectasis    Therapy Ordered: IS and Flutter QID    Assessment Summary: Patient showing proper technique with IS and Flutter but needs reminders to perform. Obtaining 750-100 mL on IS. Good, productive cough following flutter.     Eryn Gillespie, RT  7/17/2024

## 2024-07-17 NOTE — PROGRESS NOTES
Pt recently performed flutter dayne and IS independently after a walk at 1430. Encouraged pt to continue independently Q 1-2 hours w/a. RT available as needed    BP 92/58 (BP Location: Right arm)   Pulse 70   Temp 98  F (36.7  C) (Oral)   Resp 18   Ht 1.829 m (6')   Wt 79.3 kg (174 lb 14.4 oz)   SpO2 96%   BMI 23.72 kg/m

## 2024-07-17 NOTE — PROGRESS NOTES
Cardiac rehab      07/17/24 0935   Appointment Info   Signing Clinician's Name / Credentials (OT) Ester Prakash OTR/L   Living Environment   People in Home significant other   Current Living Arrangements house   Home Accessibility no concerns   Transportation Anticipated family or friend will provide   Living Environment Comments Pt ind. w/ ADL routine   Self-Care   Usual Activity Tolerance good   Current Activity Tolerance good   Equipment Currently Used at Home none   Fall history within last six months no   Activity/Exercise/Self-Care Comment Ind. w/ ADL ind.   Instrumental Activities of Daily Living (IADL)   IADL Comments IND.  w/ IADL routine, pt recently retired   General Information   Onset of Illness/Injury or Date of Surgery 07/15/24   Referring Physician Shannan Virk MD   Additional Occupational Profile Info/Pertinent History of Current Problem 59 year old year old gentleman who presented with dyspnea on exertion and was found to have an ischemic cardiomyopathy and severe triple vessel coronary artery disease. s/p CABGx 4   Existing Precautions/Restrictions fall;sternal;cardiac   Cognitive Status Examination   Orientation Status orientation to person, place and time   Range of Motion Comprehensive   Comment, General Range of Motion NT d/t sternal prec.   Bed Mobility   Comment (Bed Mobility) NT pt up in recliner   Transfers   Transfers sit-stand transfer   Sit-Stand Transfer   Sit-Stand Perry (Transfers) contact guard;verbal cues;supervision   Assistive Device (Sit-Stand Transfers) walker, front-wheeled   Activities of Daily Living   BADL Assessment/Intervention lower body dressing   Lower Body Dressing Assessment/Training   Perry Level (Lower Body Dressing) moderate assist (50% patient effort)   Clinical Impression   Criteria for Skilled Therapeutic Interventions Met (OT) Yes, treatment indicated   OT Diagnosis decreased act. tolerance/aDL ind.   OT Problem List-Impairments impacting  ADL problems related to;activity tolerance impaired;mobility   Assessment of Occupational Performance 1-3 Performance Deficits   Identified Performance Deficits endurance/act. tolerance, ADL ind.   Planned Therapy Interventions (OT) ADL retraining;balance training;strengthening;transfer training;home program guidelines;progressive activity/exercise   Clinical Decision Making Complexity (OT) problem focused assessment/low complexity   Risk & Benefits of therapy have been explained evaluation/treatment results reviewed;patient   OT Total Evaluation Time   OT Eval, Low Complexity Minutes (23953) 10   OT Goals   Therapy Frequency (OT) 2 times/day   OT Predicted Duration/Target Date for Goal Attainment 07/24/24   OT Goals Cardiac Phase 1   OT: Understanding of cardiac education to maximize quality of life, condition management, and health outcomes Patient;Verbalize;Demonstrate   OT: Perform aerobic activity with stable cardiovascular response continuous;20 minutes;ambulation   OT: Functional/aerobic ambulation tolerance with stable cardiovascular response in order to return to home and community environment Modified independent;Greater than 300 feet   OT: Navigation of stairs simulating home set up with stable cardiovascular response in order to return to home and community environment Modified independent;Greater than 10 stairs   Self-Care/Home Management   Self-Care/Home Mgmt/ADL, Compensatory, Meal Prep Minutes (10379) 8   Treatment Detail/Skilled Intervention ed. on sternal prec/outpatient cardiac rehab, mult. STS SBA no LOB noted   Therapeutic Procedures/Exercise   Therapeutic Procedure: strength, endurance, ROM, flexibillity minutes (94335) 10   Symptoms Noted During/After Treatment fatigue   Treatment Detail/Skilled Intervention see ambulation tab   Treatment Time Includes (CR Only) See specific exercise details intervention group(s)   Ambulation   Workload 450ft   Symptoms Denies symptoms   Cardiovascular  Response Normal   Exercise Details SBA w/ 4ww slow pace   Vital Signs Details pre act. HR 67 BP 90/59 post HR 75 /61   Cardiac Education   Education Provided Precautions   Cardiac Rehab Phase II Plan   Phase II Order Received Yes   Phase II Appointment Status Scheduled   Date/Time 7/26   Location [facility]   OT Discharge Planning   OT Plan ambulation w/ device   OT Discharge Recommendation (DC Rec) home with outpatient cardiac rehab;home with assist   OT Rationale for DC Rec pt has good family support from spouse, expect pt to be safe for d/c home w/ assist when medically ready.   Total Session Time   Timed Code Treatment Minutes 18   Total Session Time (sum of timed and untimed services) 28

## 2024-07-17 NOTE — PLAN OF CARE
Goal Outcome Evaluation:                [facility] - ICU    RN Progress Note:            Pertinent Assessments:      Please refer to flowsheet rows for full assessment     CV, Respiratory, pain control           Key Events - This Shift:     Patient cardiac rhythm change from a sinus to atrial fibrillation rate 85 to 110 bpm.  This writer did call and text Dr. Hastings  Respiratory patient was able to stay on 3 L nasal cannula  with sats 92 to 97%.  Patient was able to take oral pain medication with adequate coverage but did encourage   Patient to inform nursing staff sooner for better pain management.          Barriers to Discharge / Downgrade:     Chest tubes and pacer wires

## 2024-07-17 NOTE — PLAN OF CARE
Problem: Cardiovascular Surgery  Goal: Improved Activity Tolerance  Outcome: Progressing  Goal: Fluid and Electrolyte Balance  Outcome: Progressing  Goal: Acceptable Pain Control  Outcome: Progressing  Goal: Nausea and Vomiting Relief  Outcome: Progressing  Goal: Effective Urinary Elimination  Outcome: Progressing  Goal: Effective Oxygenation and Ventilation  Outcome: Progressing  Intervention: Promote Airway Secretion Clearance  Recent Flowsheet Documentation  Taken 7/17/2024 1328 by Ofe Degroot RN  Cough And Deep Breathing: done with encouragement   Goal Outcome Evaluation:    sc  Patient Name: Gonsalo Blakely   MRN: [id number]   Date of Admission: 7/15/2024    Procedure: Procedure(s):  CORONARY ARTERY BYPASS GRAFT TIMES FOUR, INTERNAL MAMMARY ARTERY HARVEST,  LEFT LEG ENDOSCOPIC VESSEL PROCUREMENT , EPIAORTIC ULTRASOUND,  LIGATION, LEFT ATRIAL APPENDAGE, OPEN  ANESTHEIA TRANSESOPHAGEAL ECHOCARDIOGRAM.    Post Op day #:2    Subjective (Patient focus/Primary Problem for shift): Increase activity          Pain Goal 0 Pain Rating 0           Pain Medication/ Regime effective to reduce patient pain yes    Objective (Physical assessment):           Rhythm: atrial fibrillation            Bowel Activity: no if Yes indicate when: n/a          Bowel Medications: yes            Incision: healing well          Incentive Spirometry Q 1-2 hour when awake:  yes Volume: 1000          Epicardial Pacing Wires:  no            Patient Activity:           Up to chair for meals: yes          Ambulation with RN x2 (Not including CR): yes            Is patient in home clothes:no             Chest Tubes   Pleural: not applicable Draining: not applicable               Suction: not applicable              Mediastinal: not applicable Draining: not applicable               Suction: not applicable   Dressing Change Daily:no If No, why? SHANTI                     Urinary Catheter: no           Preventative WOC consult (need MD order):  no       Assessment (Nursing primary shift focus): Activity tolerance. Pain control. I/S    Plan (Patient Care Plan/focus):  Patient transferred to P3 from ICU @ 1330. Vincent was removed prior to transfer. CT and pacer wires removed this AM. Incision sites were washed with soap and water, shower tomorrow. After walking over to unit patient became nauseated and was given IV Compazine which was effective per patient. HR was A-fib and bradycardic @ 50 BPM so Metoprolol was held per parameters. Amiodarone gtt was titrated down to 0.5 mg/min @ 1330. Remains on 3 LPM NC.      Ofe Degroot RN   7/17/2024   2:08 PM

## 2024-07-17 NOTE — PLAN OF CARE
[facility] - ICU    RN Progress Note:            Pertinent Assessments:      Please refer to flowsheet rows for full assessment     VSS  Afebrile  A/O x4  Pain hitting levels range from 7-10 out of 10.  PRN Dilaudid given.             Key Events - This Shift:       Up to chair                 Barriers to Discharge / Downgrade:     Further Eval through tonight.         Point of Contact Update YES-OR-NO: Yes  If No, reason:   Name:Marcy  Phone Number:[phone]  Summary of Conversation: Care plan goal/daily update.           Goal Outcome Evaluation:      Plan of Care Reviewed With: patient    Overall Patient Progress: improvingOverall Patient Progress: improving           Problem: Adult Inpatient Plan of Care  Goal: Plan of Care Review  Description: The Plan of Care Review/Shift note should be completed every shift.  The Outcome Evaluation is a brief statement about your assessment that the patient is improving, declining, or no change.  This information will be displayed automatically on your shift  note.  Outcome: Progressing  Flowsheets (Taken 7/16/2024 2234)  Plan of Care Reviewed With: patient  Overall Patient Progress: improving  Goal: Absence of Hospital-Acquired Illness or Injury  Intervention: Identify and Manage Fall Risk  Recent Flowsheet Documentation  Taken 7/16/2024 2000 by Carlos Enrique Forde, RN  Safety Promotion/Fall Prevention:   room door open   room near nurse's station   bedside attendant  Taken 7/16/2024 1600 by Carlos Enrique Forde, RN  Safety Promotion/Fall Prevention:   room door open   room near nurse's station   bedside attendant  Intervention: Prevent Skin Injury  Recent Flowsheet Documentation  Taken 7/16/2024 2130 by Carlos Enrique Forde, RN  Body Position: (chair) other (see comments)  Taken 7/16/2024 2051 by Carlos Enrique Forde, RN  Body Position: turned  Taken 7/16/2024 2000 by Carlos Enrique Forde, RN  Body Position: turned  Taken 7/16/2024 1851 by Carlos Enrique Forde, RN  Body  Position: turned  Taken 7/16/2024 1600 by Carlos Enrique Forde, RN  Body Position: turned  Intervention: Prevent and Manage VTE (Venous Thromboembolism) Risk  Recent Flowsheet Documentation  Taken 7/16/2024 2000 by Carlos Enrique Forde, RN  VTE Prevention/Management: SCDs on (sequential compression devices)  Taken 7/16/2024 1600 by Carlos Enrique Forde, RN  VTE Prevention/Management: SCDs on (sequential compression devices)  Intervention: Prevent Infection  Recent Flowsheet Documentation  Taken 7/16/2024 2000 by Carlos Enrique Forde, RN  Infection Prevention:   hand hygiene promoted   single patient room provided  Taken 7/16/2024 1600 by Carlos Enrique Forde, RN  Infection Prevention:   hand hygiene promoted   single patient room provided  Goal: Optimal Comfort and Wellbeing  Intervention: Provide Person-Centered Care  Recent Flowsheet Documentation  Taken 7/16/2024 2000 by Carlos Enrique Forde, RN  Trust Relationship/Rapport:   care explained   reassurance provided   questions answered   questions encouraged   empathic listening provided  Taken 7/16/2024 1600 by Carlos Enrique Forde, RN  Trust Relationship/Rapport:   care explained   reassurance provided   questions answered   questions encouraged   empathic listening provided     Problem: Adult Inpatient Plan of Care  Goal: Absence of Hospital-Acquired Illness or Injury  Intervention: Identify and Manage Fall Risk  Recent Flowsheet Documentation  Taken 7/16/2024 2000 by Carlos Enrique Forde, RN  Safety Promotion/Fall Prevention:   room door open   room near nurse's station   bedside attendant  Taken 7/16/2024 1600 by Carlos Enrique Forde, RN  Safety Promotion/Fall Prevention:   room door open   room near nurse's station   bedside attendant  Intervention: Prevent Skin Injury  Recent Flowsheet Documentation  Taken 7/16/2024 2130 by Carlos Enrique Forde, RN  Body Position: (chair) other (see comments)  Taken 7/16/2024 2051 by Carlos Enrique Forde, RN  Body Position: turned  Taken 7/16/2024 2000 by Carlos Enrique Forde,  RN  Body Position: turned  Taken 7/16/2024 1851 by Carlos Enrique Forde, RN  Body Position: turned  Taken 7/16/2024 1600 by Carlos Enrique Forde, RN  Body Position: turned  Intervention: Prevent and Manage VTE (Venous Thromboembolism) Risk  Recent Flowsheet Documentation  Taken 7/16/2024 2000 by Carlos Enrique Forde, RN  VTE Prevention/Management: SCDs on (sequential compression devices)  Taken 7/16/2024 1600 by Carlos Enrique Forde, RN  VTE Prevention/Management: SCDs on (sequential compression devices)  Intervention: Prevent Infection  Recent Flowsheet Documentation  Taken 7/16/2024 2000 by Carlos Enrique Forde, RN  Infection Prevention:   hand hygiene promoted   single patient room provided  Taken 7/16/2024 1600 by Carlos Enrique Forde, RN  Infection Prevention:   hand hygiene promoted   single patient room provided     Problem: Adult Inpatient Plan of Care  Goal: Absence of Hospital-Acquired Illness or Injury  Intervention: Prevent Skin Injury  Recent Flowsheet Documentation  Taken 7/16/2024 2130 by Carlos Enrique Forde, RN  Body Position: (chair) other (see comments)  Taken 7/16/2024 2051 by Carlos Enrique Forde, RN  Body Position: turned  Taken 7/16/2024 2000 by Carlos Enrique Forde, RN  Body Position: turned  Taken 7/16/2024 1851 by Carlos Enrique Forde, RN  Body Position: turned  Taken 7/16/2024 1600 by Carlos Enrique Forde, RN  Body Position: turned

## 2024-07-17 NOTE — PROGRESS NOTES
CVTS Daily Progress Note   POD#2 s/p CABGx4 / LAAL   Attending: Sully  LOS: 2    SUBJECTIVE/INTERVAL EVENTS:    Did convert to a-fib overnight with rates 90-100s. Normotensive. NSR. IABP removed yesterday. Maintaining O2 sats on nasal cannula. Patient progressing overall well. Up to chair this AM. Does endorse some surgical pain. - BM / +flatus. Tolerating diet without nausea. UOP adequate. Chest tube output appropriate. Hgb 12.4. Patient denies new chest pain, shortness of breath, abdominal pain, calf pain, nausea. Patient has no questions today.     OBJECTIVE:  Temp:  [97  F (36.1  C)-98.4  F (36.9  C)] 98.1  F (36.7  C)  Pulse:  [] 89  Resp:  [12-29] 20  BP: ()/(62-93) 110/71  MAP:  [92 mmHg-102 mmHg] 100 mmHg  Arterial Line BP: (133-148)/(73-80) 138/80  FiO2 (%):  [30 %] 30 %  SpO2:  [90 %-100 %] 90 %  Vitals:    07/15/24 0943 07/15/24 1230 07/16/24 0500 07/17/24 0600   Weight: 77.5 kg (170 lb 14.4 oz) 78.8 kg (173 lb 12.8 oz) 79.9 kg (176 lb 3.2 oz) 79.3 kg (174 lb 14.4 oz)       Clinically Significant Risk Factors        # Hyperkalemia: Highest K = 5.6 mmol/L in last 2 days, will monitor as appropriate   # Hypocalcemia: Lowest iCa = 4.2 mg/dL in last 2 days, will monitor and replace as appropriate  # Hypercalcemia: Highest iCa = 5.3 mg/dL in last 2 days, will monitor as appropriate    # Hypoalbuminemia: Lowest albumin = 3.1 g/dL at 7/15/2024  6:48 PM, will monitor as appropriate    # Coagulation Defect: INR = 1.26 (Ref range: 0.85 - 1.15) and/or PTT = 65 Seconds (Ref range: 22 - 38 Seconds), will monitor for bleeding  # Thrombocytopenia: Lowest platelets = 124 in last 2 days, will monitor for bleeding    # Acute heart failure with reduced ejection fraction: last echo with EF <40% and receiving IV diuretics            #Precipitous drop in Hgb/Hct: Lowest Hgb this hospitalization: 10 g/dL. Will continue to monitor and treat/transfuse as appropriate.    # DMII: A1C = 7.0 % (Ref range: <5.7 %) within  past 6 months, PRESENT ON ADMISSION       # History of CABG: noted on surgical history                Current Medications:    Scheduled Meds:  Current Facility-Administered Medications   Medication Dose Route Frequency Provider Last Rate Last Admin    acetaminophen (TYLENOL) tablet 975 mg  975 mg Oral Q8H Justine Rey PA-C   975 mg at 07/17/24 0526    aspirin (ASA) chewable tablet 162 mg  162 mg Oral or NG Tube Daily Justine Rey PA-C   162 mg at 07/16/24 1006    Or    aspirin (ASA) Suppository 300 mg  300 mg Rectal Daily Justine Rey PA-C        atorvastatin (LIPITOR) tablet 80 mg  80 mg Oral Daily Justine Rey PA-C   80 mg at 07/16/24 0948    [Held by provider] dapagliflozin (FARXIGA) tablet 10 mg  10 mg Oral Daily Justine Rey PA-C        furosemide (LASIX) injection 40 mg  40 mg Intravenous TID Justine Rey PA-C   40 mg at 07/17/24 0610    heparin ANTICOAGULANT injection 5,000 Units  5,000 Units Subcutaneous Q8H Justine Rey PA-C   5,000 Units at 07/17/24 0335    insulin aspart (NovoLOG) injection (RAPID ACTING)  1-7 Units Subcutaneous TID AC Justine Rey PA-C        insulin aspart (NovoLOG) injection (RAPID ACTING)  1-5 Units Subcutaneous At Bedtime Justine Rey PA-C        ketorolac (TORADOL) injection 30 mg  30 mg Intravenous Once Justine Rey PA-C        Lidocaine (LIDOCARE) 4 % Patch 1-2 patch  1-2 patch Transdermal Q24H Justine Rey PA-C   1 patch at 07/16/24 2043    metoprolol tartrate (LOPRESSOR) half-tab 12.5 mg  12.5 mg Oral TID Justine Rey PA-C   12.5 mg at 07/17/24 0335    metoprolol tartrate (LOPRESSOR) tablet 50 mg  50 mg Oral BID Justine Rey PA-C   50 mg at 07/17/24 0707    pantoprazole (PROTONIX) 2 mg/mL suspension 40 mg  40 mg Oral or NG Tube Daily Justine Rey PA-C        Or    pantoprazole (PROTONIX) EC tablet 40 mg  40 mg Oral Daily Candido  Justine Rivera PA-C   40 mg at 07/16/24 0949    polyethylene glycol (MIRALAX) Packet 17 g  17 g Oral Daily Justine Rey PA-C   17 g at 07/16/24 0949    senna-docusate (SENOKOT-S/PERICOLACE) 8.6-50 MG per tablet 1 tablet  1 tablet Oral BID Justine Rey PA-C   1 tablet at 07/16/24 2042    [Held by provider] spironolactone (ALDACTONE) tablet 25 mg  25 mg Oral Daily Justine Rey PA-C         Continuous Infusions:  Current Facility-Administered Medications   Medication Dose Route Frequency Provider Last Rate Last Admin    amiodarone (NEXTERONE) 1.8 mg/mL in dextrose 5% 200 mL ADULT STANDARD infusion  1 mg/min Intravenous Continuous Justine Rey PA-C 33.3 mL/hr at 07/17/24 0737 1 mg/min at 07/17/24 0737    amiodarone (NEXTERONE) 1.8 mg/mL in dextrose 5% 200 mL ADULT STANDARD infusion  0.5 mg/min Intravenous Continuous Justine Rey PA-C         PRN Meds:.  Current Facility-Administered Medications   Medication Dose Route Frequency Provider Last Rate Last Admin    [START ON 7/18/2024] acetaminophen (TYLENOL) tablet 650 mg  650 mg Oral Q4H PRN Justine Rey PA-C        bisacodyl (DULCOLAX) suppository 10 mg  10 mg Rectal Daily PRN Justine Rey PA-C        calcium gluconate 1 g in 50 mL in sodium chloride intermittent infusion  1 g Intravenous Once PRN Justine Rey PA-C        calcium gluconate 2 g in  mL intermittent infusion  2 g Intravenous Once PRN Justine Rey PA-C        calcium gluconate 3 g in sodium chloride 0.9 % 100 mL intermittent infusion  3 g Intravenous Once PRN Justine Rey PA-C        glucose gel 15-30 g  15-30 g Oral Q15 Min PRN Justine Rey PA-C        Or    dextrose 50 % injection 25-50 mL  25-50 mL Intravenous Q15 Min PRN Justine Rey, PA-C        Or    glucagon injection 1 mg  1 mg Subcutaneous Q15 Min PRJustine Jaramillo PA-C        glucose gel 15-30 g  15-30  g Oral Q15 Min PRN Justine Rey PA-C        Or    dextrose 50 % injection 25-50 mL  25-50 mL Intravenous Q15 Min PRN Justine Rey PA-C        Or    glucagon injection 1 mg  1 mg Subcutaneous Q15 Min PRN Justine Rey PA-C        hydrALAZINE (APRESOLINE) injection 10 mg  10 mg Intravenous Q30 Min PRN Justine Rey PA-C        HYDROmorphone (DILAUDID) injection 0.2 mg  0.2 mg Intravenous Q2H PRN Justine Rey PA-C   0.2 mg at 07/16/24 2053    Or    HYDROmorphone (DILAUDID) injection 0.4 mg  0.4 mg Intravenous Q2H PRN Justine Rey PA-C        ipratropium - albuterol 0.5 mg/2.5 mg/3 mL (DUONEB) neb solution 3 mL  3 mL Nebulization Q4H PRN Mariusz Ross MD        lactated ringers BOLUS 250 mL  250 mL Intravenous Q15 Min PRN Justine Rey PA-C        magnesium hydroxide (MILK OF MAGNESIA) suspension 30 mL  30 mL Oral Daily PRN Justine Rey PA-C        naloxone (NARCAN) injection 0.2 mg  0.2 mg Intravenous Q2 Min PRN Valerio Virk MD        Or    naloxone (NARCAN) injection 0.4 mg  0.4 mg Intravenous Q2 Min PRN Valerio Virk MD        Or    naloxone (NARCAN) injection 0.2 mg  0.2 mg Intramuscular Q2 Min PRN Valerio Virk MD        Or    naloxone (NARCAN) injection 0.4 mg  0.4 mg Intramuscular Q2 Min PRN Valerio Virk MD        ondansetron (ZOFRAN ODT) ODT tab 4 mg  4 mg Oral Q6H PRN Justine Rey PA-C        Or    ondansetron (ZOFRAN) injection 4 mg  4 mg Intravenous Q6H PRN Justnie Rey PA-C        oxyCODONE (ROXICODONE) tablet 5 mg  5 mg Oral Q4H PRN Justine Rey PA-C   5 mg at 07/17/24 0525    Or    oxyCODONE (ROXICODONE) tablet 10 mg  10 mg Oral Q4H PRN Justine Rey PA-C        prochlorperazine (COMPAZINE) injection 10 mg  10 mg Intravenous Q6H PRN Justine Rey PA-C        Or    prochlorperazine (COMPAZINE) tablet 10 mg  10 mg Oral Q6H PRN Justine Rey,  PA-C           Cardiographics:    Telemetry monitoring demonstrates a-fib with rates in the 90s per my personal review.    Imaging:  Results for orders placed or performed during the hospital encounter of 07/15/24   XR Chest Port 1 View    Impression    IMPRESSION: Sternal wires with mediastinal and left chest drains, atrial appendage closure device and pulmonary arterial catheter with its tip in the outflow tract are again observed. Vascular volume is normal and the lungs are clear. No pneumothorax or   pleural effusion.   XR Chest Port 1 View    Impression    IMPRESSION: Sternal wires, mediastinal clips and left atrial appendage clip now present following CABG. ET tube, Dickinson-Irlanda catheter, as well as mediastinal and pleural drains appear in good position.  Heart size and pulmonary vessels are normal. Lungs clear, no pneumothorax.       Labs, personally reviewed.  Hemoglobin   Date Value Ref Range Status   07/16/2024 12.4 (L) 13.3 - 17.7 g/dL Final   07/15/2024 12.9 (L) 13.3 - 17.7 g/dL Final   07/15/2024 11.0 (L) 13.3 - 17.7 g/dL Final     Hemoglobin POCT   Date Value Ref Range Status   07/15/2024 12.2 (L) 13.3 - 17.7 g/dL Final   07/15/2024 11.3 (L) 13.3 - 17.7 g/dL Final   07/15/2024 10.8 (L) 13.3 - 17.7 g/dL Final     WBC Count   Date Value Ref Range Status   07/16/2024 12.5 (H) 4.0 - 11.0 10e3/uL Final   07/15/2024 14.4 (H) 4.0 - 11.0 10e3/uL Final   07/15/2024 16.2 (H) 4.0 - 11.0 10e3/uL Final     Platelet Count   Date Value Ref Range Status   07/16/2024 124 (L) 150 - 450 10e3/uL Final   07/15/2024 156 150 - 450 10e3/uL Final   07/15/2024 148 (L) 150 - 450 10e3/uL Final     Creatinine   Date Value Ref Range Status   07/16/2024 1.06 0.67 - 1.17 mg/dL Final   07/15/2024 1.03 0.67 - 1.17 mg/dL Final   06/21/2024 1.08 0.67 - 1.17 mg/dL Final     Potassium   Date Value Ref Range Status   07/17/2024 4.7 3.4 - 5.3 mmol/L Final   07/16/2024 4.8 3.4 - 5.3 mmol/L Final   07/16/2024 5.1 3.4 - 5.3 mmol/L Final    01/12/2019 3.8 3.5 - 5.0 mmol/L Final     Potassium POCT   Date Value Ref Range Status   07/15/2024 4.0 3.4 - 5.3 mmol/L Final   07/15/2024 4.4 3.4 - 5.3 mmol/L Final   07/15/2024 5.6 (H) 3.4 - 5.3 mmol/L Final     Magnesium   Date Value Ref Range Status   07/17/2024 2.2 1.7 - 2.3 mg/dL Final   07/16/2024 2.3 1.7 - 2.3 mg/dL Final   07/16/2024 2.5 (H) 1.7 - 2.3 mg/dL Final          I/O:  I/O last 3 completed shifts:  In: 1510.5 [P.O.:1200; I.V.:310.5]  Out: 2785 [Urine:2505; Chest Tube:280]       Physical Exam:    General: Patient seen up in chair. NAD. Conversant. Pleasant.   CV: A-fib on monitor. 2+ peripheral pulses in all extremities. Mild edema.  Pulm: Non-labored effort on nasal cannula. Chest tubes in place, serosanguinous output, no air leak. Incision C/D/I.  Abd: Soft, NT, ND  : Vincent with clatyon urine  Ext: Mild pedal edema, SCDs in place, warm, distal pulses intact  Neuro: CNs grossly intact.       ASSESSMENT/PLAN:    Gonsalo Blakely is a 59 year old male with a history of CAD and a-fib who is s/p CABGx4 / LAAL.    Active Problems:    Acute decompensated heart failure (H)    Ischemic cardiomyopathy    Coronary artery disease involving native coronary artery of native heart, unspecified whether angina present        NEURO:   - Scheduled Tylenol/lidocaine patches and PRN Tylenol/oxycodone/dilaudid/Toradol x1 for pain    CV:   - Pre-op EF 25-30%  - Echo close to discharge; consider anticoagulation per surgeon if EF remains low  - Consider heart failure regimen/follow-up  - Normotensive   - A-fib overnight 7/16-7/17--amio bolus and gtt  - IABP removed 7/16  - Metoprolol 50mg two times a day   - ASA 162mg  - Atorvastatin 80mg daily  - Chest tubes and TPWs removed this AM    PULM:   - Maintaining oxygen saturations on nasal cannula  - Encourage pulmonary toilet  - PRN nebs per RT    FEN/GI:  - Continue electrolyte replacement protocol  - Diet: Cardiac, ADAT   - Bowel regimen    RENAL:  - Adequate UOP/hr.  Continue to monitor closely.  - Cr pending today  - Vincent to be discontinued   - Diuresis with 40mg IV Lasix three times a day    HEME:  - Acute blood loss anemia post-op.   - No bleeding concerns. Hep SQ, ASA    ID:  - Marcelle op ppx complete, afebrile. No concerns for infection    ENDO:   - SSI  - Restart PTA Farxiga when able    PPx:   - DVT: SCDs, SQ heparin TID, ambulation   - GI: Protonix 40mg PO daily    DISPO:   - Transfer to general telemetry status  - Medically Ready for Discharge: Anticipated in 3-4 days         Patient discussed with Dr. Virk.        _______  NEVIN ConradC  Cardiothoracic Surgery  [phone]

## 2024-07-18 ENCOUNTER — APPOINTMENT (OUTPATIENT)
Dept: OCCUPATIONAL THERAPY | Facility: HOSPITAL | Age: 59
End: 2024-07-18
Attending: INTERNAL MEDICINE
Payer: MEDICAID

## 2024-07-18 LAB
ANION GAP SERPL CALCULATED.3IONS-SCNC: 9 MMOL/L (ref 7–15)
BUN SERPL-MCNC: 32.4 MG/DL (ref 8–23)
CA-I BLD-MCNC: 4.6 MG/DL (ref 4.4–5.2)
CALCIUM SERPL-MCNC: 8.8 MG/DL (ref 8.8–10.4)
CHLORIDE SERPL-SCNC: 96 MMOL/L (ref 98–107)
CREAT SERPL-MCNC: 1.48 MG/DL (ref 0.67–1.17)
EGFRCR SERPLBLD CKD-EPI 2021: 53 ML/MIN/1.73M2
GLUCOSE BLDC GLUCOMTR-MCNC: 120 MG/DL (ref 70–99)
GLUCOSE BLDC GLUCOMTR-MCNC: 124 MG/DL (ref 70–99)
GLUCOSE BLDC GLUCOMTR-MCNC: 128 MG/DL (ref 70–99)
GLUCOSE BLDC GLUCOMTR-MCNC: 167 MG/DL (ref 70–99)
GLUCOSE BLDC GLUCOMTR-MCNC: 98 MG/DL (ref 70–99)
GLUCOSE SERPL-MCNC: 142 MG/DL (ref 70–99)
HCO3 SERPL-SCNC: 30 MMOL/L (ref 22–29)
HOLD SPECIMEN: NORMAL
MAGNESIUM SERPL-MCNC: 2.2 MG/DL (ref 1.7–2.3)
PHOSPHATE SERPL-MCNC: 3.4 MG/DL (ref 2.5–4.5)
POTASSIUM SERPL-SCNC: 4.3 MMOL/L (ref 3.4–5.3)
SODIUM SERPL-SCNC: 135 MMOL/L (ref 135–145)

## 2024-07-18 PROCEDURE — 97110 THERAPEUTIC EXERCISES: CPT | Mod: GO

## 2024-07-18 PROCEDURE — 250N000013 HC RX MED GY IP 250 OP 250 PS 637: Performed by: PHYSICIAN ASSISTANT

## 2024-07-18 PROCEDURE — 82330 ASSAY OF CALCIUM: CPT | Performed by: PHYSICIAN ASSISTANT

## 2024-07-18 PROCEDURE — 210N000001 HC R&B IMCU HEART CARE

## 2024-07-18 PROCEDURE — 80048 BASIC METABOLIC PNL TOTAL CA: CPT | Performed by: PHYSICIAN ASSISTANT

## 2024-07-18 PROCEDURE — 84100 ASSAY OF PHOSPHORUS: CPT | Performed by: PHYSICIAN ASSISTANT

## 2024-07-18 PROCEDURE — 250N000011 HC RX IP 250 OP 636: Performed by: PHYSICIAN ASSISTANT

## 2024-07-18 PROCEDURE — 36415 COLL VENOUS BLD VENIPUNCTURE: CPT | Performed by: PHYSICIAN ASSISTANT

## 2024-07-18 PROCEDURE — 97535 SELF CARE MNGMENT TRAINING: CPT | Mod: GO

## 2024-07-18 PROCEDURE — 83735 ASSAY OF MAGNESIUM: CPT | Performed by: PHYSICIAN ASSISTANT

## 2024-07-18 RX ADMIN — EMPAGLIFLOZIN 10 MG: 10 TABLET, FILM COATED ORAL at 08:06

## 2024-07-18 RX ADMIN — ACETAMINOPHEN 975 MG: 325 TABLET ORAL at 06:23

## 2024-07-18 RX ADMIN — HEPARIN SODIUM 5000 UNITS: 10000 INJECTION, SOLUTION INTRAVENOUS; SUBCUTANEOUS at 21:07

## 2024-07-18 RX ADMIN — FUROSEMIDE 40 MG: 10 INJECTION, SOLUTION INTRAMUSCULAR; INTRAVENOUS at 17:29

## 2024-07-18 RX ADMIN — ACETAMINOPHEN 975 MG: 325 TABLET ORAL at 21:06

## 2024-07-18 RX ADMIN — METOPROLOL TARTRATE 12.5 MG: 25 TABLET, FILM COATED ORAL at 04:23

## 2024-07-18 RX ADMIN — ACETAMINOPHEN 975 MG: 325 TABLET ORAL at 13:17

## 2024-07-18 RX ADMIN — PANTOPRAZOLE SODIUM 40 MG: 40 TABLET, DELAYED RELEASE ORAL at 08:02

## 2024-07-18 RX ADMIN — POLYETHYLENE GLYCOL 3350 17 G: 17 POWDER, FOR SOLUTION ORAL at 08:01

## 2024-07-18 RX ADMIN — AMIODARONE HYDROCHLORIDE 0.5 MG/MIN: 1.8 INJECTION, SOLUTION INTRAVENOUS at 01:04

## 2024-07-18 RX ADMIN — HEPARIN SODIUM 5000 UNITS: 10000 INJECTION, SOLUTION INTRAVENOUS; SUBCUTANEOUS at 04:23

## 2024-07-18 RX ADMIN — SENNOSIDES AND DOCUSATE SODIUM 1 TABLET: 50; 8.6 TABLET ORAL at 21:07

## 2024-07-18 RX ADMIN — ATORVASTATIN CALCIUM 80 MG: 40 TABLET, FILM COATED ORAL at 08:01

## 2024-07-18 RX ADMIN — METOPROLOL TARTRATE 12.5 MG: 25 TABLET, FILM COATED ORAL at 17:28

## 2024-07-18 RX ADMIN — FUROSEMIDE 40 MG: 10 INJECTION, SOLUTION INTRAMUSCULAR; INTRAVENOUS at 11:40

## 2024-07-18 RX ADMIN — METOPROLOL TARTRATE 25 MG: 25 TABLET, FILM COATED ORAL at 08:02

## 2024-07-18 RX ADMIN — HEPARIN SODIUM 5000 UNITS: 10000 INJECTION, SOLUTION INTRAVENOUS; SUBCUTANEOUS at 11:40

## 2024-07-18 RX ADMIN — ASPIRIN 81 MG CHEWABLE TABLET 162 MG: 81 TABLET CHEWABLE at 08:01

## 2024-07-18 RX ADMIN — LIDOCAINE 1 PATCH: 246 PATCH TOPICAL at 21:07

## 2024-07-18 RX ADMIN — METOPROLOL TARTRATE 12.5 MG: 25 TABLET, FILM COATED ORAL at 13:17

## 2024-07-18 RX ADMIN — FUROSEMIDE 40 MG: 10 INJECTION, SOLUTION INTRAMUSCULAR; INTRAVENOUS at 06:23

## 2024-07-18 ASSESSMENT — ACTIVITIES OF DAILY LIVING (ADL)
ADLS_ACUITY_SCORE: 29
ADLS_ACUITY_SCORE: 27
ADLS_ACUITY_SCORE: 27
ADLS_ACUITY_SCORE: 29
ADLS_ACUITY_SCORE: 29
ADLS_ACUITY_SCORE: 27
ADLS_ACUITY_SCORE: 27
ADLS_ACUITY_SCORE: 29
ADLS_ACUITY_SCORE: 27
ADLS_ACUITY_SCORE: 29
ADLS_ACUITY_SCORE: 27
ADLS_ACUITY_SCORE: 29
ADLS_ACUITY_SCORE: 27
ADLS_ACUITY_SCORE: 29
ADLS_ACUITY_SCORE: 29
ADLS_ACUITY_SCORE: 27
ADLS_ACUITY_SCORE: 29
ADLS_ACUITY_SCORE: 27
ADLS_ACUITY_SCORE: 27
ADLS_ACUITY_SCORE: 29
ADLS_ACUITY_SCORE: 29
ADLS_ACUITY_SCORE: 27
ADLS_ACUITY_SCORE: 27

## 2024-07-18 NOTE — PLAN OF CARE
sc  Patient Name: Gonsalo Blakely   MRN: [id number]   Date of Admission: 7/15/2024    Procedure: Procedure(s):  CORONARY ARTERY BYPASS GRAFT TIMES FOUR, INTERNAL MAMMARY ARTERY HARVEST,  LEFT LEG ENDOSCOPIC VESSEL PROCUREMENT , EPIAORTIC ULTRASOUND,  LIGATION, LEFT ATRIAL APPENDAGE, OPEN  ANESTHEIA TRANSESOPHAGEAL ECHOCARDIOGRAM.    Post Op day #:3    Subjective (Patient focus/Primary Problem for shift): Better active tolerance and shower.           Pain Goal 0 Pain Rating 0           Pain Medication/ Regime effective to reduce patient pain n/a    Objective (Physical assessment):           Rhythm: normal sinus rhythm            Bowel Activity: yes if Yes indicate when: 07/17          Bowel Medications: yes            Incision: opened          Incentive Spirometry Q 1-2 hour when awake:  yes Volume: 1000          Epicardial Pacing Wires:  not applicable            Patient Activity:           Up to chair for meals: yes          Ambulation with RN x2 (Not including CR): yes            Is patient in home clothes:no             Chest Tubes   Pleural: not applicable Draining: not applicable               Suction: not applicable              Mediastinal: not applicable Draining: not applicable               Suction: not applicable   Dressing Change Daily:not applicable                      Urinary Catheter: no           Preventative WOC consult (need MD order): no       Assessment (Nursing primary shift focus): Pt is A/O x 4. Assist x 1 with walker. VSS. Tele NSR. RA. Denied pain. No SOB noticed. Using the urinal when walking to the bathroom. Pt educated about the importance to take a shower and he asked to have the shower at 2000. K, Mg, Phosp and Ionized calcium all AM recheck.     Plan (Patient Care Plan/focus): Shower and better activity tolerance.       Lori Keane RN   7/18/2024   6:31 PM      Plan of Care Reviewed With: patient    Overall Patient Progress: improvingOverall Patient Progress: improving

## 2024-07-18 NOTE — PLAN OF CARE
sc  Patient Name: Gonsalo Blakely   MRN: [id number]   Date of Admission: 7/15/2024    Procedure: Procedure(s):  CORONARY ARTERY BYPASS GRAFT TIMES FOUR, INTERNAL MAMMARY ARTERY HARVEST,  LEFT LEG ENDOSCOPIC VESSEL PROCUREMENT , EPIAORTIC ULTRASOUND,  LIGATION, LEFT ATRIAL APPENDAGE, OPEN  ANESTHEIA TRANSESOPHAGEAL ECHOCARDIOGRAM.    Post Op day #:3    Subjective (Patient focus/Primary Problem for shift): Pain well controlled with scheduled tylenol. Pt denies pain.          Pain Goal 0 Pain Rating 0           Pain Medication/ Regime effective to reduce patient pain yes    Objective (Physical assessment):           Rhythm: normal sinus rhythm            Bowel Activity: yes if Yes indicate when: 7/17          Bowel Medications: yes            Incision: healing well          Incentive Spirometry Q 1-2 hour when awake:  yes Volume: 1000          Epicardial Pacing Wires:  not applicable            Patient Activity:           Up to chair for meals: yes          Ambulation with RN x2 (Not including CR): yes            Is patient in home clothes:no             Chest Tubes   Pleural: not applicable Draining: not applicable               Suction: not applicable              Mediastinal: not applicable Draining: not applicable               Suction: not applicable   Dressing Change Daily:not applicable If No, why?na                     Urinary Catheter: no           Preventative WOC consult (need MD order): no       Assessment (Nursing primary shift focus): Pt up in chair since 11am.  Pt up to bathroom to use urinal.  Pt coughing up white sputum and is splinting with heart pillow appropriately.  Pt is demonstrating proper sternal precautions.  Pt is using IS often independently.   Pt continues on room air.     Plan (Patient Care Plan/focus): Continue to encourage out of bed, IS use, and shower yet today.        Leeanna Alcaraz RN   7/18/2024   2:10 PM

## 2024-07-18 NOTE — CONSULTS
NUTRITION EDUCATION      REASON:  Provider order for diet education after CV surgery    NUTRITION HISTORY:  Patient is s/p CABG 7/15/24.  He has had low sodium diet education with an RD on 6/17/24.  Patient reports that he has been eating the heart healthy diet, low fat, low sodium    CURRENT DIET:  Low sodium, low fat.    NUTRITION DIAGNOSIS:  Food- and nutrition-related knowledge deficit R/t heart disease    INTERVENTIONS:    Implementation:      *  Nutrition Education (Content):   A)  Provided handout Heart Healthy Nutrition therapy, Label reading tips, Eat right with My[employer].   B)  Discussed Low fat, low sodium diet building a meal on 1/2 plate vegetables, 1/4 plate protein, 1/4 plate carb, low sugar beverage.      *  Nutrition Education (Application):   A)  Discussed current eating habits and recommended alternative food choices      *  Anticipate compliance      *  Diet Education - refer to Education Flowsheet    Goals:      *  Patient participated in discussion and demonstrates understanding.      *  All of the above goals met during the education session    Follow Up/Monitoring:      *  Provided RD contact information for future questions      *  Further education opportunities in the cardiac rehab outpatient program.

## 2024-07-18 NOTE — PLAN OF CARE
Goal Outcome Evaluation:  sc  Patient Name: Gonsalo Blakely   MRN: [id number]   Date of Admission: 7/15/2024    Procedure: Procedure(s):  CORONARY ARTERY BYPASS GRAFT TIMES FOUR, INTERNAL MAMMARY ARTERY HARVEST,  LEFT LEG ENDOSCOPIC VESSEL PROCUREMENT , EPIAORTIC ULTRASOUND,  LIGATION, LEFT ATRIAL APPENDAGE, OPEN  ANESTHEIA TRANSESOPHAGEAL ECHOCARDIOGRAM.    Post Op day #:3    Subjective (Patient focus/Primary Problem for shift): Breathing exercises          Pain Goal0 Pain Rating0           Pain Medication/ Regime effective to reduce patient pain prn meds    Objective (Physical assessment):           Rhythm: normal sinus rhythm            Bowel Activity: yes if Yes indicate when: 7/17          Bowel Medications: yes            Incision: healing well          Incentive Spirometry Q 1-2 hour when awake:  yes Volume: 1000          Epicardial Pacing Wires:  not applicable            Patient Activity:           Up to chair for meals: no          Ambulation with RN x2 (Not including CR): no            Is patient in home clothes:no             Chest Tubes   Pleural: not applicable Draining: not applicable               Suction: not applicable              Mediastinal: not applicable Draining: not applicable               Suction: not applicable   Dressing Change Daily:yes If No, why?                        Urinary Catheter: no           Preventative WOC consult (need MD order): no       Assessment (Nursing primary shift focus): Pt is on RA and using IS and flutter valve.      Plan (Patient Care Plan/focus): PT is working on breathing exercises and denies pain nausea sob.      Samy Ocampo RN   7/18/2024   10:51 AM

## 2024-07-18 NOTE — PROGRESS NOTES
CVTS Daily Progress Note   POD#3 s/p CABGx4 / LAAL   Attending: Sully  LOS: 3    SUBJECTIVE/INTERVAL EVENTS:    No acute events overnight. Converted to NSR from a-fib yesterday. Normotensive. NSR. Maintaining O2 sats on room air. Patient progressing overall well. Working with therapy. Does endorse some surgical pain, improved. - BM / +flatus. Tolerating diet without nausea. UOP not accurately measured. Chest tubes removed yesterday. Patient denies new chest pain, shortness of breath, abdominal pain, calf pain, nausea. Patient has no questions today.     OBJECTIVE:  Temp:  [97.8  F (36.6  C)-98.7  F (37.1  C)] 98.7  F (37.1  C)  Pulse:  [50-81] 75  Resp:  [16-20] 20  BP: ()/(51-70) 144/68  SpO2:  [82 %-98 %] 96 %  Vitals:    07/15/24 0943 07/15/24 1230 07/16/24 0500 07/17/24 0600   Weight: 77.5 kg (170 lb 14.4 oz) 78.8 kg (173 lb 12.8 oz) 79.9 kg (176 lb 3.2 oz) 79.3 kg (174 lb 14.4 oz)    07/18/24 0623   Weight: 80 kg (176 lb 6.4 oz)       Clinically Significant Risk Factors              # Hypoalbuminemia: Lowest albumin = 3.1 g/dL at 7/15/2024  6:48 PM, will monitor as appropriate      # Acute Kidney Injury, unspecified: based on a >150% or 0.3 mg/dL increase in last creatinine compared to past 90 day average, will monitor renal function   # Acute heart failure with reduced ejection fraction: last echo with EF <40% and receiving IV diuretics            #Precipitous drop in Hgb/Hct: Lowest Hgb this hospitalization: 10 g/dL. Will continue to monitor and treat/transfuse as appropriate.    # DMII: A1C = 7.0 % (Ref range: <5.7 %) within past 6 months, PRESENT ON ADMISSION       # History of CABG: noted on surgical history                Current Medications:    Scheduled Meds:  Current Facility-Administered Medications   Medication Dose Route Frequency Provider Last Rate Last Admin    acetaminophen (TYLENOL) tablet 975 mg  975 mg Oral Q8H Justine Rey PA-C   975 mg at 07/18/24 0623    aspirin (ASA)  chewable tablet 162 mg  162 mg Oral or NG Tube Daily Justine Rey PA-C   162 mg at 07/17/24 0814    Or    aspirin (ASA) Suppository 300 mg  300 mg Rectal Daily Justine Rey PA-C        atorvastatin (LIPITOR) tablet 80 mg  80 mg Oral Daily Justine Rey PA-C   80 mg at 07/17/24 0814    furosemide (LASIX) injection 40 mg  40 mg Intravenous TID Justine Rey PA-C   40 mg at 07/18/24 0623    heparin ANTICOAGULANT injection 5,000 Units  5,000 Units Subcutaneous Q8H Justine Rey PA-C   5,000 Units at 07/18/24 0423    insulin aspart (NovoLOG) injection (RAPID ACTING)  1-7 Units Subcutaneous TID AC Justine Rey PA-C   1 Units at 07/17/24 1150    insulin aspart (NovoLOG) injection (RAPID ACTING)  1-5 Units Subcutaneous At Bedtime Justine Rey PA-C        Lidocaine (LIDOCARE) 4 % Patch 1-2 patch  1-2 patch Transdermal Q24H Justine Rey PA-C   1 patch at 07/17/24 2018    metoprolol tartrate (LOPRESSOR) half-tab 12.5 mg  12.5 mg Oral TID Justine Rey PA-C   12.5 mg at 07/18/24 0423    metoprolol tartrate (LOPRESSOR) tablet 25 mg  25 mg Oral BID Justine Rey PA-C        pantoprazole (PROTONIX) 2 mg/mL suspension 40 mg  40 mg Oral or NG Tube Daily Justine Rey PA-C        Or    pantoprazole (PROTONIX) EC tablet 40 mg  40 mg Oral Daily Justine Rey PA-C   40 mg at 07/17/24 0814    polyethylene glycol (MIRALAX) Packet 17 g  17 g Oral Daily Justine Rey PA-C   17 g at 07/17/24 0814    senna-docusate (SENOKOT-S/PERICOLACE) 8.6-50 MG per tablet 1 tablet  1 tablet Oral BID Justine Rey PA-C   1 tablet at 07/17/24 0814    [Held by provider] spironolactone (ALDACTONE) tablet 25 mg  25 mg Oral Daily Justine Rey PA-C         Continuous Infusions:  Current Facility-Administered Medications   Medication Dose Route Frequency Provider Last Rate Last Admin     PRN Meds:.  Current  Facility-Administered Medications   Medication Dose Route Frequency Provider Last Rate Last Admin    acetaminophen (TYLENOL) tablet 650 mg  650 mg Oral Q4H PRN Justine Rye PA-C        bisacodyl (DULCOLAX) suppository 10 mg  10 mg Rectal Daily PRN Justine Rey PA-C        calcium gluconate 1 g in 50 mL in sodium chloride intermittent infusion  1 g Intravenous Once PRN Justine Rey PA-C        calcium gluconate 2 g in  mL intermittent infusion  2 g Intravenous Once PRN Justine Rey PA-C        calcium gluconate 3 g in sodium chloride 0.9 % 100 mL intermittent infusion  3 g Intravenous Once PRN Justine Rey PA-C        glucose gel 15-30 g  15-30 g Oral Q15 Min PRN Justine Rey PA-C        Or    dextrose 50 % injection 25-50 mL  25-50 mL Intravenous Q15 Min PRN Justine Rey PA-C        Or    glucagon injection 1 mg  1 mg Subcutaneous Q15 Min PRN Justine Rey PA-C        glucose gel 15-30 g  15-30 g Oral Q15 Min PRN Justine Rey PA-C        Or    dextrose 50 % injection 25-50 mL  25-50 mL Intravenous Q15 Min PRN Justine Rey PA-C        Or    glucagon injection 1 mg  1 mg Subcutaneous Q15 Min PRN Justine Rey PA-C        hydrALAZINE (APRESOLINE) injection 10 mg  10 mg Intravenous Q30 Min PRN Justine Rey PA-C        ipratropium - albuterol 0.5 mg/2.5 mg/3 mL (DUONEB) neb solution 3 mL  3 mL Nebulization Q4H PRN Justine Rey PA-C        lactated ringers BOLUS 250 mL  250 mL Intravenous Q15 Min PRN Justine Rey PA-C        magnesium hydroxide (MILK OF MAGNESIA) suspension 30 mL  30 mL Oral Daily PRN Justine Rey PA-C        naloxone (NARCAN) injection 0.2 mg  0.2 mg Intravenous Q2 Min PRN Justine Rey PA-C        Or    naloxone (NARCAN) injection 0.4 mg  0.4 mg Intravenous Q2 Min PRN Justine Rey PA-C        Or    naloxone (NARCAN)  injection 0.2 mg  0.2 mg Intramuscular Q2 Min PRN Justine Rey PA-C        Or    naloxone (NARCAN) injection 0.4 mg  0.4 mg Intramuscular Q2 Min PRN Justine Rey PA-C        ondansetron (ZOFRAN ODT) ODT tab 4 mg  4 mg Oral Q6H PRN Justine Rey PA-C        Or    ondansetron (ZOFRAN) injection 4 mg  4 mg Intravenous Q6H PRN Justine Rey PA-C   4 mg at 07/17/24 1305    oxyCODONE (ROXICODONE) tablet 5 mg  5 mg Oral Q4H PRN Justine Rey PA-C   5 mg at 07/17/24 0525    Or    oxyCODONE (ROXICODONE) tablet 10 mg  10 mg Oral Q4H PRN Justine Rey PA-C        prochlorperazine (COMPAZINE) injection 10 mg  10 mg Intravenous Q6H PRN Justine Rey PA-C        Or    prochlorperazine (COMPAZINE) tablet 10 mg  10 mg Oral Q6H PRN Justine Rey PA-C           Cardiographics:    Telemetry monitoring demonstrates NSR with rates in the 70s per my personal review.    Imaging:  Results for orders placed or performed during the hospital encounter of 07/15/24   XR Chest Port 1 View    Impression    IMPRESSION: Sternal wires with mediastinal and left chest drains, atrial appendage closure device and pulmonary arterial catheter with its tip in the outflow tract are again observed. Vascular volume is normal and the lungs are clear. No pneumothorax or   pleural effusion.   XR Chest Port 1 View    Impression    IMPRESSION: Sternal wires, mediastinal clips and left atrial appendage clip now present following CABG. ET tube, [place] catheter, as well as mediastinal and pleural drains appear in good position.  Heart size and pulmonary vessels are normal. Lungs clear, no pneumothorax.       Labs, personally reviewed.  Hemoglobin   Date Value Ref Range Status   07/16/2024 12.4 (L) 13.3 - 17.7 g/dL Final   07/15/2024 12.9 (L) 13.3 - 17.7 g/dL Final   07/15/2024 11.0 (L) 13.3 - 17.7 g/dL Final     Hemoglobin POCT   Date Value Ref Range Status   07/15/2024 12.2 (L) 13.3  - 17.7 g/dL Final   07/15/2024 11.3 (L) 13.3 - 17.7 g/dL Final   07/15/2024 10.8 (L) 13.3 - 17.7 g/dL Final     WBC Count   Date Value Ref Range Status   07/16/2024 12.5 (H) 4.0 - 11.0 10e3/uL Final   07/15/2024 14.4 (H) 4.0 - 11.0 10e3/uL Final   07/15/2024 16.2 (H) 4.0 - 11.0 10e3/uL Final     Platelet Count   Date Value Ref Range Status   07/16/2024 124 (L) 150 - 450 10e3/uL Final   07/15/2024 156 150 - 450 10e3/uL Final   07/15/2024 148 (L) 150 - 450 10e3/uL Final     Creatinine   Date Value Ref Range Status   07/18/2024 1.48 (H) 0.67 - 1.17 mg/dL Final   07/17/2024 1.22 (H) 0.67 - 1.17 mg/dL Final   07/16/2024 1.06 0.67 - 1.17 mg/dL Final     Potassium   Date Value Ref Range Status   07/18/2024 4.3 3.4 - 5.3 mmol/L Final   07/17/2024 4.7 3.4 - 5.3 mmol/L Final   07/16/2024 4.8 3.4 - 5.3 mmol/L Final   01/12/2019 3.8 3.5 - 5.0 mmol/L Final     Potassium POCT   Date Value Ref Range Status   07/15/2024 4.0 3.4 - 5.3 mmol/L Final   07/15/2024 4.4 3.4 - 5.3 mmol/L Final   07/15/2024 5.6 (H) 3.4 - 5.3 mmol/L Final     Magnesium   Date Value Ref Range Status   07/18/2024 2.2 1.7 - 2.3 mg/dL Final   07/17/2024 2.2 1.7 - 2.3 mg/dL Final   07/16/2024 2.3 1.7 - 2.3 mg/dL Final          I/O:  I/O last 3 completed shifts:  In: 222 [P.O.:222]  Out: 775 [Urine:775]       Physical Exam:    General: Patient seen up in chair. NAD. Conversant. Pleasant.   CV: NSR on monitor. 2+ peripheral pulses in all extremities. Mild edema.  Pulm: Non-labored effort on room air. Chest tubes in place, serosanguinous output, no air leak. Incision C/D/I.  Abd: Soft, NT, ND  : Vincent with clayton urine  Ext: Mild pedal edema, SCDs in place, warm, distal pulses intact  Neuro: CNs grossly intact.       ASSESSMENT/PLAN:    Gonsalo Blakely is a 59 year old male with a history of CAD and a-fib who is s/p CABGx4 / LAAL.    Active Problems:    Acute decompensated heart failure (H)    Ischemic cardiomyopathy    Coronary artery disease involving native  coronary artery of native heart, unspecified whether angina present        NEURO:   - Scheduled Tylenol/lidocaine patches and PRN Tylenol/oxycodone for pain  - Toradol stopped due to Cr bump    CV:   - Pre-op EF 25-30%  - Echo closer to discharge; consider anticoagulation per surgeon if EF remains low  - Consider heart failure regimen/follow-up  - Normotensive   - A-fib overnight 7/16-7/17 PM; now NSR  - No anticoagulation for recurrent a-fib per surgeon preference s/p LAAL  - IABP removed 7/16  - Metoprolol 50mg two times a day   - ASA 162mg  - Atorvastatin 80mg daily  - Chest tubes and TPWs removed 7/17    PULM:   - Maintaining oxygen saturations on room air  - Encourage pulmonary toilet  - PRN nebs per RT    FEN/GI:  - Continue electrolyte replacement protocol  - Diet: Cardiac, ADAT   - Bowel regimen    RENAL:  - Adequate UOP/hr. Continue to monitor closely.  - Cr 1.48 (1.22); monitor. Daily BMP  - Diuresis with 40mg IV Lasix three times a day    HEME:  - Acute blood loss anemia post-op.   - No bleeding concerns. Hep SQ, ASA    ID:  - Marcelle op ppx complete, afebrile. No concerns for infection    ENDO:   - SSI  - Restarted PTA Farxiga     PPx:   - DVT: SCDs, SQ heparin TID, ambulation   - GI: Protonix 40mg PO daily    DISPO:   - General telemetry status  - Medically Ready for Discharge: Anticipated in 1-2 days         Patient discussed with Dr. Virk.        _______  Justine Rey PA-C  Cardiothoracic Surgery  [phone]

## 2024-07-18 NOTE — PROGRESS NOTES
SPIRITUAL HEALTH SERVICES Note     Saw pt Gonsalo Blakely and administered Orthodoxy sacrament of anointing for the healing of the sick.    Fr. Yves Gambino

## 2024-07-18 NOTE — DISCHARGE SUMMARY
Cardiovascular Surgery Discharge Summary    Primary Care Physician:  Amelia He  Discharge Provider: Joyce Alaniz PA-C   Admission Date: 7/15/2024  Admission Diagnoses: CAD (coronary artery disease) [I25.10]  Acute decompensated heart failure (H) [I50.9]  Discharge Date: July 20, 2024  Disposition: Home  Condition at Discharge: Good  Code Status: Full Code     Principal Diagnosis:   Severe multi-vessel coronary artery disease s/p CABGx4    Discharge Diagnoses:    Active Problems:      Patient Active Problem List   Diagnosis    Acute decompensated heart failure (H)    SOB (shortness of breath)    Ischemic cardiomyopathy    Coronary artery disease involving native coronary artery of native heart without angina pectoris    Coronary artery disease involving native coronary artery of native heart, unspecified whether angina present    S/P CABG (coronary artery bypass graft)   Previous acute decompensated heart failure, resolved. Remain w/ chronic HFrEF      Consult/s: Dietary, critical care medicine    Surgery:   07/15/2024 with Dr. Virk   Median sternotomy   Take down of the left internal mammary artery    Endoscopic greater saphenous vein procurement from the left lower extremity    Epiaortic ultrasound of the ascending aorta    Placement on central cardiopulmonary bypass    Quadruple vessel coronary artery bypass grafting;   -  Left internal mammary artery to the left anterior descending coronary artery  -  Separate reversed saphenous vein grafts to left anterior descending diagonal   Branch 1, the ramus intermedius and the right posterolateral coronary arteries  7.     Placement of a Flex V AtriClip on the left atrial appendage  8.     Placement of temporary atrial and ventricular pacing wires    FINDINGS AT OPERATION:  An intra-aortic balloon pump was placed prior to his arrival in the operating room. The left radial artery was not usable.  His lungs were hyperexpanded.  The left internal mammary  artery was a 2 mm in diameter conduit with excellent flow.  The greater saphenous vein graft measured 4 to 5 mm in diameter and was of good quality.  The left anterior descending coronary artery in its apical third was a 2 mm in diameter target vessel, an excellent vessel for bypass grafting. The left anterior descending diagonal branch 1 was a 1.75 mm in diameter target vessel, an excellent vessel for bypass grafting. The ramus intermedius was a 2 mm intramyocardial target vessel, an excellent vessel for bypass grafting.  The right posterolateral coronary artery was a 1.5 mm in diameter target vessel, a good vessel for bypass grafting.  His heart was globally enlarged.  His ascending aorta was elongated and measured about 4 cm in its greatest dimension.  It was free of any plaque seen on epiaortic ultrasound.       Discharge Medications:      Review of your medicines        START taking        Dose / Directions   acetaminophen 325 MG tablet  Commonly known as: TYLENOL  Used for: S/P CABG (coronary artery bypass graft)      Dose: 650 mg  Take 2 tablets (650 mg) by mouth every 4 hours as needed for other (For optimal non-opioid multimodal pain management to improve pain control.)  Quantity: 60 tablet  Refills: 0     amiodarone 200 MG tablet  Commonly known as: PACERONE  Used for: S/P CABG (coronary artery bypass graft)      Start taking on: July 20, 2024  Take 2 tablets (400 mg) by mouth 2 times daily for 4 days, THEN 1 tablet (200 mg) 2 times daily for 2 days, THEN 1 tablet (200 mg) daily for 21 days.  Quantity: 41 tablet  Refills: 0     Lidocaine 4 % Patch  Commonly known as: LIDOCARE  Used for: S/P CABG (coronary artery bypass graft)      Dose: 1-2 patch  Place 1-2 patches onto the skin every 24 hours To prevent lidocaine toxicity, patient should be patch free for 12 hrs daily.  Quantity: 6 patch  Refills: 0     Metoprolol Tartrate 37.5 MG Tabs  Used for: S/P CABG (coronary artery bypass graft)      Dose: 37.5  mg  Take 37.5 mg by mouth 2 times daily  Quantity: 180 tablet  Refills: 3     oxyCODONE 5 MG tablet  Commonly known as: ROXICODONE  Used for: S/P CABG (coronary artery bypass graft)      Dose: 2.5-5 mg  Take 0.5-1 tablets (2.5-5 mg) by mouth every 6 hours as needed for moderate to severe pain  Quantity: 5 tablet  Refills: 0     polyethylene glycol 17 GM/Dose powder  Commonly known as: MIRALAX  Used for: S/P CABG (coronary artery bypass graft)      Dose: 17 g  Take 17 g by mouth daily  Quantity: 510 g  Refills: 0     warfarin ANTICOAGULANT 2.5 MG tablet  Commonly known as: COUMADIN  Used for: S/P CABG (coronary artery bypass graft)      Take 5 mg (2 tablets) tonight 7/20 and tomorrow 7/21. Have your INR checked (they will call you) on 7/22. Dosing may change from there, they will let you know.  Quantity: 60 tablet  Refills: 0            CONTINUE these medicines which may have CHANGED, or have new prescriptions. If we are uncertain of the size of tablets/capsules you have at home, strength may be listed as something that might have changed.        Dose / Directions   furosemide 40 MG tablet  Commonly known as: LASIX  This may have changed:   medication strength  how much to take  Used for: S/P CABG (coronary artery bypass graft)      Dose: 40 mg  Start taking on: July 21, 2024  Take 1 tablet (40 mg) by mouth daily  Quantity: 60 tablet  Refills: 0            CONTINUE these medicines which have NOT CHANGED        Dose / Directions   atorvastatin 80 MG tablet  Commonly known as: LIPITOR  Used for: Heart failure with reduced ejection fraction, NYHA class II (H), Exacerbation of asthma, unspecified asthma severity, unspecified whether persistent, SOB (shortness of breath), Systolic congestive heart failure, unspecified HF chronicity (H)  Notes to patient: Lowers cholesterol      Dose: 80 mg  Take 1 tablet (80 mg) by mouth daily  Quantity: 90 tablet  Refills: 3     dapagliflozin 10 MG Tabs tablet  Commonly known as:  FARXIGA  Used for: Ischemic cardiomyopathy, Acute systolic congestive heart failure (H)      Dose: 10 mg  Take 1 tablet (10 mg) by mouth daily  Quantity: 90 tablet  Refills: 1     spironolactone 25 MG tablet  Commonly known as: ALDACTONE  Used for: Acute systolic congestive heart failure (H)  Notes to patient: heart      Dose: 25 mg  Take 1 tablet (25 mg) by mouth daily  Quantity: 90 tablet  Refills: 0            STOP taking      aspirin 81 MG EC tablet        losartan 25 MG tablet  Commonly known as: COZAAR        metoprolol succinate ER 50 MG 24 hr tablet  Commonly known as: TOPROL XL                  Where to get your medicines        These medications were sent to [facility][id number] - [address] [facility] AT [address] [facility] [address]      Phone: [phone]   acetaminophen 325 MG tablet  amiodarone 200 MG tablet  furosemide 40 MG tablet  Lidocaine 4 % Patch  Metoprolol Tartrate 37.5 MG Tabs  oxyCODONE 5 MG tablet  polyethylene glycol 17 GM/Dose powder  warfarin ANTICOAGULANT 2.5 MG tablet         Discharge Instructions:    Follow up appointment with Primary Care Physician: Amelia He within 7-14 days of discharge  Follow up appointment with Specialist:    Follow with CV Surgery as scheduled.   Follow up w/ afib clinic (not yet scheduled, we will schedule this for you)   Follow up w/ heart failure clinic (not yet scheduled)   Follow-up with cardiology as scheduled    Diet: Cardiac    Activity/Restrictions: As tolerated with sternal precautions in mind (see below). No driving for 4 weeks or while on pain medication.     - Shower and wash your incisions daily with soap and water. No tub baths/hot tubs for 4 weeks. An antibacterial soap such as Dial or Safeguard is recommended.    - Check your incisions every day. If you notice any redness, drainage, or anything unusual, please call the surgeons office.    - No driving for 4 weeks after surgery or  "while on pain medication     - Do not lift anything more than 10 pounds for 8 weeks after surgery. After 8 weeks, advance lifting gradually as tolerated.    - You may have watery drainage from your chest tube site for 2-3 weeks after surgery. Your may cover with a Band-Aid to protect your clothing. Remove the Band-Aid every day and wash the site.    - If you have a leg lesion, you may have swelling for 2-3 months. Elevate your leg any time you are not walking.    - If you feel any \"popping\" or \"clicking\" sensations in your chest, your arms are out too far or you are putting too much weight into arm movements. Do not reach over your head or out to the side to pull something. Do not do any arm exercises or use any exercise equipment that involves arm movement. If you feel your sternum moving, call the surgeon's office.    - Increase your daily activity as explained by Cardiac Rehab. You are encouraged to enroll in an Outpatient Cardiac Rehab Program.    - No active sports using your upper arms for 3 months. This includes fishing, hunting, bowling, swimming, tennis or golf.    - No physical activity such as cutting the grass, raking, vacuuming, changing sheets on your bed, snow shoveling, or using a  for 3 months.    - Use incentive spirometer 6-8 times per day for 2 weeks.       Hospital Summary:   Gonsalo Blakely is a 59 year old male who was admitted to [facility] on 7/15/2024 following an abnormal outpatient coronary angiogram demonstrating severe multi-vessel coronary artery disease. He was referred to CV surgery for evaluation for possible elective coronary artery revascularization.     Patient was deemed a candidate for coronary artery bypass surgery, and was taken to the operating room on 07/15/2024 where patient underwent four vessel coronary artery bypass and endoscopic vein harvest from the left leg. Left atrial appendage also occluded. Surgery was uneventful and patient was brought to " the ICU post-operatively. He was extubated on POD#0 and weaned from pressors. IABP removed POD#1. Patient was awake and alert, afebrile, and with stable vitals. Insulin drip was discontinued and he was transitioned to a sliding scale. He was transferred to general telemetry status on POD#2 where patient has had return of bowel function, is maintaining oxygen saturations on room air, had his chest tubes removed, and has no complaints of chest pain or shortness of breath. On 07/20/24, patient was stable enough to be discharged to home.    Of note, he did have a couple of episodes of a-fib with rates 80-100s. He did convert to NSR after amiodarone administration both times. He will be sent w/ amio taper and warfarin for anticoagulation per surgeon preference. Repeat echo before discharge showed improved EF to 35%. He is below preop weight but given HFrEF will be sent w/ 40 mg lasix PO and PTA aldactone. He had a mild Cr elevation on POD#2-3 which resolved. Cr on day of discharge was 0.94.    Vital signs:  Temp: 98  F (36.7  C) Temp src: Oral BP: (!) 146/85 Pulse: 83   Resp: 20 SpO2: 94 % O2 Device: None (Room air) Oxygen Delivery: 1 LPM Height: 182.9 cm (6') Weight: 76.1 kg (167 lb 12.8 oz)  Estimated body mass index is 22.76 kg/m  as calculated from the following:    Height as of this encounter: 1.829 m (6').    Weight as of this encounter: 76.1 kg (167 lb 12.8 oz).      Physical Exam:    Pertinent exam findings on day of discharge include:  Gen: Seen up in chair. NAD. Pleasant and conversant.   CV: RRR on monitor. No significant lower extremity edema.  Pulm: Non-labored breathing on room air.  Abd: Soft, non-tender, non-distended  Neuro: CNs grossly intact  Inc: C/D/I        Mirta Alaniz PA-C  [facility] Cardiothoracic Surgery  Pager: [phone]  July 20, 2024

## 2024-07-18 NOTE — PLAN OF CARE
Problem: Cardiovascular Surgery  Goal: Improved Activity Tolerance  Outcome: Progressing  Intervention: Optimize Tolerance for Activity  Recent Flowsheet Documentation  Taken 7/18/2024 0400 by Carlos Jackson RN  Environmental Support: calm environment promoted  Taken 7/18/2024 0000 by Carlos Jackson RN  Environmental Support: calm environment promoted  Goal: Effective Oxygenation and Ventilation  Outcome: Progressing  Intervention: Promote Airway Secretion Clearance  Recent Flowsheet Documentation  Taken 7/18/2024 0400 by Carlos Jackson RN  Administration (IS):   instruction provided, follow-up   proper technique demonstrated  Cough And Deep Breathing: done with encouragement  Taken 7/18/2024 0000 by Carlos Jackson RN  Administration (IS):   instruction provided, follow-up   proper technique demonstrated  Cough And Deep Breathing: done with encouragement     Problem: Pain Acute  Goal: Optimal Pain Control and Function  Outcome: Progressing  Intervention: Prevent or Manage Pain  Recent Flowsheet Documentation  Taken 7/18/2024 0400 by Carlos Jackson RN  Medication Review/Management:   medications reviewed   high-risk medications identified  Taken 7/18/2024 0000 by Carlos Jackson RN  Medication Review/Management:   medications reviewed   high-risk medications identified  Intervention: Optimize Psychosocial Wellbeing  Recent Flowsheet Documentation  Taken 7/18/2024 0400 by Carlos Jackson RN  Supportive Measures: goal-setting facilitated  Taken 7/18/2024 0000 by Carlos Jackson RN  Supportive Measures: goal-setting facilitated     Problem: Heart Failure  Goal: Fluid and Electrolyte Balance  Outcome: Progressing     Problem: Chest Pain  Goal: Resolution of Chest Pain Symptoms  Outcome: Progressing   Goal Outcome Evaluation:         A/Ox4, cooperative and pleasant. Slept most of the night. Denies any pain, SOB, chest pain, or discomfort, h/n/v. Denies any numbness/tingling. VSS. Tele: SR. Amio gtt @0.5mg/min  16.7ml/hr; to be discontinued this morning @0800 per order. Afebrile On 1L NC, lung sounds diminished. 0200 . On K/Mag/Phos protocol, no replacement needed this morning, recheck tomorrow am. L groin site, L tibial site liquid bandage CDI. Sternal incision with liquid bandage CDI. Uneventful night.

## 2024-07-18 NOTE — PLAN OF CARE
sc  Patient Name: Gonsalo Blakely   MRN: [id number]   Date of Admission: 7/15/2024    Procedure: Procedure(s):  CORONARY ARTERY BYPASS GRAFT TIMES FOUR, INTERNAL MAMMARY ARTERY HARVEST,  LEFT LEG ENDOSCOPIC VESSEL PROCUREMENT , EPIAORTIC ULTRASOUND,  LIGATION, LEFT ATRIAL APPENDAGE, OPEN  ANESTHEIA TRANSESOPHAGEAL ECHOCARDIOGRAM.    Post Op day #:2    Subjective (Patient focus/Primary Problem for shift): Weaned off O2 & voiding after pace cathter removal.           Pain Goal0 Pain Rating0           Pain Medication/ Regime effective to reduce patient pain: scheduled tylenol given.     Objective (Physical assessment):           Rhythm: normal sinus rhythm            Bowel Activity: no if Yes indicate when:           Bowel Medications: no. Pt has been refusing stool softer.             Incision: opened          Incentive Spirometry Q 1-2 hour when awake:  yes Volume: 1000          Epicardial Pacing Wires:  not applicable            Patient Activity:           Up to chair for meals: yes          Ambulation with RN x2 (Not including CR): yes            Is patient in home clothes:no             Chest Tubes   Pleural: not applicable Draining: not applicable               Suction: not applicable              Mediastinal: not applicable Draining: not applicable               Suction: not applicable   Dressing Change Daily:not applicable If No, why?                     Urinary Catheter: no           Preventative WOC consult (need MD order): no       Assessment (Nursing primary shift focus): Pt is A/O x 4. Assist x 1 with walker and gait belt. Weaned to 1L NC, pt keeping SpO2 above 90%. Tele: NSR. BP soft 102/55. HR: 63 - 69. Metoprolol held. Post CABG incisions WDL. Chest tubes removed this morning. Pt will shower tomorrow. Able to void using the urinal. K, Mg, Phosphorus and Ionized calcium protocol all AM recheck.     Plan (Patient Care Plan/focus): Weaned off O2.       Lori Keane RN   7/17/2024   9:28 PM

## 2024-07-19 ENCOUNTER — APPOINTMENT (OUTPATIENT)
Dept: OCCUPATIONAL THERAPY | Facility: HOSPITAL | Age: 59
End: 2024-07-19
Attending: INTERNAL MEDICINE
Payer: MEDICAID

## 2024-07-19 ENCOUNTER — APPOINTMENT (OUTPATIENT)
Dept: CARDIOLOGY | Facility: HOSPITAL | Age: 59
End: 2024-07-19
Attending: PHYSICIAN ASSISTANT
Payer: MEDICAID

## 2024-07-19 LAB
ANION GAP SERPL CALCULATED.3IONS-SCNC: 10 MMOL/L (ref 7–15)
BUN SERPL-MCNC: 24.8 MG/DL (ref 8–23)
CA-I BLD-MCNC: 4.4 MG/DL (ref 4.4–5.2)
CALCIUM SERPL-MCNC: 8.9 MG/DL (ref 8.8–10.4)
CHLORIDE SERPL-SCNC: 90 MMOL/L (ref 98–107)
CREAT SERPL-MCNC: 1.1 MG/DL (ref 0.67–1.17)
EGFRCR SERPLBLD CKD-EPI 2021: 76 ML/MIN/1.73M2
GLUCOSE BLDC GLUCOMTR-MCNC: 113 MG/DL (ref 70–99)
GLUCOSE BLDC GLUCOMTR-MCNC: 126 MG/DL (ref 70–99)
GLUCOSE BLDC GLUCOMTR-MCNC: 137 MG/DL (ref 70–99)
GLUCOSE BLDC GLUCOMTR-MCNC: 151 MG/DL (ref 70–99)
GLUCOSE SERPL-MCNC: 108 MG/DL (ref 70–99)
HCO3 SERPL-SCNC: 31 MMOL/L (ref 22–29)
MAGNESIUM SERPL-MCNC: 2 MG/DL (ref 1.7–2.3)
PHOSPHATE SERPL-MCNC: 2.9 MG/DL (ref 2.5–4.5)
PLATELET # BLD AUTO: 145 10E3/UL (ref 150–450)
POTASSIUM SERPL-SCNC: 3.5 MMOL/L (ref 3.4–5.3)
SODIUM SERPL-SCNC: 131 MMOL/L (ref 135–145)

## 2024-07-19 PROCEDURE — 80048 BASIC METABOLIC PNL TOTAL CA: CPT | Performed by: PHYSICIAN ASSISTANT

## 2024-07-19 PROCEDURE — 210N000001 HC R&B IMCU HEART CARE

## 2024-07-19 PROCEDURE — 250N000013 HC RX MED GY IP 250 OP 250 PS 637: Performed by: PHYSICIAN ASSISTANT

## 2024-07-19 PROCEDURE — 83735 ASSAY OF MAGNESIUM: CPT | Performed by: THORACIC SURGERY (CARDIOTHORACIC VASCULAR SURGERY)

## 2024-07-19 PROCEDURE — 97110 THERAPEUTIC EXERCISES: CPT | Mod: GO

## 2024-07-19 PROCEDURE — 93306 TTE W/DOPPLER COMPLETE: CPT

## 2024-07-19 PROCEDURE — 97535 SELF CARE MNGMENT TRAINING: CPT | Mod: GO

## 2024-07-19 PROCEDURE — 36415 COLL VENOUS BLD VENIPUNCTURE: CPT | Performed by: PHYSICIAN ASSISTANT

## 2024-07-19 PROCEDURE — 250N000013 HC RX MED GY IP 250 OP 250 PS 637: Performed by: THORACIC SURGERY (CARDIOTHORACIC VASCULAR SURGERY)

## 2024-07-19 PROCEDURE — 84100 ASSAY OF PHOSPHORUS: CPT | Performed by: THORACIC SURGERY (CARDIOTHORACIC VASCULAR SURGERY)

## 2024-07-19 PROCEDURE — 250N000011 HC RX IP 250 OP 636: Performed by: PHYSICIAN ASSISTANT

## 2024-07-19 PROCEDURE — 82330 ASSAY OF CALCIUM: CPT | Performed by: PHYSICIAN ASSISTANT

## 2024-07-19 PROCEDURE — 85049 AUTOMATED PLATELET COUNT: CPT | Performed by: PHYSICIAN ASSISTANT

## 2024-07-19 PROCEDURE — 93306 TTE W/DOPPLER COMPLETE: CPT | Mod: 26 | Performed by: INTERNAL MEDICINE

## 2024-07-19 RX ORDER — FUROSEMIDE 40 MG
40 TABLET ORAL DAILY
Status: DISCONTINUED | OUTPATIENT
Start: 2024-07-20 | End: 2024-07-20 | Stop reason: HOSPADM

## 2024-07-19 RX ORDER — FUROSEMIDE 40 MG
40 TABLET ORAL DAILY
Status: DISCONTINUED | OUTPATIENT
Start: 2024-07-19 | End: 2024-07-19

## 2024-07-19 RX ORDER — MAGNESIUM OXIDE 400 MG/1
400 TABLET ORAL EVERY 4 HOURS
Status: COMPLETED | OUTPATIENT
Start: 2024-07-19 | End: 2024-07-19

## 2024-07-19 RX ADMIN — EMPAGLIFLOZIN 10 MG: 10 TABLET, FILM COATED ORAL at 08:38

## 2024-07-19 RX ADMIN — LIDOCAINE 2 PATCH: 246 PATCH TOPICAL at 18:09

## 2024-07-19 RX ADMIN — METOPROLOL TARTRATE 12.5 MG: 25 TABLET, FILM COATED ORAL at 05:00

## 2024-07-19 RX ADMIN — MAGNESIUM OXIDE TAB 400 MG (241.3 MG ELEMENTAL MG) 400 MG: 400 (241.3 MG) TAB at 12:17

## 2024-07-19 RX ADMIN — SENNOSIDES AND DOCUSATE SODIUM 1 TABLET: 50; 8.6 TABLET ORAL at 08:32

## 2024-07-19 RX ADMIN — Medication 40 MG: at 08:31

## 2024-07-19 RX ADMIN — ASPIRIN 81 MG CHEWABLE TABLET 162 MG: 81 TABLET CHEWABLE at 08:32

## 2024-07-19 RX ADMIN — FUROSEMIDE 40 MG: 10 INJECTION, SOLUTION INTRAMUSCULAR; INTRAVENOUS at 07:00

## 2024-07-19 RX ADMIN — ACETAMINOPHEN 975 MG: 325 TABLET ORAL at 21:10

## 2024-07-19 RX ADMIN — ACETAMINOPHEN 975 MG: 325 TABLET ORAL at 04:59

## 2024-07-19 RX ADMIN — METOPROLOL TARTRATE 12.5 MG: 25 TABLET, FILM COATED ORAL at 12:16

## 2024-07-19 RX ADMIN — HEPARIN SODIUM 5000 UNITS: 10000 INJECTION, SOLUTION INTRAVENOUS; SUBCUTANEOUS at 12:13

## 2024-07-19 RX ADMIN — HEPARIN SODIUM 5000 UNITS: 10000 INJECTION, SOLUTION INTRAVENOUS; SUBCUTANEOUS at 21:11

## 2024-07-19 RX ADMIN — MAGNESIUM OXIDE TAB 400 MG (241.3 MG ELEMENTAL MG) 400 MG: 400 (241.3 MG) TAB at 08:38

## 2024-07-19 RX ADMIN — METOPROLOL TARTRATE 37.5 MG: 25 TABLET, FILM COATED ORAL at 21:10

## 2024-07-19 RX ADMIN — ATORVASTATIN CALCIUM 80 MG: 40 TABLET, FILM COATED ORAL at 08:33

## 2024-07-19 RX ADMIN — HEPARIN SODIUM 5000 UNITS: 10000 INJECTION, SOLUTION INTRAVENOUS; SUBCUTANEOUS at 05:00

## 2024-07-19 RX ADMIN — METOPROLOL TARTRATE 25 MG: 25 TABLET, FILM COATED ORAL at 08:40

## 2024-07-19 RX ADMIN — POLYETHYLENE GLYCOL 3350 17 G: 17 POWDER, FOR SOLUTION ORAL at 08:33

## 2024-07-19 RX ADMIN — ACETAMINOPHEN 975 MG: 325 TABLET ORAL at 13:47

## 2024-07-19 RX ADMIN — FUROSEMIDE 40 MG: 10 INJECTION, SOLUTION INTRAMUSCULAR; INTRAVENOUS at 12:12

## 2024-07-19 ASSESSMENT — ACTIVITIES OF DAILY LIVING (ADL)
ADLS_ACUITY_SCORE: 29
ADLS_ACUITY_SCORE: 29
ADLS_ACUITY_SCORE: 28
ADLS_ACUITY_SCORE: 28
ADLS_ACUITY_SCORE: 29
ADLS_ACUITY_SCORE: 28
ADLS_ACUITY_SCORE: 29
ADLS_ACUITY_SCORE: 28
ADLS_ACUITY_SCORE: 29
ADLS_ACUITY_SCORE: 29
ADLS_ACUITY_SCORE: 28
ADLS_ACUITY_SCORE: 29
ADLS_ACUITY_SCORE: 28
ADLS_ACUITY_SCORE: 29
ADLS_ACUITY_SCORE: 28
ADLS_ACUITY_SCORE: 29
ADLS_ACUITY_SCORE: 28
ADLS_ACUITY_SCORE: 28
ADLS_ACUITY_SCORE: 29
ADLS_ACUITY_SCORE: 28
ADLS_ACUITY_SCORE: 29

## 2024-07-19 NOTE — PLAN OF CARE
Problem: Adult Inpatient Plan of Care  Goal: Optimal Comfort and Wellbeing  Outcome: Progressing  Intervention: Monitor Pain and Promote Comfort  Recent Flowsheet Documentation  Taken 7/19/2024 0459 by Carlee Reynolds RN  Pain Management Interventions: medication (see MAR)     Problem: Cardiovascular Surgery  Goal: Improved Activity Tolerance  Outcome: Progressing  Goal: Optimal Coping with Heart Surgery  Outcome: Progressing     Problem: Heart Failure  Goal: Optimal Coping  Outcome: Progressing  Goal: Stable Heart Rate and Rhythm  Outcome: Progressing  Goal: Optimal Functional Ability  Outcome: Progressing  Goal: Improved Oral Intake  Outcome: Progressing     Problem: Fall Injury Risk  Goal: Absence of Fall and Fall-Related Injury  Outcome: Progressing   Goal Outcome Evaluation:         Patient denied pain, other than some back pain when scheduled Tylenol cam about. Tele Afib; HR  at rest. Patient was able to sleep for most of the night.      Vitals:    07/18/24 1957 07/18/24 2348 07/19/24 0439 07/19/24 0459   BP: 104/72 (!) 142/75 (!) 144/82    BP Location: Right arm Right arm Right arm    Pulse: 100 92 95    Resp: 20 16 18    Temp: 98.4  F (36.9  C) 98.3  F (36.8  C) 98.2  F (36.8  C) 98.2  F (36.8  C)   TempSrc: Oral Oral Oral    SpO2: 90% 90% 91%    Weight:   77.2 kg (170 lb 1.6 oz)    Height:             sc  Patient Name: Gonsalo Blakely   MRN: [id number]   Date of Admission: 7/15/2024    Procedure: Procedure(s):  CORONARY ARTERY BYPASS GRAFT TIMES FOUR, INTERNAL MAMMARY ARTERY HARVEST,  LEFT LEG ENDOSCOPIC VESSEL PROCUREMENT , EPIAORTIC ULTRASOUND,  LIGATION, LEFT ATRIAL APPENDAGE, OPEN  ANESTHEIA TRANSESOPHAGEAL ECHOCARDIOGRAM.    Post Op day #:4    Subjective (Patient focus/Primary Problem for shift): Sleep, rhythm          Pain Goal None Pain Rating Denied           Pain Medication/ Regime effective to reduce patient pain Yes    Objective (Physical assessment):           Rhythm: atrial  fibrillation            Bowel Activity: no if Yes indicate when:           Bowel Medications: no            Incision: healing well          Incentive Spirometry Q 1-2 hour when awake:  yes Volume: 1500ml          Epicardial Pacing Wires:  no            Patient Activity:           Up to chair for meals: yes          Ambulation with RN x2 (Not including CR): not applicable            Is patient in home clothes:no             Chest Tubes   Pleural: no Draining: not applicable               Suction: not applicable              Mediastinal: no Draining: not applicable               Suction: not applicable   Dressing Change Daily:no If No, why?Open to air                     Urinary Catheter: no           Preventative WOC consult (need MD order): no       Assessment (Nursing primary shift focus): Sleep, rhythm change    Plan (Patient Care Plan/focus): sleep, address rhythm currently Afib      Carlee Reynolds RN   7/19/2024   7:07 AM

## 2024-07-19 NOTE — PROGRESS NOTES
Pt is independent, capable of repositioning self. All reposition chartings on the brain  for this shift were skipped.

## 2024-07-19 NOTE — PROGRESS NOTES
..sc  Patient Name: Gonsalo Blakely   MRN: [id number]   Date of Admission: 7/15/2024    Procedure: Procedure(s):  CORONARY ARTERY BYPASS GRAFT TIMES FOUR, INTERNAL MAMMARY ARTERY HARVEST,  LEFT LEG ENDOSCOPIC VESSEL PROCUREMENT , EPIAORTIC ULTRASOUND,  LIGATION, LEFT ATRIAL APPENDAGE, OPEN  ANESTHEIA TRANSESOPHAGEAL ECHOCARDIOGRAM.    Post Op day #:04    Subjective (Patient focus/Primary Problem for shift): Pain level IS level and tolerance          Pain Goal0/10 Pain Rating0/10           Pain Medication/ Regime effective to reduce patient pain Scheduled Tylenol and Lidocaine Patch    Objective (Physical assessment):           Rhythm: atrial fibrillation            Bowel Activity: yes if Yes indicate when: 07/18          Bowel Medications: yes            Incision: healing well          Incentive Spirometry Q 1-2 hour when awake:  yes Volume: 1250          Epicardial Pacing Wires:  no            Patient Activity:           Up to chair for meals: yes          Ambulation with RN x2 (Not including CR): yes            Is patient in home clothes:yes             Chest Tubes   Pleural: not applicable Draining: not applicable               Suction: not applicable              Mediastinal: not applicable Draining: not applicable               Suction: not applicable   Dressing Change Daily:no If No, why?N/A                     Urinary Catheter: no/Voiding freely           Preventative WOC consult (need MD order): not applicable       Assessment (Nursing primary shift focus): Tolerating IS up in the hallway up in the chair for meals  no complaints of any pain or any discomfort passing flatus denied any abdominal discomfort verbalized   loss of appetite but eating good amount     Plan (Patient Care Plan/focus): continue to encourage IS use and flutter valve  and to continue to encourage increase in activity level      Dionna Yates RN   7/19/2024   5:19 PM

## 2024-07-19 NOTE — PROGRESS NOTES
"Care Management Follow Up    Length of Stay (days): 4    Expected Discharge Date: 07/20/2024     Concerns to be Addressed:  Care progression - discharge planning  POD#4 s/p CABGx4 / LAAL    Patient plan of care discussed at interdisciplinary rounds: Yes    Anticipated Discharge Disposition:  home with outpatient cardiac rehab     Anticipated Discharge Services:  Outpatient cardiac rehab  Anticipated Discharge DME:  NA    Patient/family educated on Medicare website which has current facility and service quality ratings:  NA  Education Provided on the Discharge Plan:  yes per team  Patient/Family in Agreement with the Plan:  NA    Referrals Placed by CM/SW:  NA  Private pay costs discussed: Not applicable    Additional Information:  Chart reviewed.    Social Hx: \"CABGx4 7/15. Live in SO Marcy's house. Independent w/ADLs/IADLs, ambulates without devices and works. Therapy rec OP cardiac rehab. Marcy is primary contact and can transport.\"     RNCM to follow for medical progression, recommendations, and final discharge plan.     Lili Tucker RN     "

## 2024-07-19 NOTE — PROGRESS NOTES
CVTS Daily Progress Note   POD#4 s/p CABGx4 / LAAL   Attending: Sully  LOS: 4    SUBJECTIVE/INTERVAL EVENTS:    No acute events overnight. Saturating well on room air. Pain controlled. Tolerating diet. No BM but feels like it is close. +flatus. Repeat Echo done today with improvement in EF.     OBJECTIVE:  Temp:  [97.9  F (36.6  C)-98.4  F (36.9  C)] 98.2  F (36.8  C)  Pulse:  [] 95  Resp:  [16-24] 18  BP: (104-144)/(58-82) 127/67  SpO2:  [90 %-92 %] 91 %  Vitals:    07/15/24 1230 07/16/24 0500 07/17/24 0600 07/18/24 0623   Weight: 78.8 kg (173 lb 12.8 oz) 79.9 kg (176 lb 3.2 oz) 79.3 kg (174 lb 14.4 oz) 80 kg (176 lb 6.4 oz)    07/19/24 0439   Weight: 77.2 kg (170 lb 1.6 oz)       Clinically Significant Risk Factors              # Hypoalbuminemia: Lowest albumin = 3.1 g/dL at 7/15/2024  6:48 PM, will monitor as appropriate        # Acute heart failure with reduced ejection fraction: last echo with EF <40% and receiving IV diuretics            #Precipitous drop in Hgb/Hct: Lowest Hgb this hospitalization: 10 g/dL. Will continue to monitor and treat/transfuse as appropriate.    # DMII: A1C = 7.0 % (Ref range: <5.7 %) within past 6 months, PRESENT ON ADMISSION       # History of CABG: noted on surgical history                Current Medications:    Scheduled Meds:  Current Facility-Administered Medications   Medication Dose Route Frequency Provider Last Rate Last Admin    acetaminophen (TYLENOL) tablet 975 mg  975 mg Oral Q8H Justine Rey PA-C   975 mg at 07/19/24 0459    aspirin (ASA) chewable tablet 162 mg  162 mg Oral or NG Tube Daily Justine Rey PA-C   162 mg at 07/18/24 0801    Or    aspirin (ASA) Suppository 300 mg  300 mg Rectal Daily Justine Rey PA-C        atorvastatin (LIPITOR) tablet 80 mg  80 mg Oral Daily Justine Rey PA-C   80 mg at 07/18/24 0801    empagliflozin (JARDIANCE) tablet 10 mg  10 mg Oral Daily Justine Rey PA-C   10 mg at  07/18/24 0806    furosemide (LASIX) injection 40 mg  40 mg Intravenous TID Justine Rey PA-C   40 mg at 07/19/24 0700    heparin ANTICOAGULANT injection 5,000 Units  5,000 Units Subcutaneous Q8H Justine Rey PA-C   5,000 Units at 07/19/24 0500    insulin aspart (NovoLOG) injection (RAPID ACTING)  1-7 Units Subcutaneous TID AC Justine Rey PA-C   1 Units at 07/17/24 1150    insulin aspart (NovoLOG) injection (RAPID ACTING)  1-5 Units Subcutaneous At Bedtime Justine Rey PA-C        Lidocaine (LIDOCARE) 4 % Patch 1-2 patch  1-2 patch Transdermal Q24H Justine Rey PA-C   1 patch at 07/18/24 2107    magnesium oxide (MAG-OX) tablet 400 mg  400 mg Oral Q4H Shannan Virk MD        metoprolol tartrate (LOPRESSOR) half-tab 12.5 mg  12.5 mg Oral TID Justine Rey PA-C   12.5 mg at 07/19/24 0500    metoprolol tartrate (LOPRESSOR) tablet 25 mg  25 mg Oral BID Justine Rey PA-C   25 mg at 07/18/24 0802    pantoprazole (PROTONIX) 2 mg/mL suspension 40 mg  40 mg Oral or NG Tube Daily Justine Rey PA-C        Or    pantoprazole (PROTONIX) EC tablet 40 mg  40 mg Oral Daily Justine Rey PA-C   40 mg at 07/18/24 0802    polyethylene glycol (MIRALAX) Packet 17 g  17 g Oral Daily Justine Rey PA-C   17 g at 07/18/24 0801    senna-docusate (SENOKOT-S/PERICOLACE) 8.6-50 MG per tablet 1 tablet  1 tablet Oral BID Justine Rey PA-C   1 tablet at 07/18/24 2107    [Held by provider] spironolactone (ALDACTONE) tablet 25 mg  25 mg Oral Daily Justine Rey PA-C         Continuous Infusions:  Current Facility-Administered Medications   Medication Dose Route Frequency Provider Last Rate Last Admin     PRN Meds:.  Current Facility-Administered Medications   Medication Dose Route Frequency Provider Last Rate Last Admin    acetaminophen (TYLENOL) tablet 650 mg  650 mg Oral Q4H PRN Justine Rey PA-C         bisacodyl (DULCOLAX) suppository 10 mg  10 mg Rectal Daily PRN Justine Rey PA-C        calcium gluconate 1 g in 50 mL in sodium chloride intermittent infusion  1 g Intravenous Once PRN Justine Rey PA-C        calcium gluconate 2 g in  mL intermittent infusion  2 g Intravenous Once PRN Justine Rey PA-C        calcium gluconate 3 g in sodium chloride 0.9 % 100 mL intermittent infusion  3 g Intravenous Once PRN Justine Rey PA-C        glucose gel 15-30 g  15-30 g Oral Q15 Min PRN Justine Rey PA-C        Or    dextrose 50 % injection 25-50 mL  25-50 mL Intravenous Q15 Min PRN Justine Rey PA-C        Or    glucagon injection 1 mg  1 mg Subcutaneous Q15 Min PRN Justine Rey PA-C        glucose gel 15-30 g  15-30 g Oral Q15 Min PRN Justine Rey PA-C        Or    dextrose 50 % injection 25-50 mL  25-50 mL Intravenous Q15 Min PRN Justine Rey PA-C        Or    glucagon injection 1 mg  1 mg Subcutaneous Q15 Min PRN Justine Rey PA-C        hydrALAZINE (APRESOLINE) injection 10 mg  10 mg Intravenous Q30 Min PRN Justine Rey PA-C        ipratropium - albuterol 0.5 mg/2.5 mg/3 mL (DUONEB) neb solution 3 mL  3 mL Nebulization Q4H PRN Justine Rey PA-C        lactated ringers BOLUS 250 mL  250 mL Intravenous Q15 Min PRN Justine Rey PA-C        magnesium hydroxide (MILK OF MAGNESIA) suspension 30 mL  30 mL Oral Daily PRN Justine Rey PA-C        naloxone (NARCAN) injection 0.2 mg  0.2 mg Intravenous Q2 Min PRN Justine Rey PA-C        Or    naloxone (NARCAN) injection 0.4 mg  0.4 mg Intravenous Q2 Min PRN Justine Rey PA-C        Or    naloxone (NARCAN) injection 0.2 mg  0.2 mg Intramuscular Q2 Min PRN Justine Rey PA-C        Or    naloxone (NARCAN) injection 0.4 mg  0.4 mg Intramuscular Q2 Min PRN Justine Rey PA-C         ondansetron (ZOFRAN ODT) ODT tab 4 mg  4 mg Oral Q6H PRN Justine Rey PA-C        Or    ondansetron (ZOFRAN) injection 4 mg  4 mg Intravenous Q6H PRN Justine Rey PA-C   4 mg at 07/17/24 1305    oxyCODONE (ROXICODONE) tablet 5 mg  5 mg Oral Q4H PRN Justine Rey PA-C   5 mg at 07/17/24 0525    Or    oxyCODONE (ROXICODONE) tablet 10 mg  10 mg Oral Q4H PRN Justine Rey PA-C        prochlorperazine (COMPAZINE) injection 10 mg  10 mg Intravenous Q6H PRN Justine Rey PA-C        Or    prochlorperazine (COMPAZINE) tablet 10 mg  10 mg Oral Q6H PRN Justine Rey PA-C           Cardiographics:    Telemetry monitoring demonstrates NSR with rates in the 70s per my personal review.    Imaging:  Results for orders placed or performed during the hospital encounter of 07/15/24   XR Chest Port 1 View    Impression    IMPRESSION: Sternal wires with mediastinal and left chest drains, atrial appendage closure device and pulmonary arterial catheter with its tip in the outflow tract are again observed. Vascular volume is normal and the lungs are clear. No pneumothorax or   pleural effusion.   XR Chest Port 1 View    Impression    IMPRESSION: Sternal wires, mediastinal clips and left atrial appendage clip now present following CABG. ET tube, Russell-Irlanda catheter, as well as mediastinal and pleural drains appear in good position.  Heart size and pulmonary vessels are normal. Lungs clear, no pneumothorax.       Labs, personally reviewed.  Hemoglobin   Date Value Ref Range Status   07/16/2024 12.4 (L) 13.3 - 17.7 g/dL Final   07/15/2024 12.9 (L) 13.3 - 17.7 g/dL Final   07/15/2024 11.0 (L) 13.3 - 17.7 g/dL Final     Hemoglobin POCT   Date Value Ref Range Status   07/15/2024 12.2 (L) 13.3 - 17.7 g/dL Final   07/15/2024 11.3 (L) 13.3 - 17.7 g/dL Final   07/15/2024 10.8 (L) 13.3 - 17.7 g/dL Final     WBC Count   Date Value Ref Range Status   07/16/2024 12.5 (H) 4.0 - 11.0 10e3/uL  Final   07/15/2024 14.4 (H) 4.0 - 11.0 10e3/uL Final   07/15/2024 16.2 (H) 4.0 - 11.0 10e3/uL Final     Platelet Count   Date Value Ref Range Status   07/19/2024 145 (L) 150 - 450 10e3/uL Final   07/16/2024 124 (L) 150 - 450 10e3/uL Final   07/15/2024 156 150 - 450 10e3/uL Final     Creatinine   Date Value Ref Range Status   07/19/2024 1.10 0.67 - 1.17 mg/dL Final   07/18/2024 1.48 (H) 0.67 - 1.17 mg/dL Final   07/17/2024 1.22 (H) 0.67 - 1.17 mg/dL Final     Potassium   Date Value Ref Range Status   07/19/2024 3.5 3.4 - 5.3 mmol/L Final   07/18/2024 4.3 3.4 - 5.3 mmol/L Final   07/17/2024 4.7 3.4 - 5.3 mmol/L Final   01/12/2019 3.8 3.5 - 5.0 mmol/L Final     Potassium POCT   Date Value Ref Range Status   07/15/2024 4.0 3.4 - 5.3 mmol/L Final   07/15/2024 4.4 3.4 - 5.3 mmol/L Final   07/15/2024 5.6 (H) 3.4 - 5.3 mmol/L Final     Magnesium   Date Value Ref Range Status   07/19/2024 2.0 1.7 - 2.3 mg/dL Final   07/18/2024 2.2 1.7 - 2.3 mg/dL Final   07/17/2024 2.2 1.7 - 2.3 mg/dL Final          I/O:  I/O last 3 completed shifts:  In: 940 [P.O.:940]  Out: 3025 [Urine:3025]       Physical Exam:    General: Patient seen up in chair. NAD. Conversant. Pleasant.   CV: NSR on monitor. 2+ peripheral pulses in all extremities. Mild edema.  Pulm: Non-labored effort on room air. Chest tubes in place, serosanguinous output, no air leak. Incision C/D/I.  Abd: Soft, NT, ND  : Vincent with clayton urine  Ext: Mild pedal edema, SCDs in place, warm, distal pulses intact  Neuro: CNs grossly intact.       ASSESSMENT/PLAN:    Gonsalo Blakely is a 59 year old male with a history of CAD and a-fib who is s/p CABGx4 / LAAL.    Active Problems:    Acute decompensated heart failure (H)    Ischemic cardiomyopathy    Coronary artery disease involving native coronary artery of native heart, unspecified whether angina present        NEURO:   - Scheduled Tylenol/lidocaine patches and PRN Tylenol/oxycodone for pain  - Toradol stopped due to Cr  bump    CV:   - Pre-op EF 25-30%  - Baseline Echo today with improvement in EF to 35%  - Consider heart failure regimen/follow-up  - Normotensive   - A-fib overnight 7/16-7/17 PM; now NSR  - No anticoagulation for recurrent a-fib per surgeon preference s/p LAAL  - IABP removed 7/16  - Metoprolol 37.5mg BID-- discontinued TID dosing.  - ASA 162mg  - Atorvastatin 80mg daily  - Chest tubes and TPWs removed 7/17    PULM:   - Maintaining oxygen saturations on room air  - Encourage pulmonary toilet  - PRN nebs per RT    FEN/GI:  - Continue electrolyte replacement protocol  - Diet: Cardiac, ADAT   - Bowel regimen    RENAL:  - Adequate UOP/hr. Continue to monitor closely.  - Cr 1.10 (1.48); monitor. Daily BMP  - Diuresis with 40mg IV Lasix x 2 today, at baseline weight today, will discontinue and decrease lasix to PO 40mg daily starting 7/20    HEME:  - Acute blood loss anemia post-op.   - No bleeding concerns. Hep SQ, ASA    ID:  - Marcelle op ppx complete, afebrile. No concerns for infection    ENDO:   - SSI  - Restarted PTA Farxiga     PPx:   - DVT: SCDs, SQ heparin TID, ambulation   - GI: Protonix 40mg PO daily    DISPO:   - General telemetry status  - Medically Ready for Discharge: Anticipated in 1-2 days pending bowel movement         Patient discussed with Dr. Virk.        _______  Demi Tyler PA-C  Cardiothoracic Surgery  [phone]

## 2024-07-19 NOTE — PROGRESS NOTES
sc  Patient Name: Gonsalo Blakely   MRN: [id number]   Date of Admission: 7/15/2024    Procedure: Procedure(s):  CORONARY ARTERY BYPASS GRAFT TIMES FOUR, INTERNAL MAMMARY ARTERY HARVEST,  LEFT LEG ENDOSCOPIC VESSEL PROCUREMENT , EPIAORTIC ULTRASOUND,  LIGATION, LEFT ATRIAL APPENDAGE, OPEN  ANESTHEIA TRANSESOPHAGEAL ECHOCARDIOGRAM.    Post Op day #:3    Subjective (Patient focus/Primary Problem for shift): ambulation, incentive usage, and pain management.          Pain Goal0 Pain Rating 4/10           Pain Medication/ Regime effective to reduce patient pain, scheduled tylenol.     Objective (Physical assessment):           Rhythm: atrial fibrillation            Bowel Activity: yes if Yes indicate when:  7/18/24.          Bowel Medications: yes; scheduled senna.             Incision: healing well          Incentive Spirometry Q 1-2 hour when awake:  yes Volume: 1500          Epicardial Pacing Wires:  no. D/topher            Patient Activity:           Up to chair for meals: yes          Ambulation with RN x2 (Not including CR): yes            Is patient in home clothes:no , hospital gown            Chest Tubes   Pleural: no Draining: no               Suction: no              Mediastinal: no Draining: no               Suction: no   Dressing Change Daily:no If No, why?. all INCISIONS ARE OPEN TO AIR.                     Urinary Catheter: no           Preventative WOC consult (need MD order): no       Assessment (Nursing primary shift focus): Pt has productive coughing frequently with while phlegm out. Incisions to prevent infection, encourage hand washing during and after care,     Plan (Patient Care Plan/focus): Is to encourage ambulation, incentive usage, and pain management. Pt also had a shower during this shift. All incisions are open to air and looks good. Ambulated with RN in the hallways. Tolerated the walk well. VSS after ambulation.       Samuel Brower RN   7/18/2024   10:27 PM

## 2024-07-20 ENCOUNTER — APPOINTMENT (OUTPATIENT)
Dept: OCCUPATIONAL THERAPY | Facility: HOSPITAL | Age: 59
End: 2024-07-20
Attending: INTERNAL MEDICINE
Payer: MEDICAID

## 2024-07-20 VITALS
DIASTOLIC BLOOD PRESSURE: 85 MMHG | SYSTOLIC BLOOD PRESSURE: 146 MMHG | RESPIRATION RATE: 20 BRPM | WEIGHT: 167.8 LBS | HEIGHT: 72 IN | HEART RATE: 83 BPM | BODY MASS INDEX: 22.73 KG/M2 | OXYGEN SATURATION: 94 % | TEMPERATURE: 98 F

## 2024-07-20 PROBLEM — I48.91 ATRIAL FIBRILLATION WITH RVR (H): Status: RESOLVED | Noted: 2024-07-20 | Resolved: 2024-07-20

## 2024-07-20 PROBLEM — I48.91 ATRIAL FIBRILLATION WITH RVR (H): Status: ACTIVE | Noted: 2024-07-20

## 2024-07-20 PROBLEM — Z95.1 S/P CABG (CORONARY ARTERY BYPASS GRAFT): Status: ACTIVE | Noted: 2024-07-20

## 2024-07-20 LAB
ANION GAP SERPL CALCULATED.3IONS-SCNC: 8 MMOL/L (ref 7–15)
BUN SERPL-MCNC: 24.2 MG/DL (ref 8–23)
CA-I BLD-MCNC: 4.5 MG/DL (ref 4.4–5.2)
CALCIUM SERPL-MCNC: 9.1 MG/DL (ref 8.8–10.4)
CHLORIDE SERPL-SCNC: 93 MMOL/L (ref 98–107)
CREAT SERPL-MCNC: 0.94 MG/DL (ref 0.67–1.17)
EGFRCR SERPLBLD CKD-EPI 2021: >90 ML/MIN/1.73M2
GLUCOSE BLDC GLUCOMTR-MCNC: 123 MG/DL (ref 70–99)
GLUCOSE SERPL-MCNC: 138 MG/DL (ref 70–99)
HCO3 SERPL-SCNC: 34 MMOL/L (ref 22–29)
INR PPP: 1.09 (ref 0.85–1.15)
MAGNESIUM SERPL-MCNC: 2.2 MG/DL (ref 1.7–2.3)
PHOSPHATE SERPL-MCNC: 2.8 MG/DL (ref 2.5–4.5)
POTASSIUM SERPL-SCNC: 3.4 MMOL/L (ref 3.4–5.3)
POTASSIUM SERPL-SCNC: 3.7 MMOL/L (ref 3.4–5.3)
SODIUM SERPL-SCNC: 135 MMOL/L (ref 135–145)

## 2024-07-20 PROCEDURE — 82330 ASSAY OF CALCIUM: CPT | Performed by: PHYSICIAN ASSISTANT

## 2024-07-20 PROCEDURE — 80048 BASIC METABOLIC PNL TOTAL CA: CPT | Performed by: PHYSICIAN ASSISTANT

## 2024-07-20 PROCEDURE — 84100 ASSAY OF PHOSPHORUS: CPT | Performed by: THORACIC SURGERY (CARDIOTHORACIC VASCULAR SURGERY)

## 2024-07-20 PROCEDURE — 250N000013 HC RX MED GY IP 250 OP 250 PS 637: Performed by: PHYSICIAN ASSISTANT

## 2024-07-20 PROCEDURE — 84132 ASSAY OF SERUM POTASSIUM: CPT | Performed by: PHYSICIAN ASSISTANT

## 2024-07-20 PROCEDURE — 36415 COLL VENOUS BLD VENIPUNCTURE: CPT | Performed by: PHYSICIAN ASSISTANT

## 2024-07-20 PROCEDURE — 250N000011 HC RX IP 250 OP 636: Performed by: PHYSICIAN ASSISTANT

## 2024-07-20 PROCEDURE — 83735 ASSAY OF MAGNESIUM: CPT | Performed by: THORACIC SURGERY (CARDIOTHORACIC VASCULAR SURGERY)

## 2024-07-20 PROCEDURE — 85610 PROTHROMBIN TIME: CPT

## 2024-07-20 PROCEDURE — 93010 ELECTROCARDIOGRAM REPORT: CPT | Performed by: INTERNAL MEDICINE

## 2024-07-20 PROCEDURE — 36415 COLL VENOUS BLD VENIPUNCTURE: CPT

## 2024-07-20 PROCEDURE — 97110 THERAPEUTIC EXERCISES: CPT | Mod: GO

## 2024-07-20 PROCEDURE — 250N000013 HC RX MED GY IP 250 OP 250 PS 637

## 2024-07-20 PROCEDURE — 93005 ELECTROCARDIOGRAM TRACING: CPT

## 2024-07-20 PROCEDURE — 250N000011 HC RX IP 250 OP 636

## 2024-07-20 RX ORDER — LIDOCAINE 4 G/G
1-2 PATCH TOPICAL EVERY 24 HOURS
Qty: 6 PATCH | Refills: 0 | Status: SHIPPED | OUTPATIENT
Start: 2024-07-20 | End: 2024-09-30

## 2024-07-20 RX ORDER — POLYETHYLENE GLYCOL 3350 17 G/17G
17 POWDER, FOR SOLUTION ORAL DAILY
Qty: 510 G | Refills: 0 | Status: SHIPPED | OUTPATIENT
Start: 2024-07-20 | End: 2024-07-20

## 2024-07-20 RX ORDER — LIDOCAINE 4 G/G
1-2 PATCH TOPICAL EVERY 24 HOURS
Qty: 6 PATCH | Refills: 0 | Status: SHIPPED | OUTPATIENT
Start: 2024-07-20 | End: 2024-07-20

## 2024-07-20 RX ORDER — OXYCODONE HYDROCHLORIDE 5 MG/1
5 TABLET ORAL EVERY 4 HOURS PRN
Status: DISCONTINUED | OUTPATIENT
Start: 2024-07-20 | End: 2024-07-20 | Stop reason: HOSPADM

## 2024-07-20 RX ORDER — METOPROLOL TARTRATE 37.5 MG/1
37.5 TABLET, FILM COATED ORAL 2 TIMES DAILY
Qty: 180 TABLET | Refills: 3 | Status: SHIPPED | OUTPATIENT
Start: 2024-07-20 | End: 2024-07-20

## 2024-07-20 RX ORDER — WARFARIN SODIUM 5 MG/1
5 TABLET ORAL
Status: DISCONTINUED | OUTPATIENT
Start: 2024-07-20 | End: 2024-07-20 | Stop reason: HOSPADM

## 2024-07-20 RX ORDER — AMIODARONE HYDROCHLORIDE 200 MG/1
400 TABLET ORAL 2 TIMES DAILY
Status: DISCONTINUED | OUTPATIENT
Start: 2024-07-20 | End: 2024-07-20 | Stop reason: HOSPADM

## 2024-07-20 RX ORDER — AMIODARONE HYDROCHLORIDE 200 MG/1
TABLET ORAL
Qty: 41 TABLET | Refills: 0 | Status: SHIPPED | OUTPATIENT
Start: 2024-07-20 | End: 2024-07-20

## 2024-07-20 RX ORDER — OXYCODONE HYDROCHLORIDE 5 MG/1
2.5-5 TABLET ORAL EVERY 6 HOURS PRN
Qty: 5 TABLET | Refills: 0 | Status: SHIPPED | OUTPATIENT
Start: 2024-07-20 | End: 2024-07-20

## 2024-07-20 RX ORDER — POLYETHYLENE GLYCOL 3350 17 G/17G
17 POWDER, FOR SOLUTION ORAL DAILY
Qty: 510 G | Refills: 0 | Status: SHIPPED | OUTPATIENT
Start: 2024-07-20 | End: 2024-09-30

## 2024-07-20 RX ORDER — ASPIRIN 81 MG/1
81 TABLET, CHEWABLE ORAL DAILY
Status: DISCONTINUED | OUTPATIENT
Start: 2024-07-21 | End: 2024-07-20 | Stop reason: HOSPADM

## 2024-07-20 RX ORDER — WARFARIN SODIUM 2.5 MG/1
TABLET ORAL
Qty: 60 TABLET | Refills: 0 | Status: SHIPPED | OUTPATIENT
Start: 2024-07-20 | End: 2024-08-19

## 2024-07-20 RX ORDER — POTASSIUM CHLORIDE 1500 MG/1
40 TABLET, EXTENDED RELEASE ORAL ONCE
Status: COMPLETED | OUTPATIENT
Start: 2024-07-20 | End: 2024-07-20

## 2024-07-20 RX ORDER — AMIODARONE HYDROCHLORIDE 200 MG/1
TABLET ORAL
Qty: 41 TABLET | Refills: 0 | Status: SHIPPED | OUTPATIENT
Start: 2024-07-20 | End: 2024-09-06

## 2024-07-20 RX ORDER — AMIODARONE HYDROCHLORIDE 200 MG/1
200 TABLET ORAL 2 TIMES DAILY
Status: DISCONTINUED | OUTPATIENT
Start: 2024-07-24 | End: 2024-07-20 | Stop reason: HOSPADM

## 2024-07-20 RX ORDER — AMIODARONE HYDROCHLORIDE 200 MG/1
200 TABLET ORAL DAILY
Status: DISCONTINUED | OUTPATIENT
Start: 2024-07-26 | End: 2024-07-20 | Stop reason: HOSPADM

## 2024-07-20 RX ORDER — FUROSEMIDE 40 MG
40 TABLET ORAL DAILY
Qty: 60 TABLET | Refills: 0 | Status: SHIPPED | OUTPATIENT
Start: 2024-07-21 | End: 2024-07-20

## 2024-07-20 RX ORDER — FUROSEMIDE 40 MG
40 TABLET ORAL DAILY
Qty: 60 TABLET | Refills: 0 | Status: SHIPPED | OUTPATIENT
Start: 2024-07-21 | End: 2024-09-23

## 2024-07-20 RX ORDER — ASPIRIN 81 MG/1
81 TABLET, CHEWABLE ORAL DAILY
Qty: 90 TABLET | Refills: 3 | Status: SHIPPED | OUTPATIENT
Start: 2024-07-21 | End: 2024-07-20

## 2024-07-20 RX ORDER — METOPROLOL TARTRATE 37.5 MG/1
37.5 TABLET, FILM COATED ORAL 2 TIMES DAILY
Qty: 180 TABLET | Refills: 3 | Status: SHIPPED | OUTPATIENT
Start: 2024-07-20 | End: 2024-08-19

## 2024-07-20 RX ORDER — OXYCODONE HYDROCHLORIDE 5 MG/1
2.5-5 TABLET ORAL EVERY 6 HOURS PRN
Qty: 5 TABLET | Refills: 0 | Status: SHIPPED | OUTPATIENT
Start: 2024-07-20 | End: 2024-07-26

## 2024-07-20 RX ORDER — ACETAMINOPHEN 325 MG/1
650 TABLET ORAL EVERY 4 HOURS PRN
Qty: 60 TABLET | Refills: 0 | Status: SHIPPED | OUTPATIENT
Start: 2024-07-20 | End: 2024-09-27

## 2024-07-20 RX ORDER — ACETAMINOPHEN 325 MG/1
650 TABLET ORAL EVERY 4 HOURS PRN
Qty: 60 TABLET | Refills: 0 | Status: SHIPPED | OUTPATIENT
Start: 2024-07-20 | End: 2024-07-20

## 2024-07-20 RX ORDER — WARFARIN SODIUM 2.5 MG/1
TABLET ORAL
Qty: 60 TABLET | Refills: 0 | Status: SHIPPED | OUTPATIENT
Start: 2024-07-20 | End: 2024-07-20

## 2024-07-20 RX ADMIN — POLYETHYLENE GLYCOL 3350 17 G: 17 POWDER, FOR SOLUTION ORAL at 08:13

## 2024-07-20 RX ADMIN — ATORVASTATIN CALCIUM 80 MG: 40 TABLET, FILM COATED ORAL at 08:12

## 2024-07-20 RX ADMIN — POTASSIUM CHLORIDE 40 MEQ: 1500 TABLET, EXTENDED RELEASE ORAL at 08:12

## 2024-07-20 RX ADMIN — ASPIRIN 81 MG CHEWABLE TABLET 162 MG: 81 TABLET CHEWABLE at 08:12

## 2024-07-20 RX ADMIN — AMIODARONE HYDROCHLORIDE 1 MG/MIN: 1.8 INJECTION, SOLUTION INTRAVENOUS at 12:12

## 2024-07-20 RX ADMIN — AMIODARONE HYDROCHLORIDE 400 MG: 200 TABLET ORAL at 13:16

## 2024-07-20 RX ADMIN — PANTOPRAZOLE SODIUM 40 MG: 40 TABLET, DELAYED RELEASE ORAL at 08:12

## 2024-07-20 RX ADMIN — FUROSEMIDE 40 MG: 40 TABLET ORAL at 08:20

## 2024-07-20 RX ADMIN — SENNOSIDES AND DOCUSATE SODIUM 1 TABLET: 50; 8.6 TABLET ORAL at 08:12

## 2024-07-20 RX ADMIN — ACETAMINOPHEN 975 MG: 325 TABLET ORAL at 05:03

## 2024-07-20 RX ADMIN — AMIODARONE HYDROCHLORIDE 150 MG: 1.5 INJECTION, SOLUTION INTRAVENOUS at 10:11

## 2024-07-20 RX ADMIN — HEPARIN SODIUM 5000 UNITS: 10000 INJECTION, SOLUTION INTRAVENOUS; SUBCUTANEOUS at 11:48

## 2024-07-20 RX ADMIN — SPIRONOLACTONE 25 MG: 25 TABLET, FILM COATED ORAL at 10:10

## 2024-07-20 RX ADMIN — EMPAGLIFLOZIN 10 MG: 10 TABLET, FILM COATED ORAL at 08:12

## 2024-07-20 RX ADMIN — HEPARIN SODIUM 5000 UNITS: 10000 INJECTION, SOLUTION INTRAVENOUS; SUBCUTANEOUS at 05:03

## 2024-07-20 RX ADMIN — METOPROLOL TARTRATE 37.5 MG: 25 TABLET, FILM COATED ORAL at 08:11

## 2024-07-20 ASSESSMENT — ACTIVITIES OF DAILY LIVING (ADL)
ADLS_ACUITY_SCORE: 28
ADLS_ACUITY_SCORE: 26
ADLS_ACUITY_SCORE: 26
ADLS_ACUITY_SCORE: 28
ADLS_ACUITY_SCORE: 28
ADLS_ACUITY_SCORE: 26
ADLS_ACUITY_SCORE: 28
ADLS_ACUITY_SCORE: 26
ADLS_ACUITY_SCORE: 28

## 2024-07-20 NOTE — PROGRESS NOTES
Care Management Discharge Note    Discharge Date: 07/20/2024       Discharge Disposition:  home    Discharge Services:  outpatient cardiac rehab      Additional Information:  Discharge orders complete. Pt will discharge home today. Outpatient cardiac rehab appointment scheduled for 7/26/2024.      THIERNO Lagos

## 2024-07-20 NOTE — PROGRESS NOTES
Amiodarone bolus gtt given and monitored for any Rhythm changes V/S taken and recorded tolerated the gtt well and will continue to monitor

## 2024-07-20 NOTE — PROGRESS NOTES
CVTS Daily Progress Note   POD#5 s/p CABGx4 / LAAL   Attending: Sully  LOS: 5    SUBJECTIVE/INTERVAL EVENTS:    No acute events overnight. Remains in afib (rate controlled) since yesterday AM. Saturating well on room air. Pain controlled. Tolerating diet. +BM. Repeat Echo POD#4 w/ improved EF of 35%. Eager to discharge home.     OBJECTIVE:  Temp:  [97.8  F (36.6  C)-98.4  F (36.9  C)] 97.9  F (36.6  C)  Pulse:  [] 90  Resp:  [16-20] 16  BP: (101-155)/(60-90) 155/90  SpO2:  [90 %-95 %] 94 %  Vitals:    07/16/24 0500 07/17/24 0600 07/18/24 0623 07/19/24 0439   Weight: 79.9 kg (176 lb 3.2 oz) 79.3 kg (174 lb 14.4 oz) 80 kg (176 lb 6.4 oz) 77.2 kg (170 lb 1.6 oz)    07/20/24 0357   Weight: 76.1 kg (167 lb 12.8 oz)       Clinically Significant Risk Factors              # Hypoalbuminemia: Lowest albumin = 3.1 g/dL at 7/15/2024  6:48 PM, will monitor as appropriate        # Acute heart failure with reduced ejection fraction: last echo with EF <40% and receiving IV diuretics            #Precipitous drop in Hgb/Hct: Lowest Hgb this hospitalization: 10 g/dL. Will continue to monitor and treat/transfuse as appropriate.    # DMII: A1C = 7.0 % (Ref range: <5.7 %) within past 6 months        # History of CABG: noted on surgical history                Current Medications:    Scheduled Meds:  Current Facility-Administered Medications   Medication Dose Route Frequency Provider Last Rate Last Admin    acetaminophen (TYLENOL) tablet 975 mg  975 mg Oral Q8H Justine Rey PA-C   975 mg at 07/20/24 0503    aspirin (ASA) chewable tablet 162 mg  162 mg Oral or NG Tube Daily Justine Rey PA-C   162 mg at 07/20/24 0812    Or    aspirin (ASA) Suppository 300 mg  300 mg Rectal Daily Justine Rey PA-C        atorvastatin (LIPITOR) tablet 80 mg  80 mg Oral Daily Justine Rey PA-C   80 mg at 07/20/24 0812    empagliflozin (JARDIANCE) tablet 10 mg  10 mg Oral Daily Justine Rey PA-C    10 mg at 07/20/24 0812    furosemide (LASIX) tablet 40 mg  40 mg Oral Daily Demi Tyler PA-C   40 mg at 07/20/24 0820    heparin ANTICOAGULANT injection 5,000 Units  5,000 Units Subcutaneous Q8H Justine Rey PA-C   5,000 Units at 07/20/24 0503    insulin aspart (NovoLOG) injection (RAPID ACTING)  1-7 Units Subcutaneous TID AC Justine Rey PA-C   1 Units at 07/17/24 1150    insulin aspart (NovoLOG) injection (RAPID ACTING)  1-5 Units Subcutaneous At Bedtime Justine Rey PA-C        Lidocaine (LIDOCARE) 4 % Patch 1-2 patch  1-2 patch Transdermal Q24H Justine Rey PA-C   2 patch at 07/19/24 1809    metoprolol tartrate (LOPRESSOR) half-tab 37.5 mg  37.5 mg Oral BID Demi Tyler PA-C   37.5 mg at 07/20/24 0811    pantoprazole (PROTONIX) 2 mg/mL suspension 40 mg  40 mg Oral or NG Tube Daily Justine Rey PA-C   40 mg at 07/19/24 0831    Or    pantoprazole (PROTONIX) EC tablet 40 mg  40 mg Oral Daily Justine Rey PA-C   40 mg at 07/20/24 0812    polyethylene glycol (MIRALAX) Packet 17 g  17 g Oral Daily Justine Rey PA-C   17 g at 07/20/24 0813    senna-docusate (SENOKOT-S/PERICOLACE) 8.6-50 MG per tablet 1 tablet  1 tablet Oral BID Justine Rey PA-C   1 tablet at 07/20/24 0812    [Held by provider] spironolactone (ALDACTONE) tablet 25 mg  25 mg Oral Daily uJstine Rey PA-C         Continuous Infusions:  Current Facility-Administered Medications   Medication Dose Route Frequency Provider Last Rate Last Admin     PRN Meds:.  Current Facility-Administered Medications   Medication Dose Route Frequency Provider Last Rate Last Admin    acetaminophen (TYLENOL) tablet 650 mg  650 mg Oral Q4H PRN Justine Rey PA-C        bisacodyl (DULCOLAX) suppository 10 mg  10 mg Rectal Daily PRN Justine Rey PA-C        calcium gluconate 1 g in 50 mL in sodium chloride intermittent infusion  1 g Intravenous Once  PRN Justine Rey PA-C        calcium gluconate 2 g in  mL intermittent infusion  2 g Intravenous Once PRN Justine Rey PA-C        calcium gluconate 3 g in sodium chloride 0.9 % 100 mL intermittent infusion  3 g Intravenous Once PRN Justine Rye PA-C        glucose gel 15-30 g  15-30 g Oral Q15 Min PRN Justine Rey PA-C        Or    dextrose 50 % injection 25-50 mL  25-50 mL Intravenous Q15 Min PRN Justine Rey PA-C        Or    glucagon injection 1 mg  1 mg Subcutaneous Q15 Min PRN Justine Rey PA-C        hydrALAZINE (APRESOLINE) injection 10 mg  10 mg Intravenous Q30 Min PRN Justine Rey PA-C        ipratropium - albuterol 0.5 mg/2.5 mg/3 mL (DUONEB) neb solution 3 mL  3 mL Nebulization Q4H PRN Justine Rey PA-C        lactated ringers BOLUS 250 mL  250 mL Intravenous Q15 Min PRN Justine Rey PA-C        magnesium hydroxide (MILK OF MAGNESIA) suspension 30 mL  30 mL Oral Daily PRN Justine Rey PA-C        naloxone (NARCAN) injection 0.2 mg  0.2 mg Intravenous Q2 Min PRN Justine Rey PA-C        Or    naloxone (NARCAN) injection 0.4 mg  0.4 mg Intravenous Q2 Min PRN Justine Rey PA-C        Or    naloxone (NARCAN) injection 0.2 mg  0.2 mg Intramuscular Q2 Min PRN Justine Rey PA-C        Or    naloxone (NARCAN) injection 0.4 mg  0.4 mg Intramuscular Q2 Min PRN Justine Rey PA-C        ondansetron (ZOFRAN ODT) ODT tab 4 mg  4 mg Oral Q6H PRN Justine Rey PA-C        Or    ondansetron (ZOFRAN) injection 4 mg  4 mg Intravenous Q6H PRN Justine Rey PA-C   4 mg at 07/17/24 1305    oxyCODONE (ROXICODONE) tablet 5 mg  5 mg Oral Q4H PRN Justine Rey PA-C   5 mg at 07/17/24 0525    Or    oxyCODONE (ROXICODONE) tablet 10 mg  10 mg Oral Q4H PRN Justine Rey PA-C        prochlorperazine (COMPAZINE) injection 10 mg  10 mg  Intravenous Q6H PRN Justine Rey PA-C        Or    prochlorperazine (COMPAZINE) tablet 10 mg  10 mg Oral Q6H PRN Justine Rey PA-C           Cardiographics:    Telemetry monitoring demonstrates afib w/ rates in the 90s per my personal review, confirmed by ECG this AM.     Imaging:  Results for orders placed or performed during the hospital encounter of 07/15/24   XR Chest Port 1 View    Impression    IMPRESSION: Sternal wires with mediastinal and left chest drains, atrial appendage closure device and pulmonary arterial catheter with its tip in the outflow tract are again observed. Vascular volume is normal and the lungs are clear. No pneumothorax or   pleural effusion.   XR Chest Port 1 View    Impression    IMPRESSION: Sternal wires, mediastinal clips and left atrial appendage clip now present following CABG. ET tube, [place] catheter, as well as mediastinal and pleural drains appear in good position.  Heart size and pulmonary vessels are normal. Lungs clear, no pneumothorax.       Labs, personally reviewed.  Hemoglobin   Date Value Ref Range Status   07/16/2024 12.4 (L) 13.3 - 17.7 g/dL Final   07/15/2024 12.9 (L) 13.3 - 17.7 g/dL Final   07/15/2024 11.0 (L) 13.3 - 17.7 g/dL Final     Hemoglobin POCT   Date Value Ref Range Status   07/15/2024 12.2 (L) 13.3 - 17.7 g/dL Final   07/15/2024 11.3 (L) 13.3 - 17.7 g/dL Final   07/15/2024 10.8 (L) 13.3 - 17.7 g/dL Final     WBC Count   Date Value Ref Range Status   07/16/2024 12.5 (H) 4.0 - 11.0 10e3/uL Final   07/15/2024 14.4 (H) 4.0 - 11.0 10e3/uL Final   07/15/2024 16.2 (H) 4.0 - 11.0 10e3/uL Final     Platelet Count   Date Value Ref Range Status   07/19/2024 145 (L) 150 - 450 10e3/uL Final   07/16/2024 124 (L) 150 - 450 10e3/uL Final   07/15/2024 156 150 - 450 10e3/uL Final     Creatinine   Date Value Ref Range Status   07/20/2024 0.94 0.67 - 1.17 mg/dL Final   07/19/2024 1.10 0.67 - 1.17 mg/dL Final   07/18/2024 1.48 (H) 0.67 - 1.17 mg/dL  Final     Potassium   Date Value Ref Range Status   07/20/2024 3.4 3.4 - 5.3 mmol/L Final   07/19/2024 3.5 3.4 - 5.3 mmol/L Final   07/18/2024 4.3 3.4 - 5.3 mmol/L Final   01/12/2019 3.8 3.5 - 5.0 mmol/L Final     Potassium POCT   Date Value Ref Range Status   07/15/2024 4.0 3.4 - 5.3 mmol/L Final   07/15/2024 4.4 3.4 - 5.3 mmol/L Final   07/15/2024 5.6 (H) 3.4 - 5.3 mmol/L Final     Magnesium   Date Value Ref Range Status   07/20/2024 2.2 1.7 - 2.3 mg/dL Final   07/19/2024 2.0 1.7 - 2.3 mg/dL Final   07/18/2024 2.2 1.7 - 2.3 mg/dL Final          I/O:  I/O last 3 completed shifts:  In: 900 [P.O.:900]  Out: 1150 [Urine:1150]       Physical Exam:    General: Patient seen in bed this AM. NAD. Conversant. Pleasant.   CV: NSR on monitor. 2+ peripheral pulses in all extremities. Mild edema.  Pulm: Non-labored effort on room air. Incision C/D/I.  Abd: Soft, NT, ND  : Voiding  Ext: Mild pedal edema, SCDs in place, warm, distal pulses intact  Neuro: CNs grossly intact.       ASSESSMENT/PLAN:    Gonsalo Blakely is a 59 year old male with a history of CAD and a-fib who is s/p CABGx4 / LAAL.    Active Problems:    Acute decompensated heart failure (H)    Ischemic cardiomyopathy    Coronary artery disease involving native coronary artery of native heart, unspecified whether angina present        NEURO:   - Scheduled Tylenol/lidocaine patches and PRN Tylenol/oxycodone for pain  - Toradol stopped due to Cr bump    CV:   - Pre-op EF 25-30%, POD#4 with improvement in EF to 35%  - IABP removed POD#1  - Chest tubes and TPWs removed POD#2  - Normotensive   - A-fib overnight 7/16-7/17 PM; was in NSR until POD#4. Remains in afib since then. Will trial amio bolus this AM, if effective will plan for amio taper. Otherwise will plan for amio gtt  - Plan for anticoagulation w/ warfarin for afib. INR goal 2-3. Will plan for 5 mg today. Further dosing per pharmacy.   - Metoprolol 37.5mg BID  - ASA 162mg  - Atorvastatin 80mg daily  -  Diuresis as below  - Heart failure follow-up at discharge    PULM:   - Maintaining oxygen saturations on room air  - Encourage pulmonary toilet  - PRN nebs per RT    FEN/GI:  - Continue electrolyte replacement protocol  - Diet: Cardiac, ADAT   - Bowel regimen, +BM    RENAL:  - Adequate UOP/hr. Continue to monitor closely.  - Cr stable at baseline.  - 40 mg PO lasix qday given reduced EF  - Restart PTA aldactone    HEME:  - Acute blood loss anemia post-op.   - No bleeding concerns. Hep SQ, ASA    ID:  - Marcelle op ppx complete, afebrile. No concerns for infection    ENDO:   - Euglycemic  - PTA Farxiga     PPx:   - DVT: SCDs, SQ heparin TID, ambulation   - GI: Protonix 40mg PO daily    DISPO:   - General telemetry status  - Cardiac rehab recommending home w/ outpt cardiac rehab when medically ready for discharge  - Medically Ready for Discharge: Anticipated Tomorrow or Monday. Per surgeon preference, patient should be out of afib prior to discharge.             Patient discussed with Dr. Virk and Dr. Reeder.        Mirta Alaniz PA-C  [facility] Cardiothoracic Surgery  Pager: [phone]  July 20, 2024

## 2024-07-20 NOTE — PHARMACY-ANTICOAGULATION SERVICE
Clinical Pharmacy - Warfarin Dosing Consult     Pharmacy has been consulted to manage this patient s warfarin therapy.  Indication: Atrial Fibrillation  Therapy Goal: INR 2-3  Provider/Team: CT Surgery  Warfarin Prior to Admission: No  Significant drug interactions: Amiodarone (only bolus x1 so far), aspirin, PPI, SQ Heparin  Dose Comments: CT Surgeon ordering 5 mg first dose today. Pharmacy will dose starting tomorrow.    INR   Date Value Ref Range Status   07/15/2024 1.26 (H) 0.85 - 1.15 Final   07/15/2024 1.37 (H) 0.85 - 1.15 Final       Recommend warfarin 5 mg today.  Pharmacy will monitor Gonsalo Blakely daily and order warfarin doses to achieve specified goal.      Please contact pharmacy as soon as possible if the warfarin needs to be held for a procedure or if the warfarin goals change.

## 2024-07-20 NOTE — PROGRESS NOTES
Amiodarone gtt started at 1 mg/min initial  V/S as follows Bp 121/93 HR 91 )2 saturation 95% on RA .       !4 mins into the Amiodarone gtt converted to NSR CV surgery informed .

## 2024-07-20 NOTE — PLAN OF CARE
Occupational Therapy Discharge Summary    Reason for therapy discharge:    Discharged to home with outpatient therapy.    Progress towards therapy goal(s). See goals on Care Plan in [facility] electronic health record for goal details.  Goals met    Therapy recommendation(s):    Continued therapy is recommended.  Rationale/Recommendations:  Pt is safe to return home w/family support and OP Cardiac Rehab.

## 2024-07-20 NOTE — PLAN OF CARE
"  Problem: Adult Inpatient Plan of Care  Goal: Plan of Care Review  Description: The Plan of Care Review/Shift note should be completed every shift.  The Outcome Evaluation is a brief statement about your assessment that the patient is improving, declining, or no change.  This information will be displayed automatically on your shift  note.  Outcome: Adequate for Care Transition  Goal: Patient-Specific Goal (Individualized)  Description: You can add care plan individualizations to a care plan. Examples of Individualization might be:  \"Parent requests to be called daily at 9am for status\", \"I have a hard time hearing out of my right ear\", or \"Do not touch me to wake me up as it startles  me\".  Outcome: Adequate for Care Transition  Goal: Absence of Hospital-Acquired Illness or Injury  Intervention: Identify and Manage Fall Risk  Recent Flowsheet Documentation  Taken 7/20/2024 1246 by Dionna Yates RN  Safety Promotion/Fall Prevention:   activity supervised   mobility aid in reach   nonskid shoes/slippers when out of bed   patient and family education   room door open   room near nurse's station   room organization consistent   supervised activity   treat reversible contributory factors   treat underlying cause  Taken 7/20/2024 0732 by Dionna Yates RN  Safety Promotion/Fall Prevention:   activity supervised   mobility aid in reach   nonskid shoes/slippers when out of bed   patient and family education   room door open   room near nurse's station   room organization consistent   supervised activity   treat reversible contributory factors   treat underlying cause  Intervention: Prevent Skin Injury  Recent Flowsheet Documentation  Taken 7/20/2024 1246 by Dionna Yates RN  Body Position:   weight shifting   position changed independently  Taken 7/20/2024 1210 by Dionna Yates RN  Body Position: supine  Taken 7/20/2024 0800 by Dionna Yates RN  Body Position: supine  Taken 7/20/2024 0732 by " Dionna Yates RN  Body Position:   weight shifting   position changed independently  Intervention: Prevent Infection  Recent Flowsheet Documentation  Taken 7/20/2024 1246 by Dionna Yates RN  Infection Prevention:   hand hygiene promoted   single patient room provided   rest/sleep promoted  Taken 7/20/2024 0732 by Dionna Yates RN  Infection Prevention:   hand hygiene promoted   single patient room provided   rest/sleep promoted  Goal: Optimal Comfort and Wellbeing  Outcome: Adequate for Care Transition  Intervention: Provide Person-Centered Care  Recent Flowsheet Documentation  Taken 7/20/2024 1246 by Dionna Yates RN  Trust Relationship/Rapport:   care explained   choices provided  Taken 7/20/2024 0732 by Dionna Yates RN  Trust Relationship/Rapport:   care explained   choices provided  Goal: Readiness for Transition of Care  Outcome: Adequate for Care Transition   Goal Outcome Evaluation:  Will discharge to home Significant other will come to pick him up completed a bolus dose of amiodarone gtt  and about 3 hrs of the amiodarone gtt and converted to NSR given a dose of 400 mg of PO amiodarone remained on NSR IV and Telemetry removed coronary artery bypass graft discharge instructions discussed and verbalized understanding OOP appointment schedules discussed all questions were answered.[facility]

## 2024-07-22 ENCOUNTER — PATIENT OUTREACH (OUTPATIENT)
Dept: CARE COORDINATION | Facility: CLINIC | Age: 59
End: 2024-07-22

## 2024-07-22 ENCOUNTER — TELEPHONE (OUTPATIENT)
Dept: CARDIOLOGY | Facility: CLINIC | Age: 59
End: 2024-07-22

## 2024-07-22 ENCOUNTER — LAB (OUTPATIENT)
Dept: LAB | Facility: HOSPITAL | Age: 59
End: 2024-07-22
Payer: MEDICAID

## 2024-07-22 ENCOUNTER — TELEPHONE (OUTPATIENT)
Dept: ANTICOAGULATION | Facility: CLINIC | Age: 59
End: 2024-07-22

## 2024-07-22 DIAGNOSIS — Z95.1 S/P CABG (CORONARY ARTERY BYPASS GRAFT): ICD-10-CM

## 2024-07-22 DIAGNOSIS — Z95.1 S/P CABG (CORONARY ARTERY BYPASS GRAFT): Primary | ICD-10-CM

## 2024-07-22 LAB
ATRIAL RATE - MUSE: 83 BPM
DIASTOLIC BLOOD PRESSURE - MUSE: NORMAL MMHG
INR PPP: 1.25 (ref 0.85–1.15)
INTERPRETATION ECG - MUSE: NORMAL
P AXIS - MUSE: NORMAL DEGREES
PR INTERVAL - MUSE: NORMAL MS
QRS DURATION - MUSE: 138 MS
QT - MUSE: 426 MS
QTC - MUSE: 538 MS
R AXIS - MUSE: 41 DEGREES
SYSTOLIC BLOOD PRESSURE - MUSE: NORMAL MMHG
T AXIS - MUSE: -65 DEGREES
VENTRICULAR RATE- MUSE: 96 BPM

## 2024-07-22 PROCEDURE — 36415 COLL VENOUS BLD VENIPUNCTURE: CPT

## 2024-07-22 PROCEDURE — 85610 PROTHROMBIN TIME: CPT

## 2024-07-22 ASSESSMENT — ACTIVITIES OF DAILY LIVING (ADL): DEPENDENT_IADLS:: INDEPENDENT

## 2024-07-22 NOTE — PROGRESS NOTES
Clinic Care Coordination Contact  Transitions of Care Outreach    Chief Complaint   Patient presents with    Clinic Care Coordination - Post Hospital     TCM-Hospital Follow up       Most Recent Admission Date: 7/15/2024   Most Recent Admission Diagnosis: CAD (coronary artery disease) - I25.10  Acute decompensated heart failure (H) - I50.9     Most Recent Discharge Date: 7/20/2024   Most Recent Discharge Diagnosis: Acute decompensated heart failure (H) - I50.9  Coronary artery disease involving native coronary artery of native heart, unspecified whether angina present - I25.10  Ischemic cardiomyopathy - I25.5  S/P CABG (coronary artery bypass graft) - Z95.1     Transitions of Care Assessment    Discharge Assessment  How are you doing now that you are home?: Pt is doing better  How are your symptoms? (Red Flag symptoms escalate to triage hotline per guidelines): Improved  Do you know how to contact your clinic care team if you have future questions or changes to your health status? : Yes  Does the patient have their discharge instructions? : Yes  Does the patient have questions regarding their discharge instructions? : No  Were you started on any new medications or were there changes to any of your previous medications? : Yes  Does the patient have all of their medications?: Yes  Do you have questions regarding any of your medications? : No  Do you have all of your needed medical supplies or equipment (DME)?  (i.e. oxygen tank, CPAP, cane, etc.): No - What equipment or supplies are needed?                  Follow up Plan     Discharge Follow-Up  Discharge follow up appointment scheduled in alignment with recommended follow up timeframe or Transitions of Risk Category? (Low = within 30 days; Moderate= within 14 days; High= within 7 days): Yes  Discharge Follow Up Appointment Date: 07/26/24  Discharge Follow Up Appointment Scheduled with?: Primary Care Provider    Future Appointments   Date Time Provider Department  Center   7/26/2024 11:20 AM Amelia He, DO [facility]   7/26/2024  1:15 PM [facility] CR 1 [facility]   8/2/2024  1:20 PM Amelia He, DO [facility]   8/13/2024 10:30 [facility]   8/16/2024 10:30 AM [facility] PFT RM 2 [facility]   9/6/2024  9:50 AM Amelia Chung APRN CNP [facility]   9/30/2024  3:50 PM [facility]       Outpatient Plan as outlined on AVS reviewed with patient.      For any urgent concerns, please contact our 24 hour nurse triage line: [phone]       EULOGIO Rivera

## 2024-07-22 NOTE — TELEPHONE ENCOUNTER
Cardiovascular Surgery  [facility]    DISCHARGE FOLLOW UP PHONE CALL      POST OP MONITORING  How is your pain on a 0-10 scale, how are you managing your pain? Pain is being managed well with tylenol and oxycodone occasionally.    ACTIVITY  How is your activity tolerance? Up and walking, doing IS regularly.  Are you still doing sternal precautions? Yes.  Do you hear any clicking when you are moving or taking a deep breath? No.    Are you weighing yourself daily? Yes, pt is currently 170 lbs.     SIGNS AND SYMPTOMS OF INFECTION  INCREASE IN PAIN - No  FEVER - No  DRAINAGE - No If so, color: NA  REDNESS - No  SWELLING - No    ASSISTANCE  Do you have someone at home to assist you with your daily activities? Sig other Marcy.    MEDICATIONS  Is someone helping you to set up your medications? Sig other.  Do you have any questions about your medications? No.    Are you on a blood thinner? Warfarin.  Who is managing your INRs? PCP.    FOLLOW UP  You are scheduled to see your primary care physician on 7/26 and 8/2.  You are scheduled to see our surgery advanced practive provider for post operative follow up on 8/13.    You are scheduled for cardiac rehab on 7/26.  You are scheduled to see your cardiologist on 9/30.  Afib follow up on 9/6.    CONTACT INFORMATION  Please feel free to call us with any questions or symptoms that are concerning for you at [phone]. If it is after 4:00 in the afternoon, or a weekend please call [phone] and ask for the on call specialist. We want to do everything we can to help prevent you needing to return to the ED, so please do not hesitate to call us.    Eulalia Lucas RN  Cardiovascular Surgery  [phone]  July 22, 2024 10:06 AM        ----- Message from Eulalia CHOWDARY sent at 7/22/2024  8:08 AM CDT -----    Regarding: FW: Discharge  POC 7/22  ----- Message -----  From: Joyce Alaniz PA-C  Sent: 7/20/2024   2:35 PM CDT  To: Eulalia Lucas RN  Subject: Discharge                                         Discharging to home today.    Of note, he did have a couple of episodes of a-fib with rates 80-100s. He did convert to NSR after amiodarone administration both times. He will be sent w/ amio taper and warfarin for anticoagulation per surgeon preference. Repeat echo before discharge showed improved EF to 35%. He is below preop weight but given HFrEF will be sent w/ 40 mg lasix PO and PTA aldactone. He had a mild Cr elevation on POD#2-3 which resolved. Cr on day of discharge was 0.94.

## 2024-07-22 NOTE — TELEPHONE ENCOUNTER
ANTICOAGULATION  MANAGEMENT: NEW REFERRAL      SUBJECTIVE/OBJECTIVE     Gonsalo Blakely, a 59 year old male  is newly referred to [facility].    Anticoagulation:    Previously on warfarin: No, new to anticoagulation  Warfarin initiation date (approximate): 24   Indication(s):  CABG , post op afib with LAAC   Goal Range: 2.0-3.0   Anticoagulation Bridge/Overlap: No   Referring provider: from heartcare provider    General Dietary/Social Hx:    Typical vitamin K intake: unable to assess could not reach patient after INR resulted today    Other dietary considerations:  unable to assess could not reach patient after INR resulted today    Social History:   Social History     Tobacco Use    Smoking status: Former     Current packs/day: 0.00     Types: Cigarettes     Quit date: 2024     Years since quittin.0     Passive exposure: Current    Smokeless tobacco: Former     Types: Chew     Quit date:     Tobacco comments:     quit recently   Substance Use Topics    Alcohol use: Yes     Comment: 2 beers per month    Drug use: Never       In the past 2 weeks, patient estimates taking medications as instructed % of time: unable to assess could not reach patient after INR resulted today    Results:        Recent labs: (last 7 days)     24  1116   INR 1.25*       Wt Readings from Last 2 Encounters:   24 76.1 kg (167 lb 12.8 oz)   24 77 kg (169 lb 12.8 oz)      Estimated body mass index is 22.76 kg/m  as calculated from the following:    Height as of 24: 1.829 m (6').    Weight as of 24: 76.1 kg (167 lb 12.8 oz).  Lab Results   Component Value Date    AST 33 2024    ALT 17 2024    ALBUMIN 3.4 (L) 2024     Lab Results   Component Value Date    CR 0.94 2024     Estimated Creatinine Clearance: 88.8 mL/min (based on SCr of 0.94 mg/dL).    ASSESSMENT     Goal INR 2-3, standard for indication(s) above  Establishing initial warfarin  maintenance dose (on warfarin < 30 days)   Factors that may increase sensitivity to warfarin: none, pending further assessment on Vit K intake and ETOH intake  Factors that may reduce sensitivity to warfarin: Male Gender  Potential significant drug interactions with home medications: Amiodarone, IV loaded      PLAN     Dosing Instructions: Increase your warfarin dose with INR in 4 days       Summary  As of 7/22/2024      Full warfarin instructions:  6.25 mg every day   Next INR check:  7/26/2024               Education provided:   Goal range and lab monitoring: goal range and significance of current result    Education still needed:   Dietary considerations: importance of consistent vitamin K intake, impact of vitamin K foods on INR, vitamin K content of foods, Impact of protein intake on INR , and importance of notifying [facility] to changes in diet  Healthy lifestyle considerations: potential interaction between warfarin and alcohol, impact of smoking or tobacco on INR, impact of exercise/activity level on INR, impact of CBD, marijuana, or medical marijuana on INR, and avoid activities that have a high risk for falling or injury such as contact sports  Symptom monitoring: monitoring for bleeding signs and symptoms, monitoring for clotting signs and symptoms, monitoring for stroke signs and symptoms, when to seek medical attention/emergency care, if you hit your head or have a bad fall seek emergency care, and travel related clotting risk and prevention  Importance of notifying anticoagulation clinic for: changes in medications; a sooner lab recheck maybe needed, diarrhea, nausea/vomiting, reduced intake, cold/flu, and/or infections; a sooner lab recheck maybe needed, upcoming surgeries and procedures 2 weeks in advance, and if you did not receive dosing instructions on the same day as your labs were checked    Called Gonsalo this am when ADT received and scheduled him for an INR today. Attempted several times today to  contact him with results and was unable to reach him.   [facility] and sent [place] message with tonight's warfarin dosing (7/22/24) but will need to follow up with patient tomorrow to go over dosing and provide full introduction to [facility] program, provide full education, set up next INR.         Standing orders placed in Epic: Point of Care INR (Lab 5000) and Venous INR  (Lab 3572)    Plan made with [facility] Pharmacist Jenae Burks, RN  Anticoagulation Clinic  7/22/2024

## 2024-07-22 NOTE — PROGRESS NOTES
[facility]    Background: Transitional Care Management program identified per system criteria and reviewed by [facility] team for possible outreach.    Assessment: Upon chart review, CCR Team member will not proceed with patient outreach related to this episode of Transitional Care Management program due to reason below:    Patient has active communication with a nurse, provider or care team for reason of post-hospital follow up plan.  Outreach call by CCR team not indicated to minimize duplicative efforts.     Plan: Transitional Care Management episode addressed appropriately per reason noted above.          JOHN Bowden  [phone]  [facility]

## 2024-07-23 NOTE — TELEPHONE ENCOUNTER
ANTICOAGULATION MANAGEMENT        PLAN     Unable to reach Gonsalo by phone today. He has not read the [email or web address] Message sent to him yesterday either.    I left a message for him to take 2.5 tabs of warfarin every day and call back [facility] as soon as possible to discuss further directions and dosing plan. I informed him that [facility] is open 8 am to 6 pm Monday through Friday and to call any time during those hours.    Follow up required to confirm warfarin dose taken and assess for changes, discuss dosing instructions and confirm understanding of instructions, and complete new enrollment education.    Rahel Banks RN  Anticoagulation Clinic  7/23/2024

## 2024-07-24 NOTE — TELEPHONE ENCOUNTER
Left another VM asking patient to continue taking 6.25 mg (2.5 tablets) of warfarin daily and to call [facility] back to discuss asap.     Deborah Forrest RN

## 2024-07-25 NOTE — TELEPHONE ENCOUNTER
ANTICOAGULATION  MANAGEMENT: NEW REFERRAL      SUBJECTIVE/OBJECTIVE     Gonsalo Blakely, a 59 year old male  is newly referred to [facility].    Anticoagulation:    Previously on warfarin: No, new to anticoagulation  Warfarin initiation date (approximate): 7/20   Indication(s):  CABG, post of a.fib after LAAC   Goal Range:  2-3   Anticoagulation Bridge/Overlap: No   [facility]    Results:        Recent labs: (last 7 days)     07/22/24  1116   INR 1.25*       Wt Readings from Last 2 Encounters:   07/20/24 76.1 kg (167 lb 12.8 oz)   07/14/24 77 kg (169 lb 12.8 oz)      Estimated body mass index is 22.76 kg/m  as calculated from the following:    Height as of 7/17/24: 1.829 m (6').    Weight as of 7/20/24: 76.1 kg (167 lb 12.8 oz).  Lab Results   Component Value Date    AST 33 07/16/2024    ALT 17 07/16/2024    ALBUMIN 3.4 (L) 07/16/2024     Lab Results   Component Value Date    CR 0.94 07/20/2024     Estimated Creatinine Clearance: 88.8 mL/min (based on SCr of 0.94 mg/dL).    ASSESSMENT     Goal INR 2-3, standard for indication(s) above  Establishing initial warfarin maintenance dose (on warfarin < 30 days)   Factors that may increase sensitivity to warfarin: none  Factors that may reduce sensitivity to warfarin: Male Gender  Potential significant drug interactions with home medications: None noted and Amiodarone  Starting warfarin dose adjustment recommended 6.25 mg daily    PLAN     Dosing Instructions: Continue to take 6.25 mg daily with INR in 4 days       Summary  As of 7/22/2024      Full warfarin instructions:  6.25 mg every day   Next INR check:  7/26/2024               Education provided:   Goal range and lab monitoring: goal range and significance of current result and Importance of following up at instructed interval    Education still needed:   Taking warfarin: purpose of warfarin and how it works and Importance of taking warfarin as  instructed  Goal range and lab monitoring: Importance of therapeutic range  Dietary considerations: importance of consistent vitamin K intake, impact of vitamin K foods on INR, and vitamin K content of foods  Healthy lifestyle considerations: potential interaction between warfarin and alcohol  Importance of notifying anticoagulation clinic for: changes in medications; a sooner lab recheck maybe needed, diarrhea, nausea/vomiting, reduced intake, cold/flu, and/or infections; a sooner lab recheck maybe needed, and upcoming surgeries and procedures 2 weeks in advance      Detailed voice message left for Gonsalo with dosing instructions and follow up date.     Check at provider office visit    Standing orders placed in Epic: Point of Care INR (Lab 5000) and POCT INR (POC 15)--for [facility] testing only    Plan made with [facility] Pharmacist Jenae Forrest, RN  Anticoagulation Clinic  7/25/2024

## 2024-07-26 ENCOUNTER — OFFICE VISIT (OUTPATIENT)
Dept: FAMILY MEDICINE | Facility: CLINIC | Age: 59
End: 2024-07-26
Payer: MEDICAID

## 2024-07-26 ENCOUNTER — ANTICOAGULATION THERAPY VISIT (OUTPATIENT)
Dept: ANTICOAGULATION | Facility: CLINIC | Age: 59
End: 2024-07-26

## 2024-07-26 ENCOUNTER — HOSPITAL ENCOUNTER (OUTPATIENT)
Dept: CARDIAC REHAB | Facility: CLINIC | Age: 59
Discharge: HOME | End: 2024-07-26
Attending: THORACIC SURGERY (CARDIOTHORACIC VASCULAR SURGERY)
Payer: MEDICAID

## 2024-07-26 VITALS
HEART RATE: 109 BPM | RESPIRATION RATE: 22 BRPM | BODY MASS INDEX: 22.9 KG/M2 | DIASTOLIC BLOOD PRESSURE: 78 MMHG | WEIGHT: 169.08 LBS | HEIGHT: 72 IN | SYSTOLIC BLOOD PRESSURE: 142 MMHG | OXYGEN SATURATION: 98 % | TEMPERATURE: 98.1 F

## 2024-07-26 DIAGNOSIS — Z95.1 S/P CABG (CORONARY ARTERY BYPASS GRAFT): ICD-10-CM

## 2024-07-26 DIAGNOSIS — Z79.01 ENCOUNTER FOR MONITORING WARFARIN THERAPY: ICD-10-CM

## 2024-07-26 DIAGNOSIS — Z51.81 ENCOUNTER FOR MONITORING WARFARIN THERAPY: ICD-10-CM

## 2024-07-26 DIAGNOSIS — Z95.1 S/P CABG (CORONARY ARTERY BYPASS GRAFT): Primary | ICD-10-CM

## 2024-07-26 DIAGNOSIS — I47.10 PAROXYSMAL SUPRAVENTRICULAR TACHYCARDIA (H): ICD-10-CM

## 2024-07-26 DIAGNOSIS — Z09 HOSPITAL DISCHARGE FOLLOW-UP: Primary | ICD-10-CM

## 2024-07-26 DIAGNOSIS — R07.89 CHEST WALL PAIN: ICD-10-CM

## 2024-07-26 LAB — INR BLD: 1.9 (ref 0.9–1.1)

## 2024-07-26 PROCEDURE — 36416 COLLJ CAPILLARY BLOOD SPEC: CPT

## 2024-07-26 PROCEDURE — 93797 PHYS/QHP OP CAR RHAB WO ECG: CPT

## 2024-07-26 PROCEDURE — 85610 PROTHROMBIN TIME: CPT

## 2024-07-26 PROCEDURE — 93798 PHYS/QHP OP CAR RHAB W/ECG: CPT

## 2024-07-26 PROCEDURE — 99496 TRANSJ CARE MGMT HIGH F2F 7D: CPT | Mod: GC

## 2024-07-26 RX ORDER — OXYCODONE HYDROCHLORIDE 5 MG/1
2.5-5 TABLET ORAL EVERY 6 HOURS PRN
Qty: 10 TABLET | Refills: 0 | Status: SHIPPED | OUTPATIENT
Start: 2024-07-26 | End: 2024-09-27

## 2024-07-26 NOTE — PROGRESS NOTES
Assessment & Plan     Hospital discharge follow-up  S/P CABG (coronary artery bypass graft)  Paroxysmal supraventricular tachycardia (H)  Chest wall pain  Hospitalized 7/15/24 and discharged 7/20/24 after CABG x4 with CT surgery, Dr. Virk. Surgery was performed 7/15/24 via median sternotomy with take down of left internal mammary artery and greater saphenous vein procurement from left lower extremity. Quadruple vessel artery bypass grafting, placement of Flex V AtriClip on left atrial appendage. Surgery was uneventful and patient was brought to the ICU post-operatively. He was extubated on POD#0 and weaned from pressors. IABP removed POD#1  , he did have a couple of episodes of a-fib with rates 80-100s. He did convert to NSR after amiodarone administration both times. He will be sent w/ amio taper and warfarin for anticoagulation per surgeon preference. Repeat echo before discharge showed improved EF to 35%. Since hospital discharge, doing well. Notes ongoing pain at sternotomy site due to cough that he has been having.  Due to ongoing pain at sternotomy site due to cough that patient is having, will prescribe short course of additional pain medication.  Discussed that if pain persist despite this, would recommend closer follow-up with CT surgery to discuss postoperative course.  -Continue amiodarone taper -in normal sinus rhythm during clinic visit today  -continue current medication regimen to include: atorvastatin, aspirin, atorvastatin, metoprolol, furosemide, dapagliflozin   - has planned cardiac rehab appointment this afternoon   - recommend follow up visit with CT surgery, cardiology, EP team as scheduled  - oxyCODONE (ROXICODONE) 5 MG tablet; Take 0.5-1 tablets (2.5-5 mg) by mouth every 6 hours as needed for moderate to severe pain      Encounter for monitoring warfarin therapy  Patient discharged on warfarin and amiodarone taper due to postoperative episodes of atrial fibrillation.  Currently on  amiodarone taper.  Following with INR clinic for monitoring of warfarin.  Patient had follow-up visit on Monday, at which time his INR was below therapeutic range.  Will recheck INR today, and advised to follow-up with INR clinic for further management.  Goal INR between 2 and 3.   - INR point of care    Return in about 2 months (around 9/26/2024) for Follow up.    Marty Brush is a 59 year old, presenting for the following health issues:  Hospital F/U and Pain (Upper check and back pain. Started about few days ago)        7/26/2024    11:19 AM   Additional Questions   Roomed by chelly ayers   Accompanied by Bria(Girlfriend)     HPI        Hospital Follow-up Visit:    Hospital/Nursing Home/IP Rehab Facility: [facility]  Date of Admission: 7/15/24  Date of Discharge: 7/20/24  Reason(s) for Admission: CABG  Was the patient in the ICU or did the patient experience delirium during hospitalization?  No  Do you have any other stressors you would like to discuss with your provider? No    Problems taking medications regularly:  None  Medication changes since discharge: Amiodarone taper  Problems adhering to non-medication therapy:  None    Summary of hospitalization:  [facility] discharge summary reviewed  Diagnostic Tests/Treatments reviewed.  Follow up needed: none  Other Healthcare Providers Involved in Patient s Care:         Specialist appointment - CV surgery, Cardiology and Cardiac rehab  Update since discharge: stable.         Plan of care communicated with patient             Doing well since hospital discharge.  Reports that he has had some sternotomy pain, especially in the context of some recent coughing that he has had.  Pain is located along the sternotomy site.  He notes that he has had a cough.  This has been ongoing for quite some time, but notes that whenever he has to cough it is associated with worsening pain of his sternotomy site.    Review of  "Systems  Constitutional, neuro, ENT, endocrine, pulmonary, cardiac, gastrointestinal, genitourinary, musculoskeletal, integument and psychiatric systems are negative, except as otherwise noted.      Objective    BP (!) 142/78   Pulse 109   Temp 98.1  F (36.7  C)   Resp 22   Ht 1.83 m (6' 0.05\")   Wt 76.7 kg (169 lb 1.3 oz)   SpO2 98%   BMI 22.90 kg/m    Body mass index is 22.9 kg/m .  Physical Exam   GENERAL: alert and no distress  NECK: no adenopathy, no asymmetry, masses, or scars  RESP: no increased work of breathing, mild right lower extremity wheezing, no rales, no rhonchi   CV: regular rate and rhythm, normal S1 S2, no peripheral edema  ABDOMEN: soft, nontender, no hepatosplenomegaly, no masses and bowel sounds normal  MS: no gross musculoskeletal defects noted, no edema  SKIN: sternotomy incision clean, dry in-tact         Signed Electronically by: Amelia He, DO    "

## 2024-07-26 NOTE — PROGRESS NOTES
ANTICOAGULATION MANAGEMENT     Gonsalo Blakely 59 year old male is on warfarin with subtherapeutic INR result. (Goal INR 2.0-3.0)    Recent labs: (last 7 days)     07/26/24  1208   INR 1.9*       ASSESSMENT     Source(s): Chart Review and Patient/Caregiver Call     Warfarin doses taken: Warfarin taken as instructed  Diet: No new diet changes identified  Medication/supplement changes: None noted  New illness, injury, or hospitalization: No  Signs or symptoms of bleeding or clotting: No  Previous result: Subtherapeutic  Additional findings: New start on day 7 of warfarin       PLAN     Recommended plan for no diet, medication or health factor changes affecting INR     Dosing Instructions: decrease your warfarin dose (8.6% change) with next INR in 5 days       Summary  As of 7/26/2024      Full warfarin instructions:  5 mg every Sun, Tue, Fri; 6.25 mg all other days   Next INR check:  7/30/2024               Telephone call with Gonsalo who agrees to plan and repeated back plan correctly  Sent [email or web address] message with dosing and follow up instructions    Lab visit scheduled    Education provided: Goal range and lab monitoring: goal range and significance of current result, Importance of therapeutic range, and Importance of following up at instructed interval    Plan made with [facility] Pharmacist Nicole Forrest RN  Anticoagulation Clinic  7/26/2024    _______________________________________________________________________     Anticoagulation Episode Summary       Current INR goal:  2.0-3.0   TTR:  --   Target end date:  Indefinite   Send INR reminders to:  [facility] HEART [facility]    Indications    S/P CABG (coronary artery bypass graft) [Z95.1]             Comments:               Anticoagulation Care Providers       Provider Role Specialty Phone number    Joyce Alaniz PA-C Referring  [phone]

## 2024-07-29 ENCOUNTER — HOSPITAL ENCOUNTER (OUTPATIENT)
Dept: CARDIAC REHAB | Facility: CLINIC | Age: 59
Discharge: HOME | End: 2024-07-29
Attending: THORACIC SURGERY (CARDIOTHORACIC VASCULAR SURGERY)
Payer: MEDICAID

## 2024-07-29 PROCEDURE — 93798 PHYS/QHP OP CAR RHAB W/ECG: CPT

## 2024-07-30 ENCOUNTER — ANTICOAGULATION THERAPY VISIT (OUTPATIENT)
Dept: ANTICOAGULATION | Facility: CLINIC | Age: 59
End: 2024-07-30

## 2024-07-30 ENCOUNTER — LAB (OUTPATIENT)
Dept: LAB | Facility: CLINIC | Age: 59
End: 2024-07-30
Payer: MEDICAID

## 2024-07-30 DIAGNOSIS — Z95.1 S/P CABG (CORONARY ARTERY BYPASS GRAFT): Primary | ICD-10-CM

## 2024-07-30 DIAGNOSIS — Z95.1 S/P CABG (CORONARY ARTERY BYPASS GRAFT): ICD-10-CM

## 2024-07-30 LAB — INR BLD: 2.4 (ref 0.9–1.1)

## 2024-07-30 PROCEDURE — 85610 PROTHROMBIN TIME: CPT

## 2024-07-30 PROCEDURE — 36415 COLL VENOUS BLD VENIPUNCTURE: CPT

## 2024-07-30 NOTE — PROGRESS NOTES
ANTICOAGULATION MANAGEMENT     Gonsalo Blakely 59 year old male is on warfarin with therapeutic INR result. (Goal INR 2.0-3.0)    Recent labs: (last 7 days)     07/30/24  0829   INR 2.4*       ASSESSMENT     Source(s): Chart Review  Previous INR was Subtherapeutic  Medication, diet, health changes since last INR chart reviewed; none identified         PLAN     Recommended plan for no diet, medication or health factor changes affecting INR     Dosing Instructions: Continue your current warfarin dose with next INR in 3 days       Summary  As of 7/30/2024      Full warfarin instructions:  5 mg every Sun, Tue, Fri; 6.25 mg all other days   Next INR check:  8/2/2024               Detailed voice message left for Gonsalo with dosing instructions and follow up date.   Sent [employer] message with dosing and follow up instructions    Contact [phone] to schedule and with any changes, questions or concerns.     Education provided: Please call back if any changes to your diet, medications or how you've been taking warfarin    Plan made per [facility] anticoagulation protocol    Deborah Forrest RN  Anticoagulation Clinic  7/30/2024    _______________________________________________________________________     Anticoagulation Episode Summary       Current INR goal:  2.0-3.0   TTR:  --   Target end date:  Indefinite   Send INR reminders to:  [facility]    Indications    S/P CABG (coronary artery bypass graft) [Z95.1]             Comments:               Anticoagulation Care Providers       Provider Role Specialty Phone number    Joyce Alaniz PA-C Referring  [phone]

## 2024-08-02 NOTE — PROGRESS NOTES
Preceptor Attestation:   Patient seen, evaluated and discussed with the resident. I have verified the content of the note, which accurately reflects my assessment of the patient and the plan of care.    Supervising Physician:Franco Calle MD    [facility]

## 2024-08-07 ENCOUNTER — MYC MEDICAL ADVICE (OUTPATIENT)
Dept: ANTICOAGULATION | Facility: CLINIC | Age: 59
End: 2024-08-07
Payer: MEDICAID

## 2024-08-13 ENCOUNTER — ANTICOAGULATION THERAPY VISIT (OUTPATIENT)
Dept: ANTICOAGULATION | Facility: CLINIC | Age: 59
End: 2024-08-13

## 2024-08-13 ENCOUNTER — OFFICE VISIT (OUTPATIENT)
Dept: CARDIOLOGY | Facility: CLINIC | Age: 59
End: 2024-08-13
Payer: MEDICAID

## 2024-08-13 VITALS
HEART RATE: 65 BPM | RESPIRATION RATE: 16 BRPM | BODY MASS INDEX: 23.3 KG/M2 | DIASTOLIC BLOOD PRESSURE: 90 MMHG | WEIGHT: 172 LBS | SYSTOLIC BLOOD PRESSURE: 158 MMHG

## 2024-08-13 DIAGNOSIS — Z95.1 S/P CABG (CORONARY ARTERY BYPASS GRAFT): Primary | ICD-10-CM

## 2024-08-13 LAB — INR POINT OF CARE: 2.4 (ref 0.9–1.1)

## 2024-08-13 PROCEDURE — 99024 POSTOP FOLLOW-UP VISIT: CPT

## 2024-08-13 PROCEDURE — 85610 PROTHROMBIN TIME: CPT | Performed by: PHYSICIAN ASSISTANT

## 2024-08-13 PROCEDURE — 36416 COLLJ CAPILLARY BLOOD SPEC: CPT | Performed by: PHYSICIAN ASSISTANT

## 2024-08-13 RX ORDER — ALBUTEROL SULFATE 90 UG/1
1-2 AEROSOL, METERED RESPIRATORY (INHALATION) EVERY 6 HOURS PRN
Qty: 18 G | Refills: 1 | Status: SHIPPED | OUTPATIENT
Start: 2024-08-13

## 2024-08-13 NOTE — PROGRESS NOTES
CARDIOTHORACIC SURGERY FOLLOW-UP VISIT     Gonsalo Blakely   1965   [id number]      Reason for visit: Post operative clinic visit. Patient underwent CABGx4 and left atrial appendage ligation with Dr. Virk on 07/15/2024.     HPI: Gonsalo Blakely is a 59 year old year old male seen in clinic for a routine follow-up appointment after surgery. Patient has past medical history as below. Hospital course was remarkable for a few episodes of a-fib. Patient was discharged to home.    Patient has been doing overall well since discharge. Patient did follow-up with primary care since leaving the hospital; no concerns noted at this visit. Reports that incision is healing well. Denies fevers, peripheral edema. Appetite is improving and patient is voiding without difficulty. Weight has been stable. Nothing needed for pain at this point. Patient had originally been attending cardiac rehab although missed his recent appointments due to vacation. Patient is on warfarin for a-fib; he has also missed some anticoagulation appointments but has been taking his warfarin. INR check here in clinic today.     PAST MEDICAL HISTORY:  Past Medical History:   Diagnosis Date    Asthma     CAD (coronary artery disease)     Chronic obstructive pulmonary disease (H)     Dyslipidemia     Heart failure with reduced ejection fraction, NYHA class I (H)     HTN (hypertension)     Ischemic cardiomyopathy        PAST SURGICAL HISTORY:  Past Surgical History:   Procedure Laterality Date    CORONARY ARTERY BYPASS GRAFT, WITH ENDOSCOPIC VESSEL PROCUREMENT N/A 7/15/2024    Procedure: CORONARY ARTERY BYPASS GRAFT TIMES FOUR, INTERNAL MAMMARY ARTERY HARVEST,  LEFT LEG ENDOSCOPIC VESSEL PROCUREMENT , EPIAORTIC ULTRASOUND,;  Surgeon: Shannan Virk MD;  Location: [facility] Main OR    CV CORONARY ANGIOGRAM N/A 06/18/2024    Procedure: CV CORONARY ANGIOGRAM;  Surgeon: Roman Florence MD;  Location: [facility] CATH LAB CV    CV INTRA AORTIC BALLOON  N/A 7/15/2024    Procedure: Intra aortic Balloon Pump Insertion;  Surgeon: Valerio Virk MD;  Location: [facility] CATH LAB CV    CV LEFT HEART CATH N/A 06/18/2024    Procedure: Left Heart Catheterization;  Surgeon: Roman Florence MD;  Location: [facility] CATH LAB CV    OR LIGATION, LEFT ATRIAL APPENDAGE N/A 7/15/2024    Procedure: LIGATION, LEFT ATRIAL APPENDAGE, OPEN;  Surgeon: Shannan Virk MD;  Location: [facility] OR    SHOULDER SURGERY Right     TRANSESOPHAGEAL ECHOCARDIOGRAM INTRAOPERATIVE  7/15/2024    Procedure: ANESTHEIA TRANSESOPHAGEAL ECHOCARDIOGRAM.;  Surgeon: Shannan Virk MD;  Location: [facility] OR       CURRENT MEDICATIONS:     Current Outpatient Medications:     acetaminophen (TYLENOL) 325 MG tablet, Take 2 tablets (650 mg) by mouth every 4 hours as needed for other (For optimal non-opioid multimodal pain management to improve pain control.), Disp: 60 tablet, Rfl: 0    amiodarone (PACERONE) 200 MG tablet, Take 2 tablets (400 mg) by mouth 2 times daily for 4 days, THEN 1 tablet (200 mg) 2 times daily for 2 days, THEN 1 tablet (200 mg) daily for 21 days., Disp: 41 tablet, Rfl: 0    atorvastatin (LIPITOR) 80 MG tablet, Take 1 tablet (80 mg) by mouth daily, Disp: 90 tablet, Rfl: 3    dapagliflozin (FARXIGA) 10 MG TABS tablet, Take 1 tablet (10 mg) by mouth daily, Disp: 90 tablet, Rfl: 1    furosemide (LASIX) 40 MG tablet, Take 1 tablet (40 mg) by mouth daily, Disp: 60 tablet, Rfl: 0    Metoprolol Tartrate 37.5 MG TABS, Take 37.5 mg by mouth 2 times daily, Disp: 180 tablet, Rfl: 3    oxyCODONE (ROXICODONE) 5 MG tablet, Take 0.5-1 tablets (2.5-5 mg) by mouth every 6 hours as needed for moderate to severe pain, Disp: 10 tablet, Rfl: 0    spironolactone (ALDACTONE) 25 MG tablet, Take 1 tablet (25 mg) by mouth daily, Disp: 90 tablet, Rfl: 0    warfarin ANTICOAGULANT (COUMADIN) 2.5 MG tablet, Take 5 mg (2 tablets) tonight 7/20 and tomorrow 7/21. Have your INR checked (they will call  you) on 7/22. Dosing may change from there, they will let you know., Disp: 60 tablet, Rfl: 0    Lidocaine (LIDOCARE) 4 % Patch, Place 1-2 patches onto the skin every 24 hours To prevent lidocaine toxicity, patient should be patch free for 12 hrs daily. (Patient not taking: Reported on 8/13/2024), Disp: 6 patch, Rfl: 0    polyethylene glycol (MIRALAX) 17 GM/Dose powder, Take 17 g by mouth daily (Patient not taking: Reported on 8/13/2024), Disp: 510 g, Rfl: 0    ALLERGIES:      Allergies   Allergen Reactions    Ace Inhibitors Cough       ROS:  Gen: No fevers, weight loss/gain  CV: Denies chest pain, peripheral edema  Pulm: Denies SOB  GI/: Voiding without problems, appetite improving.     LABS:  None    IMAGING:  None    PHYSICAL EXAM:   BP (!) 158/90 (BP Location: Right arm, Patient Position: Sitting, Cuff Size: Adult Regular)   Pulse 65   Resp 16   Wt 78 kg (172 lb)   BMI 23.30 kg/m    General: Alert and oriented, pleasant, no acute distress.  CV:  No peripheral edema.  Pulm: Easy work of breathing on room air.   GI: Soft, non-tender, and non-distended  Incision: Chest and leg incisions clean dry and intact without erythema, swelling or drainage  Neuro: CNs grossly intact.      ASSESSMENT/PLAN:  Gonsalo Blakely is a 59 year old year old male status post CABG who returns to clinic for postop visit.     - Surgically doing well. Incisions are healing well with no signs of infection.  - OK to lightly start playing drums  - No medication changes were needed today; will assess BP at cardiac rehab sessions.  - Follow up with INR clinic  - Follow up with cardiology as scheduled (a-fib clinic on 09/06/2024 and Dr. Samaniego on 09/30/2024)  - Re-start and continue Cardiac Rehab until completed.   - May start driving (4 weeks post-op) if not using narcotic pain medications.  - Continue strict sternal precautions until 09/09/2024. No lifting >10bs; may gradually increase at this point (8 weeks post-op).   - No need for further  follow-up with CV surgery unless concerns. Feel free to call our office with questions. [phone].         _______  Justine Rey PA-C  Cardiothoracic Surgery  [phone]

## 2024-08-13 NOTE — PATIENT INSTRUCTIONS
- Surgically doing well. Incisions are healing well with no signs of infection.  - OK to lightly start playing drums.  - No medication changes were needed today; will assess BP at cardiac rehab sessions.  - Albuterol inhaler sent to your pharmacy  - Follow up with INR clinic  - Follow up with cardiology as scheduled (a-fib clinic on 09/06/2024 and Dr. Samaniego on 09/30/2024)  - Re-start and continue Cardiac Rehab until completed.   - May start driving (4 weeks post-op) if not using narcotic pain medications.  - Continue strict sternal precautions until 09/09/2024. No lifting >10bs; may gradually increase at this point (8 weeks post-op).   - No need for further follow-up with CV surgery unless concerns. Feel free to call our office with questions. [phone].

## 2024-08-13 NOTE — PROGRESS NOTES
ANTICOAGULATION MANAGEMENT     Gonsalo Blakely 59 year old male is on warfarin with therapeutic INR result. (Goal INR 2.0-3.0)    Recent labs: (last 7 days)     08/13/24  1110   INR 2.4*       ASSESSMENT     Source(s): Chart Review and Template     Warfarin doses taken: Reviewed in chart  Diet: No new diet changes identified  Medication/supplement changes:  Albuterol inhaler started on today No interaction anticipated  Previously started Amiodarone on 7/20/24.(25 days ago) Dose was decreased on 7/26 8.6%. He is also new to warfarin.   New illness, injury, or hospitalization: No  Signs or symptoms of bleeding or clotting: No  Previous result: Therapeutic last visit; previously outside of goal range  Additional findings:  has been non compliant with INR checks.        PLAN     Recommended plan for ongoing change(s) affecting INR     Dosing Instructions: Continue your current warfarin dose with next INR in 1 week       Summary  As of 8/13/2024      Full warfarin instructions:  5 mg every Sun, Tue, Fri; 6.25 mg all other days   Next INR check:  8/20/2024               Detailed voice message left for Gonsalo with dosing instructions and follow up date.   Sent Mevvy message with dosing and follow up instructions    Contact [phone] to schedule and with any changes, questions or concerns.     Education provided: Please call back if any changes to your diet, medications or how you've been taking warfarin  Goal range and lab monitoring: goal range and significance of current result and Importance of therapeutic range    Plan made per [facility] anticoagulation protocol    Karolina Rodriguez, RN  Anticoagulation Clinic  8/13/2024    _______________________________________________________________________     Anticoagulation Episode Summary       Current INR goal:  2.0-3.0   TTR:  100.0% (2 wk)   Target end date:  Indefinite   Send INR reminders to:  [facility]    Indications    S/P CABG (coronary  artery bypass graft) [Z95.1]             Comments:               Anticoagulation Care Providers       Provider Role Specialty Phone number    Joyce Alaniz PA-C Referring  [phone]

## 2024-08-14 NOTE — PROGRESS NOTES
Left voicemail for patient in regards to PFT appt on Friday 8/16/24. Patient had CABG x 4 on 7/15/24 which is almost exactly a month ago. Wanted to check in on patient and make sure recovery is going well and that he feels up to doing PFT. Explained in voicemail that he will be required to take a deep breath in and blast it out as hard and fast as he can. Explained in voicemail that he can come in and we will see him on Friday if that feels like something he can do and if not gave him phone number to call and reschedule.

## 2024-08-16 ENCOUNTER — OFFICE VISIT (OUTPATIENT)
Dept: PULMONOLOGY | Facility: CLINIC | Age: 59
End: 2024-08-16
Attending: FAMILY MEDICINE
Payer: MEDICAID

## 2024-08-16 DIAGNOSIS — J45.909 ASTHMA, UNSPECIFIED ASTHMA SEVERITY, UNSPECIFIED WHETHER COMPLICATED, UNSPECIFIED WHETHER PERSISTENT: ICD-10-CM

## 2024-08-16 DIAGNOSIS — R06.02 SOB (SHORTNESS OF BREATH): ICD-10-CM

## 2024-08-16 LAB — HGB BLD-MCNC: 15 G/DL

## 2024-08-16 PROCEDURE — 94060 EVALUATION OF WHEEZING: CPT | Mod: 26 | Performed by: INTERNAL MEDICINE

## 2024-08-16 PROCEDURE — 99207 PR NO BILLABLE SERVICE THIS VISIT: CPT | Performed by: INTERNAL MEDICINE

## 2024-08-16 PROCEDURE — 85018 HEMOGLOBIN: CPT | Mod: QW | Performed by: INTERNAL MEDICINE

## 2024-08-16 PROCEDURE — 94729 DIFFUSING CAPACITY: CPT | Mod: 26 | Performed by: INTERNAL MEDICINE

## 2024-08-16 PROCEDURE — 94726 PLETHYSMOGRAPHY LUNG VOLUMES: CPT | Mod: 26 | Performed by: INTERNAL MEDICINE

## 2024-08-18 LAB
DLCOCOR-%PRED-PRE: 68 %
DLCOCOR-PRE: 18.64 ML/MIN/MMHG
DLCOUNC-%PRED-PRE: 68 %
DLCOUNC-PRE: 18.84 ML/MIN/MMHG
DLCOUNC-PRED: 27.38 ML/MIN/MMHG
ERV-%PRED-PRE: 26 %
ERV-PRE: 0.41 L
ERV-PRED: 1.56 L
EXPTIME-PRE: 10.61 SEC
FEF2575-%PRED-POST: 30 %
FEF2575-%PRED-PRE: 17 %
FEF2575-POST: 0.84 L/SEC
FEF2575-PRE: 0.47 L/SEC
FEF2575-PRED: 2.76 L/SEC
FEFMAX-%PRED-PRE: 48 %
FEFMAX-PRE: 4.42 L/SEC
FEFMAX-PRED: 9.13 L/SEC
FEV1-%PRED-PRE: 38 %
FEV1-PRE: 1.28 L
FEV1FEV6-PRE: 55 %
FEV1FEV6-PRED: 79 %
FEV1FVC-PRE: 50 %
FEV1FVC-PRED: 78 %
FEV1SVC-PRE: 47 %
FEV1SVC-PRED: 74 %
FIFMAX-PRE: 4.04 L/SEC
FRCPLETH-%PRED-PRE: 116 %
FRCPLETH-PRE: 4.23 L
FRCPLETH-PRED: 3.63 L
FVC-%PRED-PRE: 59 %
FVC-PRE: 2.54 L
FVC-PRED: 4.23 L
IC-%PRED-PRE: 74 %
IC-PRE: 2.32 L
IC-PRED: 3.12 L
RVPLETH-%PRED-PRE: 155 %
RVPLETH-PRE: 3.82 L
RVPLETH-PRED: 2.45 L
TLCPLETH-%PRED-PRE: 91 %
TLCPLETH-PRE: 6.54 L
TLCPLETH-PRED: 7.14 L
VA-%PRED-PRE: 71 %
VA-PRE: 4.68 L
VC-%PRED-PRE: 61 %
VC-PRE: 2.73 L
VC-PRED: 4.45 L

## 2024-08-19 DIAGNOSIS — I25.5 ISCHEMIC CARDIOMYOPATHY: ICD-10-CM

## 2024-08-19 DIAGNOSIS — I50.21 ACUTE SYSTOLIC CONGESTIVE HEART FAILURE (H): ICD-10-CM

## 2024-08-19 DIAGNOSIS — Z95.1 S/P CABG (CORONARY ARTERY BYPASS GRAFT): ICD-10-CM

## 2024-08-19 RX ORDER — WARFARIN SODIUM 2.5 MG/1
TABLET ORAL
Qty: 60 TABLET | Status: CANCELLED | OUTPATIENT
Start: 2024-08-19

## 2024-08-19 RX ORDER — WARFARIN SODIUM 2.5 MG/1
5-6.25 TABLET ORAL DAILY
Qty: 200 TABLET | Refills: 1 | Status: SHIPPED | OUTPATIENT
Start: 2024-08-19

## 2024-08-19 NOTE — TELEPHONE ENCOUNTER
Amiodarone and metoprolol not last prescribed by our clinic. No protocol for amiodarone, outside RN standing orders. Routing to PCP to review and fill if appropriate. Willow ANDERSON

## 2024-08-19 NOTE — TELEPHONE ENCOUNTER
ANTICOAGULATION MANAGEMENT:  Medication Refill    Anticoagulation Summary  As of 8/13/2024      Warfarin maintenance plan:  5 mg (2.5 mg x 2) every Sun, Tue, Fri; 6.25 mg (2.5 mg x 2.5) all other days   Next INR check:  8/20/2024   Target end date:  Indefinite    Indications    S/P CABG (coronary artery bypass graft) [Z95.1]                 Anticoagulation Care Providers       Provider Role Specialty Phone number    Joyce Alaniz PA-C Referring  [phone]            Refill Criteria    Visit with referring provider/group: Meets criteria: visit within referring provider group in the last 15 months on 8/13/24    [facility] referral last signed: 07/20/2024; within last year: Yes    Lab monitoring not exceeding 2 weeks overdue: Yes    Gonsalo meets all criteria for refill. Rx instructions and quantity supplied updated to match patient's current dosing plan. Warfarin 90 day supply with 1 refill granted per [facility] protocol     Deborah Forrest RN  Anticoagulation Clinic

## 2024-08-22 ENCOUNTER — MYC MEDICAL ADVICE (OUTPATIENT)
Dept: ANTICOAGULATION | Facility: CLINIC | Age: 59
End: 2024-08-22
Payer: MEDICAID

## 2024-08-22 RX ORDER — AMIODARONE HYDROCHLORIDE 200 MG/1
TABLET ORAL
Qty: 41 TABLET | OUTPATIENT
Start: 2024-08-22 | End: 2024-09-18

## 2024-08-22 RX ORDER — METOPROLOL TARTRATE 37.5 MG/1
37.5 TABLET, FILM COATED ORAL 2 TIMES DAILY
Qty: 180 TABLET | Refills: 0 | Status: SHIPPED | OUTPATIENT
Start: 2024-08-22 | End: 2024-09-23

## 2024-08-22 RX ORDER — DAPAGLIFLOZIN 10 MG/1
10 TABLET, FILM COATED ORAL DAILY
Qty: 90 TABLET | Refills: 0 | Status: SHIPPED | OUTPATIENT
Start: 2024-08-22 | End: 2024-09-04

## 2024-09-04 ENCOUNTER — OFFICE VISIT (OUTPATIENT)
Dept: FAMILY MEDICINE | Facility: CLINIC | Age: 59
End: 2024-09-04
Payer: COMMERCIAL

## 2024-09-04 VITALS
RESPIRATION RATE: 20 BRPM | SYSTOLIC BLOOD PRESSURE: 138 MMHG | WEIGHT: 178.12 LBS | DIASTOLIC BLOOD PRESSURE: 87 MMHG | BODY MASS INDEX: 23.61 KG/M2 | HEART RATE: 69 BPM | HEIGHT: 73 IN | OXYGEN SATURATION: 96 % | TEMPERATURE: 98 F

## 2024-09-04 DIAGNOSIS — E11.9 TYPE 2 DIABETES MELLITUS WITHOUT COMPLICATION, WITHOUT LONG-TERM CURRENT USE OF INSULIN (H): ICD-10-CM

## 2024-09-04 DIAGNOSIS — Z11.59 NEED FOR HEPATITIS C SCREENING TEST: ICD-10-CM

## 2024-09-04 DIAGNOSIS — Z11.4 SCREENING FOR HIV (HUMAN IMMUNODEFICIENCY VIRUS): ICD-10-CM

## 2024-09-04 DIAGNOSIS — I50.21 ACUTE SYSTOLIC CONGESTIVE HEART FAILURE (H): ICD-10-CM

## 2024-09-04 DIAGNOSIS — Z12.11 SCREEN FOR COLON CANCER: Primary | ICD-10-CM

## 2024-09-04 DIAGNOSIS — J44.9 CHRONIC OBSTRUCTIVE PULMONARY DISEASE, UNSPECIFIED COPD TYPE (H): ICD-10-CM

## 2024-09-04 DIAGNOSIS — I25.5 ISCHEMIC CARDIOMYOPATHY: ICD-10-CM

## 2024-09-04 PROCEDURE — 99214 OFFICE O/P EST MOD 30 MIN: CPT | Performed by: STUDENT IN AN ORGANIZED HEALTH CARE EDUCATION/TRAINING PROGRAM

## 2024-09-04 RX ORDER — DAPAGLIFLOZIN 10 MG/1
10 TABLET, FILM COATED ORAL DAILY
Qty: 90 TABLET | Refills: 0 | Status: SHIPPED | OUTPATIENT
Start: 2024-09-04 | End: 2024-09-27

## 2024-09-04 ASSESSMENT — ASTHMA QUESTIONNAIRES
QUESTION_5 LAST FOUR WEEKS HOW WOULD YOU RATE YOUR ASTHMA CONTROL: SOMEWHAT CONTROLLED
QUESTION_4 LAST FOUR WEEKS HOW OFTEN HAVE YOU USED YOUR RESCUE INHALER OR NEBULIZER MEDICATION (SUCH AS ALBUTEROL): ONCE A WEEK OR LESS
QUESTION_2 LAST FOUR WEEKS HOW OFTEN HAVE YOU HAD SHORTNESS OF BREATH: ONCE OR TWICE A WEEK
QUESTION_3 LAST FOUR WEEKS HOW OFTEN DID YOUR ASTHMA SYMPTOMS (WHEEZING, COUGHING, SHORTNESS OF BREATH, CHEST TIGHTNESS OR PAIN) WAKE YOU UP AT NIGHT OR EARLIER THAN USUAL IN THE MORNING: ONCE OR TWICE
QUESTION_1 LAST FOUR WEEKS HOW MUCH OF THE TIME DID YOUR ASTHMA KEEP YOU FROM GETTING AS MUCH DONE AT WORK, SCHOOL OR AT HOME: A LITTLE OF THE TIME

## 2024-09-04 NOTE — PATIENT INSTRUCTIONS
It is ok to resume sexual activity as long as you are not having chest pain with other activities.

## 2024-09-04 NOTE — PROGRESS NOTES
Assessment & Plan     Screening/health maintenance  Patient declined screening tests/health maintenance today    Chronic obstructive pulmonary disease, unspecified COPD type (H) vs Asthma/COPD  PFTs are not completely reliable given they were done shortly after the patient's CABG (Performed on 8/16, CABG was on 7/15). Nonetheless he does have chronic shortness of breath that is improved with steroids and bronchodilators. He could have a component of asthma/allergy- eosinophil testing would be helpful to distinguish this. For now we will start with LAMA- spiriva 2 puffs daily, to see if that improves his shortness of breath. His symptoms will be a little tricky to tease out since he is still healing from sternotomy and has heart failure with reduced ejection fraction. If spiriva does not improve his breathing, would switch to trelegy     We discussed that he has already done the best possible two things he can for his lungs: 1. Stopped smoking, 2. Is very physically active.   - tiotropium (SPIRIVA RESPIMAT) 2.5 MCG/ACT inhaler; Inhale 2 puffs into the lungs daily.    Type 2 diabetes mellitus without complication, without long-term current use of insulin (H)  Patient is aware of his recent new diagnosis of DM. A1c 7. The hope would be that his farxiga will help control blood sugars. We should add metformin eventually but since he has a complex regimen and several new medical diagnoses, I did not recommend adding this today.     Ischemic cardiomyopathy  Patient was wondering if he should continue his amiodarone and I instructed him he is finished with that medicine which was given as a taper for inpatient afib. Continue atorvastatin  - dapagliflozin (FARXIGA) 10 MG TABS tablet; Take 1 tablet (10 mg) by mouth daily.    Chronic systolic congestive heart failure (H)  Needs farxiga refill. Euvolemic on exam today. Continue spironolactone, metoprolol, furosemide.  - dapagliflozin (FARXIGA) 10 MG TABS tablet; Take 1 tablet  "(10 mg) by mouth daily.          No follow-ups on file.    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    Smoked on and off for 25 years. Would finish a pack every 4-5 days. Had periods of time when he would quit.   Quit smoking this spring (2024)  No history of lung infections or pneumonia or bronchitis   Gets short of breath going up a steep hill. Has noticed this for maybe 6 months.   Has had inhalers in the past. Has used it periodically.   Tries not to use it too much.     When he got a steroid it really opened up his breahting  For two weeks he felt GREAT from the steroids.       Review of Systems  Constitutional, HEENT, cardiovascular, pulmonary, gi and gu systems are negative, except as otherwise noted.      Objective    /87 (BP Location: Right arm, Patient Position: Sitting, Cuff Size: Adult Regular)   Pulse 69   Temp 98  F (36.7  C)   Resp 20   Ht 1.85 m (6' 0.84\")   Wt 80.8 kg (178 lb 1.9 oz)   SpO2 96%   BMI 23.61 kg/m    Body mass index is 23.61 kg/m .    Physical Exam   GENERAL: alert and no distress  NECK: no adenopathy, no asymmetry, masses, or scars  RESP: lungs clear to auscultation - no rales, rhonchi or wheezes other than slight end expiratory wheeze at upper left lobe  CV: regular rate and rhythm, normal S1 S2, no S3 or S4, no murmur, click or rub, no peripheral edema, there is a healing sternotomy wound  MS: no gross musculoskeletal defects noted, no edema          Signed Electronically by: Cleo Jack MD        I spent a total of 37 minutes on the day of the visit.   Time spent by me doing chart review, history and exam, documentation and further activities per the note  "

## 2024-09-06 ENCOUNTER — OFFICE VISIT (OUTPATIENT)
Dept: CARDIOLOGY | Facility: CLINIC | Age: 59
End: 2024-09-06
Payer: COMMERCIAL

## 2024-09-06 VITALS
SYSTOLIC BLOOD PRESSURE: 130 MMHG | BODY MASS INDEX: 23.31 KG/M2 | RESPIRATION RATE: 20 BRPM | OXYGEN SATURATION: 96 % | DIASTOLIC BLOOD PRESSURE: 74 MMHG | WEIGHT: 175.9 LBS | HEART RATE: 76 BPM

## 2024-09-06 DIAGNOSIS — I97.89 POSTOPERATIVE ATRIAL FIBRILLATION (H): Primary | ICD-10-CM

## 2024-09-06 DIAGNOSIS — I48.91 POSTOPERATIVE ATRIAL FIBRILLATION (H): Primary | ICD-10-CM

## 2024-09-06 PROCEDURE — G2211 COMPLEX E/M VISIT ADD ON: HCPCS | Performed by: NURSE PRACTITIONER

## 2024-09-06 PROCEDURE — 99204 OFFICE O/P NEW MOD 45 MIN: CPT | Performed by: NURSE PRACTITIONER

## 2024-09-06 NOTE — LETTER
9/6/2024    Amelia He DO  [address] [place] [address]    RE: Gonsalo Blakely       Dear Colleague,     I had the pleasure of seeing Gonsalo Blakely in the [facility].       [facility]  Cardiac Electrophysiology  [address]  [place] [address]   Office: [phone]  Fax: [phone]     HEART CARE ELECTROPHYSIOLOGY NOTE    Primary care: Amelia He MD  Primary cardiologist: Lorna Samaniego MD     REASON FOR VISIT: postoperative atrial fibrillation      Assessment/Recommendations     Postoperative atrial fibrillation: Multiple risk factors for recurrent AF including CAD, HTN, HF, diabetes and COPD. Off amiodarone for 2 weeks. We discussed the pathophysiology and progression of atrial fibrillation, management including stroke risk reduction, rate control, antiarrhythmic drug therapy, and consideration of catheter ablation. We also discussed long term management of atrial fibrillation includes sleep apnea diagnosis and management, avoiding heavy alcohol consumption, and management of concurrent hypertension and coronary artery disease as appropriate    [id number]-LCAw score of 4 for CAD, HF, HTN, diabetes.  Status post AtriClip 7/15/2024. Defer imaging pending Zio as he may not remain on chronic OAC    Plan:  Continue warfarin as ordered for stroke prophylaxis  Continue metoprolol 37.5 mg twice a day  14-day ZIO  Follow-up with Dr. Samaniego 9/30/2024     History of Present Illness/Subjective    Gonsalo Blakely is a 59 year old male who is seen today for evaluation of postoperative atrial fibrillation. He has a past medical history significant for RBBB, CAD status post 4v CABG and left atrial appendage ligation 7/15/2024, ischemic cardiomyopathy, HTN, type 2 diabetes, COPD.  He was hospitalized in June with progressive dyspnea on exertion eventually associated with pedal edema, documented in sinus rhythm.  He was ultimately diagnosed with  ischemic cardiomyopathy, diuresed, and underwent 4v CABG and left atrial appendage ligation via AtriClip 7/15/2024. He developed atrial fibrillation with controlled ventricular response postop day 2, converted to sinus rhythm with IV amiodarone, and discharged on oral amiodarone.  He recalls perhaps an irregular heart beating with this, which has not recurred.  He has been off amiodarone for about 2 weeks.  His stamina is gradually getting better. He denies chest discomfort, palpitations, shortness of breath, lightheadedness/dizziness, pedal edema, or syncope.     Data Review     EKG         Personally reviewed.     TTE 7/19/2024  1. The left ventricle is normal in size. Left ventricular systolic performance  is moderately reduced. The ejection fraction is estimated to be 35%.  2. There is moderate global reduction in left ventricular systolic  performance.  3. There is mild concentric increase in left ventricular wall thickness.  4. There is mild aortic valve sclerosis without significant stenosis.  5. Normal right ventricular sized with mildly reduced right ventricular  systolic performance.  6. There is mild left atrial enlargement. There is mild right atrial  enlargement.  7. There is a very small posterior pericardial effusion.     When compared to the prior real-time echocardiogram dated 17 June 2024, a  small posterior pericardial effusion is now appreciated. The findings are  otherwise felt to be fairly similar on both studies.      I have reviewed and updated the patient's past medical history, allergy list and medication list.          Physical Examination Review of Systems   BMI= Body mass index is 23.31 kg/m .    Wt Readings from Last 3 Encounters:   09/06/24 79.8 kg (175 lb 14.4 oz)   09/04/24 80.8 kg (178 lb 1.9 oz)   08/13/24 78 kg (172 lb)       Vitals: /74 (BP Location: Right arm, Patient Position: Sitting, Cuff Size: Adult Regular)   Pulse 76   Resp 20   Wt 79.8 kg (175 lb 14.4 oz)   SpO2  96%   BMI 23.31 kg/m    General   Appearance:   Alert and oriented, in no acute distress.    HEENT:  Normocephalic and atraumatic. Conjunctiva and sclera are clear. Moist oral mucosa.    Neck: No JVP, carotid bruit or obvious thyromegaly.   Lungs:   Respirations unlabored. Clear bilaterally with no rales, rhonchi, or wheezes.     Cardiovascular:   Rhythm is regular. S1 and S2 are normal. No significant murmur is present. Lower extremities demonstrate no significant edema. Posterior tibial pulses are intact bilaterally.   Extremities: No cyanosis or clubbing   Skin: Skin is warm, dry, and otherwise intact.   Neurologic: Gait not assessed. Mood and affect appropriate.                                                Medical History  Surgical History Family History Social History   Past Medical History:   Diagnosis Date     Asthma      CAD (coronary artery disease)      Chronic obstructive pulmonary disease (H)      Dyslipidemia      Heart failure with reduced ejection fraction, NYHA class I (H)      HTN (hypertension)      Ischemic cardiomyopathy     Past Surgical History:   Procedure Laterality Date     CORONARY ARTERY BYPASS GRAFT, WITH ENDOSCOPIC VESSEL PROCUREMENT N/A 7/15/2024    Procedure: CORONARY ARTERY BYPASS GRAFT TIMES FOUR, INTERNAL MAMMARY ARTERY HARVEST,  LEFT LEG ENDOSCOPIC VESSEL PROCUREMENT , EPIAORTIC ULTRASOUND,;  Surgeon: Shannan Virk MD;  Location: [facility] Main OR     CV CORONARY ANGIOGRAM N/A 06/18/2024    Procedure: CV CORONARY ANGIOGRAM;  Surgeon: Roman Florence MD;  Location: [facility] LAB CV     CV INTRA AORTIC BALLOON N/A 7/15/2024    Procedure: Intra aortic Balloon Pump Insertion;  Surgeon: Valerio Virk MD;  Location: [facility] LAB CV     CV LEFT HEART CATH N/A 06/18/2024    Procedure: Left Heart Catheterization;  Surgeon: Roman Florence MD;  Location: [facility] LAB CV     OR LIGATION, LEFT ATRIAL APPENDAGE N/A 7/15/2024    Procedure: LIGATION, LEFT ATRIAL  APPENDAGE, OPEN;  Surgeon: Shannan Virk MD;  [place] [facility]     SHOULDER SURGERY Right      TRANSESOPHAGEAL ECHOCARDIOGRAM INTRAOPERATIVE  7/15/2024    Procedure: ANESTHEIA TRANSESOPHAGEAL ECHOCARDIOGRAM.;  Surgeon: Shannan Virk MD;  [place] [facility] [place]    No family history on file. Social History     Tobacco Use     Smoking status: Former     Current packs/day: 0.00     Types: Cigarettes     Quit date: 2024     Years since quittin.2     Passive exposure: Past     Smokeless tobacco: Former     Types: Chew     Quit date:      Tobacco comments:     quit recently   Substance Use Topics     Alcohol use: Yes     Comment: 2 beers per month     Drug use: Never          Medications  Allergies   Current Outpatient Medications   Medication Sig Dispense Refill     acetaminophen (TYLENOL) 325 MG tablet Take 2 tablets (650 mg) by mouth every 4 hours as needed for other (For optimal non-opioid multimodal pain management to improve pain control.) 60 tablet 0     albuterol (PROAIR HFA/PROVENTIL HFA/VENTOLIN HFA) 108 (90 Base) MCG/ACT inhaler Inhale 1-2 puffs into the lungs every 6 hours as needed for shortness of breath, wheezing or cough 18 g 1     atorvastatin (LIPITOR) 80 MG tablet Take 1 tablet (80 mg) by mouth daily 90 tablet 3     dapagliflozin (FARXIGA) 10 MG TABS tablet Take 1 tablet (10 mg) by mouth daily. 90 tablet 0     furosemide (LASIX) 40 MG tablet Take 1 tablet (40 mg) by mouth daily 60 tablet 0     Metoprolol Tartrate 37.5 MG TABS Take 37.5 mg by mouth 2 times daily. 180 tablet 0     spironolactone (ALDACTONE) 25 MG tablet Take 1 tablet (25 mg) by mouth daily 90 tablet 0     warfarin ANTICOAGULANT (COUMADIN) 2.5 MG tablet Take 2-2.5 tablets (5-6.25 mg) by mouth daily Adjust dose based on INR results as directed. 200 tablet 1     amiodarone (PACERONE) 200 MG tablet Take 2 tablets (400 mg) by mouth 2 times daily for 4 days, THEN 1 tablet (200 mg) 2 times daily for 2 days,  "THEN 1 tablet (200 mg) daily for 21 days. 41 tablet 0     Lidocaine (LIDOCARE) 4 % Patch Place 1-2 patches onto the skin every 24 hours To prevent lidocaine toxicity, patient should be patch free for 12 hrs daily. (Patient not taking: Reported on 8/13/2024) 6 patch 0     oxyCODONE (ROXICODONE) 5 MG tablet Take 0.5-1 tablets (2.5-5 mg) by mouth every 6 hours as needed for moderate to severe pain (Patient not taking: Reported on 9/4/2024) 10 tablet 0     polyethylene glycol (MIRALAX) 17 GM/Dose powder Take 17 g by mouth daily (Patient not taking: Reported on 8/13/2024) 510 g 0     tiotropium (SPIRIVA RESPIMAT) 2.5 MCG/ACT inhaler Inhale 2 puffs into the lungs daily. (Patient not taking: Reported on 9/6/2024) 4 g 2    Allergies   Allergen Reactions     Ace Inhibitors Cough         Lab Results    Chemistry/lipid CBC Cardiac Enzymes/BNP/TSH/INR   Lab Results   Component Value Date    BUN 24.2 (H) 07/20/2024     07/20/2024    CO2 34 (H) 07/20/2024     No results found for: \"CREATININE\"    Lab Results   Component Value Date    CHOL 217 (H) 06/17/2024    HDL 72 06/17/2024     (H) 06/17/2024      Lab Results   Component Value Date    WBC 12.5 (H) 07/16/2024    HGB 15.0 08/16/2024    HCT 38.6 (L) 07/16/2024    MCV 89 07/16/2024     (L) 07/19/2024    Lab Results   Component Value Date    TROPONINI <0.01 01/12/2019    INR 2.4 (A) 08/13/2024        35 minutes spent reviewing prior records (including documentation, laboratory studies, cardiac testing/imaging), history and physical exam, planning, and subsequent documentation.     The longitudinal plan of care for the diagnosis(es)/condition(s) as documented were addressed during this visit. Due to the added complexity in care, I will continue to support Gonsalo in the subsequent management and with ongoing continuity of care.      This note has been dictated using voice recognition software. Any grammatical, typographical, or context distortions are " unintentional and inherent to the software.    Amelia Chung CNP  Clinical Cardiac Electrophysiology  [facility]  Clinic and [phone]  Fax: [phone]  Electrophysiology Nurses: [phone]                                     Thank you for allowing me to participate in the care of your patient.      Sincerely,     ASHLEY WALLER CNP     [facility]  cc:   Amelia He DO  [address] [place] [address]

## 2024-09-06 NOTE — PROGRESS NOTES
[facility]  Cardiac Electrophysiology  [address]  [place] [address]   Office: [phone]  Fax: [phone]     HEART CARE ELECTROPHYSIOLOGY NOTE    Primary care: Amelia He MD  Primary cardiologist: Lorna Samaniego MD     REASON FOR VISIT: postoperative atrial fibrillation      Assessment/Recommendations     Postoperative atrial fibrillation: Multiple risk factors for recurrent AF including CAD, HTN, HF, diabetes and COPD. Off amiodarone for 2 weeks. We discussed the pathophysiology and progression of atrial fibrillation, management including stroke risk reduction, rate control, antiarrhythmic drug therapy, and consideration of catheter ablation. We also discussed long term management of atrial fibrillation includes sleep apnea diagnosis and management, avoiding heavy alcohol consumption, and management of concurrent hypertension and coronary artery disease as appropriate    [id number] score of 4 for CAD, HF, HTN, diabetes.  Status post AtriClip 7/15/2024. Defer imaging pending Zio as he may not remain on chronic OAC    Plan:  Continue warfarin as ordered for stroke prophylaxis  Continue metoprolol 37.5 mg twice a day  14-day ZIO  Follow-up with Dr. Samaniego 9/30/2024     History of Present Illness/Subjective    Gonsalo Blakely is a 59 year old male who is seen today for evaluation of postoperative atrial fibrillation. He has a past medical history significant for RBBB, CAD status post 4v CABG and left atrial appendage ligation 7/15/2024, ischemic cardiomyopathy, HTN, type 2 diabetes, COPD.  He was hospitalized in June with progressive dyspnea on exertion eventually associated with pedal edema, documented in sinus rhythm.  He was ultimately diagnosed with ischemic cardiomyopathy, diuresed, and underwent 4v CABG and left atrial appendage ligation via AtriClip 7/15/2024. He developed atrial fibrillation with controlled ventricular response postop day 2, converted to  sinus rhythm with IV amiodarone, and discharged on oral amiodarone.  He recalls perhaps an irregular heart beating with this, which has not recurred.  He has been off amiodarone for about 2 weeks.  His stamina is gradually getting better. He denies chest discomfort, palpitations, shortness of breath, lightheadedness/dizziness, pedal edema, or syncope.     Data Review     EKG         Personally reviewed.     TTE 7/19/2024  1. The left ventricle is normal in size. Left ventricular systolic performance  is moderately reduced. The ejection fraction is estimated to be 35%.  2. There is moderate global reduction in left ventricular systolic  performance.  3. There is mild concentric increase in left ventricular wall thickness.  4. There is mild aortic valve sclerosis without significant stenosis.  5. Normal right ventricular sized with mildly reduced right ventricular  systolic performance.  6. There is mild left atrial enlargement. There is mild right atrial  enlargement.  7. There is a very small posterior pericardial effusion.     When compared to the prior real-time echocardiogram dated 17 June 2024, a  small posterior pericardial effusion is now appreciated. The findings are  otherwise felt to be fairly similar on both studies.      I have reviewed and updated the patient's past medical history, allergy list and medication list.          Physical Examination Review of Systems   BMI= Body mass index is 23.31 kg/m .    Wt Readings from Last 3 Encounters:   09/06/24 79.8 kg (175 lb 14.4 oz)   09/04/24 80.8 kg (178 lb 1.9 oz)   08/13/24 78 kg (172 lb)       Vitals: /74 (BP Location: Right arm, Patient Position: Sitting, Cuff Size: Adult Regular)   Pulse 76   Resp 20   Wt 79.8 kg (175 lb 14.4 oz)   SpO2 96%   BMI 23.31 kg/m    General   Appearance:   Alert and oriented, in no acute distress.    HEENT:  Normocephalic and atraumatic. Conjunctiva and sclera are clear. Moist oral mucosa.    Neck: No JVP, carotid  bruit or obvious thyromegaly.   Lungs:   Respirations unlabored. Clear bilaterally with no rales, rhonchi, or wheezes.     Cardiovascular:   Rhythm is regular. S1 and S2 are normal. No significant murmur is present. Lower extremities demonstrate no significant edema. Posterior tibial pulses are intact bilaterally.   Extremities: No cyanosis or clubbing   Skin: Skin is warm, dry, and otherwise intact.   Neurologic: Gait not assessed. Mood and affect appropriate.                                                Medical History  Surgical History Family History Social History   Past Medical History:   Diagnosis Date    Asthma     CAD (coronary artery disease)     Chronic obstructive pulmonary disease (H)     Dyslipidemia     Heart failure with reduced ejection fraction, NYHA class I (H)     HTN (hypertension)     Ischemic cardiomyopathy     Past Surgical History:   Procedure Laterality Date    CORONARY ARTERY BYPASS GRAFT, WITH ENDOSCOPIC VESSEL PROCUREMENT N/A 7/15/2024    Procedure: CORONARY ARTERY BYPASS GRAFT TIMES FOUR, INTERNAL MAMMARY ARTERY HARVEST,  LEFT LEG ENDOSCOPIC VESSEL PROCUREMENT , EPIAORTIC ULTRASOUND,;  Surgeon: Shannan Virk MD;  Location: [facility] OR    CV CORONARY ANGIOGRAM N/A 06/18/2024    Procedure: CV CORONARY ANGIOGRAM;  Surgeon: Roman Florence MD;  Location: [facility] CATH LAB CV    CV INTRA AORTIC BALLOON N/A 7/15/2024    Procedure: Intra aortic Balloon Pump Insertion;  Surgeon: Valerio Virk MD;  Location: [facility] CATH LAB CV    CV LEFT HEART CATH N/A 06/18/2024    Procedure: Left Heart Catheterization;  Surgeon: Roman Florence MD;  Location: [facility] LAB CV    OR LIGATION, LEFT ATRIAL APPENDAGE N/A 7/15/2024    Procedure: LIGATION, LEFT ATRIAL APPENDAGE, OPEN;  Surgeon: Shannan Virk MD;  Location: [facility] OR    SHOULDER SURGERY Right     TRANSESOPHAGEAL ECHOCARDIOGRAM INTRAOPERATIVE  7/15/2024    Procedure: ANESTHEIA TRANSESOPHAGEAL ECHOCARDIOGRAM.;   Surgeon: Shannan Virk MD;  Location: [facility] OR    No family history on file. Social History     Tobacco Use    Smoking status: Former     Current packs/day: 0.00     Types: Cigarettes     Quit date: 2024     Years since quittin.2     Passive exposure: Past    Smokeless tobacco: Former     Types: Chew     Quit date:     Tobacco comments:     quit recently   Substance Use Topics    Alcohol use: Yes     Comment: 2 beers per month    Drug use: Never          Medications  Allergies   Current Outpatient Medications   Medication Sig Dispense Refill    acetaminophen (TYLENOL) 325 MG tablet Take 2 tablets (650 mg) by mouth every 4 hours as needed for other (For optimal non-opioid multimodal pain management to improve pain control.) 60 tablet 0    albuterol (PROAIR HFA/PROVENTIL HFA/VENTOLIN HFA) 108 (90 Base) MCG/ACT inhaler Inhale 1-2 puffs into the lungs every 6 hours as needed for shortness of breath, wheezing or cough 18 g 1    atorvastatin (LIPITOR) 80 MG tablet Take 1 tablet (80 mg) by mouth daily 90 tablet 3    dapagliflozin (FARXIGA) 10 MG TABS tablet Take 1 tablet (10 mg) by mouth daily. 90 tablet 0    furosemide (LASIX) 40 MG tablet Take 1 tablet (40 mg) by mouth daily 60 tablet 0    Metoprolol Tartrate 37.5 MG TABS Take 37.5 mg by mouth 2 times daily. 180 tablet 0    spironolactone (ALDACTONE) 25 MG tablet Take 1 tablet (25 mg) by mouth daily 90 tablet 0    warfarin ANTICOAGULANT (COUMADIN) 2.5 MG tablet Take 2-2.5 tablets (5-6.25 mg) by mouth daily Adjust dose based on INR results as directed. 200 tablet 1    amiodarone (PACERONE) 200 MG tablet Take 2 tablets (400 mg) by mouth 2 times daily for 4 days, THEN 1 tablet (200 mg) 2 times daily for 2 days, THEN 1 tablet (200 mg) daily for 21 days. 41 tablet 0    Lidocaine (LIDOCARE) 4 % Patch Place 1-2 patches onto the skin every 24 hours To prevent lidocaine toxicity, patient should be patch free for 12 hrs daily. (Patient not taking:  "Reported on 2024) 6 patch 0    oxyCODONE (ROXICODONE) 5 MG tablet Take 0.5-1 tablets (2.5-5 mg) by mouth every 6 hours as needed for moderate to severe pain (Patient not taking: Reported on 2024) 10 tablet 0    polyethylene glycol (MIRALAX) 17 GM/Dose powder Take 17 g by mouth daily (Patient not taking: Reported on 2024) 510 g 0    tiotropium (SPIRIVA RESPIMAT) 2.5 MCG/ACT inhaler Inhale 2 puffs into the lungs daily. (Patient not taking: Reported on 2024) 4 g 2    Allergies   Allergen Reactions    Ace Inhibitors Cough         Lab Results    Chemistry/lipid CBC Cardiac Enzymes/BNP/TSH/INR   Lab Results   Component Value Date    BUN 24.2 (H) 2024     2024    CO2 34 (H) 2024     No results found for: \"CREATININE\"    Lab Results   Component Value Date    CHOL 217 (H) 2024    HDL 72 2024     (H) 2024      Lab Results   Component Value Date    WBC 12.5 (H) 2024    HGB 15.0 2024    HCT 38.6 (L) 2024    MCV 89 2024     (L) 2024    Lab Results   Component Value Date    TROPONINI <0.01 2019    INR 2.4 (A) 2024        35 minutes spent reviewing prior records (including documentation, laboratory studies, cardiac testing/imaging), history and physical exam, planning, and subsequent documentation.     The longitudinal plan of care for the diagnosis(es)/condition(s) as documented were addressed during this visit. Due to the added complexity in care, I will continue to support Gonsalo in the subsequent management and with ongoing continuity of care.      This note has been dictated using voice recognition software. Any grammatical, typographical, or context distortions are unintentional and inherent to the software.    Amelia Chung CNP  Clinical Cardiac Electrophysiology  [facility]  Clinic and [phone]  Fax: [phone]  Electrophysiology Nurses: [phone]                     "

## 2024-09-09 ENCOUNTER — DOCUMENTATION ONLY (OUTPATIENT)
Dept: ANTICOAGULATION | Facility: CLINIC | Age: 59
End: 2024-09-09
Payer: COMMERCIAL

## 2024-09-09 NOTE — PROGRESS NOTES
ANTICOAGULATION     Gonsalo Blakely is overdue for an INR check.     Reminder letter sent    Karolina Rodriguez RN

## 2024-09-09 NOTE — LETTER
[facility]  [address] [facility] [address]  Phone: [phone]  Fax: [phone]   September 9, 2024        Gonsalo Blakely  [address] [facility] [place] [address]            Dear Gonsalo,    You are currently under the care of [facility] for your warfarin (Coumadin , Jantoven ) therapy.  We are contacting you because our records show you were due for an INR on 8/20/24.    There are potentially serious risks when taking warfarin without careful monitoring and we want to make sure you are safely managed.  Routine lab monitoring is required for warfarin refills.     Please call [phone] as soon as possible to schedule a lab appointment. If it is difficult for you to get to lab, please call us to discuss options.  If there has been a change in your care or other concerns, please let us know so we can help and/or update our records.         Sincerely,       [facility]

## 2024-09-12 ENCOUNTER — TRANSFERRED RECORDS (OUTPATIENT)
Dept: MULTI SPECIALTY CLINIC | Facility: CLINIC | Age: 59
End: 2024-09-12

## 2024-09-12 LAB — RETINOPATHY: NORMAL

## 2024-09-13 ENCOUNTER — ORDERS ONLY (AUTO-RELEASED) (OUTPATIENT)
Dept: CARDIOLOGY | Facility: CLINIC | Age: 59
End: 2024-09-13
Payer: COMMERCIAL

## 2024-09-13 DIAGNOSIS — I48.91 POSTOPERATIVE ATRIAL FIBRILLATION (H): ICD-10-CM

## 2024-09-13 DIAGNOSIS — I97.89 POSTOPERATIVE ATRIAL FIBRILLATION (H): ICD-10-CM

## 2024-09-23 DIAGNOSIS — I50.21 ACUTE SYSTOLIC CONGESTIVE HEART FAILURE (H): ICD-10-CM

## 2024-09-23 DIAGNOSIS — J45.901 EXACERBATION OF ASTHMA, UNSPECIFIED ASTHMA SEVERITY, UNSPECIFIED WHETHER PERSISTENT: ICD-10-CM

## 2024-09-23 DIAGNOSIS — R06.02 SOB (SHORTNESS OF BREATH): ICD-10-CM

## 2024-09-23 DIAGNOSIS — I50.20 HEART FAILURE WITH REDUCED EJECTION FRACTION, NYHA CLASS II (H): ICD-10-CM

## 2024-09-23 DIAGNOSIS — I50.20 SYSTOLIC CONGESTIVE HEART FAILURE, UNSPECIFIED HF CHRONICITY (H): ICD-10-CM

## 2024-09-23 DIAGNOSIS — Z95.1 S/P CABG (CORONARY ARTERY BYPASS GRAFT): ICD-10-CM

## 2024-09-23 RX ORDER — FUROSEMIDE 40 MG
40 TABLET ORAL DAILY
Qty: 60 TABLET | Refills: 0 | Status: SHIPPED | OUTPATIENT
Start: 2024-09-23 | End: 2024-09-27

## 2024-09-23 RX ORDER — ATORVASTATIN CALCIUM 80 MG/1
80 TABLET, FILM COATED ORAL DAILY
Qty: 90 TABLET | Refills: 0 | Status: SHIPPED | OUTPATIENT
Start: 2024-09-23 | End: 2024-09-27

## 2024-09-23 RX ORDER — SPIRONOLACTONE 25 MG/1
25 TABLET ORAL DAILY
Qty: 90 TABLET | Refills: 0 | Status: SHIPPED | OUTPATIENT
Start: 2024-09-23 | End: 2024-09-27

## 2024-09-23 RX ORDER — METOPROLOL TARTRATE 37.5 MG/1
37.5 TABLET, FILM COATED ORAL 2 TIMES DAILY
Qty: 180 TABLET | Refills: 0 | Status: SHIPPED | OUTPATIENT
Start: 2024-09-23 | End: 2024-09-30

## 2024-09-26 NOTE — PROGRESS NOTES
Assessment & Plan     Postoperative atrial fibrillation (H)  On warfarin therapy  S/P CABG (coronary artery bypass graft)  After CABG, had episodes of post operative atrial fibrillation. Was discharged on amiodarone taper. Saw EP 9/6/24. Recommendations to continue warfarin for stroke prophylaxis, metoprolol 37.5mg, 14 day ZIO patch. Plans to start ZIO patch Monday per patient.  Advised patient to continue following with anticoagulation clinic regarding management of warfarin.  INR obtained today.  Recommended continued follow-up as scheduled with cardiology.  - INR point of care (finger stick)  - acetaminophen (TYLENOL) 325 MG tablet; Take 2 tablets (650 mg) by mouth every 4 hours as needed for other (For optimal non-opioid multimodal pain management to improve pain control.).  - atorvastatin (LIPITOR) 80 MG tablet; Take 1 tablet (80 mg) by mouth daily.  - furosemide (LASIX) 40 MG tablet; Take 1 tablet (40 mg) by mouth daily.    Ischemic cardiomyopathy  Acute systolic congestive heart failure (H)  Patient with history of ischemic cardiomyopathy, systolic heart failure.  Currently managed with metoprolol, spironolactone, furosemide, dapagliflozin. Patient had recently run out of medications for the past 4 to 5 days, and is in need of refill today.  Advised to continue current goal-directed medical therapy for heart failure.  Patient with scheduled follow-up visit with cardiology 9/30/2024.  With reinitiation of medications, recommended close follow-up for repeat labs.  On exam, appears euvolemic.  - dapagliflozin (FARXIGA) 10 MG TABS tablet; Take 1 tablet (10 mg) by mouth daily.  - furosemide (LASIX) 40 MG tablet; Take 1 tablet (40 mg) by mouth daily.  - spironolactone (ALDACTONE) 25 MG tablet; Take 1 tablet (25 mg) by mouth daily.  -Follow-up in 2 to 4 weeks at which time recommend BMP      SOB (shortness of breath)  COPD vs. Asthma   Last saw colleague, 9/4/24.  At that time, it was noted that PFTs are not  clinically completely reliable given patient's recent CABG.  Due to the shortness of breath which improved with steroids and bronchodilators, Spiriva was initiated.  Patient notes improvement in symptoms since initiation of LAMA.  Advised to continue cessation of smoking as well as continued physical activity.     Diabetes mellitus, type 2 (H)  At previous visit, noted that patient had elevated A1c to 7.  Patient currently on Farxiga for heart failure.  A1c today of 6.6.  With diagnosis of diabetes, recommended initiation of metformin.  Will initiate up titration of metformin.  In addition, obtain microalbumin today in clinic, and will send for eye referral.    - Albumin Random Urine Quantitative with Creat Ratio  - TSH WITH FREE T4 REFLEX  - HEMOGLOBIN A1C  - metFORMIN (GLUCOPHAGE XR) 500 MG 24 hr tablet; Take 1 tablet (500 mg) by mouth daily for 7 days, THEN 2 tablets (1,000 mg) daily for 7 days, THEN 3 tablets (1,500 mg) daily for 7 days, THEN 4 tablets (2,000 mg) daily.    Screening for HIV (human immunodeficiency virus)  Due for screening today.   - HIV Screening    Need for hepatitis C screening test  Due for screening today.   - Hepatitis C Screen Reflex to HCV RNA Quant and Genotype    Hyperlipidemia LDL goal <100  In need of refill today.   - atorvastatin (LIPITOR) 80 MG tablet; Take 1 tablet (80 mg) by mouth daily.    Benign essential hypertension  Patient noted to be hypertensive in clinic today with BP of 164/98.  After further review, patient has been out of medications for 3 to 4 days.  Advised to continue to monitor blood pressure at home.  Recommend close follow-up after reinitiation of medications.  At follow-up, recommend repeat BMP.    This note was created with the aid of dictation software. Any mistakes are unintentional.       Return in about 2 weeks (around 10/11/2024) for Follow up.    Marty Brush is a 59 year old, presenting for the following health issues:  RECHECK (INR)         "9/27/2024     2:44 PM   Additional Questions   Roomed by chelly ayers   Accompanied by self         9/27/2024    Information    services provided? No      HPI     Patient here today for follow-up on multiple chronic conditions as well as clarification of medication refills.  Has been out of medications for 4 to 5 days.  Overall, feeling well.  Feels that breathing has been better since initiation of inhaler.  Asking about medications today, and whether he needs to continue those medications.  Discussed need for continuation of medications of which are goal-directed medical therapy for heart failure.  Patient recently evaluated by EP team.  Has planned cardiology follow-up next week.  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      Review of Systems  Constitutional, neuro, ENT, endocrine, pulmonary, cardiac, gastrointestinal, genitourinary, musculoskeletal, integument and psychiatric systems are negative, except as otherwise noted.      Objective    BP (!) 164/98 (BP Location: Right arm, Patient Position: Sitting, Cuff Size: Adult Regular)   Pulse 88   Temp 98.2  F (36.8  C)   Resp 20   Ht 1.825 m (5' 11.85\")   Wt 82.1 kg (181 lb)   SpO2 96%   BMI 24.65 kg/m    Body mass index is 24.65 kg/m .  Physical Exam   GENERAL: healthy, alert and no distress  RESP: speaking in full sentences, normal work of breathing   CV: extremities well perfused  ABDOMEN: soft, nontender  MS: no gross musculoskeletal defects noted, no edema  PSYCH: mentation appears normal, affect normal/bright          Signed Electronically by: Amelia He, DO    "

## 2024-09-27 ENCOUNTER — ANTICOAGULATION THERAPY VISIT (OUTPATIENT)
Dept: ANTICOAGULATION | Facility: CLINIC | Age: 59
End: 2024-09-27

## 2024-09-27 ENCOUNTER — OFFICE VISIT (OUTPATIENT)
Dept: FAMILY MEDICINE | Facility: CLINIC | Age: 59
End: 2024-09-27
Payer: COMMERCIAL

## 2024-09-27 VITALS
RESPIRATION RATE: 20 BRPM | HEART RATE: 88 BPM | TEMPERATURE: 98.2 F | HEIGHT: 72 IN | SYSTOLIC BLOOD PRESSURE: 164 MMHG | WEIGHT: 181 LBS | BODY MASS INDEX: 24.52 KG/M2 | OXYGEN SATURATION: 96 % | DIASTOLIC BLOOD PRESSURE: 98 MMHG

## 2024-09-27 DIAGNOSIS — J44.9 CHRONIC OBSTRUCTIVE PULMONARY DISEASE, UNSPECIFIED COPD TYPE (H): ICD-10-CM

## 2024-09-27 DIAGNOSIS — E11.9 TYPE 2 DIABETES MELLITUS WITHOUT COMPLICATION, WITHOUT LONG-TERM CURRENT USE OF INSULIN (H): ICD-10-CM

## 2024-09-27 DIAGNOSIS — Z11.59 NEED FOR HEPATITIS C SCREENING TEST: ICD-10-CM

## 2024-09-27 DIAGNOSIS — I50.21 ACUTE SYSTOLIC CONGESTIVE HEART FAILURE (H): ICD-10-CM

## 2024-09-27 DIAGNOSIS — Z95.1 S/P CABG (CORONARY ARTERY BYPASS GRAFT): ICD-10-CM

## 2024-09-27 DIAGNOSIS — I10 BENIGN ESSENTIAL HYPERTENSION: ICD-10-CM

## 2024-09-27 DIAGNOSIS — E78.5 HYPERLIPIDEMIA LDL GOAL <100: ICD-10-CM

## 2024-09-27 DIAGNOSIS — Z79.01 ON WARFARIN THERAPY: ICD-10-CM

## 2024-09-27 DIAGNOSIS — I97.89 POSTOPERATIVE ATRIAL FIBRILLATION (H): Primary | ICD-10-CM

## 2024-09-27 DIAGNOSIS — R06.02 SOB (SHORTNESS OF BREATH): ICD-10-CM

## 2024-09-27 DIAGNOSIS — I48.91 POSTOPERATIVE ATRIAL FIBRILLATION (H): Primary | ICD-10-CM

## 2024-09-27 DIAGNOSIS — Z95.1 S/P CABG (CORONARY ARTERY BYPASS GRAFT): Primary | ICD-10-CM

## 2024-09-27 DIAGNOSIS — Z11.4 SCREENING FOR HIV (HUMAN IMMUNODEFICIENCY VIRUS): ICD-10-CM

## 2024-09-27 DIAGNOSIS — I25.5 ISCHEMIC CARDIOMYOPATHY: ICD-10-CM

## 2024-09-27 LAB
EST. AVERAGE GLUCOSE BLD GHB EST-MCNC: 143 MG/DL
HBA1C MFR BLD: 6.6 % (ref 0–5.6)
INR BLD: 1.4 (ref 0.9–1.1)

## 2024-09-27 PROCEDURE — 86803 HEPATITIS C AB TEST: CPT

## 2024-09-27 PROCEDURE — 82043 UR ALBUMIN QUANTITATIVE: CPT

## 2024-09-27 PROCEDURE — 36415 COLL VENOUS BLD VENIPUNCTURE: CPT

## 2024-09-27 PROCEDURE — 83036 HEMOGLOBIN GLYCOSYLATED A1C: CPT

## 2024-09-27 PROCEDURE — 87389 HIV-1 AG W/HIV-1&-2 AB AG IA: CPT

## 2024-09-27 PROCEDURE — 85610 PROTHROMBIN TIME: CPT

## 2024-09-27 RX ORDER — DAPAGLIFLOZIN 10 MG/1
10 TABLET, FILM COATED ORAL DAILY
Qty: 90 TABLET | Refills: 0 | Status: SHIPPED | OUTPATIENT
Start: 2024-09-27

## 2024-09-27 RX ORDER — LIDOCAINE 4 G/G
1-2 PATCH TOPICAL EVERY 24 HOURS
Qty: 6 PATCH | Refills: 0 | Status: CANCELLED | OUTPATIENT
Start: 2024-09-27

## 2024-09-27 RX ORDER — METFORMIN HCL 500 MG
TABLET, EXTENDED RELEASE 24 HR ORAL
Qty: 360 TABLET | Refills: 0 | Status: SHIPPED | OUTPATIENT
Start: 2024-09-27 | End: 2024-12-26

## 2024-09-27 RX ORDER — ATORVASTATIN CALCIUM 80 MG/1
80 TABLET, FILM COATED ORAL DAILY
Qty: 90 TABLET | Refills: 0 | Status: SHIPPED | OUTPATIENT
Start: 2024-09-27 | End: 2024-09-30

## 2024-09-27 RX ORDER — ACETAMINOPHEN 325 MG/1
650 TABLET ORAL EVERY 4 HOURS PRN
Qty: 60 TABLET | Refills: 0 | Status: SHIPPED | OUTPATIENT
Start: 2024-09-27

## 2024-09-27 RX ORDER — SPIRONOLACTONE 25 MG/1
25 TABLET ORAL DAILY
Qty: 90 TABLET | Refills: 0 | Status: SHIPPED | OUTPATIENT
Start: 2024-09-27

## 2024-09-27 RX ORDER — ALBUTEROL SULFATE 90 UG/1
1-2 AEROSOL, METERED RESPIRATORY (INHALATION) EVERY 6 HOURS PRN
Qty: 18 G | Refills: 1 | Status: CANCELLED | OUTPATIENT
Start: 2024-09-27

## 2024-09-27 RX ORDER — FUROSEMIDE 40 MG
40 TABLET ORAL DAILY
Qty: 60 TABLET | Refills: 0 | Status: SHIPPED | OUTPATIENT
Start: 2024-09-27 | End: 2024-09-30

## 2024-09-27 ASSESSMENT — ASTHMA QUESTIONNAIRES
QUESTION_3 LAST FOUR WEEKS HOW OFTEN DID YOUR ASTHMA SYMPTOMS (WHEEZING, COUGHING, SHORTNESS OF BREATH, CHEST TIGHTNESS OR PAIN) WAKE YOU UP AT NIGHT OR EARLIER THAN USUAL IN THE MORNING: ONCE OR TWICE
QUESTION_2 LAST FOUR WEEKS HOW OFTEN HAVE YOU HAD SHORTNESS OF BREATH: ONCE OR TWICE A WEEK
QUESTION_4 LAST FOUR WEEKS HOW OFTEN HAVE YOU USED YOUR RESCUE INHALER OR NEBULIZER MEDICATION (SUCH AS ALBUTEROL): ONCE A WEEK OR LESS
ACT_TOTALSCORE: 21
QUESTION_1 LAST FOUR WEEKS HOW MUCH OF THE TIME DID YOUR ASTHMA KEEP YOU FROM GETTING AS MUCH DONE AT WORK, SCHOOL OR AT HOME: NONE OF THE TIME
ACT_TOTALSCORE: 21
EMERGENCY_ROOM_LAST_YEAR_TOTAL: TWO
HOSPITALIZATION_OVERNIGHT_LAST_YEAR_TOTAL: TWO
QUESTION_5 LAST FOUR WEEKS HOW WOULD YOU RATE YOUR ASTHMA CONTROL: WELL CONTROLLED

## 2024-09-27 NOTE — PROGRESS NOTES
Preceptor Attestation:  Patient's case reviewed and discussed with the resident, Amelia He DO, and I personally evaluated the patient. I agree with written assessment and plan of care.    Supervising Physician:  Frances Aleman MD   [facility]

## 2024-09-28 LAB
CREAT UR-MCNC: 58 MG/DL
HCV AB SERPL QL IA: NONREACTIVE
HIV 1+2 AB+HIV1 P24 AG SERPL QL IA: NONREACTIVE
MICROALBUMIN UR-MCNC: 38.1 MG/L
MICROALBUMIN/CREAT UR: 65.69 MG/G CR (ref 0–17)
TSH SERPL DL<=0.005 MIU/L-ACNC: 1.92 UIU/ML (ref 0.3–4.2)

## 2024-09-30 ENCOUNTER — OFFICE VISIT (OUTPATIENT)
Dept: CARDIOLOGY | Facility: CLINIC | Age: 59
End: 2024-09-30
Payer: COMMERCIAL

## 2024-09-30 VITALS
RESPIRATION RATE: 18 BRPM | OXYGEN SATURATION: 96 % | HEIGHT: 73 IN | WEIGHT: 181 LBS | HEART RATE: 91 BPM | SYSTOLIC BLOOD PRESSURE: 154 MMHG | BODY MASS INDEX: 23.99 KG/M2 | DIASTOLIC BLOOD PRESSURE: 90 MMHG

## 2024-09-30 DIAGNOSIS — I48.91 POSTOPERATIVE ATRIAL FIBRILLATION (H): ICD-10-CM

## 2024-09-30 DIAGNOSIS — I10 PRIMARY HYPERTENSION: ICD-10-CM

## 2024-09-30 DIAGNOSIS — E78.5 DYSLIPIDEMIA, GOAL LDL BELOW 70: ICD-10-CM

## 2024-09-30 DIAGNOSIS — I97.89 POSTOPERATIVE ATRIAL FIBRILLATION (H): ICD-10-CM

## 2024-09-30 DIAGNOSIS — Z95.1 S/P CABG (CORONARY ARTERY BYPASS GRAFT): Primary | ICD-10-CM

## 2024-09-30 DIAGNOSIS — I25.810 CORONARY ARTERY DISEASE INVOLVING CORONARY BYPASS GRAFT OF NATIVE HEART WITHOUT ANGINA PECTORIS: ICD-10-CM

## 2024-09-30 DIAGNOSIS — I25.5 ISCHEMIC CARDIOMYOPATHY: ICD-10-CM

## 2024-09-30 PROCEDURE — 99214 OFFICE O/P EST MOD 30 MIN: CPT | Performed by: INTERNAL MEDICINE

## 2024-09-30 PROCEDURE — G2211 COMPLEX E/M VISIT ADD ON: HCPCS | Performed by: INTERNAL MEDICINE

## 2024-09-30 RX ORDER — FUROSEMIDE 40 MG
40 TABLET ORAL DAILY
Qty: 90 TABLET | Refills: 3 | Status: SHIPPED | OUTPATIENT
Start: 2024-09-30

## 2024-09-30 RX ORDER — LOSARTAN POTASSIUM 25 MG/1
25 TABLET ORAL DAILY
Qty: 90 TABLET | Refills: 3 | Status: SHIPPED | OUTPATIENT
Start: 2024-09-30

## 2024-09-30 RX ORDER — METOPROLOL SUCCINATE 50 MG/1
50 TABLET, EXTENDED RELEASE ORAL DAILY
Qty: 90 TABLET | Refills: 3 | Status: SHIPPED | OUTPATIENT
Start: 2024-09-30

## 2024-09-30 RX ORDER — ATORVASTATIN CALCIUM 80 MG/1
80 TABLET, FILM COATED ORAL DAILY
Qty: 90 TABLET | Refills: 3 | Status: SHIPPED | OUTPATIENT
Start: 2024-09-30

## 2024-09-30 RX ORDER — FUROSEMIDE 40 MG
40 TABLET ORAL DAILY
Qty: 90 TABLET | Refills: 3 | Status: SHIPPED | OUTPATIENT
Start: 2024-09-30 | End: 2024-09-30

## 2024-09-30 NOTE — PROGRESS NOTES
Assessment/Plan:   Ischemic cardiomyopathy, LVEF 30 to 35%, chronic systolic congestive heart failure: The patient has no complaints of shortness of breath, no leg edema.  He is compensated at this time.  No signs of fluid retention.  He has CABG more than 2 months.  Echocardiogram is requested for reevaluate LV systolic function.  Meantime, continue to optimize CHF medications.  Started losartan 25 mg daily, changed metoprolol titrate 25 mg twice a day to metoprolol succinate 50 mg daily.  Continue Lasix 40 mg daily and spironolactone 25 mg daily.  CMP and lipid profile are added to his labs 3 days ago.  Multiple vessel coronary artery disease status post 4V CABG LIMA to LAD, SVG to D1, SVG to ramus, SVG to right RCA on July 15, 2024: No chest pain.  Advised him to start cardiac rehab.  Continue aspirin, metoprolol and atorvastatin.  Echocardiogram to reevaluate LV systolic function as mentioned above.  Postop atrial fibrillation: The patient is in sinus rhythm today.  Start metoprolol succinate 50 mg daily for rate control.  Continue warfarin for chronic anticoagulation.  Zio patch monitor to evaluate atrial fibrillation was requested.  Request CT NING evaluation.  If his Flex V AtriClip on the left atrial appendage on July 15, 2024 closed left atrial appendage well, consider to discontinue anticoagulation.  Benign essential hypertension: His blood pressure is not controlled.  As mentioned above, start metoprolol XL 50 mg daily, losartan 25 mg daily, continue diuretics.  Continue to monitor his blood pressure.  Dyslipidemia: Continue atorvastatin 80 mg at bedtime.  Check lipid profile and liver function today.  Type 2 diabetes, COPD: No change in treatment today.    We will follow-up echo, Zio patch monitor, CT NING and laboratory test the reports, discuss the findings with the patient.    Thank you for the opportunity to be involved in the care of Gonsalo Blakely. If you have any questions, please feel  free to contact me.  I will see the patient again in 6 months and as needed.    Much or all of the text in this note was generated through the use of Dragon Dictate voice-to-text software. Errors in spelling or words which seem out of context are unintentional. Sound alike errors, in particular, may have escaped editing.       History of Present Illness:   It is my pleasure to see Gonsalo Blakely at the [facility] for routine cardiology follow up post CABG.  Gonsalo Blakely is a 59 year old male with a medical history of multiple vessel coronary artery disease status post 4V CABG LIMA to LAD, SVG to D1, SVG to ramus, SVG to right RCA, status post placement of a Flex V AtriClip on the left atrial appendage on July 15, 2024, ischemic cardiomyopathy LVEF 30 to 35%, chronic systolic congestive heart failure, postop atrial fibrillation, benign essential hypertension, dyslipidemia, type 2 diabetes.    The patient had a CABG on July 15, 2024.  He did not start cardiac rehab yet.  He denies chest pain, shortness of breath on exertion, palpitations, dizziness, leg edema.  His blood pressure is high, not well-controlled.  His pulse is in normal range.  He states that he was out of a few medications for several days.  Now he is able to take all his medication regularly.    Past Medical History:     Patient Active Problem List   Diagnosis    Acute decompensated heart failure (H)    SOB (shortness of breath)    Ischemic cardiomyopathy    Coronary artery disease involving native coronary artery of native heart without angina pectoris    Coronary artery disease involving native coronary artery of native heart, unspecified whether angina present    S/P CABG (coronary artery bypass graft)    Paroxysmal supraventricular tachycardia (H)    Diabetes mellitus, type 2 (H)       Past Surgical History:     Past Surgical History:   Procedure Laterality Date    CORONARY ARTERY BYPASS GRAFT, WITH ENDOSCOPIC VESSEL  PROCUREMENT N/A 7/15/2024    Procedure: CORONARY ARTERY BYPASS GRAFT TIMES FOUR, INTERNAL MAMMARY ARTERY HARVEST,  LEFT LEG ENDOSCOPIC VESSEL PROCUREMENT , EPIAORTIC ULTRASOUND,;  Surgeon: Shannan Virk MD;  Location: [facility] OR    CV CORONARY ANGIOGRAM N/A 06/18/2024    Procedure: CV CORONARY ANGIOGRAM;  Surgeon: Roman Florence MD;  Location: [facility] CATH LAB CV    CV INTRA AORTIC BALLOON N/A 7/15/2024    Procedure: Intra aortic Balloon Pump Insertion;  Surgeon: Valerio Virk MD;  Location: [facility] CATH LAB CV    CV LEFT HEART CATH N/A 06/18/2024    Procedure: Left Heart Catheterization;  Surgeon: Roman Florence MD;  Location: [facility] CV    OR LIGATION, LEFT ATRIAL APPENDAGE N/A 7/15/2024    Procedure: LIGATION, LEFT ATRIAL APPENDAGE, OPEN;  Surgeon: Shannan Virk MD;  Location: [facility] OR    SHOULDER SURGERY Right     TRANSESOPHAGEAL ECHOCARDIOGRAM INTRAOPERATIVE  7/15/2024    Procedure: ANESTHEIA TRANSESOPHAGEAL ECHOCARDIOGRAM.;  Surgeon: Shannan Virk MD;  Location: [facility]       Family History:   No family history on file.     Social History:    reports that he quit smoking about 3 months ago. His smoking use included cigarettes. He has been exposed to tobacco smoke. He quit smokeless tobacco use about 44 years ago.  His smokeless tobacco use included chew. He reports current alcohol use. He reports that he does not use drugs.    Review of Systems:   12 systems are reviewed negative except for in HPI.    Meds:     Current Outpatient Medications:     acetaminophen (TYLENOL) 325 MG tablet, Take 2 tablets (650 mg) by mouth every 4 hours as needed for other (For optimal non-opioid multimodal pain management to improve pain control.)., Disp: 60 tablet, Rfl: 0    albuterol (PROAIR HFA/PROVENTIL HFA/VENTOLIN HFA) 108 (90 Base) MCG/ACT inhaler, Inhale 1-2 puffs into the lungs every 6 hours as needed for shortness of breath, wheezing or cough, Disp: 18 g,  "Rfl: 1    atorvastatin (LIPITOR) 80 MG tablet, Take 1 tablet (80 mg) by mouth daily., Disp: 90 tablet, Rfl: 3    dapagliflozin (FARXIGA) 10 MG TABS tablet, Take 1 tablet (10 mg) by mouth daily., Disp: 90 tablet, Rfl: 0    furosemide (LASIX) 40 MG tablet, Take 1 tablet (40 mg) by mouth daily., Disp: 90 tablet, Rfl: 3    losartan (COZAAR) 25 MG tablet, Take 1 tablet (25 mg) by mouth daily., Disp: 90 tablet, Rfl: 3    metFORMIN (GLUCOPHAGE XR) 500 MG 24 hr tablet, Take 1 tablet (500 mg) by mouth daily for 7 days, THEN 2 tablets (1,000 mg) daily for 7 days, THEN 3 tablets (1,500 mg) daily for 7 days, THEN 4 tablets (2,000 mg) daily., Disp: 360 tablet, Rfl: 0    metoprolol succinate ER (TOPROL XL) 50 MG 24 hr tablet, Take 1 tablet (50 mg) by mouth daily., Disp: 90 tablet, Rfl: 3    spironolactone (ALDACTONE) 25 MG tablet, Take 1 tablet (25 mg) by mouth daily., Disp: 90 tablet, Rfl: 0    tiotropium (SPIRIVA RESPIMAT) 2.5 MCG/ACT inhaler, Inhale 2 puffs into the lungs daily., Disp: 4 g, Rfl: 2    warfarin ANTICOAGULANT (COUMADIN) 2.5 MG tablet, Take 2-2.5 tablets (5-6.25 mg) by mouth daily Adjust dose based on INR results as directed., Disp: 200 tablet, Rfl: 1    Allergies:   Ace inhibitors      Objective:      Physical Exam  82.1 kg (181 lb)  1.854 m (6' 1\")  Body mass index is 23.88 kg/m .  BP (!) 154/90 (BP Location: Left arm, Patient Position: Sitting, Cuff Size: Adult Regular)   Pulse 91   Resp 18   Ht 1.854 m (6' 1\")   Wt 82.1 kg (181 lb)   SpO2 96%   BMI 23.88 kg/m      General Appearance:   Awake, Alert, No acute distress.   HEENT:  Pupil equal and reactive to light. No scleral icterus; the mucous membranes were moist.   Neck: No cervical bruits. No JVD. No thyromegaly.     Chest: The spine was straight. The chest was symmetric.   Lungs:   Respirations unlabored; Lungs are clear to auscultation. No crackles. No wheezing.   Cardiovascular:   Regular rhythm and rate, normal first and second heart sounds with no " murmurs. No rubs or gallops.    Abdomen:  Soft. No tenderness. Non-distended. Bowels sounds are present   Extremities: Equal tibial pulses. No leg edema.   Skin: No rashes or ulcers. Warm, Dry.   Musculoskeletal: No tenderness. No deformity.   Neurologic: Mood and affect are appropriate. No focal deficits.         EKG: Personally reviewed  Atrial fibrillation   Right bundle branch block   Anterior infarct , age undetermined   T wave abnormality, consider inferolateral ischemia   Abnormal ECG   When compared with ECG of 16-JUL-2024 04:49,   Atrial fibrillation has replaced Sinus rhythm   Anterior infarct is now Present   T wave inversion now evident in Inferior leads   T wave inversion more evident in Lateral leads     Cardiac Imaging Studies  Echocardiogram on July 19, 2024:  1. The left ventricle is normal in size. Left ventricular systolic performance  is moderately reduced. The ejection fraction is estimated to be 35%.  2. There is moderate global reduction in left ventricular systolic  performance.  3. There is mild concentric increase in left ventricular wall thickness.  4. There is mild aortic valve sclerosis without significant stenosis.  5. Normal right ventricular sized with mildly reduced right ventricular  systolic performance.  6. There is mild left atrial enlargement. There is mild right atrial  enlargement.  7. There is a very small posterior pericardial effusion.     When compared to the prior real-time echocardiogram dated 17 June 2024, a  small posterior pericardial effusion is now appreciated. The findings are  otherwise felt to be fairly similar on both studies.    Coronary angiogram on June 18, 2024:  LM:no obstruction  LAD:proximal 50% narrowing, 75% mid-distal, large D1 and D2 w/ 80-90% narrowing  RI: large vessel, 85% proximal narrowing  Lcx:40-50% narrowing  RCA:dominant, rPLV w/ a 90% narrowing    Lab Review   Lab Results   Component Value Date     07/20/2024    CO2 34 07/20/2024     "CO2 26 01/12/2019    BUN 24.2 07/20/2024    BUN 14 01/12/2019     Lab Results   Component Value Date    WBC 12.5 07/16/2024    HGB 15.0 08/16/2024    HGB 12.4 07/16/2024    HCT 38.6 07/16/2024    MCV 89 07/16/2024     07/19/2024     Lab Results   Component Value Date    CHOL 217 (H) 06/17/2024     Lab Results   Component Value Date    HDL 72 06/17/2024     No components found for: \"LDLCALC\"  Lab Results   Component Value Date    TRIG 61 06/17/2024     No results found for: \"CHOLHDL\"  Lab Results   Component Value Date    TROPONINI <0.01 01/12/2019     No results found for: \"BNP\"  Lab Results   Component Value Date    TSH 1.92 09/27/2024             "

## 2024-09-30 NOTE — LETTER
9/30/2024    Amelia He, DO  [address]  [place] [address]    RE: Gonsalo KANG Reddy       Dear Colleague,     I had the pleasure of seeing Gonsalo KANG Reddy in the [facility].            Assessment/Plan:   Ischemic cardiomyopathy, LVEF 30 to 35%, chronic systolic congestive heart failure: The patient has no complaints of shortness of breath, no leg edema.  He is compensated at this time.  No signs of fluid retention.  He has CABG more than 2 months.  Echocardiogram is requested for reevaluate LV systolic function.  Meantime, continue to optimize CHF medications.  Started losartan 25 mg daily, changed metoprolol titrate 25 mg twice a day to metoprolol succinate 50 mg daily.  Continue Lasix 40 mg daily and spironolactone 25 mg daily.  CMP and lipid profile are added to his labs 3 days ago.  Multiple vessel coronary artery disease status post 4V CABG LIMA to LAD, SVG to D1, SVG to ramus, SVG to right RCA on July 15, 2024: No chest pain.  Advised him to start cardiac rehab.  Continue aspirin, metoprolol and atorvastatin.  Echocardiogram to reevaluate LV systolic function as mentioned above.  Postop atrial fibrillation: The patient is in sinus rhythm today.  Start metoprolol succinate 50 mg daily for rate control.  Continue warfarin for chronic anticoagulation.  Zio patch monitor to evaluate atrial fibrillation was requested.  Request CT NING evaluation.  If his Flex V AtriClip on the left atrial appendage on July 15, 2024 closed left atrial appendage well, consider to discontinue anticoagulation.  Benign essential hypertension: His blood pressure is not controlled.  As mentioned above, start metoprolol XL 50 mg daily, losartan 25 mg daily, continue diuretics.  Continue to monitor his blood pressure.  Dyslipidemia: Continue atorvastatin 80 mg at bedtime.  Check lipid profile and liver function today.  Type 2 diabetes, COPD: No change in treatment today.    We will follow-up echo, Zio patch  monitor, CT NING and laboratory test the reports, discuss the findings with the patient.    Thank you for the opportunity to be involved in the care of Gonsalo Blakely. If you have any questions, please feel free to contact me.  I will see the patient again in 6 months and as needed.    Much or all of the text in this note was generated through the use of Dragon Dictate voice-to-text software. Errors in spelling or words which seem out of context are unintentional. Sound alike errors, in particular, may have escaped editing.       History of Present Illness:   It is my pleasure to see Gonsalo Blakely at the [facility] for routine cardiology follow up post CABG.  Gonsalo Blakely is a 59 year old male with a medical history of multiple vessel coronary artery disease status post 4V CABG LIMA to LAD, SVG to D1, SVG to ramus, SVG to right RCA, status post placement of a Flex V AtriClip on the left atrial appendage on July 15, 2024, ischemic cardiomyopathy LVEF 30 to 35%, chronic systolic congestive heart failure, postop atrial fibrillation, benign essential hypertension, dyslipidemia, type 2 diabetes.    The patient had a CABG on July 15, 2024.  He did not start cardiac rehab yet.  He denies chest pain, shortness of breath on exertion, palpitations, dizziness, leg edema.  His blood pressure is high, not well-controlled.  His pulse is in normal range.  He states that he was out of a few medications for several days.  Now he is able to take all his medication regularly.    Past Medical History:     Patient Active Problem List   Diagnosis     Acute decompensated heart failure (H)     SOB (shortness of breath)     Ischemic cardiomyopathy     Coronary artery disease involving native coronary artery of native heart without angina pectoris     Coronary artery disease involving native coronary artery of native heart, unspecified whether angina present     S/P CABG (coronary artery bypass graft)      Paroxysmal supraventricular tachycardia (H)     Diabetes mellitus, type 2 (H)       Past Surgical History:     Past Surgical History:   Procedure Laterality Date     CORONARY ARTERY BYPASS GRAFT, WITH ENDOSCOPIC VESSEL PROCUREMENT N/A 7/15/2024    Procedure: CORONARY ARTERY BYPASS GRAFT TIMES FOUR, INTERNAL MAMMARY ARTERY HARVEST,  LEFT LEG ENDOSCOPIC VESSEL PROCUREMENT , EPIAORTIC ULTRASOUND,;  Surgeon: Shannan Virk MD;  Location: [facility] [place]     CV CORONARY ANGIOGRAM N/A 06/18/2024    Procedure: CV CORONARY ANGIOGRAM;  Surgeon: Roman Florence MD;  Location: [facility] CATH LAB CV     CV INTRA AORTIC BALLOON N/A 7/15/2024    Procedure: Intra aortic Balloon Pump Insertion;  Surgeon: Valerio Virk MD;  Location: [facility] CATH LAB CV     CV LEFT HEART CATH N/A 06/18/2024    Procedure: Left Heart Catheterization;  Surgeon: Roman Florence MD;  Location: [facility] CATH LAB CV     OR LIGATION, LEFT ATRIAL APPENDAGE N/A 7/15/2024    Procedure: LIGATION, LEFT ATRIAL APPENDAGE, OPEN;  Surgeon: Shannan Virk MD;  Location: [facility] OR     SHOULDER SURGERY Right      TRANSESOPHAGEAL ECHOCARDIOGRAM INTRAOPERATIVE  7/15/2024    Procedure: ANESTHEIA TRANSESOPHAGEAL ECHOCARDIOGRAM.;  Surgeon: Shannan Virk MD;  [facility] [facility]       Family History:   No family history on file.     Social History:    reports that he quit smoking about 3 months ago. His smoking use included cigarettes. He has been exposed to tobacco smoke. He quit smokeless tobacco use about 44 years ago.  His smokeless tobacco use included chew. He reports current alcohol use. He reports that he does not use drugs.    Review of Systems:   12 systems are reviewed negative except for in HPI.    Meds:     Current Outpatient Medications:      acetaminophen (TYLENOL) 325 MG tablet, Take 2 tablets (650 mg) by mouth every 4 hours as needed for other (For optimal non-opioid multimodal pain management to improve pain  "control.)., Disp: 60 tablet, Rfl: 0     albuterol (PROAIR HFA/PROVENTIL HFA/VENTOLIN HFA) 108 (90 Base) MCG/ACT inhaler, Inhale 1-2 puffs into the lungs every 6 hours as needed for shortness of breath, wheezing or cough, Disp: 18 g, Rfl: 1     atorvastatin (LIPITOR) 80 MG tablet, Take 1 tablet (80 mg) by mouth daily., Disp: 90 tablet, Rfl: 3     dapagliflozin (FARXIGA) 10 MG TABS tablet, Take 1 tablet (10 mg) by mouth daily., Disp: 90 tablet, Rfl: 0     furosemide (LASIX) 40 MG tablet, Take 1 tablet (40 mg) by mouth daily., Disp: 90 tablet, Rfl: 3     losartan (COZAAR) 25 MG tablet, Take 1 tablet (25 mg) by mouth daily., Disp: 90 tablet, Rfl: 3     metFORMIN (GLUCOPHAGE XR) 500 MG 24 hr tablet, Take 1 tablet (500 mg) by mouth daily for 7 days, THEN 2 tablets (1,000 mg) daily for 7 days, THEN 3 tablets (1,500 mg) daily for 7 days, THEN 4 tablets (2,000 mg) daily., Disp: 360 tablet, Rfl: 0     metoprolol succinate ER (TOPROL XL) 50 MG 24 hr tablet, Take 1 tablet (50 mg) by mouth daily., Disp: 90 tablet, Rfl: 3     spironolactone (ALDACTONE) 25 MG tablet, Take 1 tablet (25 mg) by mouth daily., Disp: 90 tablet, Rfl: 0     tiotropium (SPIRIVA RESPIMAT) 2.5 MCG/ACT inhaler, Inhale 2 puffs into the lungs daily., Disp: 4 g, Rfl: 2     warfarin ANTICOAGULANT (COUMADIN) 2.5 MG tablet, Take 2-2.5 tablets (5-6.25 mg) by mouth daily Adjust dose based on INR results as directed., Disp: 200 tablet, Rfl: 1    Allergies:   Ace inhibitors      Objective:      Physical Exam  82.1 kg (181 lb)  1.854 m (6' 1\")  Body mass index is 23.88 kg/m .  BP (!) 154/90 (BP Location: Left arm, Patient Position: Sitting, Cuff Size: Adult Regular)   Pulse 91   Resp 18   Ht 1.854 m (6' 1\")   Wt 82.1 kg (181 lb)   SpO2 96%   BMI 23.88 kg/m      General Appearance:   Awake, Alert, No acute distress.   HEENT:  Pupil equal and reactive to light. No scleral icterus; the mucous membranes were moist.   Neck: No cervical bruits. No JVD. No thyromegaly.   "   Chest: The spine was straight. The chest was symmetric.   Lungs:   Respirations unlabored; Lungs are clear to auscultation. No crackles. No wheezing.   Cardiovascular:   Regular rhythm and rate, normal first and second heart sounds with no murmurs. No rubs or gallops.    Abdomen:  Soft. No tenderness. Non-distended. Bowels sounds are present   Extremities: Equal tibial pulses. No leg edema.   Skin: No rashes or ulcers. Warm, Dry.   Musculoskeletal: No tenderness. No deformity.   Neurologic: Mood and affect are appropriate. No focal deficits.         EKG: Personally reviewed  Atrial fibrillation   Right bundle branch block   Anterior infarct , age undetermined   T wave abnormality, consider inferolateral ischemia   Abnormal ECG   When compared with ECG of 16-JUL-2024 04:49,   Atrial fibrillation has replaced Sinus rhythm   Anterior infarct is now Present   T wave inversion now evident in Inferior leads   T wave inversion more evident in Lateral leads     Cardiac Imaging Studies  Echocardiogram on July 19, 2024:  1. The left ventricle is normal in size. Left ventricular systolic performance  is moderately reduced. The ejection fraction is estimated to be 35%.  2. There is moderate global reduction in left ventricular systolic  performance.  3. There is mild concentric increase in left ventricular wall thickness.  4. There is mild aortic valve sclerosis without significant stenosis.  5. Normal right ventricular sized with mildly reduced right ventricular  systolic performance.  6. There is mild left atrial enlargement. There is mild right atrial  enlargement.  7. There is a very small posterior pericardial effusion.     When compared to the prior real-time echocardiogram dated 17 June 2024, a  small posterior pericardial effusion is now appreciated. The findings are  otherwise felt to be fairly similar on both studies.    Coronary angiogram on June 18, 2024:  LM:no obstruction  LAD:proximal 50% narrowing, 75%  "mid-distal, large D1 and D2 w/ 80-90% narrowing  RI: large vessel, 85% proximal narrowing  Lcx:40-50% narrowing  RCA:dominant, rPLV w/ a 90% narrowing    Lab Review   Lab Results   Component Value Date     07/20/2024    CO2 34 07/20/2024    CO2 26 01/12/2019    BUN 24.2 07/20/2024    BUN 14 01/12/2019     Lab Results   Component Value Date    WBC 12.5 07/16/2024    HGB 15.0 08/16/2024    HGB 12.4 07/16/2024    HCT 38.6 07/16/2024    MCV 89 07/16/2024     07/19/2024     Lab Results   Component Value Date    CHOL 217 (H) 06/17/2024     Lab Results   Component Value Date    HDL 72 06/17/2024     No components found for: \"LDLCALC\"  Lab Results   Component Value Date    TRIG 61 06/17/2024     No results found for: \"CHOLHDL\"  Lab Results   Component Value Date    TROPONINI <0.01 01/12/2019     No results found for: \"BNP\"  Lab Results   Component Value Date    TSH 1.92 09/27/2024                 Thank you for allowing me to participate in the care of your patient.      Sincerely,     Lorna Samaniego MD     [facility]  cc:   Lorna Samaniego MD  [facility] [address] [facility] [address]  [place] [address]      "

## 2024-09-30 NOTE — PROGRESS NOTES
ANTICOAGULATION MANAGEMENT     Gonsalo Blakely 59 year old male is on warfarin with subtherapeutic INR result. (Goal INR 2.0-3.0)    Recent labs: (last 7 days)     24  1456   INR 1.4*       ASSESSMENT     Source(s): Chart Review and Patient/Caregiver Call     Warfarin doses taken: Warfarin taken as instructed and patient states that he vomited on 24 after taking his medication and may have vomited the warfarin   Diet: No new diet changes identified  Medication/supplement changes:  per chart review and patient, he stopped the amiodarone around 24   New illness, injury, or hospitalization: No  Signs or symptoms of bleeding or clotting: No  Previous result: Therapeutic last 2(+) visits  Additional findings:  patient will recheck his INR on Friday, 10/4/24 when in to see PCP may need adjust maintenance dose at that time,  did not change anything today due to possibly missing this past week  patient's phone  so did not get our message on Friday, he will take a booster dose today.        PLAN     Recommended plan for temporary change(s) affecting INR     Dosing Instructions: booster dose then continue your current warfarin dose with next INR in 1 week       Summary  As of 2024      Full warfarin instructions:  : 7.5 mg; Otherwise 5 mg every Sun, Tue, Fri; 6.25 mg all other days   Next INR check:  10/4/2024               Telephone call with Gonsalo who verbalizes understanding and agrees to plan    Lab visit scheduled    Education provided: Goal range and lab monitoring: goal range and significance of current result  Contact [phone] with any changes, questions or concerns.     Plan made per [facility] anticoagulation protocol    Cristina Starr RN  2024  Anticoagulation Clinic  [employer] for routing messages: reina [facility] HEART [facility] patient phone line: [phone]        _______________________________________________________________________     Anticoagulation  Episode Summary       Current INR goal:  2.0-3.0   TTR:  54.6% (2 mo)   Target end date:  Indefinite   [facility] reminders [facility]    Indications    S/P CABG (coronary artery bypass graft) [Z95.1]             Comments:               Anticoagulation Care Providers       Provider Role Specialty Phone number    Joyce Alaniz PA-C Referring  [phone]

## 2024-10-01 NOTE — PROGRESS NOTES
Assessment & Plan     Benign essential hypertension  Patient here today for follow-up.  Last seen in clinic 9/27/2024 at which time blood pressure was not at goal; however, patient had been out of medication for 5+days. Since last visit, medications refilled and patient instructed to resume medications.  Since that visit, patient has also seen cardiology 9/30/2024 with addition of losartan 25 mg.  With resumption of medications, and addition of losartan, will recheck electrolytes and kidney function.  In clinic today, blood pressure well-controlled with BP of 127/82.  Advised to continue current regimen.  - Basic metabolic panel    Encounter for monitoring warfarin therapy  Postoperative atrial fibrillation (H)  After CABG, had episodes of post operative atrial fibrillation. Was discharged on amiodarone taper. Saw EP 9/6/24. Recommendations to continue warfarin for stroke prophylaxis, metoprolol, ZIO patch. Plans to start ZIO patch monitor to evaluate for atrial fibrillation. Had cardiology visit 9/30/2024.  Per cardiology, plan for repeat echo. If his Flex V AtriClip on the left atrial appendage on July 15, 2024 closed left atrial appendage well, consider to discontinue anticoagulation. INR obtained today. Advised patient to continue following with anticoagulation clinic regarding management of warfarin.   - continue warfarin management   - CT NING evaluation   - INR point of care (finger stick)    S/P CABG (coronary artery bypass graft)  Patient found to have multivessel coronary disease status post CABG x 4, 7/15/24 via median sternotomy.  Patient underwent quadruple vessel artery bypass grafting, with placement of Flex V AtriClip on the left atrial appendage.  Patient denies any chest pain.  Echocardiogram ordered per cardiology to reevaluate systolic function.  - advised to reestablish for cardiac rehab   - continue aspirin, atorvastatin    Ischemic cardiomyopathy, LVEF 30-35%  Acute systolic congestive heart  failure (H)  Patient with history of ischemic cardiomyopathy, systolic heart failure. Currently managed with metoprolol, spironolactone, furosemide, dapagliflozin.  Had follow-up cardiology visit 9/30/2024 at which time losartan 25 mg was also added to regimen.  Euvolemic on exam today, no signs of fluid retention.  Advised to continue current goal-directed medical therapy for heart failure.  - continue GDMT - losartan 25, metoprolol 50, lasix 40, spironolactone 25     - echo to reevaluate LV systolic dysfunction     Chronic obstructive pulmonary disease, unspecified COPD type (H)  Reports feeling that breathing has improved since initiation of Spiriva.  Advised to continue use of this.  Would consider repeat PFTs after chest has healed more after CABG.    Type 2 diabetes mellitus without complication, without long-term current use of insulin (H)  At last visit, patient initiated on metformin due to elevation in A1c to 7.  Since then, has worked on titrating metformin.  Notes some GI side effects especially if taking without a meal.  Advised to continue to titrate up on metformin with goal of 2000 mg daily.  Last visit, obtained urine to assess for microalbuminuria.  Urine with presence of microalbuminuria.  Plan to repeat this in 3 to 6 months.  - microalbuminuria present > plan to repeat in 3-6 months     Dyslipidemia  Hyperlipidemia LDL goal < 70  Patient with history of multivessel coronary disease.  During hospitalization, lipid profile showing LDL of 133.  Patient initiated on statin therapy at that time.  Will reassess lipid profile after initiation of statin therapy.  If patient is still not at goal, recommend initiation of ezetimibe for further reduction of LDL  -Lipid profile    This note was created with the aid of dictation software. Any mistakes are unintentional.       Return in about 1 month (around 11/4/2024).    Marty Brush is a 59 year old, presenting for the following health  "issues:  RECHECK        10/4/2024     2:23 PM   Additional Questions   Roomed by chelly ayers   Accompanied by self         10/4/2024    Information    services provided? No      HPI     60 y/o here for follow up.  Overall doing well, no concerns today.  Had follow-up visit with cardiology with some additional changes to medications.  Notes that he is supposed to reestablish with cardiac rehab after his CABG.  Also notes the plan to repeat echo in order to assess for his heart function.    Has been able to start metformin, however has had some GI side effects especially without taking with meals.  Advised to take with full stomach.      Review of Systems  Constitutional, neuro, ENT, endocrine, pulmonary, cardiac, gastrointestinal, genitourinary, musculoskeletal, integument and psychiatric systems are negative, except as otherwise noted.      Objective    /82 (BP Location: Right arm, Patient Position: Sitting, Cuff Size: Adult Regular)   Pulse 90   Temp 97.9  F (36.6  C)   Resp 20   Ht 1.826 m (5' 11.89\")   Wt 79 kg (174 lb 1.3 oz)   SpO2 97%   BMI 23.68 kg/m    Body mass index is 23.68 kg/m .  Physical Exam   GENERAL: healthy, alert and no distress  RESP: speaking in full sentences, normal work of breathing   CV: extremities well perfused  MS: no gross musculoskeletal defects noted, no edema  PSYCH: mentation appears normal, affect normal/bright          Signed Electronically by: Amelia He, DO    "

## 2024-10-04 ENCOUNTER — OFFICE VISIT (OUTPATIENT)
Dept: FAMILY MEDICINE | Facility: CLINIC | Age: 59
End: 2024-10-04
Payer: COMMERCIAL

## 2024-10-04 ENCOUNTER — ANTICOAGULATION THERAPY VISIT (OUTPATIENT)
Dept: ANTICOAGULATION | Facility: CLINIC | Age: 59
End: 2024-10-04

## 2024-10-04 VITALS
WEIGHT: 174.08 LBS | HEIGHT: 72 IN | DIASTOLIC BLOOD PRESSURE: 82 MMHG | RESPIRATION RATE: 20 BRPM | SYSTOLIC BLOOD PRESSURE: 127 MMHG | HEART RATE: 90 BPM | TEMPERATURE: 97.9 F | BODY MASS INDEX: 23.58 KG/M2 | OXYGEN SATURATION: 97 %

## 2024-10-04 DIAGNOSIS — Z95.1 S/P CABG (CORONARY ARTERY BYPASS GRAFT): ICD-10-CM

## 2024-10-04 DIAGNOSIS — Z95.1 S/P CABG (CORONARY ARTERY BYPASS GRAFT): Primary | ICD-10-CM

## 2024-10-04 DIAGNOSIS — I48.91 POSTOPERATIVE ATRIAL FIBRILLATION (H): ICD-10-CM

## 2024-10-04 DIAGNOSIS — I50.21 ACUTE SYSTOLIC CONGESTIVE HEART FAILURE (H): ICD-10-CM

## 2024-10-04 DIAGNOSIS — I97.89 POSTOPERATIVE ATRIAL FIBRILLATION (H): ICD-10-CM

## 2024-10-04 DIAGNOSIS — Z79.01 ENCOUNTER FOR MONITORING WARFARIN THERAPY: ICD-10-CM

## 2024-10-04 DIAGNOSIS — E11.9 TYPE 2 DIABETES MELLITUS WITHOUT COMPLICATION, WITHOUT LONG-TERM CURRENT USE OF INSULIN (H): ICD-10-CM

## 2024-10-04 DIAGNOSIS — E78.5 HYPERLIPIDEMIA LDL GOAL <70: ICD-10-CM

## 2024-10-04 DIAGNOSIS — E78.5 DYSLIPIDEMIA: ICD-10-CM

## 2024-10-04 DIAGNOSIS — J44.9 CHRONIC OBSTRUCTIVE PULMONARY DISEASE, UNSPECIFIED COPD TYPE (H): ICD-10-CM

## 2024-10-04 DIAGNOSIS — Z51.81 ENCOUNTER FOR MONITORING WARFARIN THERAPY: ICD-10-CM

## 2024-10-04 DIAGNOSIS — I10 BENIGN ESSENTIAL HYPERTENSION: Primary | ICD-10-CM

## 2024-10-04 DIAGNOSIS — I25.5 ISCHEMIC CARDIOMYOPATHY: ICD-10-CM

## 2024-10-04 PROBLEM — I47.10 PAROXYSMAL SUPRAVENTRICULAR TACHYCARDIA (H): Status: RESOLVED | Noted: 2024-07-26 | Resolved: 2024-10-04

## 2024-10-04 LAB — INR BLD: 1.4 (ref 0.9–1.1)

## 2024-10-04 PROCEDURE — 36415 COLL VENOUS BLD VENIPUNCTURE: CPT

## 2024-10-04 PROCEDURE — 99214 OFFICE O/P EST MOD 30 MIN: CPT | Mod: GC

## 2024-10-04 PROCEDURE — 80061 LIPID PANEL: CPT

## 2024-10-04 PROCEDURE — 80048 BASIC METABOLIC PNL TOTAL CA: CPT

## 2024-10-04 PROCEDURE — 85610 PROTHROMBIN TIME: CPT

## 2024-10-04 ASSESSMENT — ASTHMA QUESTIONNAIRES
QUESTION_1 LAST FOUR WEEKS HOW MUCH OF THE TIME DID YOUR ASTHMA KEEP YOU FROM GETTING AS MUCH DONE AT WORK, SCHOOL OR AT HOME: A LITTLE OF THE TIME
QUESTION_4 LAST FOUR WEEKS HOW OFTEN HAVE YOU USED YOUR RESCUE INHALER OR NEBULIZER MEDICATION (SUCH AS ALBUTEROL): ONCE A WEEK OR LESS
ACT_TOTALSCORE: 21
ACT_TOTALSCORE: 21
QUESTION_2 LAST FOUR WEEKS HOW OFTEN HAVE YOU HAD SHORTNESS OF BREATH: ONCE OR TWICE A WEEK
QUESTION_5 LAST FOUR WEEKS HOW WOULD YOU RATE YOUR ASTHMA CONTROL: WELL CONTROLLED
HOSPITALIZATION_OVERNIGHT_LAST_YEAR_TOTAL: TWO
EMERGENCY_ROOM_LAST_YEAR_TOTAL: TWO
QUESTION_3 LAST FOUR WEEKS HOW OFTEN DID YOUR ASTHMA SYMPTOMS (WHEEZING, COUGHING, SHORTNESS OF BREATH, CHEST TIGHTNESS OR PAIN) WAKE YOU UP AT NIGHT OR EARLIER THAN USUAL IN THE MORNING: NOT AT ALL

## 2024-10-04 NOTE — PROGRESS NOTES
ANTICOAGULATION MANAGEMENT     Gonsalo Blakely 59 year old male is on warfarin with subtherapeutic INR result. (Goal INR 2.0-3.0)    Recent labs: (last 7 days)     10/04/24  1418   INR 1.4*       ASSESSMENT     Source(s): Chart Review and Patient/Caregiver Call     Warfarin doses taken: Missed dose(s) may be affecting INR  Diet: No new diet changes identified  Medication/supplement changes: None noted  New illness, injury, or hospitalization: No  Signs or symptoms of bleeding or clotting: No  Previous result: Subtherapeutic  Additional findings:  patient states he is having a hard time remembering to take his pills at bedtime. He will take them with dinner going forward.        PLAN     Recommended plan for temporary change(s) affecting INR     Dosing Instructions: booster dose then Increase your warfarin dose (10% change) with next INR in 1 week       Summary  As of 10/4/2024      Full warfarin instructions:  10/4: 7.5 mg; Otherwise 6.25 mg every day   Next INR check:  10/11/2024               Telephone call with Gonsalo who agrees to plan and repeated back plan correctly    Lab visit scheduled    Education provided: Goal range and lab monitoring: goal range and significance of current result, Importance of therapeutic range, and Importance of following up at instructed interval    Plan made per [facility] anticoagulation protocol    Deborah Forrest RN  10/4/2024  Anticoagulation Clinic  [employer] for routing messages: p [facility] patient phone line: [phone]        _______________________________________________________________________     Anticoagulation Episode Summary       Current INR goal:  2.0-3.0   TTR:  48.8% (2.2 mo)   Target end date:  Indefinite   Send INR reminders to:  [facility]    Indications    S/P CABG (coronary artery bypass graft) [Z95.1]             Comments:               Anticoagulation Care Providers       Provider Role Specialty Phone  number    Joyce Alaniz PA-C [facility]  [phone]

## 2024-10-04 NOTE — PROGRESS NOTES
Preceptor Attestation:   Patient seen, evaluated and discussed with the resident. I have verified the content of the note, which accurately reflects my assessment of the patient and the plan of care.    Supervising Physician:Vy Murphy MD    [facility]

## 2024-10-05 LAB
ANION GAP SERPL CALCULATED.3IONS-SCNC: 14 MMOL/L (ref 7–15)
BUN SERPL-MCNC: 34.5 MG/DL (ref 8–23)
CALCIUM SERPL-MCNC: 9.5 MG/DL (ref 8.8–10.4)
CHLORIDE SERPL-SCNC: 95 MMOL/L (ref 98–107)
CHOLEST SERPL-MCNC: 137 MG/DL
CREAT SERPL-MCNC: 1.91 MG/DL (ref 0.67–1.17)
EGFRCR SERPLBLD CKD-EPI 2021: 39 ML/MIN/1.73M2
GLUCOSE SERPL-MCNC: 143 MG/DL (ref 70–99)
HCO3 SERPL-SCNC: 27 MMOL/L (ref 22–29)
HDLC SERPL-MCNC: 62 MG/DL
LDLC SERPL CALC-MCNC: 60 MG/DL
NONHDLC SERPL-MCNC: 75 MG/DL
POTASSIUM SERPL-SCNC: 4.5 MMOL/L (ref 3.4–5.3)
SODIUM SERPL-SCNC: 136 MMOL/L (ref 135–145)
TRIGL SERPL-MCNC: 73 MG/DL

## 2024-10-10 NOTE — PROGRESS NOTES
Assessment & Plan     Benign essential hypertension  Encouraged to go home and take his metoprolol.     SONY (acute kidney injury) (H)  Recheck labs today  - Basic metabolic panel    Postoperative atrial fibrillation (H)  Subtherapeutic international normalized ratio (INR)  Encounter for monitoring warfarin therapy    INR is 1.4 today. Anticoag clinic will be in touch with patient later today.  - INR point of care    S/P CABG (coronary artery bypass graft)  Has been working out. Taking medications as directed.                Return in about 1 week (around 10/18/2024) for BP Recheck/BMP/INR.    Marty Brush is a 59 year old, presenting for the following health issues:  RECHECK (Follow up/)    HPI     Blood pressure: High today, has not taken any medications today. Due to an SONY, he was requested to hold his losartan, furosemide and spironolactone. His weight is up 3 lbs, but he is bot feeling swollen    Wt Readings from Last 2 Encounters:   10/11/24 80.5 kg (177 lb 6.4 oz)   10/04/24 79 kg (174 lb 1.3 oz)     Has been working out, very active, physical job  Play drums for fun    Follows with anticoagulation. INR today is unchanged at 1.4 today.                       Objective    BP (!) 151/98   Pulse 88   Resp 16   Wt 80.5 kg (177 lb 6.4 oz)   SpO2 99%   BMI 24.13 kg/m    Body mass index is 24.13 kg/m .  Physical Exam   GENERAL: alert and no distress  RESP: lungs clear to auscultation - no rales, rhonchi or wheezes  CV: regular rate and rhythm, normal S1 S2, no S3 or S4, no murmur, click or rub, no peripheral edema  MS: no gross musculoskeletal defects noted, no edema            Signed Electronically by: Carlee Pugh DO

## 2024-10-11 ENCOUNTER — OFFICE VISIT (OUTPATIENT)
Dept: FAMILY MEDICINE | Facility: CLINIC | Age: 59
End: 2024-10-11
Payer: COMMERCIAL

## 2024-10-11 ENCOUNTER — ANTICOAGULATION THERAPY VISIT (OUTPATIENT)
Dept: ANTICOAGULATION | Facility: CLINIC | Age: 59
End: 2024-10-11

## 2024-10-11 VITALS
HEART RATE: 88 BPM | OXYGEN SATURATION: 99 % | RESPIRATION RATE: 16 BRPM | DIASTOLIC BLOOD PRESSURE: 98 MMHG | WEIGHT: 177.4 LBS | SYSTOLIC BLOOD PRESSURE: 151 MMHG | BODY MASS INDEX: 24.13 KG/M2

## 2024-10-11 DIAGNOSIS — Z51.81 ENCOUNTER FOR MONITORING WARFARIN THERAPY: ICD-10-CM

## 2024-10-11 DIAGNOSIS — I48.91 POSTOPERATIVE ATRIAL FIBRILLATION (H): ICD-10-CM

## 2024-10-11 DIAGNOSIS — Z95.1 S/P CABG (CORONARY ARTERY BYPASS GRAFT): ICD-10-CM

## 2024-10-11 DIAGNOSIS — Z79.01 ENCOUNTER FOR MONITORING WARFARIN THERAPY: ICD-10-CM

## 2024-10-11 DIAGNOSIS — R79.1 SUBTHERAPEUTIC INTERNATIONAL NORMALIZED RATIO (INR): ICD-10-CM

## 2024-10-11 DIAGNOSIS — I10 BENIGN ESSENTIAL HYPERTENSION: Primary | ICD-10-CM

## 2024-10-11 DIAGNOSIS — Z95.1 S/P CABG (CORONARY ARTERY BYPASS GRAFT): Primary | ICD-10-CM

## 2024-10-11 DIAGNOSIS — I97.89 POSTOPERATIVE ATRIAL FIBRILLATION (H): ICD-10-CM

## 2024-10-11 DIAGNOSIS — N17.9 AKI (ACUTE KIDNEY INJURY) (H): ICD-10-CM

## 2024-10-11 DIAGNOSIS — E87.5 HYPERKALEMIA: ICD-10-CM

## 2024-10-11 LAB
ANION GAP SERPL CALCULATED.3IONS-SCNC: 14 MMOL/L (ref 7–15)
BUN SERPL-MCNC: 31.5 MG/DL (ref 8–23)
CALCIUM SERPL-MCNC: 8.1 MG/DL (ref 8.8–10.4)
CHLORIDE SERPL-SCNC: 102 MMOL/L (ref 98–107)
CREAT SERPL-MCNC: 1.24 MG/DL (ref 0.67–1.17)
EGFRCR SERPLBLD CKD-EPI 2021: 66 ML/MIN/1.73M2
GLUCOSE SERPL-MCNC: 82 MG/DL (ref 70–99)
HCO3 SERPL-SCNC: 23 MMOL/L (ref 22–29)
INR BLD: 1.4 (ref 0.9–1.1)
POTASSIUM SERPL-SCNC: 5.9 MMOL/L (ref 3.4–5.3)
SODIUM SERPL-SCNC: 139 MMOL/L (ref 135–145)

## 2024-10-11 PROCEDURE — 99214 OFFICE O/P EST MOD 30 MIN: CPT | Performed by: FAMILY MEDICINE

## 2024-10-11 PROCEDURE — 85610 PROTHROMBIN TIME: CPT | Performed by: FAMILY MEDICINE

## 2024-10-11 PROCEDURE — 36415 COLL VENOUS BLD VENIPUNCTURE: CPT | Performed by: FAMILY MEDICINE

## 2024-10-11 PROCEDURE — 80048 BASIC METABOLIC PNL TOTAL CA: CPT | Performed by: FAMILY MEDICINE

## 2024-10-11 NOTE — PATIENT INSTRUCTIONS
Plan to get vaccines next week.    I will get back to you on labs from today and when to restart blood pressure medications.     Tentative plan: If your lab work is improved today, I would restart losartan and spironolactone. Continue to hold furosemide. Recheck labs again in 1 week and your blood pressure.

## 2024-10-11 NOTE — PROGRESS NOTES
ANTICOAGULATION MANAGEMENT     Gonsalo Blakely 59 year old male is on warfarin with subtherapeutic INR result. (Goal INR 2.0-3.0)    Recent labs: (last 7 days)     10/11/24  0931   INR 1.4*       ASSESSMENT     Source(s): Chart Review  Previous INR was Subtherapeutic  Medication, diet, health changes since last INR chart reviewed; none identified         PLAN     Unable to reach Gonsalo today.    Left message to take 12.5 mg today, 7.5 mg Sat and Sun this weekend. Request call back for assessment. Also sent MyChart    Follow up required to confirm warfarin dose taken and assess for changes    Per Nicole CANALES: if no missed doses, 12.5 mg x1, then increase 20%.     Deborah Forrest RN  10/11/2024  Anticoagulation Clinic  [facility] for routing messages: reina [facility] HEART CLINIC [facility] patient phone line: [phone]

## 2024-10-12 NOTE — RESULT ENCOUNTER NOTE
Results are abnormal    Your potassium is too high. Over the weekend, do not eat any foods high in potassium such as bananas. If you are taking a multi-vitamin, please hold for now.    Do NOT restart your losartan or spironolactone as this can raise your potassium.  Please restart your Lasix (furosemide) this is different than what we discussed in clinic.  Please come to clinic on Monday for a repeat potassium level.

## 2024-10-14 NOTE — PROGRESS NOTES
ANTICOAGULATION MANAGEMENT     Gonsalo Blakely 59 year old male is on warfarin with subtherapeutic INR result. (Goal INR 2.0-3.0)    Recent labs: (last 7 days)     10/11/24  0931   INR 1.4*       ASSESSMENT     Source(s): Chart Review and Patient/Caregiver Call     Warfarin doses taken: Less warfarin taken than planned which may be affecting INR - patient did not increase dose as recommended last check.   Diet: No new diet changes identified  Medication/supplement changes: None noted  New illness, injury, or hospitalization: No  Signs or symptoms of bleeding or clotting: No  Previous result: Subtherapeutic  Additional findings:  He states he has been taking 2.5 tablets daily since Friday.        PLAN     Recommended plan for temporary change(s) affecting INR     Dosing Instructions: Increase your warfarin dose (~12% change from how you have been taking it) with next INR in 1 week       Summary  As of 10/11/2024      Full warfarin instructions:  7.5 mg every Mon, Wed, Fri; 6.25 mg all other days   Next INR check:  10/18/2024               Telephone call with Gonsalo who agrees to plan and repeated back plan correctly    Lab visit scheduled    Education provided: Goal range and lab monitoring: goal range and significance of current result and Importance of following up at instructed interval    Plan made per [facility] anticoagulation protocol    Deborah Forrest RN  10/14/2024  Anticoagulation Clinic  [employer] for routing messages: p Counts include 234 beds at the [facility] patient phone line: [phone]        _______________________________________________________________________     Anticoagulation Episode Summary       Current INR goal:  2.0-3.0   TTR:  44.3% (2.4 mo)   Target end date:  Indefinite   Send INR reminders to:  [facility]    Indications    S/P CABG (coronary artery bypass graft) [Z95.1]             Comments:               Anticoagulation Care Providers       Provider Role  Specialty Phone number    Joyce Alaniz PA-C Referring  [phone]

## 2024-10-18 ENCOUNTER — ANTICOAGULATION THERAPY VISIT (OUTPATIENT)
Dept: ANTICOAGULATION | Facility: CLINIC | Age: 59
End: 2024-10-18

## 2024-10-18 ENCOUNTER — LAB (OUTPATIENT)
Dept: LAB | Facility: CLINIC | Age: 59
End: 2024-10-18
Payer: COMMERCIAL

## 2024-10-18 DIAGNOSIS — E87.5 HYPERKALEMIA: ICD-10-CM

## 2024-10-18 DIAGNOSIS — Z95.1 S/P CABG (CORONARY ARTERY BYPASS GRAFT): Primary | ICD-10-CM

## 2024-10-18 DIAGNOSIS — Z95.1 S/P CABG (CORONARY ARTERY BYPASS GRAFT): ICD-10-CM

## 2024-10-18 LAB
ANION GAP SERPL CALCULATED.3IONS-SCNC: 11 MMOL/L (ref 7–15)
BUN SERPL-MCNC: 20.2 MG/DL (ref 8–23)
CALCIUM SERPL-MCNC: 9.5 MG/DL (ref 8.8–10.4)
CHLORIDE SERPL-SCNC: 102 MMOL/L (ref 98–107)
CREAT SERPL-MCNC: 1.43 MG/DL (ref 0.67–1.17)
EGFRCR SERPLBLD CKD-EPI 2021: 56 ML/MIN/1.73M2
GLUCOSE SERPL-MCNC: 128 MG/DL (ref 70–99)
HCO3 SERPL-SCNC: 30 MMOL/L (ref 22–29)
INR BLD: 1.5 (ref 0.9–1.1)
POTASSIUM SERPL-SCNC: 4.4 MMOL/L (ref 3.4–5.3)
SODIUM SERPL-SCNC: 143 MMOL/L (ref 135–145)

## 2024-10-18 PROCEDURE — 80048 BASIC METABOLIC PNL TOTAL CA: CPT

## 2024-10-18 PROCEDURE — 85610 PROTHROMBIN TIME: CPT | Mod: QW

## 2024-10-18 PROCEDURE — 36415 COLL VENOUS BLD VENIPUNCTURE: CPT

## 2024-10-18 NOTE — PROGRESS NOTES
ANTICOAGULATION MANAGEMENT     Gonsalo Blakely 59 year old male is on warfarin with subtherapeutic INR result. (Goal INR 2.0-3.0)    Recent labs: (last 7 days)     10/18/24  0855   INR 1.5*       ASSESSMENT     Warfarin Lab Questionnaire    Warfarin Doses Last 7 Days      10/18/2024     8:48 AM   Dose in Tablet or Mg   TAB or MG? milligram (mg)     Pt Rptd Dose MATHEUS MONDAY TUESDAY WED THURS FRIDAY SATURDAY   10/18/2024   8:48 AM 2.5 3 2.5 3 2.5 3 2.5         10/18/2024   Warfarin Lab Questionnaire   Missed doses within past 14 days? No   Changes in diet or alcohol within past 14 days? No   Medication changes since last result? No   Injuries or illness since last result? No   New shortness of breath, severe headaches or sudden changes in vision since last result? No   Abnormal bleeding since last result? No   Upcoming surgery, procedure? No   Best number to call with results? ok        Previous result: Subtherapeutic  Additional findings:  amiodarone had been stopped 8/23       PLAN     Recommended plan for ongoing change(s) affecting INR     Dosing Instructions: booster dose then Increase your warfarin dose (16% change) with next INR in 1 week       Summary  As of 10/18/2024      Full warfarin instructions:  10/18: 12.5 mg; Otherwise 10 mg every Fri; 7.5 mg all other days   Next INR check:  10/25/2024               Telephone call with Gonsalo who agrees to plan and repeated back plan correctly    Lab visit scheduled    Education provided: Goal range and lab monitoring: goal range and significance of current result    Plan made per [facility] anticoagulation protocol    Deborah Forrest RN  10/18/2024  Anticoagulation Clinic  [employer] for routing messages: reina [facility] HEART [facility] patient phone line: [phone]        _______________________________________________________________________     Anticoagulation Episode Summary       Current INR goal:  2.0-3.0   TTR:  40.5% (2.7 mo)   Target end date:   Indefinite   Send INR reminders to:  [facility]    Indications    S/P CABG (coronary artery bypass graft) [Z95.1]             Comments:               Anticoagulation Care Providers       Provider Role Specialty Phone number    Joyce Alaniz PA-C Referring  [phone]

## 2024-10-21 NOTE — RESULT ENCOUNTER NOTE
Repeat BMP shows a normal potassium, but a slightly more elevated creatinine. Patient has echocardiogram scheduled for Wednesday. He has follow-up appointment with Dr. He on 11/6/24. He was instructed to take his blood pressure medications and repeat a BMP again on 11/6/24.  Carlee Pugh DO

## 2024-10-23 ENCOUNTER — HOSPITAL ENCOUNTER (OUTPATIENT)
Dept: CARDIOLOGY | Facility: HOSPITAL | Age: 59
Discharge: HOME | End: 2024-10-23
Attending: INTERNAL MEDICINE | Admitting: INTERNAL MEDICINE
Payer: COMMERCIAL

## 2024-10-23 DIAGNOSIS — Z95.1 S/P CABG (CORONARY ARTERY BYPASS GRAFT): ICD-10-CM

## 2024-10-23 DIAGNOSIS — I25.5 ISCHEMIC CARDIOMYOPATHY: ICD-10-CM

## 2024-10-23 LAB — LVEF ECHO: NORMAL

## 2024-10-23 PROCEDURE — 93306 TTE W/DOPPLER COMPLETE: CPT

## 2024-10-23 PROCEDURE — 93306 TTE W/DOPPLER COMPLETE: CPT | Mod: 26 | Performed by: INTERNAL MEDICINE

## 2024-10-25 ENCOUNTER — ANTICOAGULATION THERAPY VISIT (OUTPATIENT)
Dept: ANTICOAGULATION | Facility: CLINIC | Age: 59
End: 2024-10-25

## 2024-10-25 ENCOUNTER — LAB (OUTPATIENT)
Dept: LAB | Facility: CLINIC | Age: 59
End: 2024-10-25
Payer: COMMERCIAL

## 2024-10-25 DIAGNOSIS — Z95.1 S/P CABG (CORONARY ARTERY BYPASS GRAFT): Primary | ICD-10-CM

## 2024-10-25 DIAGNOSIS — Z95.1 S/P CABG (CORONARY ARTERY BYPASS GRAFT): ICD-10-CM

## 2024-10-25 LAB — INR BLD: 1.5 (ref 0.9–1.1)

## 2024-10-25 PROCEDURE — 85610 PROTHROMBIN TIME: CPT

## 2024-10-25 PROCEDURE — 36415 COLL VENOUS BLD VENIPUNCTURE: CPT

## 2024-10-25 RX ORDER — WARFARIN SODIUM 5 MG/1
5 TABLET ORAL DAILY
Qty: 180 TABLET | Refills: 0 | Status: SHIPPED | OUTPATIENT
Start: 2024-10-25

## 2024-10-25 NOTE — PROGRESS NOTES
ANTICOAGULATION MANAGEMENT     Gonsalo Blakely 59 year old male is on warfarin with subtherapeutic INR result. (Goal INR 2.0-3.0)    Recent labs: (last 7 days)     10/25/24  0853   INR 1.5*       ASSESSMENT     Source(s): Chart Review and Patient/Caregiver Call     Warfarin doses taken: Warfarin taken as instructed  Diet: No new diet changes identified  Medication/supplement changes: None noted. Amiodarone stopped 8/23/24  New illness, injury, or hospitalization: No  Signs or symptoms of bleeding or clotting: No  Previous result: Subtherapeutic  Additional findings: Results sent through [email or web address].  Pt currently using 2.5mg tablets and having to take 3-4 tablets based on INR results.  New rx sent for 5mg tablets.         PLAN     Recommended plan for no diet, medication or health factor changes affecting INR     Dosing Instructions: Increase your warfarin dose (18.2% change) with next INR in 1 week       Summary  As of 10/25/2024      Full warfarin instructions:  7.5 mg every Sun, Tue; 10 mg all other days   Next INR check:  11/1/2024               Telephone call with Gonsalo who verbalizes understanding and agrees to plan    Lab visit scheduled    Education provided: Goal range and lab monitoring: goal range and significance of current result and Importance of therapeutic range  Symptom monitoring: monitoring for clotting signs and symptoms, monitoring for stroke signs and symptoms, and when to seek medical attention/emergency care  Importance of notifying anticoagulation clinic for: changes in medications; a sooner lab recheck maybe needed    Plan made per [facility] anticoagulation protocol    Winnie Carbajal RN  10/25/2024  Anticoagulation Clinic  [employer] for routing messages: reina [facility] patient phone line: [phone]        _______________________________________________________________________     Anticoagulation Episode Summary       Current INR goal:  2.0-3.0   TTR:  37.2% (2.9 mo)    Target end date:  Indefinite   Send INR reminders to:  [facility]    Indications    S/P CABG (coronary artery bypass graft) [Z95.1]             Comments:  --             Anticoagulation Care Providers       Provider Role Specialty Phone number    Joyce Alaniz PA-C Referring  [phone]

## 2024-11-01 ENCOUNTER — ALLIED HEALTH/NURSE VISIT (OUTPATIENT)
Dept: FAMILY MEDICINE | Facility: CLINIC | Age: 59
End: 2024-11-01
Payer: COMMERCIAL

## 2024-11-01 ENCOUNTER — ANTICOAGULATION THERAPY VISIT (OUTPATIENT)
Dept: ANTICOAGULATION | Facility: CLINIC | Age: 59
End: 2024-11-01

## 2024-11-01 ENCOUNTER — LAB (OUTPATIENT)
Dept: LAB | Facility: CLINIC | Age: 59
End: 2024-11-01
Payer: COMMERCIAL

## 2024-11-01 VITALS — DIASTOLIC BLOOD PRESSURE: 92 MMHG | SYSTOLIC BLOOD PRESSURE: 147 MMHG | HEART RATE: 96 BPM

## 2024-11-01 DIAGNOSIS — Z01.30 BLOOD PRESSURE CHECK: Primary | ICD-10-CM

## 2024-11-01 DIAGNOSIS — Z95.1 S/P CABG (CORONARY ARTERY BYPASS GRAFT): ICD-10-CM

## 2024-11-01 DIAGNOSIS — I10 BENIGN ESSENTIAL HYPERTENSION: Primary | ICD-10-CM

## 2024-11-01 DIAGNOSIS — Z95.1 S/P CABG (CORONARY ARTERY BYPASS GRAFT): Primary | ICD-10-CM

## 2024-11-01 LAB — INR BLD: 2 (ref 0.9–1.1)

## 2024-11-01 PROCEDURE — 99207 PR NO CHARGE NURSE ONLY: CPT

## 2024-11-01 PROCEDURE — 85610 PROTHROMBIN TIME: CPT

## 2024-11-01 PROCEDURE — 36415 COLL VENOUS BLD VENIPUNCTURE: CPT

## 2024-11-01 NOTE — PROGRESS NOTES
ANTICOAGULATION MANAGEMENT     Gonsalo Blakely 59 year old male is on warfarin with therapeutic INR result. (Goal INR 2.0-3.0)    Recent labs: (last 7 days)     11/01/24  0848   INR 2.0*       ASSESSMENT     Source(s): Chart Review  Previous INR was Subtherapeutic  Medication, diet, health changes since last INR chart reviewed; none identified         PLAN     Recommended plan for no diet, medication or health factor changes affecting INR     Dosing Instructions: Continue your current warfarin dose with next INR in 1 week       Summary  As of 11/1/2024      Full warfarin instructions:  7.5 mg every Sun, Tue; 10 mg all other days   Next INR check:  11/6/2024               Detailed voice message left for Gonsalo with dosing instructions and follow up date.   Sent [employer] message with dosing and follow up instructions    Check at provider office visit    Education provided: Please call back if any changes to your diet, medications or how you've been taking warfarin    Plan made per [facility] anticoagulation protocol    Deborah Forrest RN  11/1/2024  Anticoagulation Clinic  [employer] for routing messages: p [facility] patient phone line: [phone]        _______________________________________________________________________     Anticoagulation Episode Summary       Current INR goal:  2.0-3.0   TTR:  34.5% (3.1 mo)   Target end date:  Indefinite   Send INR reminders to:  [facility]    Indications    S/P CABG (coronary artery bypass graft) [Z95.1]             Comments:  --             Anticoagulation Care Providers       Provider Role Specialty Phone number    Joyce Alaniz PA-C Referring  [phone]

## 2024-11-01 NOTE — PROGRESS NOTES
Patient does not have a blood pressure cuff at home. Therefore, he was requested to return to clinic for blood pressure check. He came in today and his blood pressure is still not at goal. Will write a DME order for him, but do not think his insurance covers a blood pressure cuff.  DME (Durable Medical Equipment) Orders and Documentation  Orders Placed This Encounter   Procedures    Home Blood Pressure Monitor Order        The patient was assessed and it was determined the patient is in need of the following listed DME Supplies/Equipment. Please complete supporting documentation below to demonstrate medical necessity.

## 2024-11-05 NOTE — PROGRESS NOTES
Assessment & Plan     Benign essential hypertension  Patient here today for follow-up.  Last seen in clinic 10/11/24 at which time blood pressure remained above goal. At that time, also noted to have hyperkalemia and was instructed to hold Losartan and Spironolactone. Has subsequently resumed all medications. Current regimen includes: Losartan 25mg, Spironolactone 25mg, Metoprolol 50 mg. Today in clinic, 147/91, repeat .81. Patient has not been able to check blood pressure outside of clinic. With repeat BP within goal, will continue current regimen. Patient will look at buying BP cuff and instructed to take his BP readings once per day and bring in log of readings at follow up visit.   - Basic metabolic panel    Encounter for monitoring warfarin therapy  Postoperative atrial fibrillation (H)  After CABG, had episodes of post operative atrial fibrillation. Was discharged on amiodarone taper. Saw EP 9/6/24. Recommendations to continue warfarin for stroke prophylaxis, metoprolol. Had cardiology visit 9/30/2024 with repeat echo showing interval improvement of EF to 50-55%. Also noted that if his Flex V AtriClip on the left atrial appendage on July 15, 2024 closed left atrial appendage well, consider to discontinue anticoagulation. Patient has had subtherapeutic INRs - following with Warfarin clinic. Due for INR today, noted to be 2.1. Advised patient to continue following with anticoagulation clinic regarding management of warfarin.     S/P CABG (coronary artery bypass graft)  Patient found to have multivessel coronary disease status post CABG x 4, 7/15/24 via median sternotomy.  Patient underwent quadruple vessel artery bypass grafting, with placement of Flex V AtriClip on the left atrial appendage.  Patient denies any chest pain.  Echocardiogram ordered per cardiology with interval improvement in EF to 50-55%.   - advised to reestablish for cardiac rehab   - continue aspirin, atorvastatin    Systolic  congestive heart failure, unspecified HF chronicity (H)  Patient with history of ischemic cardiomyopathy, systolic heart failure. Currently managed with metoprolol, spironolactone, furosemide, dapagliflozin.  Had follow-up cardiology visit 9/30/2024 at which time losartan 25 mg was also added to regimen.  Euvolemic on exam today, no signs of fluid retention.  Advised to continue current goal-directed medical therapy for heart failure.  - continue GDMT - losartan 25, metoprolol 50, lasix 40, spironolactone 25, dapagliflozin 10 mg       Chronic obstructive pulmonary disease, unspecified COPD type (H)  Reports feeling that breathing has improved since initiation of Spiriva.  Advised to continue use of this.  Would consider repeat PFTs after chest has healed more after CABG.    Type 2 diabetes mellitus without complication, without long-term current use of insulin (H)  At last visit, patient initiated on metformin due to elevation in A1c to 7. Has worked on titrating metformin.  Notes some GI side effects especially if taking without a meal.  Advised to continue to titrate up on metformin with goal of 2000 mg daily.  At prior visit, obtained urine to assess for microalbuminuria.  Urine with presence of microalbuminuria. Recommend repeat in 3-6 month (~January 2025).     Dyslipidemia  Hyperlipidemia LDL goal <70  Patient with history of multivessel coronary disease.  During hospitalization, lipid profile showing LDL of 133.  Patient initiated on statin therapy at that time.  Reassessment of lipid panel with good response, LDL of 60.   -continue therapy     Subconjunctival hemorrhage of left eye  Notes injury to his left eye a few days ago. Went to the eye doctor yesterday for this, and had dilated eye exam - told it was likely a popped blood vessel. Has become more red today. EOMI, pupils equal and reactive, no eye pain. Advised to continue to monitor symptoms and if he develops any changes in vision, blurry vision, eye  "pain to see the eye doctor. Return precautions given.     Return in about 4 weeks (around 12/4/2024) for BP Follow up.    Subjective   Gonsalo is a 59 year old, presenting for the following health issues:  RECHECK and INR Followup        11/6/2024     8:42 AM   Additional Questions   Roomed by chelly ayers   Accompanied by self         11/6/2024    Information    services provided? No      HPI     Patient is a pleasant 59-year-old here today for follow-up and for INR recheck.    Last seen in clinic about a month ago at which time he was noted to have elevation in potassium and subsequently had been told to hold some of his medications.  Has since resumed all medications.  No difficulties with taking any medications.  Reports medication adherence.    Overall doing well, increasing levels of activity, enjoying drumming.  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    Notes that a few days ago he nicked his eye, and has has subsequently had redness to his eye.  He saw the eye doctor yesterday at which time he had a dilated eye exam, with no significant findings.  Was told that he likely popped a blood vessel.  No eye pain, no pain with eye motion.        Review of Systems  Constitutional, neuro, ENT, endocrine, pulmonary, cardiac, gastrointestinal, genitourinary, musculoskeletal, integument and psychiatric systems are negative, except as otherwise noted.      Objective    /81 (BP Location: Right arm, Patient Position: Sitting, Cuff Size: Adult Regular)   Pulse 105   Resp 20   Ht 1.835 m (6' 0.24\")   Wt 80.3 kg (177 lb)   SpO2 95%   BMI 23.84 kg/m    Body mass index is 23.84 kg/m .  Physical Exam   GENERAL: healthy, alert and no distress  RESP: speaking in full sentences, normal work of breathing   CV: extremities well perfused  MS: no gross musculoskeletal defects noted, no edema  PSYCH: mentation appears normal, affect " normal/bright          Signed Electronically by: Amelia He, DO

## 2024-11-06 ENCOUNTER — ANTICOAGULATION THERAPY VISIT (OUTPATIENT)
Dept: ANTICOAGULATION | Facility: CLINIC | Age: 59
End: 2024-11-06

## 2024-11-06 ENCOUNTER — OFFICE VISIT (OUTPATIENT)
Dept: FAMILY MEDICINE | Facility: CLINIC | Age: 59
End: 2024-11-06
Payer: COMMERCIAL

## 2024-11-06 VITALS
BODY MASS INDEX: 23.98 KG/M2 | HEIGHT: 72 IN | SYSTOLIC BLOOD PRESSURE: 127 MMHG | HEART RATE: 105 BPM | DIASTOLIC BLOOD PRESSURE: 81 MMHG | RESPIRATION RATE: 20 BRPM | OXYGEN SATURATION: 95 % | WEIGHT: 177 LBS

## 2024-11-06 DIAGNOSIS — Z51.81 ENCOUNTER FOR MONITORING WARFARIN THERAPY: ICD-10-CM

## 2024-11-06 DIAGNOSIS — E11.9 TYPE 2 DIABETES MELLITUS WITHOUT COMPLICATION, WITHOUT LONG-TERM CURRENT USE OF INSULIN (H): ICD-10-CM

## 2024-11-06 DIAGNOSIS — I97.89 POSTOPERATIVE ATRIAL FIBRILLATION (H): ICD-10-CM

## 2024-11-06 DIAGNOSIS — I10 BENIGN ESSENTIAL HYPERTENSION: Primary | ICD-10-CM

## 2024-11-06 DIAGNOSIS — E78.5 DYSLIPIDEMIA: ICD-10-CM

## 2024-11-06 DIAGNOSIS — Z79.01 ENCOUNTER FOR MONITORING WARFARIN THERAPY: ICD-10-CM

## 2024-11-06 DIAGNOSIS — I48.91 POSTOPERATIVE ATRIAL FIBRILLATION (H): ICD-10-CM

## 2024-11-06 DIAGNOSIS — I50.20 SYSTOLIC CONGESTIVE HEART FAILURE, UNSPECIFIED HF CHRONICITY (H): ICD-10-CM

## 2024-11-06 DIAGNOSIS — Z95.1 S/P CABG (CORONARY ARTERY BYPASS GRAFT): Primary | ICD-10-CM

## 2024-11-06 DIAGNOSIS — J44.9 CHRONIC OBSTRUCTIVE PULMONARY DISEASE, UNSPECIFIED COPD TYPE (H): ICD-10-CM

## 2024-11-06 DIAGNOSIS — E78.5 HYPERLIPIDEMIA LDL GOAL <70: ICD-10-CM

## 2024-11-06 DIAGNOSIS — Z95.1 S/P CABG (CORONARY ARTERY BYPASS GRAFT): ICD-10-CM

## 2024-11-06 DIAGNOSIS — H11.32 SUBCONJUNCTIVAL HEMORRHAGE OF LEFT EYE: ICD-10-CM

## 2024-11-06 LAB
ANION GAP SERPL CALCULATED.3IONS-SCNC: 13 MMOL/L (ref 7–15)
BUN SERPL-MCNC: 35.9 MG/DL (ref 8–23)
CALCIUM SERPL-MCNC: 10.1 MG/DL (ref 8.8–10.4)
CHLORIDE SERPL-SCNC: 99 MMOL/L (ref 98–107)
CREAT SERPL-MCNC: 1.59 MG/DL (ref 0.67–1.17)
EGFRCR SERPLBLD CKD-EPI 2021: 49 ML/MIN/1.73M2
GLUCOSE SERPL-MCNC: 159 MG/DL (ref 70–99)
HCO3 SERPL-SCNC: 32 MMOL/L (ref 22–29)
INR BLD: 2.1 (ref 0.9–1.1)
POTASSIUM SERPL-SCNC: 5.7 MMOL/L (ref 3.4–5.3)
SODIUM SERPL-SCNC: 144 MMOL/L (ref 135–145)

## 2024-11-06 PROCEDURE — 80048 BASIC METABOLIC PNL TOTAL CA: CPT

## 2024-11-06 PROCEDURE — 36415 COLL VENOUS BLD VENIPUNCTURE: CPT

## 2024-11-06 PROCEDURE — 85610 PROTHROMBIN TIME: CPT

## 2024-11-06 PROCEDURE — 99214 OFFICE O/P EST MOD 30 MIN: CPT | Mod: GC

## 2024-11-06 NOTE — PROGRESS NOTES
ANTICOAGULATION MANAGEMENT     Gonsalo Blakely 59 year old male is on warfarin with therapeutic INR result. (Goal INR 2.0-3.0)    Recent labs: (last 7 days)     11/06/24  0853   INR 2.1*       ASSESSMENT     Source(s): Chart Review  Previous INR was Therapeutic last visit; previously outside of goal range  Medication, diet, health changes since last INR chart reviewed; none identified         PLAN     Recommended plan for no diet, medication or health factor changes affecting INR     Dosing Instructions: Continue your current warfarin dose with next INR in 2 weeks       Summary  As of 11/6/2024      Full warfarin instructions:  7.5 mg every Sun, Tue; 10 mg all other days   Next INR check:  11/20/2024               Detailed voice message left for Gonsalo with dosing instructions and follow up date.   Sent Us[employer] message with dosing and follow up instructions    Contact [phone] to schedule and with any changes, questions or concerns.     Education provided: Please call back if any changes to your diet, medications or how you've been taking warfarin    Plan made per [facility] anticoagulation protocol    Deborah Forrest RN  11/6/2024  Anticoagulation Clinic  [employer] [place] for routing messages: p [facility] patient phone line: [phone]        _______________________________________________________________________     Anticoagulation Episode Summary       Current INR goal:  2.0-3.0   TTR:  37.8% (3.3 mo)   Target end date:  Indefinite   Send INR reminders to:  [facility]    Indications    S/P CABG (coronary artery bypass graft) [Z95.1]             Comments:  --             Anticoagulation Care Providers       Provider Role Specialty Phone number    Joyce Alaniz PA-C Referring  [phone]

## 2024-11-06 NOTE — PROGRESS NOTES
Preceptor Attestation:  Patient's case reviewed and discussed with Amelia He,  resident and I evaluated the patient. I agree with written assessment and plan of care.  Supervising Physician:  Gonsalo Nicole MD, MD IBARRA  [facility]

## 2024-11-08 NOTE — PROGRESS NOTES
Assessment & Plan     Benign essential hypertension  Patient here today for follow-up.  Previous visit, 10/11/24 patient had blood pressure above goal. At that time, also noted to have hyperkalemia and was instructed to hold Losartan and Spironolactone.  Current regimen includes: Losartan 25mg, Spironolactone 25mg, Metoprolol 50 mg. At clinic visit 11/6/24, again noted to have hyperkalemia on repeat BMP and again instructed to hold Losartan and Spironolactone. Today, in clinic noted to have elevated BP reading of 156/94 but has been holding anti-hypertensive medications.  Repeat BMP showing potassium of 5.7.  Mild improvement in creatinine and GFR, however with persistent elevated readings, advised to continue to hold medications. Advised to stay well hydrated. Recommend repeat BMP by the end of the week.  Advised patient to check blood pressure daily outside of clinic. Patient will look at buying BP cuff. Again, instructed to take his BP readings once per day and bring in log of readings.  - Basic metabolic panel  -Called patient to update on results -instructed to hold losartan and spironolactone.  Plan for follow-up by end of week for repeat BMP.    Hyperkalemia  SONY (acute kidney injury) (H)  Seen in clinic last 11/6/24 for follow up at which time repeat BMP revealed SONY as well as hyperkalemia. Had similar occurrence 4 weeks prior, at which time potassium and kidney function improved after holding Losartan and Spironolactone for 3 days as well as encouraging oral hydration. With prior BMP results, was instructed to hold Losartan and Spironolactone and avoid foods high in potassium. Today, will repeat BMP. Repeat BMP showing potassium of 5.7.  Mild improvement in creatinine and GFR, however with persistent elevated readings, advised to continue to hold medications.  Advised to stay well hydrated. Recommend repeat BMP by the end of the week.  - Basic metabolic panel  -Called patient to update on results  "-instructed to hold losartan and spironolactone.  Plan for follow-up by end of week for repeat BMP.      This note was created with the aid of dictation software. Any mistakes are unintentional.       Return in about 4 weeks (around 12/10/2024) for Follow up.    Marty Brush is a 59 year old, presenting for the following health issues:  Kidney Problem (Follow up kidney/meds)        11/12/2024     3:40 PM   Additional Questions   Roomed by chelly ayers   Accompanied by self         11/12/2024    Information    services provided? No      HPI     60 y/o here today for follow up on hyperkalemia and SONY.     History of HTN, noted at last visit to have hyperkalemia and SONY. Has been holding losartan and spironolactone.       Review of Systems  Constitutional, neuro, ENT, endocrine, pulmonary, cardiac, gastrointestinal, genitourinary, musculoskeletal, integument and psychiatric systems are negative, except as otherwise noted.      Objective    BP (!) 156/94 (BP Location: Right arm, Patient Position: Sitting, Cuff Size: Adult Regular)   Pulse 97   Temp 97.4  F (36.3  C) (Tympanic)   Resp 20   Ht 1.835 m (6' 0.24\")   Wt 82.6 kg (182 lb)   SpO2 95%   BMI 24.52 kg/m    Body mass index is 24.52 kg/m .  Physical Exam   GENERAL: healthy, alert and no distress  RESP: speaking in full sentences, normal work of breathing   CV: extremities well perfused  MS: no gross musculoskeletal defects noted, no edema  PSYCH: mentation appears normal, affect normal/bright          Signed Electronically by: Amelia He, DO    "

## 2024-11-12 ENCOUNTER — OFFICE VISIT (OUTPATIENT)
Dept: FAMILY MEDICINE | Facility: CLINIC | Age: 59
End: 2024-11-12
Payer: COMMERCIAL

## 2024-11-12 VITALS
BODY MASS INDEX: 24.65 KG/M2 | OXYGEN SATURATION: 95 % | HEART RATE: 97 BPM | DIASTOLIC BLOOD PRESSURE: 94 MMHG | HEIGHT: 72 IN | RESPIRATION RATE: 20 BRPM | TEMPERATURE: 97.4 F | WEIGHT: 182 LBS | SYSTOLIC BLOOD PRESSURE: 156 MMHG

## 2024-11-12 DIAGNOSIS — I10 BENIGN ESSENTIAL HYPERTENSION: Primary | ICD-10-CM

## 2024-11-12 DIAGNOSIS — N17.9 AKI (ACUTE KIDNEY INJURY) (H): ICD-10-CM

## 2024-11-12 DIAGNOSIS — E87.5 HYPERKALEMIA: ICD-10-CM

## 2024-11-12 LAB
ANION GAP SERPL CALCULATED.3IONS-SCNC: 15 MMOL/L (ref 3–14)
BUN SERPL-MCNC: 27 MG/DL (ref 7–30)
CALCIUM SERPL-MCNC: 9.9 MG/DL (ref 8.5–10.1)
CHLORIDE BLD-SCNC: 96 MMOL/L (ref 94–109)
CO2 SERPL-SCNC: 32 MMOL/L (ref 20–32)
CREAT SERPL-MCNC: 1.5 MG/DL (ref 0.66–1.25)
EGFRCR SERPLBLD CKD-EPI 2021: 53 ML/MIN/1.73M2
GLUCOSE BLD-MCNC: 193 MG/DL (ref 70–99)
POTASSIUM BLD-SCNC: 5.7 MMOL/L (ref 3.4–5.3)
SODIUM SERPL-SCNC: 143 MMOL/L (ref 135–145)

## 2024-11-15 ENCOUNTER — OFFICE VISIT (OUTPATIENT)
Dept: FAMILY MEDICINE | Facility: CLINIC | Age: 59
End: 2024-11-15
Payer: COMMERCIAL

## 2024-11-15 VITALS
HEART RATE: 78 BPM | DIASTOLIC BLOOD PRESSURE: 91 MMHG | BODY MASS INDEX: 24.92 KG/M2 | RESPIRATION RATE: 22 BRPM | TEMPERATURE: 97.6 F | OXYGEN SATURATION: 98 % | SYSTOLIC BLOOD PRESSURE: 154 MMHG | WEIGHT: 184 LBS | HEIGHT: 72 IN

## 2024-11-15 DIAGNOSIS — E87.5 HYPERKALEMIA: Primary | ICD-10-CM

## 2024-11-15 DIAGNOSIS — Z12.11 SCREEN FOR COLON CANCER: ICD-10-CM

## 2024-11-15 DIAGNOSIS — E11.9 TYPE 2 DIABETES MELLITUS WITHOUT COMPLICATION, WITHOUT LONG-TERM CURRENT USE OF INSULIN (H): ICD-10-CM

## 2024-11-15 LAB
ANION GAP SERPL CALCULATED.3IONS-SCNC: 12 MMOL/L (ref 3–14)
BUN SERPL-MCNC: 21 MG/DL (ref 7–30)
CALCIUM SERPL-MCNC: 9.9 MG/DL (ref 8.5–10.1)
CHLORIDE BLD-SCNC: 99 MMOL/L (ref 94–109)
CO2 SERPL-SCNC: 33 MMOL/L (ref 20–32)
CREAT SERPL-MCNC: 1.4 MG/DL (ref 0.66–1.25)
EGFRCR SERPLBLD CKD-EPI 2021: 57 ML/MIN/1.73M2
GLUCOSE BLD-MCNC: 83 MG/DL (ref 70–99)
POTASSIUM BLD-SCNC: 5.4 MMOL/L (ref 3.4–5.3)
SODIUM SERPL-SCNC: 144 MMOL/L (ref 135–145)

## 2024-11-15 RX ORDER — METFORMIN HYDROCHLORIDE 500 MG/1
500 TABLET, EXTENDED RELEASE ORAL
Qty: 90 TABLET | Refills: 0 | Status: SHIPPED | OUTPATIENT
Start: 2024-11-15

## 2024-11-15 NOTE — PROGRESS NOTES
Assessment & Plan     Hyperkalemia in the setting of hypertension, heart failure with reduced ejection fraction  Potassium still mildly elevated today at 5.4. We discussed diet and he eats lots of potatoes and madarin oranges. I discussed that in general fruits and vegetables are healthy choices but if he can try to limit his potato portion size and avoid eating too many bananas or oranges, that would likely help with his potassium. GFR is improved from last visit and BUN down from 27 to 21 so maybe dehydration from spironolactone was contributing. His blood pressure remains elevated today. We discussed staying off losartan and spironolactone for now, making the above diet changes, and recheck of BMP on the 25th at visit with Dr. Alejandro. If K normal then, Likely restart losartan and continue to monitor BMP. I don't think any of his other meds are contributing to the potassium level.   - Basic Metabolic Panel ([facility]) - Results < 1 hr; Future  - Basic Metabolic Panel ([facility]) - Results < 1 hr    Type 2 diabetes mellitus without complication, without long-term current use of insulin (H)  The patient thought he had been told to discontinue metformin and so he stopped taking it. From what I can see, Dr. He had started it for him in October.  I advised that he restart metformin at 500mg daily and he is amenable to that.   - metFORMIN (GLUCOPHAGE XR) 500 MG 24 hr tablet; Take 1 tablet (500 mg) by mouth daily (with dinner).      Right ankle sprain  No indication for xray today. I wrapped his ankle in an ace bandage and he felt some immediate relief. We discussed RICE cares and reasons to return for evaluation.         No follow-ups on file.    Marty Brush is a 59 year old, presenting for the following health issues:  Follow Up (BP, Offered shots and colon cancer screening but declined for now)      11/15/2024     8:27 AM   Additional Questions   Roomed by Eloy   Accompanied by DARRIN         11/15/2024     Information    services provided? No        HPI     Patient was seen earlier this week by Dr. He and was found to be hyperkalemic to 5.7.  She instructed him to hold losartan and spironolactone. He has done that since getting the message on the 12th.   He eats a lot of potatoes and mandarin oranges.   He used to use a salt substitute but no longer uses that.     Right side twisted his ankle while raking leaves.   Took a hockey puck to the ankle when he was younger and ever since then he is prone to ankle sprains on that side.   He has not had any issue with walking on the ankle- it is sore but not bad.   He has been putting ice on it. Hasn't tried wrapping it or elevating it.     Has not been taking metformin.   He thought someone had told him to stop taking it.     Not checking BP at home.   Is is pretty active with tasks around the home and yard.   No chest pain.   He does drink a couple beers when watching football on the weekends. Usually not more than 2.     Review of Systems  Constitutional, HEENT, cardiovascular, pulmonary, gi and gu systems are negative, except as otherwise noted.      Objective    BP (!) 154/91   Pulse 78   Temp 97.6  F (36.4  C)   Resp 22   Ht 1.829 m (6')   Wt 83.5 kg (184 lb)   SpO2 98%   BMI 24.95 kg/m    Body mass index is 24.95 kg/m .  Physical Exam   GENERAL: alert and no distress  NECK: no adenopathy, no asymmetry, masses, or scars  RESP: lungs clear to auscultation - no rales, rhonchi or wheezes  CV: regular rate and rhythm, normal S1 S2, no S3 or S4, no murmur, click or rub, no peripheral edema    MS: no gross musculoskeletal defects noted, no edema  ORTHO: Right Ankle Exam:   Knee:not done  Lower leg:normal appearance, normal on palpation    ANKLE  Inspection:Swelling:Mild lateral swelling   Tender:CFL  Non-tender:ATFL, PTFL, lateral malleolus, medial malleolus, base of 5th metatarsal  Range of Motion:dorsiflexion:  full, plantarflexion:  full,  inversion:  full, eversion:  full  Strength:dorsiflexion:  5/5, plantarflexion:  5/5, inversion: 4+/5, eversion:5/5      FOOT  foot exam : Inspection Palpation:   Swelling: no swelling  Tender::none  Range of Motion:flexion of toes:  full, extension of toes  full    The longitudinal plan of care for the diagnosis(es)/condition(s) as documented were addressed during this visit. Due to the added complexity in care, I will continue to support Gonsalo in the subsequent management and with ongoing continuity of care.        Signed Electronically by: Cleo Jack MD

## 2024-11-21 ENCOUNTER — MYC MEDICAL ADVICE (OUTPATIENT)
Dept: ANTICOAGULATION | Facility: CLINIC | Age: 59
End: 2024-11-21
Payer: COMMERCIAL

## 2024-11-25 ENCOUNTER — OFFICE VISIT (OUTPATIENT)
Dept: FAMILY MEDICINE | Facility: CLINIC | Age: 59
End: 2024-11-25
Payer: COMMERCIAL

## 2024-11-25 VITALS
WEIGHT: 183.12 LBS | BODY MASS INDEX: 24.8 KG/M2 | OXYGEN SATURATION: 97 % | TEMPERATURE: 97.1 F | DIASTOLIC BLOOD PRESSURE: 96 MMHG | RESPIRATION RATE: 20 BRPM | HEART RATE: 67 BPM | SYSTOLIC BLOOD PRESSURE: 152 MMHG | HEIGHT: 72 IN

## 2024-11-25 DIAGNOSIS — N18.2 TYPE 2 DIABETES MELLITUS WITH STAGE 2 CHRONIC KIDNEY DISEASE, WITHOUT LONG-TERM CURRENT USE OF INSULIN (H): ICD-10-CM

## 2024-11-25 DIAGNOSIS — N18.2 CHRONIC KIDNEY DISEASE, STAGE 2 (MILD): ICD-10-CM

## 2024-11-25 DIAGNOSIS — E87.5 HYPERKALEMIA: ICD-10-CM

## 2024-11-25 DIAGNOSIS — I10 BENIGN ESSENTIAL HYPERTENSION: ICD-10-CM

## 2024-11-25 DIAGNOSIS — I25.5 ISCHEMIC CARDIOMYOPATHY: ICD-10-CM

## 2024-11-25 DIAGNOSIS — E11.22 TYPE 2 DIABETES MELLITUS WITH STAGE 2 CHRONIC KIDNEY DISEASE, WITHOUT LONG-TERM CURRENT USE OF INSULIN (H): ICD-10-CM

## 2024-11-25 DIAGNOSIS — Z95.1 S/P CABG (CORONARY ARTERY BYPASS GRAFT): Primary | ICD-10-CM

## 2024-11-25 LAB
ANION GAP SERPL CALCULATED.3IONS-SCNC: 10 MMOL/L (ref 7–15)
ANION GAP SERPL CALCULATED.3IONS-SCNC: 15 MMOL/L (ref 3–14)
BUN SERPL-MCNC: 26 MG/DL (ref 7–30)
BUN SERPL-MCNC: 28.9 MG/DL (ref 8–23)
CALCIUM SERPL-MCNC: 10.2 MG/DL (ref 8.8–10.4)
CALCIUM SERPL-MCNC: 9.9 MG/DL (ref 8.5–10.1)
CHLORIDE BLD-SCNC: 96 MMOL/L (ref 94–109)
CHLORIDE SERPL-SCNC: 98 MMOL/L (ref 98–107)
CO2 SERPL-SCNC: 30 MMOL/L (ref 20–32)
CREAT SERPL-MCNC: 1.3 MG/DL (ref 0.66–1.25)
CREAT SERPL-MCNC: 1.3 MG/DL (ref 0.67–1.17)
EGFRCR SERPLBLD CKD-EPI 2021: 63 ML/MIN/1.73M2
EGFRCR SERPLBLD CKD-EPI 2021: 63 ML/MIN/1.73M2
GLUCOSE BLD-MCNC: 83 MG/DL (ref 70–99)
GLUCOSE SERPL-MCNC: 70 MG/DL (ref 70–99)
HCO3 SERPL-SCNC: 29 MMOL/L (ref 22–29)
POTASSIUM BLD-SCNC: 5.9 MMOL/L (ref 3.4–5.3)
POTASSIUM SERPL-SCNC: 6 MMOL/L (ref 3.4–5.3)
SODIUM SERPL-SCNC: 137 MMOL/L (ref 135–145)
SODIUM SERPL-SCNC: 141 MMOL/L (ref 135–145)

## 2024-11-25 PROCEDURE — 36415 COLL VENOUS BLD VENIPUNCTURE: CPT | Performed by: FAMILY MEDICINE

## 2024-11-25 PROCEDURE — 99215 OFFICE O/P EST HI 40 MIN: CPT | Performed by: FAMILY MEDICINE

## 2024-11-25 PROCEDURE — 80048 BASIC METABOLIC PNL TOTAL CA: CPT | Performed by: FAMILY MEDICINE

## 2024-11-25 RX ORDER — METOPROLOL SUCCINATE 25 MG/1
TABLET, EXTENDED RELEASE ORAL
Qty: 90 TABLET | Refills: 0 | Status: SHIPPED | OUTPATIENT
Start: 2024-11-25

## 2024-11-25 RX ORDER — LOSARTAN POTASSIUM 25 MG/1
25 TABLET ORAL DAILY
Status: SHIPPED
Start: 2024-11-25 | End: 2024-11-25 | Stop reason: SINTOL

## 2024-11-25 ASSESSMENT — PAIN SCALES - GENERAL: PAINLEVEL_OUTOF10: NO PAIN (0)

## 2024-11-25 NOTE — PROGRESS NOTES
Assessment & Plan     Benign essential hypertension  Currently taking metoprolol 50mg and patient resumed spironolactone 25mg yesterday. Discontinue spironolactone and continue 25mg of losartan for blood pressure control if potassium has improved, with goal of <140/80. Patient will record blood pressures and bring to next follow-up visit.   - Basic metabolic panel  - losartan (COZAAR) 25 MG tablet    Potassium returned above normal range, so will NOT restart losartan, and instead increase Toprol XL from 50mg to 75mg daily.    Discontinue all NSAIDs    Repeat potassium level on Friday, and if GFR improving and potassium back to normal, initiate Losartan 25mg once daily with repeat BMP again in 2 weeks.    Hyperkalemia  Asymptomatic hyperkalemia. GFR continues to improve. At this time, we will discontinue spironolactone, which could be the reason for increase of potassium to 5.9 from 5.4 .    Patient took two doses of spironolactone, one yesterday, one today.     Patient BMP sample to be run again at central lab. Plan as above.     Potassium level likely to decrease with discontinuation of spironolactone and NSAIDs and with continuing furosemide.     REPEAT BMP on Wednesday 11/27 or Friday 11/29.   This plan was discussed with patient by telephone at approx 16:00 on 11/25/24.      - Basic metabolic panel    Chronic kidney disease, stage 2 (mild)  BMP testing within the last month with GFR meeting criteria for CKD Stage II (GFR 60-89).     GFR was >90 (normal) at time of CABG hospitalization in July 2024 from records review    Patient has been using some intermittent naproxen.  Advised NO NSAIDs today.    Type 2 diabetes mellitus with stage 2 chronic kidney disease, without long-term current use of insulin (H)  Stable disease, last hemoglobin A1c was at goal, <7. Continue metformin.    S/P CABG (coronary artery bypass graft)  Ischemic cardiomyopathy  Patient is s/p quadruple vessel artery bypass grafting, with  "placement of Flex V AtriClip on the left atrial appendage on 7/15/2024. Patient reports he has recovered well, and is tolerating activities he enjoyed prior to surgery.       No follow-ups on file.    Marty Brush is a 59 year old, presenting for the following health issues:  Hypertension (BP check)        11/25/2024     9:02 AM   Additional Questions   Roomed by chelly ayers   Accompanied by self         11/25/2024    Information    services provided? No     HPI   Gonsalo started his spironolactone again yesterday. He took one 25mg dose. He states he has not restarted his losartan. Reports increased water intake, about 1 gallon daily. He has additionally been working on reducing his intake of potassium rich foods.     Can climb couple flights of stairs without dyspnea or chest pain.  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    \"My girlfriend watches my diet\"  Makes low salt choices.    No muscle aches or cramping. No palpitations or racing heart. No chest pain. No dyspnea.     No hematuria. No nocturia or urine stream weakness.    Review of Systems  Constitutional, neuro, ENT, endocrine, pulmonary, cardiac, gastrointestinal, genitourinary, musculoskeletal, integument and psychiatric systems are negative, except as otherwise noted.      Objective    BP (!) 152/96 (BP Location: Right arm, Patient Position: Sitting, Cuff Size: Adult Regular)   Pulse 67   Temp 97.1  F (36.2  C) (Tympanic)   Resp 20   Ht 1.838 m (6' 0.36\")   Wt 83.1 kg (183 lb 1.9 oz)   SpO2 97%   BMI 24.59 kg/m    Body mass index is 24.59 kg/m .  Physical Exam   GENERAL: alert and no distress  EYES: Eyes grossly normal to inspection, PERRL and conjunctivae and sclerae normal  HENT: ear canals and TM's normal, nose and mouth without ulcers or lesions  RESP: lungs clear to auscultation - no rales, rhonchi or wheezes  CV: regular rate and rhythm, normal S1 S2, no S3 or S4, no murmur, " click or rub, no peripheral edema  MS: no gross musculoskeletal defects noted, no significant edema  PSYCH: mentation appears normal, affect normal/bright      Casimiro Oconnor, MS3  November 25, 2024  9:24 AM    FACULTY SUPERVISION OF RESIDENTS ATTESTATION NOTE    I personally performed the history, examined and evaluated this patient, performed all medical assessment and decision making on 11/25/2024.  I discussed the patient with the medical student and edited and amended any contribution of the medical student to the documentation for completeness and accuracy.     45 minutes spent on the day of service in records review, history, physical exam, medical decision making, lab result review, phone call results to patient, prescription entry and documentation.    Jake Alejandro MD      Signed Electronically by: Jake Alejandro MD

## 2024-11-29 ENCOUNTER — ORDERS ONLY (AUTO-RELEASED) (OUTPATIENT)
Dept: FAMILY MEDICINE | Facility: CLINIC | Age: 59
End: 2024-11-29

## 2024-11-29 ENCOUNTER — OFFICE VISIT (OUTPATIENT)
Dept: FAMILY MEDICINE | Facility: CLINIC | Age: 59
End: 2024-11-29
Payer: COMMERCIAL

## 2024-11-29 VITALS
DIASTOLIC BLOOD PRESSURE: 92 MMHG | BODY MASS INDEX: 23.98 KG/M2 | OXYGEN SATURATION: 96 % | TEMPERATURE: 98 F | HEART RATE: 104 BPM | HEIGHT: 72 IN | WEIGHT: 177 LBS | RESPIRATION RATE: 18 BRPM | SYSTOLIC BLOOD PRESSURE: 155 MMHG

## 2024-11-29 DIAGNOSIS — E87.5 HYPERKALEMIA: ICD-10-CM

## 2024-11-29 DIAGNOSIS — I10 BENIGN ESSENTIAL HYPERTENSION: ICD-10-CM

## 2024-11-29 DIAGNOSIS — I25.5 ISCHEMIC CARDIOMYOPATHY: ICD-10-CM

## 2024-11-29 DIAGNOSIS — Z12.11 SCREEN FOR COLON CANCER: Primary | ICD-10-CM

## 2024-11-29 DIAGNOSIS — Z12.11 SCREEN FOR COLON CANCER: ICD-10-CM

## 2024-11-29 DIAGNOSIS — Z95.1 S/P CABG (CORONARY ARTERY BYPASS GRAFT): ICD-10-CM

## 2024-11-29 LAB
ANION GAP SERPL CALCULATED.3IONS-SCNC: 8 MMOL/L (ref 7–15)
BUN SERPL-MCNC: 23.7 MG/DL (ref 8–23)
CALCIUM SERPL-MCNC: 9.4 MG/DL (ref 8.8–10.4)
CHLORIDE SERPL-SCNC: 101 MMOL/L (ref 98–107)
CREAT SERPL-MCNC: 1.33 MG/DL (ref 0.67–1.17)
EGFRCR SERPLBLD CKD-EPI 2021: 61 ML/MIN/1.73M2
GLUCOSE SERPL-MCNC: 115 MG/DL (ref 70–99)
HCO3 SERPL-SCNC: 32 MMOL/L (ref 22–29)
POTASSIUM SERPL-SCNC: 4.5 MMOL/L (ref 3.4–5.3)
SODIUM SERPL-SCNC: 141 MMOL/L (ref 135–145)

## 2024-11-29 PROCEDURE — 80048 BASIC METABOLIC PNL TOTAL CA: CPT

## 2024-11-29 PROCEDURE — 36415 COLL VENOUS BLD VENIPUNCTURE: CPT

## 2024-11-29 PROCEDURE — 99214 OFFICE O/P EST MOD 30 MIN: CPT | Mod: GC

## 2024-11-29 RX ORDER — FUROSEMIDE 40 MG/1
40 TABLET ORAL DAILY
Qty: 90 TABLET | Refills: 3 | Status: SHIPPED | OUTPATIENT
Start: 2024-11-29

## 2024-11-29 NOTE — PATIENT INSTRUCTIONS
Your lab results will be available via my chart  If your potassium is normal, we will restart your losartan 25 mg daily  If potassium is still elevated, we will send for 5 mg amlodipine daily  Follow up with Dr. Alejandro 12/9 for BP

## 2024-11-29 NOTE — PROGRESS NOTES
Assessment & Plan     (Z12.11) Screen for colon cancer  (primary encounter diagnosis)  Comment: sent cologuard, discussed potential for false positive and if positive will recommend colonoscopy  Plan: LUIS E([employer])            (E87.5) Hyperkalemia  Benign essential hypertension [I10]   Comment: K 6.0 on last BMP 11/25, spironolactone and losartan were discontinued. Metoprolol was increased 50>75 mg. If BMP today still hyperkalemia, will start 5 mg amlodipine. If normal, will restart losartan at 25 mg daily. BP in office today 164/92, above goal. 155/92 on recheck. Encouraged getting BP cuff and checking at home.  Plan: as above     Addendum 1:48 PM 12/1  -K wnl 4.5, will restart losartan and refill. Sent to pharmacy. Message sent to patient    (I25.5) Ischemic cardiomyopathy  Comment: refill today, no evidence of volume overload on exam. S/p CABG 7/2024, recovering well  Plan: furosemide (LASIX) 40 MG tablet            (Z95.1) Chronic systolic congestive heart failure  Comment: refill today  Plan: furosemide (LASIX) 40 MG tablet            No follow-ups on file.    Marty Brush is a 59 year old, presenting for the following health issues:  Hypertension      11/29/2024     8:34 AM   Additional Questions   Roomed by Ety   Accompanied by Self         11/29/2024    Information    services provided? No        Gonsalo Blakely is a 58 y/o M with pertinent PMHx s/p quad bypass CABG 7/2024, HFrEF, HTN, CKD 2, T2DM presenting for Bp follow up. Patient was last seen by Dr. Alejandro 11/25, his spironolactone and losartan were discontinued due to hyperkalemia (K 6.0). Repeat BMP today to assess if restart losartan. He currently takes metoprolol 75 mg daily, lasix 40, was counseled to avoid NSAIDs. Has never been on CCB in the past.     BP today in office 164/92, 163/99. Will recheck before leaving. Going to  BP cuff for home today. Denies headaches, SOB, vision changes, chest pain,  syncope, etc. No leg swelling. ACE intolerance caused severe cough.                Objective    BP (!) 164/92   Pulse 104   Temp 98  F (36.7  C)   Resp 18   Ht 1.829 m (6')   Wt 80.3 kg (177 lb)   SpO2 96%   BMI 24.01 kg/m    Body mass index is 24.01 kg/m .  Physical Exam   GENERAL: alert and no distress  NECK: no adenopathy, no asymmetry, masses, or scars  RESP: lungs clear to auscultation - no rales, rhonchi or wheezes  CV: regular rate and rhythm, normal S1 S2, no S3 or S4, no murmur, click or rub, no peripheral edema  ABDOMEN: soft, nontender, no hepatosplenomegaly, no masses and bowel sounds normal  MS: no gross musculoskeletal defects noted, no edema      Signed Electronically by: Mars Gonzalez MD  {Email feedback regarding this note to [email or web address]   :[id number]}    Mars Gonzalez MD  PGY-2  [facility] Medicine Residency  [facility]  November 29, 2024

## 2024-12-01 RX ORDER — LOSARTAN POTASSIUM 25 MG/1
25 TABLET ORAL DAILY
Qty: 90 TABLET | Refills: 1 | Status: SHIPPED | OUTPATIENT
Start: 2024-12-01

## 2024-12-03 NOTE — RESULT ENCOUNTER NOTE
Please call results and recommendations    Your potassium level was back in the normal range on Friday and your kidney function is stable. See Dr. Alejandro on Monday as scheduled to recheck your potassium level

## 2024-12-18 ENCOUNTER — ANTICOAGULATION THERAPY VISIT (OUTPATIENT)
Dept: ANTICOAGULATION | Facility: CLINIC | Age: 59
End: 2024-12-18

## 2024-12-18 ENCOUNTER — OFFICE VISIT (OUTPATIENT)
Dept: FAMILY MEDICINE | Facility: CLINIC | Age: 59
End: 2024-12-18
Payer: COMMERCIAL

## 2024-12-18 VITALS
BODY MASS INDEX: 24.11 KG/M2 | WEIGHT: 178 LBS | RESPIRATION RATE: 18 BRPM | TEMPERATURE: 98.2 F | DIASTOLIC BLOOD PRESSURE: 92 MMHG | OXYGEN SATURATION: 93 % | HEIGHT: 72 IN | SYSTOLIC BLOOD PRESSURE: 153 MMHG | HEART RATE: 106 BPM

## 2024-12-18 DIAGNOSIS — I97.89 POSTOPERATIVE ATRIAL FIBRILLATION (H): ICD-10-CM

## 2024-12-18 DIAGNOSIS — I50.21 ACUTE SYSTOLIC CONGESTIVE HEART FAILURE (H): ICD-10-CM

## 2024-12-18 DIAGNOSIS — E87.5 HYPERKALEMIA: ICD-10-CM

## 2024-12-18 DIAGNOSIS — I10 BENIGN ESSENTIAL HYPERTENSION: Primary | ICD-10-CM

## 2024-12-18 DIAGNOSIS — I25.5 ISCHEMIC CARDIOMYOPATHY: ICD-10-CM

## 2024-12-18 DIAGNOSIS — I10 BENIGN ESSENTIAL HYPERTENSION: ICD-10-CM

## 2024-12-18 DIAGNOSIS — Z95.1 S/P CABG (CORONARY ARTERY BYPASS GRAFT): Primary | ICD-10-CM

## 2024-12-18 DIAGNOSIS — I48.91 POSTOPERATIVE ATRIAL FIBRILLATION (H): ICD-10-CM

## 2024-12-18 DIAGNOSIS — Z95.1 S/P CABG (CORONARY ARTERY BYPASS GRAFT): ICD-10-CM

## 2024-12-18 LAB
ANION GAP SERPL CALCULATED.3IONS-SCNC: 16 MMOL/L (ref 3–14)
BUN SERPL-MCNC: 24 MG/DL (ref 7–30)
CALCIUM SERPL-MCNC: 10 MG/DL (ref 8.5–10.1)
CHLORIDE BLD-SCNC: 99 MMOL/L (ref 94–109)
CO2 SERPL-SCNC: 32 MMOL/L (ref 20–32)
CREAT SERPL-MCNC: 1.2 MG/DL (ref 0.66–1.25)
EGFRCR SERPLBLD CKD-EPI 2021: 69 ML/MIN/1.73M2
GLUCOSE BLD-MCNC: 117 MG/DL (ref 70–99)
INR BLD: 1.5 (ref 0.9–1.1)
POTASSIUM BLD-SCNC: 4.4 MMOL/L (ref 3.4–5.3)
SODIUM SERPL-SCNC: 147 MMOL/L (ref 135–145)

## 2024-12-18 RX ORDER — METOPROLOL SUCCINATE 25 MG/1
TABLET, EXTENDED RELEASE ORAL
Qty: 90 TABLET | Refills: 0 | Status: SHIPPED | OUTPATIENT
Start: 2024-12-18

## 2024-12-18 RX ORDER — METOPROLOL SUCCINATE 50 MG/1
50 TABLET, EXTENDED RELEASE ORAL DAILY
Qty: 90 TABLET | Refills: 3 | Status: SHIPPED | OUTPATIENT
Start: 2024-12-18

## 2024-12-18 RX ORDER — DAPAGLIFLOZIN 10 MG/1
10 TABLET, FILM COATED ORAL DAILY
Qty: 90 TABLET | Refills: 1 | Status: SHIPPED | OUTPATIENT
Start: 2024-12-18

## 2024-12-18 NOTE — PROGRESS NOTES
Assessment & Plan     Addendum  9:02 AM   K 4.4, Cr 1.2 - improved from prior. Restart losartan 25 mg daily. Notified patient.     Benign essential hypertension  Hyperkalemia, resolved  Had seen Dr Alejandro 11/25 and was found to be hyperkalemic at 6.0, so losartan and spironolactone were discontinued. Saw Dr Gonzalez on 11/29.  BMP recheck at that time with K4.5.  Plan per note was to restart losartan at 25 mg daily, but patient has not yet done this.  Currently on metoprolol 75 mg daily and Lasix 40 mg daily.  Will repeat BMP today and if K in normal range, will restart losartan 25 mg daily.  - Basic metabolic panel    Ischemic cardiomyopathy  Acute systolic congestive heart failure (H)  Known CHF. Follows with cardiology. Needs Farxiga and metoprolol refills today.   - dapagliflozin (FARXIGA) 10 MG TABS tablet  Dispense: 90 tablet; Refill: 1  - metoprolol succinate ER (TOPROL XL) 50 MG 24 hr tablet  Dispense: 90 tablet; Refill: 3    Postoperative atrial fibrillation (H)  On warfarin for a fib. Due for INR recheck today. HR slightly elevated 102 but has not yet taken metoprolol this AM.   - INR (venous draw)  - metoprolol succinate ER (TOPROL XL) 50 MG 24 hr tablet  Dispense: 90 tablet; Refill: 3  - metoprolol succinate ER (TOPROL XL) 25 MG 24 hr tablet  Dispense: 90 tablet; Refill: 0    Return in about 2 weeks (around 1/1/2025).    Marty Brush is a 59 year old, presenting for the following health issues:  RECHECK and medication check        12/18/2024     7:56 AM   Additional Questions   Roomed by Martina molina         12/18/2024    Information    services provided? No        HPI     PMHx s/p quad bypass CABG 7/2024, HFrEF, HTN, CKD 2, T2DM, a fib on warfarin    Presents to clinic for BP follow-up   Had seen Dr Alejandro 11/25 and was found to be hyperkalemic at 6.0, so losartan and spironolactone were discontinued.  Saw Dr Gonzalez on 11/29.  BMP recheck at that time with K4.5.  Restarted  losartan at 25 mg daily.  Also on metoprolol 75 mg daily and Lasix 40.    Today:   Feeling well   Active, was playing hockey last week.   Did not realize that he was supposed to start losartan after last visit so is not taking.   Current BP medications: lasix 40, metoprolol 75.  HR a bit elevevated but hasn't taken metoprolol yet this AM.     Forgot to take warfarin last night.   States that missing medications is rare for him.       Objective    BP (!) 153/92   Pulse 106   Temp 98.2  F (36.8  C) (Oral)   Resp 18   Ht 1.829 m (6')   Wt 80.7 kg (178 lb)   SpO2 93%   BMI 24.14 kg/m    Body mass index is 24.14 kg/m .  Physical Exam   GENERAL: alert and no distress  NECK: no adenopathy, no asymmetry, masses, or scars  RESP: lungs clear to auscultation - no rales, rhonchi or wheezes  CV: regular rate and rhythm, normal S1 S2, no S3 or S4, no murmur, click or rub, no peripheral edema  ABDOMEN: soft, nontender, no hepatosplenomegaly, no masses and bowel sounds normal  MS: no gross musculoskeletal defects noted, no edema        Signed Electronically by: Amelia Vanessa MD

## 2024-12-18 NOTE — PROGRESS NOTES
Preceptor Attestation:  Patient's case reviewed and discussed with Amelia Vanessa MD resident and I evaluated the patient. I agree with written assessment and plan of care.  Supervising Physician:  Gonsalo Nicole MD, MD [facility]

## 2024-12-18 NOTE — PROGRESS NOTES
ANTICOAGULATION MANAGEMENT     Gonsalo Blakely 59 year old male is on warfarin with subtherapeutic INR result. (Goal INR 2.0-3.0)    Recent labs: (last 7 days)     12/18/24  0837   INR 1.5*       ASSESSMENT     Source(s): Chart Review  Previous INR was Therapeutic last 2(+) visits  Medication, diet, health changes since last INR chart reviewed; none identified         PLAN     Unable to reach Gonsalo today.    Left voicemail and sent [employer] message to take a booster dose of warfarin,  15 mg tonight. Request call back or response via [employer] message for assessment.    Follow up required to confirm warfarin dose taken and assess for changes and discuss out of range result     Dashawn Smith RN  12/18/2024  Anticoagulation Clinic  [facility] for routing messages: reina [facility] patient phone line: [phone]

## 2024-12-19 NOTE — PROGRESS NOTES
ANTICOAGULATION MANAGEMENT     Gonsalo Blakely 59 year old male is on warfarin with subtherapeutic INR result. (Goal INR 2.0-3.0)    Recent labs: (last 7 days)     12/18/24  0837   INR 1.5*       ASSESSMENT     Source(s): Chart Review  Previous INR was Therapeutic last 2(+) visits  Medication, diet, health changes since last INR chart reviewed; none identified         PLAN     Unable to reach Gonsalo today.    Left message to call back to [facility]     Follow up required to confirm warfarin dose taken and assess for changes and discuss out of range result     Dashawn Smith RN  12/19/2024  Anticoagulation Clinic  [facility] for routing messages: reina [facility] patient phone line: [phone]

## 2024-12-19 NOTE — PROGRESS NOTES
ANTICOAGULATION MANAGEMENT     Gonsalo Blakely 59 year old male is on warfarin with subtherapeutic INR result. (Goal INR 2.0-3.0)    Recent labs: (last 7 days)     12/18/24  0837   INR 1.5*       ASSESSMENT     Source(s): Chart Review and Patient/Caregiver Call     Warfarin doses taken: Missed dose(s) may be affecting INR  Diet: No new diet changes identified  Medication/supplement changes: None noted  New illness, injury, or hospitalization: No  Signs or symptoms of bleeding or clotting: No  Previous result: Therapeutic last 2(+) visits  Additional findings: None       PLAN     Recommended plan for temporary change(s) affecting INR     Dosing Instructions: booster dose then continue your current warfarin dose with next INR in 2 weeks       Summary  As of 12/18/2024      Full warfarin instructions:  12/19: 20 mg; Otherwise 7.5 mg every Sun, Tue; 10 mg all other days   Next INR check:  1/2/2025               Telephone call with Gonsalo who verbalizes understanding and agrees to plan    Check at provider office visit    Education provided: Goal range and lab monitoring: goal range and significance of current result, Importance of therapeutic range, and Importance of following up at instructed interval    Plan made per [facility] anticoagulation protocol    Dashawn Smith RN  12/19/2024  Anticoagulation Clinic  [employer] for routing messages: p Count includes the [facility] patient phone line: [phone]        _______________________________________________________________________     Anticoagulation Episode Summary       Current INR goal:  2.0-3.0   TTR:  31.5% (4.7 mo)   Target end date:  Indefinite   Send INR reminders to:  [facility]    Indications    S/P CABG (coronary artery bypass graft) [Z95.1]             Comments:  --             Anticoagulation Care Providers       Provider Role Specialty Phone number    Joyce Alaniz PA-C Referring  [phone]

## 2025-01-02 ENCOUNTER — OFFICE VISIT (OUTPATIENT)
Dept: FAMILY MEDICINE | Facility: CLINIC | Age: 60
End: 2025-01-02
Payer: MEDICAID

## 2025-01-02 ENCOUNTER — ANTICOAGULATION THERAPY VISIT (OUTPATIENT)
Dept: ANTICOAGULATION | Facility: CLINIC | Age: 60
End: 2025-01-02

## 2025-01-02 VITALS
SYSTOLIC BLOOD PRESSURE: 158 MMHG | DIASTOLIC BLOOD PRESSURE: 103 MMHG | HEART RATE: 82 BPM | WEIGHT: 176.08 LBS | TEMPERATURE: 97.5 F | HEIGHT: 72 IN | BODY MASS INDEX: 23.85 KG/M2

## 2025-01-02 DIAGNOSIS — I25.5 ISCHEMIC CARDIOMYOPATHY: ICD-10-CM

## 2025-01-02 DIAGNOSIS — E11.9 TYPE 2 DIABETES MELLITUS WITHOUT COMPLICATION, WITHOUT LONG-TERM CURRENT USE OF INSULIN (H): ICD-10-CM

## 2025-01-02 DIAGNOSIS — I50.21 ACUTE SYSTOLIC CONGESTIVE HEART FAILURE (H): ICD-10-CM

## 2025-01-02 DIAGNOSIS — Z95.1 S/P CABG (CORONARY ARTERY BYPASS GRAFT): Primary | ICD-10-CM

## 2025-01-02 DIAGNOSIS — I10 BENIGN ESSENTIAL HYPERTENSION: Primary | ICD-10-CM

## 2025-01-02 LAB
ANION GAP SERPL CALCULATED.3IONS-SCNC: 13 MMOL/L (ref 3–14)
BUN SERPL-MCNC: 25 MG/DL (ref 7–30)
CALCIUM SERPL-MCNC: 10 MG/DL (ref 8.5–10.1)
CHLORIDE BLD-SCNC: 98 MMOL/L (ref 94–109)
CO2 SERPL-SCNC: 31 MMOL/L (ref 20–32)
CREAT SERPL-MCNC: 1.4 MG/DL (ref 0.66–1.25)
EGFRCR SERPLBLD CKD-EPI 2021: 57 ML/MIN/1.73M2
EST. AVERAGE GLUCOSE BLD GHB EST-MCNC: 166 MG/DL
GLUCOSE BLD-MCNC: 257 MG/DL (ref 70–99)
HBA1C MFR BLD: 7.4 % (ref 0–5.6)
INR PPP: 2.47 (ref 0.85–1.15)
POTASSIUM BLD-SCNC: 5.1 MMOL/L (ref 3.4–5.3)
SODIUM SERPL-SCNC: 142 MMOL/L (ref 135–145)

## 2025-01-02 RX ORDER — METFORMIN HYDROCHLORIDE 500 MG/1
1000 TABLET, EXTENDED RELEASE ORAL
Qty: 180 TABLET | Refills: 0 | Status: SHIPPED | OUTPATIENT
Start: 2025-01-02

## 2025-01-02 RX ORDER — AMLODIPINE BESYLATE 5 MG/1
5 TABLET ORAL DAILY
Qty: 30 TABLET | Refills: 1 | Status: SHIPPED | OUTPATIENT
Start: 2025-01-02

## 2025-01-02 NOTE — PROGRESS NOTES
Faculty Supervision of Residents   I have examined this patient and the medical care has been evaluated and discussed with the resident. See resident note outlining our discussion.    Erinn Mills MD

## 2025-01-02 NOTE — PROGRESS NOTES
ANTICOAGULATION MANAGEMENT     Gonsalo Blakely 59 year old male is on warfarin with therapeutic INR result. (Goal INR 2.0-3.0)    Recent labs: (last 7 days)     01/02/25  0836   INR 2.47*       ASSESSMENT     Source(s): Chart Review  Previous INR was Subtherapeutic  Medication, diet, health changes since last INR chart reviewed; none identified         PLAN     Recommended plan for no diet, medication or health factor changes affecting INR     Dosing Instructions: Continue your current warfarin dose with next INR in 3 weeks       Summary  As of 1/2/2025      Full warfarin instructions:  7.5 mg every Sun, Tue; 10 mg all other days   Next INR check:  1/23/2025               Detailed voice message left for Gonsalo with dosing instructions and follow up date.   [employer] message with dosing and follow up instructions    Contact [phone] to schedule and with any changes, questions or concerns.     Education provided: Please call back if any changes to your diet, medications or how you've been taking warfarin    Plan made per [facility] anticoagulation protocol    Dbeorah Forrest RN  1/2/2025  Anticoagulation Clinic  [employer] for routing messages: p [facility] patient phone line: [phone]        _______________________________________________________________________     Anticoagulation Episode Summary       Current INR goal:  2.0-3.0   TTR:  33.1% (5.2 mo)   Target end date:  Indefinite   Send INR reminders to:  [facility]    Indications    S/P CABG (coronary artery bypass graft) [Z95.1]             Comments:  --             Anticoagulation Care Providers       Provider Role Specialty Phone number    Joyce Alaniz PA-C Referring  [phone]

## 2025-01-02 NOTE — PROGRESS NOTES
Assessment & Plan     Benign essential hypertension  Had seen Dr Alejandro 11/25 and was found to be hyperkalemic at 6.0, so losartan and spironolactone were discontinued. Follow-up 12/18 with K 4.4. Restarted losartan 25 mg daily at that time. Also on metoprolol 75 mg daily and Lasix 40 mg daily.  Not monitoring home BP. BP in office 158/103 today. BMP with K 5.1 so hesitant to increase losartan today. Euvolemic on exam. Will  add amlodipine and follow-up in 2 weeks.   - Start amlodipine 5 mg daily    - Continue losartan 25 mg daily, metoprolol 75 mg daily, Lasix 40 mg daily.  - Basic metabolic panel  - follow-up in 2 weeks     Type 2 diabetes mellitus without complication, without long-term current use of insulin (H)  A1C 7.4% today, above goal of < 7. Patient attributes this increase to eating habits during holiday season. On Farxiga 10 mg, metformin 500 mg. Will increase metformin.  - Increase metformin to 1000 mg daily   - Continue Farxiga  - HEMOGLOBIN A1C    Atrial fibrillation on warfarin   Due for INR check today, managed by anticoag team.   - INR     Follow-up in 2 weeks for BP    Subjective   Gonsalo is a 59 year old, presenting for the following health issues:  Hypertension        12/18/2024     7:56 AM   Additional Questions   Roomed by Martina EARLY     PMHx s/p quad bypass CABG 7/2024, HFrEF, HTN, CKD 2, T2DM, a fib on warfarin     BP follow-up   Had seen Dr Alejandro 11/25 and was found to be hyperkalemic at 6.0, so losartan and spironolactone were discontinued.  Saw Dr Gonzalez on 11/29.  BMP recheck at that time with K4.5.  Plan was to restart losartan at 25 mg daily but patient had not yet done this when I saw him on 12/18, so losartan was restarted at that time.  Also on metoprolol 75 mg daily and Lasix 40.    Today:   /88, 144/88   Denies chest pain, shortness of breath, headache, vision changes, edema.   Not checking home BP but planning to  a cuff today.      DM:   Due for A1c  "today.   Current medications: metformin, farxiga.   Last A1C 6.6%.   Goal < 7.     Acute illness.   Reports 2 days of nasal congestion, productive cough.   Occasional dyspnea that improves with albuterol inhaler.   Offered viral testing today but patient declined.   Did discuss that we could do Tamiflu if he is influenza positive but he is not interested in this.         Objective    BP (!) 158/103   Pulse 82   Temp 97.5  F (36.4  C) (Tympanic)   Ht 1.84 m (6' 0.44\")   Wt 79.9 kg (176 lb 1.3 oz)   BMI 23.59 kg/m    Body mass index is 23.59 kg/m .  Physical Exam   GENERAL: alert and no distress, nasal congestion  NECK: no adenopathy, no asymmetry, masses, or scars  RESP: faint wheezes in [place], otherwise clear with no crackles  CV: regular rate and rhythm, normal S1 S2, no S3 or S4, no murmur, click or rub, no peripheral edema  ABDOMEN: soft, nontender, no hepatosplenomegaly, no masses and bowel sounds normal  MS: no gross musculoskeletal defects noted, no edema    Results for orders placed or performed in visit on 01/02/25 (from the past 24 hours)   Basic metabolic panel   Result Value Ref Range    Sodium 142 135 - 145 mmol/L    Potassium 5.1 3.4 - 5.3 mmol/L    Chloride 98 94 - 109 mmol/L    Carbon Dioxide (CO2) 31 20 - 32 mmol/L    Anion Gap 13 3 - 14 mmol/L    Urea Nitrogen 25 7 - 30 mg/dL    Creatinine 1.40 (H) 0.66 - 1.25 mg/dL    GFR Estimate 57 (L) >60 mL/min/1.73m2    Calcium 10.0 8.5 - 10.1 mg/dL    Glucose 257 (H) 70 - 99 mg/dL   HEMOGLOBIN A1C   Result Value Ref Range    Estimated Average Glucose 166 (H) <117 mg/dL    Hemoglobin A1C 7.4 (H) 0.0 - 5.6 %           The longitudinal plan of care for the diagnosis(es)/condition(s) as documented were addressed during this visit. Due to the added complexity in care, I will continue to support Gonsalo in the subsequent management and with ongoing continuity of care.     Signed Electronically by: Amelia Vanessa MD    "

## 2025-01-07 ENCOUNTER — TELEPHONE (OUTPATIENT)
Dept: FAMILY MEDICINE | Facility: CLINIC | Age: 60
End: 2025-01-07

## 2025-01-07 ENCOUNTER — OFFICE VISIT (OUTPATIENT)
Dept: FAMILY MEDICINE | Facility: CLINIC | Age: 60
End: 2025-01-07
Payer: MEDICAID

## 2025-01-07 VITALS
OXYGEN SATURATION: 91 % | SYSTOLIC BLOOD PRESSURE: 134 MMHG | RESPIRATION RATE: 18 BRPM | HEART RATE: 96 BPM | TEMPERATURE: 98.6 F | WEIGHT: 178.12 LBS | HEIGHT: 72 IN | DIASTOLIC BLOOD PRESSURE: 79 MMHG | BODY MASS INDEX: 24.12 KG/M2

## 2025-01-07 DIAGNOSIS — J06.9 VIRAL URI WITH COUGH: Primary | ICD-10-CM

## 2025-01-07 DIAGNOSIS — R09.81 NASAL CONGESTION: ICD-10-CM

## 2025-01-07 DIAGNOSIS — H61.23 BILATERAL IMPACTED CERUMEN: ICD-10-CM

## 2025-01-07 DIAGNOSIS — R09.82 POST-NASAL DRIP: ICD-10-CM

## 2025-01-07 RX ORDER — IPRATROPIUM BROMIDE 42 UG/1
2 SPRAY, METERED NASAL 4 TIMES DAILY
Qty: 15 ML | Refills: 1 | Status: SHIPPED | OUTPATIENT
Start: 2025-01-07

## 2025-01-07 NOTE — PROGRESS NOTES
Assessment & Plan     Gonsalo Blakely is a 59 year old male with pmhx including CAD s/p CABG with ICM, T2DM seen today for the following      ICD-10-CM    1. Viral URI with cough  J06.9       2. Nasal congestion  R09.81 ipratropium (ATROVENT) 0.06 % nasal spray     sodium chloride (OCEAN) 0.65 % nasal spray      3. Post-nasal drip  R09.82 ipratropium (ATROVENT) 0.06 % nasal spray     sodium chloride (OCEAN) 0.65 % nasal spray      4. Bilateral impacted cerumen  H61.23 carbamide peroxide (DEBROX) 6.5 % otic solution        Primarily seen today to discuss treatments available to him for symptomatic control of likely viral URI given his history of coronary artery disease and heart failure.  Reviewed avoiding decongestions particular those with ephedrine type ingredients.  Additionally likely avoid things like NyQuil or DayQuil as a typically have additional Tylenol twice taking.  Reviewed avoiding NSAIDs as well including Martina-[place].  Recommended Atrovent nasal spray for postnasal drip and nightly cough, regular use of sodium nasal spray and may try Illlian pot if he wishes.  Will send with Debrox for cerumen - may return to remove if needed.     Benjamin Rosenstein, MD, MA  [facility]     This note was completed with the assistance of dictation software. Typos and word substitution-errors are expected and unintended.        Subjective   Gonsalo is a 59 year old, presenting for the following health issues:  sleep (Pt couldn't sleep and pt have been sick for 7 days. ) and Recheck Medication (Pt want to recheck on medication. )      1/7/2025    11:22 AM   Additional Questions   Roomed by Hser   Accompanied by girlfriend         1/7/2025    Information    services provided? No     HPI     Coming in today with symptoms of cough and congestion for the last 7 days.  This is particularly worse at night when he lays down.  Has to cough a lot when he lays down.  Also can feel  "little bit tight with his breathing.  Describes it feels similar to when he had difficulties with heart failure last year, but only when laying down.  No recent fevers.  Has not taken anything for the symptoms.  He did  NyQuil and Martina-[place] but noted the said to not take if you have heart condition or if you are taking warfarin.  Reviewed the likely reasons behind this.        Objective    /79   Temp 98.6  F (37  C) (Tympanic)   Resp 18   Ht 1.835 m (6' 0.24\")   Wt 80.8 kg (178 lb 1.9 oz)   BMI 23.99 kg/m    Body mass index is 23.99 kg/m .  Physical Exam     General: Awake, seated comfortably, appears well and NAD   HENT: Mild pharyngeal erythema, no exudate. Nasal mucosa mildly erythematous with moderate thick mucus, no purulent drainage. Bilateral cerumen impaction  CV: RRR, normal S1/S2, no murmurs/rubs/gallops  Pulm: Normal WOB, lungs CTAB, no wheezes or crackles  Neuro: Alert, answering questions appropriately, normal thought processes; Normal Gait           Signed Electronically by: Benjamin Rosenstein, MD    "

## 2025-01-07 NOTE — TELEPHONE ENCOUNTER
Medication Question or Refill        What medication are you calling about (include dose and sig)?: Sinus medication      Patient offered an appointment? Yes: Patient is schedule today 11:20 w/ Rosenstein.    Do you have any questions or concerns?  Yes: Patient is requesting a call back from a nurse.  Patient would like to know what medication Patient can take for sinuses that patient is currently experiencing, Patient stated was told not to take over the counter sinus medication due to medications patient is currently taking for heart failure. Please call patient back to discuss this further. Thank you!       Could we send this information to you in [employer] or would you prefer to receive a phone call?:   Patient has no preference  Okay to leave a detailed message?: Yes at Cell number on file:    Telephone Information:   Mobile [phone]

## 2025-01-13 ENCOUNTER — APPOINTMENT (OUTPATIENT)
Dept: RADIOLOGY | Facility: HOSPITAL | Age: 60
DRG: 190 | End: 2025-01-13
Attending: EMERGENCY MEDICINE
Payer: COMMERCIAL

## 2025-01-13 ENCOUNTER — HOSPITAL ENCOUNTER (INPATIENT)
Facility: HOSPITAL | Age: 60
LOS: 3 days | Discharge: HOME | End: 2025-01-16
Attending: EMERGENCY MEDICINE
Payer: COMMERCIAL

## 2025-01-13 DIAGNOSIS — J44.1 COPD EXACERBATION (H): Primary | ICD-10-CM

## 2025-01-13 DIAGNOSIS — J20.9 ACUTE BRONCHITIS, UNSPECIFIED ORGANISM: ICD-10-CM

## 2025-01-13 DIAGNOSIS — J96.01 ACUTE HYPOXEMIC RESPIRATORY FAILURE (H): ICD-10-CM

## 2025-01-13 LAB
ANION GAP SERPL CALCULATED.3IONS-SCNC: 9 MMOL/L (ref 7–15)
BACTERIA SPT CULT: NORMAL
BASE EXCESS BLDV CALC-SCNC: 9.8 MMOL/L (ref -3–3)
BASOPHILS # BLD AUTO: 0.1 10E3/UL (ref 0–0.2)
BASOPHILS NFR BLD AUTO: 1 %
BUN SERPL-MCNC: 22.2 MG/DL (ref 8–23)
CALCIUM SERPL-MCNC: 9.9 MG/DL (ref 8.8–10.4)
CHLORIDE SERPL-SCNC: 97 MMOL/L (ref 98–107)
CREAT SERPL-MCNC: 1.1 MG/DL (ref 0.67–1.17)
EGFRCR SERPLBLD CKD-EPI 2021: 76 ML/MIN/1.73M2
EOSINOPHIL # BLD AUTO: 0.2 10E3/UL (ref 0–0.7)
EOSINOPHIL NFR BLD AUTO: 1 %
ERYTHROCYTE [DISTWIDTH] IN BLOOD BY AUTOMATED COUNT: 14.1 % (ref 10–15)
FLUAV RNA SPEC QL NAA+PROBE: NEGATIVE
FLUBV RNA RESP QL NAA+PROBE: NEGATIVE
GLUCOSE BLDC GLUCOMTR-MCNC: 138 MG/DL (ref 70–99)
GLUCOSE BLDC GLUCOMTR-MCNC: 198 MG/DL (ref 70–99)
GLUCOSE SERPL-MCNC: 124 MG/DL (ref 70–99)
GRAM STAIN RESULT: NORMAL
HCO3 BLDV-SCNC: 40 MMOL/L (ref 21–28)
HCO3 SERPL-SCNC: 34 MMOL/L (ref 22–29)
HCT VFR BLD AUTO: 50.3 % (ref 40–53)
HGB BLD-MCNC: 16.2 G/DL (ref 13.3–17.7)
IMM GRANULOCYTES # BLD: 0.1 10E3/UL
IMM GRANULOCYTES NFR BLD: 1 %
INR PPP: 1.28 (ref 0.85–1.15)
LYMPHOCYTES # BLD AUTO: 1.4 10E3/UL (ref 0.8–5.3)
LYMPHOCYTES NFR BLD AUTO: 10 %
MCH RBC QN AUTO: 29.2 PG (ref 26.5–33)
MCHC RBC AUTO-ENTMCNC: 32.2 G/DL (ref 31.5–36.5)
MCV RBC AUTO: 91 FL (ref 78–100)
MONOCYTES # BLD AUTO: 0.9 10E3/UL (ref 0–1.3)
MONOCYTES NFR BLD AUTO: 7 %
NEUTROPHILS # BLD AUTO: 11.2 10E3/UL (ref 1.6–8.3)
NEUTROPHILS NFR BLD AUTO: 81 %
NRBC # BLD AUTO: 0 10E3/UL
NRBC BLD AUTO-RTO: 0 /100
NT-PROBNP SERPL-MCNC: 1552 PG/ML (ref 0–900)
O2/TOTAL GAS SETTING VFR VENT: 21 %
OXYHGB MFR BLDV: 33 % (ref 70–75)
PCO2 BLDV: 76 MM HG (ref 40–50)
PH BLDV: 7.33 [PH] (ref 7.32–7.43)
PLATELET # BLD AUTO: 233 10E3/UL (ref 150–450)
PO2 BLDV: 23 MM HG (ref 25–47)
POTASSIUM SERPL-SCNC: 4.5 MMOL/L (ref 3.4–5.3)
RBC # BLD AUTO: 5.55 10E6/UL (ref 4.4–5.9)
RSV RNA SPEC NAA+PROBE: NEGATIVE
SAO2 % BLDV: 34 % (ref 70–75)
SARS-COV-2 RNA RESP QL NAA+PROBE: NEGATIVE
SODIUM SERPL-SCNC: 140 MMOL/L (ref 135–145)
TROPONIN T SERPL HS-MCNC: 18 NG/L
WBC # BLD AUTO: 13.8 10E3/UL (ref 4–11)

## 2025-01-13 PROCEDURE — 36415 COLL VENOUS BLD VENIPUNCTURE: CPT | Performed by: EMERGENCY MEDICINE

## 2025-01-13 PROCEDURE — 250N000013 HC RX MED GY IP 250 OP 250 PS 637: Performed by: FAMILY MEDICINE

## 2025-01-13 PROCEDURE — 99291 CRITICAL CARE FIRST HOUR: CPT | Mod: 25

## 2025-01-13 PROCEDURE — 85610 PROTHROMBIN TIME: CPT | Performed by: EMERGENCY MEDICINE

## 2025-01-13 PROCEDURE — 99223 1ST HOSP IP/OBS HIGH 75: CPT | Mod: AI

## 2025-01-13 PROCEDURE — 84484 ASSAY OF TROPONIN QUANT: CPT | Performed by: EMERGENCY MEDICINE

## 2025-01-13 PROCEDURE — 94640 AIRWAY INHALATION TREATMENT: CPT | Mod: 76

## 2025-01-13 PROCEDURE — 120N000001 HC R&B MED SURG/OB

## 2025-01-13 PROCEDURE — 71045 X-RAY EXAM CHEST 1 VIEW: CPT

## 2025-01-13 PROCEDURE — 87205 SMEAR GRAM STAIN: CPT | Performed by: EMERGENCY MEDICINE

## 2025-01-13 PROCEDURE — 250N000011 HC RX IP 250 OP 636: Performed by: EMERGENCY MEDICINE

## 2025-01-13 PROCEDURE — 82435 ASSAY OF BLOOD CHLORIDE: CPT | Performed by: EMERGENCY MEDICINE

## 2025-01-13 PROCEDURE — 999N000157 HC STATISTIC RCP TIME EA 10 MIN

## 2025-01-13 PROCEDURE — 82962 GLUCOSE BLOOD TEST: CPT

## 2025-01-13 PROCEDURE — 85025 COMPLETE CBC W/AUTO DIFF WBC: CPT | Performed by: EMERGENCY MEDICINE

## 2025-01-13 PROCEDURE — 87633 RESP VIRUS 12-25 TARGETS: CPT | Performed by: EMERGENCY MEDICINE

## 2025-01-13 PROCEDURE — 87070 CULTURE OTHR SPECIMN AEROBIC: CPT | Performed by: EMERGENCY MEDICINE

## 2025-01-13 PROCEDURE — 82805 BLOOD GASES W/O2 SATURATION: CPT | Performed by: EMERGENCY MEDICINE

## 2025-01-13 PROCEDURE — 93005 ELECTROCARDIOGRAM TRACING: CPT | Performed by: EMERGENCY MEDICINE

## 2025-01-13 PROCEDURE — 94640 AIRWAY INHALATION TREATMENT: CPT

## 2025-01-13 PROCEDURE — 83880 ASSAY OF NATRIURETIC PEPTIDE: CPT | Performed by: EMERGENCY MEDICINE

## 2025-01-13 PROCEDURE — 250N000009 HC RX 250

## 2025-01-13 PROCEDURE — 250N000013 HC RX MED GY IP 250 OP 250 PS 637

## 2025-01-13 PROCEDURE — 250N000009 HC RX 250: Performed by: EMERGENCY MEDICINE

## 2025-01-13 PROCEDURE — 87637 SARSCOV2&INF A&B&RSV AMP PRB: CPT | Performed by: EMERGENCY MEDICINE

## 2025-01-13 PROCEDURE — 80048 BASIC METABOLIC PNL TOTAL CA: CPT | Performed by: EMERGENCY MEDICINE

## 2025-01-13 RX ORDER — WARFARIN SODIUM 2.5 MG/1
7.5-1 TABLET ORAL SEE ADMIN INSTRUCTIONS
Status: DISCONTINUED | OUTPATIENT
Start: 2025-01-13 | End: 2025-01-15

## 2025-01-13 RX ORDER — IPRATROPIUM BROMIDE AND ALBUTEROL SULFATE 2.5; .5 MG/3ML; MG/3ML
3 SOLUTION RESPIRATORY (INHALATION)
Status: DISCONTINUED | OUTPATIENT
Start: 2025-01-13 | End: 2025-01-15

## 2025-01-13 RX ORDER — AMOXICILLIN 250 MG
1 CAPSULE ORAL 2 TIMES DAILY PRN
Status: DISCONTINUED | OUTPATIENT
Start: 2025-01-13 | End: 2025-01-16 | Stop reason: HOSPADM

## 2025-01-13 RX ORDER — METFORMIN HYDROCHLORIDE 500 MG/1
1000 TABLET, EXTENDED RELEASE ORAL
COMMUNITY

## 2025-01-13 RX ORDER — METHYLPREDNISOLONE SODIUM SUCCINATE 125 MG/2ML
125 INJECTION INTRAMUSCULAR; INTRAVENOUS ONCE
Status: DISCONTINUED | OUTPATIENT
Start: 2025-01-13 | End: 2025-01-13

## 2025-01-13 RX ORDER — DAPAGLIFLOZIN 5 MG/1
10 TABLET, FILM COATED ORAL DAILY
Status: DISCONTINUED | OUTPATIENT
Start: 2025-01-14 | End: 2025-01-16 | Stop reason: HOSPADM

## 2025-01-13 RX ORDER — DEXTROSE MONOHYDRATE 25 G/50ML
25-50 INJECTION, SOLUTION INTRAVENOUS
Status: DISCONTINUED | OUTPATIENT
Start: 2025-01-13 | End: 2025-01-16 | Stop reason: HOSPADM

## 2025-01-13 RX ORDER — WARFARIN SODIUM 5 MG/1
10 TABLET ORAL
Status: COMPLETED | OUTPATIENT
Start: 2025-01-13 | End: 2025-01-13

## 2025-01-13 RX ORDER — AMLODIPINE BESYLATE 5 MG/1
5 TABLET ORAL DAILY
Status: DISCONTINUED | OUTPATIENT
Start: 2025-01-14 | End: 2025-01-16 | Stop reason: HOSPADM

## 2025-01-13 RX ORDER — IPRATROPIUM BROMIDE AND ALBUTEROL SULFATE 2.5; .5 MG/3ML; MG/3ML
3 SOLUTION RESPIRATORY (INHALATION)
Status: COMPLETED | OUTPATIENT
Start: 2025-01-13 | End: 2025-01-13

## 2025-01-13 RX ORDER — CEFTRIAXONE 2 G/1
2 INJECTION, POWDER, FOR SOLUTION INTRAMUSCULAR; INTRAVENOUS ONCE
Status: COMPLETED | OUTPATIENT
Start: 2025-01-13 | End: 2025-01-13

## 2025-01-13 RX ORDER — PROCHLORPERAZINE MALEATE 10 MG
10 TABLET ORAL EVERY 6 HOURS PRN
Status: DISCONTINUED | OUTPATIENT
Start: 2025-01-13 | End: 2025-01-16 | Stop reason: HOSPADM

## 2025-01-13 RX ORDER — IPRATROPIUM BROMIDE AND ALBUTEROL SULFATE 2.5; .5 MG/3ML; MG/3ML
3 SOLUTION RESPIRATORY (INHALATION)
Status: DISCONTINUED | OUTPATIENT
Start: 2025-01-13 | End: 2025-01-13

## 2025-01-13 RX ORDER — ALBUTEROL SULFATE 90 UG/1
1-2 INHALANT RESPIRATORY (INHALATION) EVERY 6 HOURS PRN
Status: DISCONTINUED | OUTPATIENT
Start: 2025-01-13 | End: 2025-01-16 | Stop reason: HOSPADM

## 2025-01-13 RX ORDER — DOXYCYCLINE 100 MG/10ML
100 INJECTION, POWDER, LYOPHILIZED, FOR SOLUTION INTRAVENOUS ONCE
Status: COMPLETED | OUTPATIENT
Start: 2025-01-13 | End: 2025-01-13

## 2025-01-13 RX ORDER — AMOXICILLIN 250 MG
2 CAPSULE ORAL 2 TIMES DAILY PRN
Status: DISCONTINUED | OUTPATIENT
Start: 2025-01-13 | End: 2025-01-16 | Stop reason: HOSPADM

## 2025-01-13 RX ORDER — ONDANSETRON 4 MG/1
4 TABLET, ORALLY DISINTEGRATING ORAL EVERY 6 HOURS PRN
Status: DISCONTINUED | OUTPATIENT
Start: 2025-01-13 | End: 2025-01-16 | Stop reason: HOSPADM

## 2025-01-13 RX ORDER — ACETAMINOPHEN 325 MG/1
650 TABLET ORAL EVERY 4 HOURS PRN
Status: DISCONTINUED | OUTPATIENT
Start: 2025-01-13 | End: 2025-01-16 | Stop reason: HOSPADM

## 2025-01-13 RX ORDER — METOPROLOL SUCCINATE 50 MG/1
50 TABLET, EXTENDED RELEASE ORAL EVERY MORNING
Status: DISCONTINUED | OUTPATIENT
Start: 2025-01-14 | End: 2025-01-16 | Stop reason: HOSPADM

## 2025-01-13 RX ORDER — DOXYCYCLINE 100 MG/10ML
100 INJECTION, POWDER, LYOPHILIZED, FOR SOLUTION INTRAVENOUS EVERY 24 HOURS
Status: DISCONTINUED | OUTPATIENT
Start: 2025-01-14 | End: 2025-01-16 | Stop reason: HOSPADM

## 2025-01-13 RX ORDER — CALCIUM CARBONATE 500 MG/1
1000 TABLET, CHEWABLE ORAL 4 TIMES DAILY PRN
Status: DISCONTINUED | OUTPATIENT
Start: 2025-01-13 | End: 2025-01-16 | Stop reason: HOSPADM

## 2025-01-13 RX ORDER — WARFARIN SODIUM 5 MG/1
7.5-1 TABLET ORAL SEE ADMIN INSTRUCTIONS
COMMUNITY

## 2025-01-13 RX ORDER — METHYLPREDNISOLONE SODIUM SUCCINATE 125 MG/2ML
125 INJECTION INTRAMUSCULAR; INTRAVENOUS ONCE
Status: COMPLETED | OUTPATIENT
Start: 2025-01-13 | End: 2025-01-13

## 2025-01-13 RX ORDER — METOPROLOL SUCCINATE 25 MG/1
25 TABLET, EXTENDED RELEASE ORAL EVERY MORNING
COMMUNITY

## 2025-01-13 RX ORDER — ACETAMINOPHEN 650 MG/1
650 SUPPOSITORY RECTAL EVERY 4 HOURS PRN
Status: DISCONTINUED | OUTPATIENT
Start: 2025-01-13 | End: 2025-01-16 | Stop reason: HOSPADM

## 2025-01-13 RX ORDER — PREDNISONE 20 MG/1
40 TABLET ORAL DAILY
Status: DISCONTINUED | OUTPATIENT
Start: 2025-01-14 | End: 2025-01-16 | Stop reason: HOSPADM

## 2025-01-13 RX ORDER — METFORMIN HYDROCHLORIDE 500 MG/1
1000 TABLET, EXTENDED RELEASE ORAL
Status: DISCONTINUED | OUTPATIENT
Start: 2025-01-14 | End: 2025-01-16 | Stop reason: HOSPADM

## 2025-01-13 RX ORDER — CEFTRIAXONE 1 G/1
1 INJECTION, POWDER, FOR SOLUTION INTRAMUSCULAR; INTRAVENOUS EVERY 24 HOURS
Status: DISCONTINUED | OUTPATIENT
Start: 2025-01-14 | End: 2025-01-16 | Stop reason: HOSPADM

## 2025-01-13 RX ORDER — FUROSEMIDE 20 MG/1
40 TABLET ORAL DAILY
Status: DISCONTINUED | OUTPATIENT
Start: 2025-01-14 | End: 2025-01-16 | Stop reason: HOSPADM

## 2025-01-13 RX ORDER — NICOTINE 21 MG/24HR
1 PATCH, TRANSDERMAL 24 HOURS TRANSDERMAL DAILY
Status: DISCONTINUED | OUTPATIENT
Start: 2025-01-13 | End: 2025-01-16 | Stop reason: HOSPADM

## 2025-01-13 RX ORDER — LOSARTAN POTASSIUM 25 MG/1
25 TABLET ORAL DAILY
Status: DISCONTINUED | OUTPATIENT
Start: 2025-01-14 | End: 2025-01-16 | Stop reason: HOSPADM

## 2025-01-13 RX ORDER — METOPROLOL SUCCINATE 25 MG/1
25 TABLET, EXTENDED RELEASE ORAL EVERY MORNING
Status: DISCONTINUED | OUTPATIENT
Start: 2025-01-14 | End: 2025-01-16 | Stop reason: HOSPADM

## 2025-01-13 RX ORDER — IPRATROPIUM BROMIDE 42 UG/1
2 SPRAY, METERED NASAL 4 TIMES DAILY
Status: DISCONTINUED | OUTPATIENT
Start: 2025-01-13 | End: 2025-01-16 | Stop reason: HOSPADM

## 2025-01-13 RX ORDER — ATORVASTATIN CALCIUM 40 MG/1
80 TABLET, FILM COATED ORAL DAILY
Status: DISCONTINUED | OUTPATIENT
Start: 2025-01-14 | End: 2025-01-16 | Stop reason: HOSPADM

## 2025-01-13 RX ORDER — DOXYCYCLINE 100 MG/10ML
100 INJECTION, POWDER, LYOPHILIZED, FOR SOLUTION INTRAVENOUS ONCE
Status: DISCONTINUED | OUTPATIENT
Start: 2025-01-14 | End: 2025-01-13

## 2025-01-13 RX ORDER — METOPROLOL SUCCINATE 50 MG/1
50 TABLET, EXTENDED RELEASE ORAL EVERY MORNING
COMMUNITY

## 2025-01-13 RX ORDER — LIDOCAINE 40 MG/G
CREAM TOPICAL
Status: DISCONTINUED | OUTPATIENT
Start: 2025-01-13 | End: 2025-01-16 | Stop reason: HOSPADM

## 2025-01-13 RX ORDER — POLYETHYLENE GLYCOL 3350 17 G/17G
17 POWDER, FOR SOLUTION ORAL 2 TIMES DAILY PRN
Status: DISCONTINUED | OUTPATIENT
Start: 2025-01-13 | End: 2025-01-16 | Stop reason: HOSPADM

## 2025-01-13 RX ORDER — NICOTINE POLACRILEX 4 MG
15-30 LOZENGE BUCCAL
Status: DISCONTINUED | OUTPATIENT
Start: 2025-01-13 | End: 2025-01-16 | Stop reason: HOSPADM

## 2025-01-13 RX ORDER — ONDANSETRON 2 MG/ML
4 INJECTION INTRAMUSCULAR; INTRAVENOUS EVERY 6 HOURS PRN
Status: DISCONTINUED | OUTPATIENT
Start: 2025-01-13 | End: 2025-01-16 | Stop reason: HOSPADM

## 2025-01-13 RX ADMIN — WARFARIN SODIUM 10 MG: 5 TABLET ORAL at 18:37

## 2025-01-13 RX ADMIN — IPRATROPIUM BROMIDE AND ALBUTEROL SULFATE 3 ML: .5; 3 SOLUTION RESPIRATORY (INHALATION) at 12:32

## 2025-01-13 RX ADMIN — NICOTINE 1 PATCH: 14 PATCH, EXTENDED RELEASE TRANSDERMAL at 18:36

## 2025-01-13 RX ADMIN — METHYLPREDNISOLONE SODIUM SUCCINATE 125 MG: 125 INJECTION, POWDER, FOR SOLUTION INTRAMUSCULAR; INTRAVENOUS at 14:45

## 2025-01-13 RX ADMIN — IPRATROPIUM BROMIDE 2 SPRAY: 42 SPRAY NASAL at 21:53

## 2025-01-13 RX ADMIN — IPRATROPIUM BROMIDE AND ALBUTEROL SULFATE 3 ML: .5; 3 SOLUTION RESPIRATORY (INHALATION) at 19:19

## 2025-01-13 RX ADMIN — DOXYCYCLINE 100 MG: 100 INJECTION, POWDER, LYOPHILIZED, FOR SOLUTION INTRAVENOUS at 14:47

## 2025-01-13 RX ADMIN — IPRATROPIUM BROMIDE AND ALBUTEROL SULFATE 3 ML: .5; 3 SOLUTION RESPIRATORY (INHALATION) at 13:11

## 2025-01-13 RX ADMIN — CEFTRIAXONE SODIUM 2 G: 2 INJECTION, POWDER, FOR SOLUTION INTRAMUSCULAR; INTRAVENOUS at 14:47

## 2025-01-13 RX ADMIN — IPRATROPIUM BROMIDE AND ALBUTEROL SULFATE 3 ML: .5; 3 SOLUTION RESPIRATORY (INHALATION) at 12:31

## 2025-01-13 ASSESSMENT — ACTIVITIES OF DAILY LIVING (ADL)
ADLS_ACUITY_SCORE: 59

## 2025-01-13 NOTE — ED TRIAGE NOTES
Pt states that he has been ill for the last couple of weeks. Pt states that he has been having shortness of breath for the last couple of weeks. He also states he has been having chest pressure for the last week. Hx of quadruple bypass in July 2024. Pt has increased work of breathing noted with abdominal muscle use. Pt states he has been sleeping upright for the last couple of weeks.      Triage Assessment (Adult)       Row Name 01/13/25 1153          Triage Assessment    Airway WDL WDL        Respiratory WDL    Respiratory WDL rhythm/pattern;expansion/retractions     Rhythm/Pattern, Respiratory shortness of breath;labored     Expansion/Accessory Muscles/Retractions abdominal muscle use        Skin Circulation/Temperature WDL    Skin Circulation/Temperature WDL WDL        Cardiac WDL    Cardiac WDL chest pain        Chest Pain Assessment    Character pressure        Peripheral/Neurovascular WDL    Peripheral Neurovascular WDL WDL        Cognitive/Neuro/Behavioral WDL    Cognitive/Neuro/Behavioral WDL WDL

## 2025-01-13 NOTE — MEDICATION SCRIBE - ADMISSION MEDICATION HISTORY
Medication Scribe Admission Medication History    Admission medication history is complete. The information provided in this note is only as accurate as the sources available at the time of the update.    Information Source(s): Patient via in-person    Pertinent Information: Patient reports self management of medications.     Changes made to PTA medication list:  Added: None  Deleted: None  Changed: Warfarin dosing to [facility] 1/2/25 and patient report    Allergies reviewed with patient and updates made in EHR: yes    Medication History Completed By: Leonardo Horta 1/13/2025 1:57 PM    PTA Med List   Medication Sig Last Dose/Taking    acetaminophen (TYLENOL) 325 MG tablet Take 2 tablets (650 mg) by mouth every 4 hours as needed for other (For optimal non-opioid multimodal pain management to improve pain control.). Taking As Needed    albuterol (PROAIR HFA/PROVENTIL HFA/VENTOLIN HFA) 108 (90 Base) MCG/ACT inhaler Inhale 1-2 puffs into the lungs every 6 hours as needed for shortness of breath, wheezing or cough Taking As Needed    amLODIPine (NORVASC) 5 MG tablet Take 1 tablet (5 mg) by mouth daily. 1/13/2025 Morning    atorvastatin (LIPITOR) 80 MG tablet Take 1 tablet (80 mg) by mouth daily. 1/13/2025 Morning    carbamide peroxide (DEBROX) 6.5 % otic solution Place 5 drops into both ears 2 times daily. 1/13/2025 Morning    dapagliflozin (FARXIGA) 10 MG TABS tablet Take 1 tablet (10 mg) by mouth daily. 1/13/2025 Morning    furosemide (LASIX) 40 MG tablet Take 1 tablet (40 mg) by mouth daily. 1/13/2025 Morning    ipratropium (ATROVENT) 0.06 % nasal spray Spray 2 sprays into both nostrils 4 times daily. 1/13/2025 Morning    losartan (COZAAR) 25 MG tablet Take 1 tablet (25 mg) by mouth daily. 1/13/2025 Morning    metFORMIN (GLUCOPHAGE XR) 500 MG 24 hr tablet Take 1,000 mg by mouth daily (with breakfast). 1/13/2025 Morning    metoprolol succinate ER (TOPROL XL) 25 MG 24 hr tablet Take 25 mg by mouth every morning. Take 25mg in  addition to 50mg for a total of 75mg by mouth daily 1/13/2025 Morning    metoprolol succinate ER (TOPROL XL) 50 MG 24 hr tablet Take 50 mg by mouth every morning. Take 25mg in addition to 50mg for a total of 75mg by mouth daily 1/13/2025 Morning    sodium chloride (OCEAN) 0.65 % nasal spray Use as needed for congestion Taking    tiotropium (SPIRIVA RESPIMAT) 2.5 MCG/ACT inhaler Inhale 2 puffs into the lungs daily. 1/13/2025 Morning    warfarin ANTICOAGULANT (COUMADIN) 5 MG tablet Take 7.5-10 mg by mouth See Admin Instructions. Take 1.5 tablets (7.5 mg total) on Sunday and Tuesday evenings. All other evenings of the week take 2 tablets (10 mg total) 1/12/2025 Evening

## 2025-01-13 NOTE — H&P
[facility]    History and Physical - Hospitalist Service       Date of Admission:  1/13/2025    Assessment & Plan      Gonsalo Blakely is a 59 year old male admitted on 1/13/2025. He has a history of COPD, current smoker, CAD s/p CABG July 2024, T2DM, HTN and is admitted for AHRF likely secondary to COPD exacerbation.    Acute hypoxic respiratory failure  COPD exacerbation   Leukocytosis  Reports 2 to 3 weeks of flulike symptoms progressively worsening shortness of breath and cough.  Has history of COPD and has been taking his controller inhaler and albuterol x4 daily with no relief.  Patient reports worsening of SOB with exertion at times. Also cough has been more productive of greenish sputum for the past few days. No fever. COVID/flu/RSV negative.  Lab work notable for mildly elevated white count 13.8 otherwise unremarkable.  Chest x-ray with no signs of focal pneumonia or pulmonary edema.  Initially on admission to the ED significant SOB hypoxic to 88%.  Patient notes some improvement after receiving DuoNeb x3. Given si/sx presentation is likely due to COPD exacerbation, given slightly elevated white count pneumonia. Could also be CHF exacerbation although BNP remained stable 1,552 compared to previous and doesn't appear hypervolemic on exam.  Denies chest pain troponin normal.  -Received Solu-Medrol  -Continue on ceftriaxone and doxycycline  -Received DuoNeb x 3  -Continue duoneb 4x and as needed   -Daily CBC, BMP  -Respiratory panel pending      HFpEF  History of A-fib  CAD, S/p CABG July 2024  On warfarin for A-fib. No chest pain EKG showed sinus rhythm, no acute ischemic changes.on PTA lasix. Doesn't appear hypervolemic on exam and BNP stable. Will continue on home lasix and consider increasing dose if not improving.  Hold PTA Farxiga and metformin.  -Daily BMP  -Continue PTA lasix 40 mg daily  -Continue PTA metoprolol      Type 2 diabetes  Most recent A1c 7.4, currently on  metformin Farxiga. Will start NPH 25 units to cover methylpred. Will start sliding scale insulin.   - NPH 25 units with methypred  - MSSI  - Diabetic diet    Chronic conditions  HTN:amlodpine 5 mg  HLD: Continue Lipitor 80 mg                  Diet: Moderate Consistent Carb (60 g CHO per Meal) Diet  DVT Prophylaxis: Warfarin  Vincent Catheter: Not present  Fluids: po  Lines: None     Cardiac Monitoring: None  Code Status: Full Code    Clinically Significant Risk Factors Present on Admission          # Hypochloremia: Lowest Cl = 97 mmol/L in last 2 days, will monitor as appropriate         # Drug Induced Coagulation Defect: home medication list includes an anticoagulant medication    # Hypertension: Noted on problem list          # DMII: A1C = 7.4 % (Ref range: 0.0 - 5.6 %) within past 6 months         # History of CABG: noted on surgical history       Disposition Plan      Expected Discharge Date: 01/15/2025                The patient's care was discussed with the Attending Physician, Dr. Foss .      Prema Duron MD  Hospitalist Service  [facility]  Securely message with [employer] (more info)  Text page via [employer] Paging/Directory   ______________________________________________________________________    Chief Complaint   Sob  URI symptoms     History is obtained from the patient    History of Present Illness   Gonsalo Blakely is a 59 year old male who has a history of CAD, COPD, HLD, T2DM, HTN and is admitted for URI symptoms which got worse oer the past 2- 3 weeks also notes SOB progressively worsening has been using albuterol up to 4x times daily, no relief. Cough has become more productive and is greenish for the past few days. No fever. Feels sob worsens with exertion but no relief with rest. No GI symptoms. No worsening lower limb swelling.     Past Medical History    Past Medical History:   Diagnosis Date    Asthma     CAD (coronary artery disease)     Chronic obstructive pulmonary  disease (H)     Dyslipidemia     Heart failure with reduced ejection fraction, NYHA class I (H)     HTN (hypertension)     Ischemic cardiomyopathy     Type 2 diabetes mellitus (H)        Past Surgical History   Past Surgical History:   Procedure Laterality Date    CORONARY ARTERY BYPASS GRAFT, WITH ENDOSCOPIC VESSEL PROCUREMENT N/A 7/15/2024    Procedure: CORONARY ARTERY BYPASS GRAFT TIMES FOUR, INTERNAL MAMMARY ARTERY HARVEST,  LEFT LEG ENDOSCOPIC VESSEL PROCUREMENT , EPIAORTIC ULTRASOUND,;  Surgeon: Shannan Virk MD;  Location: [facility] OR    CV CORONARY ANGIOGRAM N/A 06/18/2024    Procedure: CV CORONARY ANGIOGRAM;  Surgeon: Roman Florence MD;  Location: [facility] CATH LAB CV    CV INTRA AORTIC BALLOON N/A 7/15/2024    Procedure: Intra aortic Balloon Pump Insertion;  Surgeon: Valerio Virk MD;  Location: [facility] CATH LAB CV    CV LEFT HEART CATH N/A 06/18/2024    Procedure: Left Heart Catheterization;  Surgeon: Roman Florence MD;  Location: [facility] LAB CV    OR LIGATION, LEFT ATRIAL APPENDAGE N/A 7/15/2024    Procedure: LIGATION, LEFT ATRIAL APPENDAGE, OPEN;  Surgeon: Shannan Virk MD;  Location: [facility] OR    SHOULDER SURGERY Right     TRANSESOPHAGEAL ECHOCARDIOGRAM INTRAOPERATIVE  7/15/2024    Procedure: ANESTHEIA TRANSESOPHAGEAL ECHOCARDIOGRAM.;  Surgeon: Shannan Virk MD;  Location: [facility] OR       Prior to Admission Medications   Prior to Admission Medications   Prescriptions Last Dose Informant Patient Reported? Taking?   acetaminophen (TYLENOL) 325 MG tablet   No Yes   Sig: Take 2 tablets (650 mg) by mouth every 4 hours as needed for other (For optimal non-opioid multimodal pain management to improve pain control.).   albuterol (PROAIR HFA/PROVENTIL HFA/VENTOLIN HFA) 108 (90 Base) MCG/ACT inhaler   No Yes   Sig: Inhale 1-2 puffs into the lungs every 6 hours as needed for shortness of breath, wheezing or cough   amLODIPine (NORVASC) 5 MG tablet 1/13/2025  Morning  No Yes   Sig: Take 1 tablet (5 mg) by mouth daily.   atorvastatin (LIPITOR) 80 MG tablet 1/13/2025 Morning  No Yes   Sig: Take 1 tablet (80 mg) by mouth daily.   carbamide peroxide (DEBROX) 6.5 % otic solution 1/13/2025 Morning  No Yes   Sig: Place 5 drops into both ears 2 times daily.   dapagliflozin (FARXIGA) 10 MG TABS tablet 1/13/2025 Morning  No Yes   Sig: Take 1 tablet (10 mg) by mouth daily.   furosemide (LASIX) 40 MG tablet 1/13/2025 Morning  No Yes   Sig: Take 1 tablet (40 mg) by mouth daily.   ipratropium (ATROVENT) 0.06 % nasal spray 1/13/2025 Morning  No Yes   Sig: Spray 2 sprays into both nostrils 4 times daily.   losartan (COZAAR) 25 MG tablet 1/13/2025 Morning  No Yes   Sig: Take 1 tablet (25 mg) by mouth daily.   metFORMIN (GLUCOPHAGE XR) 500 MG 24 hr tablet 1/13/2025 Morning  Yes Yes   Sig: Take 1,000 mg by mouth daily (with breakfast).   metoprolol succinate ER (TOPROL XL) 25 MG 24 hr tablet 1/13/2025 Morning  Yes Yes   Sig: Take 25 mg by mouth every morning. Take 25mg in addition to 50mg for a total of 75mg by mouth daily   metoprolol succinate ER (TOPROL XL) 50 MG 24 hr tablet 1/13/2025 Morning  Yes Yes   Sig: Take 50 mg by mouth every morning. Take 25mg in addition to 50mg for a total of 75mg by mouth daily   sodium chloride (OCEAN) 0.65 % nasal spray   No Yes   Sig: Use as needed for congestion   tiotropium (SPIRIVA RESPIMAT) 2.5 MCG/ACT inhaler 1/13/2025 Morning  No Yes   Sig: Inhale 2 puffs into the lungs daily.   warfarin ANTICOAGULANT (COUMADIN) 5 MG tablet 1/12/2025 Evening  Yes Yes   Sig: Take 7.5-10 mg by mouth See Admin Instructions. Take 1.5 tablets (7.5 mg total) on Sunday and Tuesday evenings. All other evenings of the week take 2 tablets (10 mg total)      Facility-Administered Medications: None        Review of Systems    The 10 point Review of Systems is negative other than noted in the HPI or here.     Social History   I have reviewed this patient's social history and  updated it with pertinent information if needed.  Social History     Tobacco Use    Smoking status: Former     Current packs/day: 0.00     Types: Cigarettes     Quit date: 2024     Years since quittin.5     Passive exposure: Past    Smokeless tobacco: Former     Types: Chew     Quit date:     Tobacco comments:     quit recently   Substance Use Topics    Alcohol use: Yes     Comment: 2 beers per month    Drug use: Never         Family History     No significant family history, including no history of:       Allergies   Allergies   Allergen Reactions    Ace Inhibitors Cough        Physical Exam   Vital Signs: Temp: 98.3  F (36.8  C) Temp src: Oral BP: 124/74 Pulse: 101   Resp: 27 SpO2: 92 % O2 Device: Nasal cannula Oxygen Delivery: 2 LPM  Weight: 178 lbs 0 oz      Physical Exam  Constitutional:       General: He is not in acute distress.     Appearance: Normal appearance.   HENT:      Head: Normocephalic and atraumatic.      Nose: Congestion present.      Mouth/Throat:      Mouth: Mucous membranes are moist.      Pharynx: Oropharynx is clear.   Eyes:      General: No scleral icterus.     Conjunctiva/sclera: Conjunctivae normal.   Cardiovascular:      Rate and Rhythm: Normal rate and regular rhythm.      Heart sounds: No murmur heard.  Pulmonary:      Effort: Pulmonary effort is normal.      Breath sounds: Wheezing present.   Abdominal:      General: Bowel sounds are normal. There is no distension.      Palpations: Abdomen is soft.      Tenderness: There is no abdominal tenderness.   Musculoskeletal:         General: No tenderness.      Left lower leg: No edema.   Skin:     General: Skin is warm.      Coloration: Skin is not jaundiced.      Findings: No rash.   Neurological:      General: No focal deficit present.      Mental Status: He is alert and oriented to person, place, and time.   Psychiatric:         Mood and Affect: Mood normal.         Behavior: Behavior normal.         Thought Content: Thought  content normal.         Medical Decision Making             Data     I have personally reviewed the following data over the past 24 hrs:    13.8 (H)  \   16.2   / 233     140 97 (L) 22.2 /  124 (H)   4.5 34 (H) 1.10 \     Trop: 18 BNP: 1,552 (H)     INR:  1.28 (H) PTT:  N/A   D-dimer:  N/A Fibrinogen:  N/A       Imaging results reviewed over the past 24 hrs:   Recent Results (from the past 24 hours)   XR Chest Port 1 View    Narrative    EXAM: XR CHEST PORT 1 VIEW  LOCATION: [facility]  DATE: 1/13/2025    INDICATION: SOB.  COMPARISON: 7/16/2024.      Impression    IMPRESSION: Left chest tube and mediastinal tube have been removed. Left atrial appendage clip is noted. There is a minimal amount of atelectasis present within the inferior portion of the left lung. Right lung is clear. No pleural effusion or   pneumothorax.

## 2025-01-13 NOTE — ED PROVIDER NOTES
EMERGENCY DEPARTMENT ENCOUNTER      NAME: Gonsalo Blakely  AGE: 59 year old male  YOB: 1965  MRN: [id number]  EVALUATION DATE & TIME: 1/13/2025 11:58 AM    PCP: Amelia He    ED PROVIDER: Martha Narayan MD    Chief Complaint   Patient presents with    Shortness of Breath         FINAL IMPRESSION:  1. Acute hypoxemic respiratory failure (H)    2. Acute bronchitis, unspecified organism          ED COURSE & MEDICAL DECISION MAKING:    Pertinent Labs & Imaging studies reviewed. (See chart for details)  59 year old male with history of HTN, HLD, DM, CAD status post CABG July 2024, ischemic cardiomyopathy EF of 50 to 55% who presents to the Emergency Department for evaluation of 3 weeks of flulike symptoms with shortness of breath, cough, chest congestion not improved after he was treated with Atrovent and Ocean nasal spray for viral URI in clinic on the seventh.  Notes that he has been having to sleep upright for several weeks.  Patient hypoxic, tachypneic with coarse breath sounds bilaterally.  Differential includes viral syndrome, influenza, COVID-19, RSV, pneumonia, CHF exacerbation, pulmonary edema.  Less likely symptomatic anemia, arrhythmia, ACS or PE.    Patient initially seen evaluate by myself in triage area due to boarding crisis.  Given his hypoxia he was placed in room, placed on supplemental oxygen IV established and blood obtained.  Twelve-lead EKG sinus rhythm with right bundle branch block.  Given DuoNebs x 3.  CBC, BMP, BNP, troponin, VBG notable for chronic compensated respiratory acidosis, BNP elevated at 1552.  This is down from previous however his previous was written the pedro-CABG area which is not necessarily good comparison.  COVID/influenza/RSV swab negative.  Chest x-ray unremarkable without focal infiltrate.  Unfortunately after the above however patient is not hypoxic 88 to 91% on room air.  Requiring 2 L.  Will cover with Solu-Medrol, Rocephin and doxycycline for  bronchitis.  Advanced viral respiratory panel sent, and patient will be admitted to medicine for further management..       ED Course as of 01/13/25 1454   Mon Jan 13, 2025   1159 SpO2(!): 85 %   1159 Resp(!): 32   1237 WBC(!): 13.8  Appears chronically elevated   1237 PCO2 Venous(!!): 76  Acute on chronic respiratory acidosis though compensated   1244 XR Chest Port 1 View  Chest x-ray independently interpreted by myself with previous sternotomy.  No cardiomegaly infiltrate or effusion   1253 N-Terminal Pro BNP Inpatient(!): 1,552  down from 8708 months ago   1356 Oxygen Delivery: 2 LPM   1427 Admitted to [facility], Dr. Ronneberg       Medical Decision Making  Obtained supplemental history:Supplemental history obtained?: No  Reviewed external records: External records reviewed?: Outpatient Record: 1/7 clinic note  Care impacted by chronic illness:Diabetes, Heart Disease, Hyperlipidemia, and Hypertension  Did you consider but not order tests?: Work up considered but not performed and documented in chart, if applicable  Did you interpret images independently?: Independent interpretation of ECG and images noted in documentation, when applicable.  Consultation discussion with other provider:Did you involve another provider (consultant, MH, pharmacy, etc.)?: I discussed the care with another health care provider, see documentation for details.  Admit.    MIPS: Not Applicable      At the conclusion of the encounter I discussed the results of all of the tests and the disposition. The questions were answered. The patient or family acknowledged understanding and was agreeable with the care plan.    CRITICAL CARE:  Critical Care  Performed by: Martha Narayan MD  Authorized by: Martha Narayan MD     Total critical care time: 42 minutes  Criticalcare time was exclusive of separately billable procedures and treating other patients.  Critical care was necessary to treat or prevent imminent or life-threatening deterioration of the  following conditions: acute Hypoxic respiratory failure  Critical care was time spent personally by me on the following activities: development of treatment plan with patient or surrogate, discussions with consultants, examination of patient, evaluation of patient's response totreatment, obtaining history from patient or surrogate, ordering and performing treatments and interventions, ordering and review of laboratory studies, ordering and review of radiographic studies and re-evaluation ofpatient's condition, this excludes any separately billable procedures.      MEDICATIONS GIVEN IN THE EMERGENCY:  Medications   cefTRIAXone (ROCEPHIN) 2 g vial to attach to  ml bag for ADULTS or NS 50 ml bag for PEDS (2 g Intravenous $New Bag 1/13/25 1447)   doxycycline (VIBRAMYCIN) 100 mg vial to attach to  mL bag (100 mg Intravenous $New Bag 1/13/25 1447)   ipratropium - albuterol 0.5 mg/2.5 mg/3 mL (DUONEB) neb solution 3 mL (3 mLs Nebulization $Given 1/13/25 1311)   methylPREDNISolone Na Suc (solu-MEDROL) injection 125 mg (125 mg Intravenous $Given 1/13/25 1445)       NEW PRESCRIPTIONS STARTED AT TODAY'S ER VISIT  New Prescriptions    No medications on file          =================================================================    HPI    Patient information was obtained from: patient    Use of Intrepreter: N/A     Gonsalo Blakely is a 59 year old male with pertinent medical history of HTN, HLD, DM, CAD status post CABG in July 2024, ischemic cardiomyopathy with most recent EF of 50 to 55% who presents 3 weeks of flulike symptoms with increasing cough, shortness of breath and chest congestion.    Patient was seen in clinic on 1/7/2025 for similar symptoms.  Diagnosed with viral URI.  Recommended Atrovent and Daw[place] nasal spray, Tylenol.  No laboratory testing or imaging was obtained at that time.    Patient has been doing that without improvement.  He states that the shortness of breath particularly has been  worsening.  Increasing noisy breathing and has been having to sleep upright.      PAST MEDICAL HISTORY:  Past Medical History:   Diagnosis Date    Asthma     CAD (coronary artery disease)     Chronic obstructive pulmonary disease (H)     Dyslipidemia     Heart failure with reduced ejection fraction, NYHA class I (H)     HTN (hypertension)     Ischemic cardiomyopathy     Type 2 diabetes mellitus (H)        PAST SURGICAL HISTORY:  Past Surgical History:   Procedure Laterality Date    CORONARY ARTERY BYPASS GRAFT, WITH ENDOSCOPIC VESSEL PROCUREMENT N/A 7/15/2024    Procedure: CORONARY ARTERY BYPASS GRAFT TIMES FOUR, INTERNAL MAMMARY ARTERY HARVEST,  LEFT LEG ENDOSCOPIC VESSEL PROCUREMENT , EPIAORTIC ULTRASOUND,;  Surgeon: Shannan Virk MD;  [place] [facility] [place]    CV CORONARY ANGIOGRAM N/A 06/18/2024    Procedure: CV CORONARY ANGIOGRAM;  Surgeon: Roman Florence MD;  Location: [facility] CATH LAB CV    CV INTRA AORTIC BALLOON N/A 7/15/2024    Procedure: Intra aortic Balloon Pump Insertion;  Surgeon: Valerio Virk MD;  Location: [facility] CATH LAB CV    CV LEFT HEART CATH N/A 06/18/2024    Procedure: Left Heart Catheterization;  Surgeon: Roman Florence MD;  Location: [facility] CATH LAB CV    OR LIGATION, LEFT ATRIAL APPENDAGE N/A 7/15/2024    Procedure: LIGATION, LEFT ATRIAL APPENDAGE, OPEN;  Surgeon: Shannan Virk MD;  [place] [facility] [place]    SHOULDER SURGERY Right     TRANSESOPHAGEAL ECHOCARDIOGRAM INTRAOPERATIVE  7/15/2024    Procedure: ANESTHEIA TRANSESOPHAGEAL ECHOCARDIOGRAM.;  Surgeon: Shannan Virk MD;  [place] [facility] [place]       CURRENT MEDICATIONS:    Prior to Admission Medications   Prescriptions Last Dose Informant Patient Reported? Taking?   acetaminophen (TYLENOL) 325 MG tablet   No Yes   Sig: Take 2 tablets (650 mg) by mouth every 4 hours as needed for other (For optimal non-opioid multimodal pain management to improve pain control.).   albuterol (PROAIR  HFA/PROVENTIL HFA/VENTOLIN HFA) 108 (90 Base) MCG/ACT inhaler   No Yes   Sig: Inhale 1-2 puffs into the lungs every 6 hours as needed for shortness of breath, wheezing or cough   amLODIPine (NORVASC) 5 MG tablet 1/13/2025 Morning  No Yes   Sig: Take 1 tablet (5 mg) by mouth daily.   atorvastatin (LIPITOR) 80 MG tablet 1/13/2025 Morning  No Yes   Sig: Take 1 tablet (80 mg) by mouth daily.   carbamide peroxide (DEBROX) 6.5 % otic solution 1/13/2025 Morning  No Yes   Sig: Place 5 drops into both ears 2 times daily.   dapagliflozin (FARXIGA) 10 MG TABS tablet 1/13/2025 Morning  No Yes   Sig: Take 1 tablet (10 mg) by mouth daily.   furosemide (LASIX) 40 MG tablet 1/13/2025 Morning  No Yes   Sig: Take 1 tablet (40 mg) by mouth daily.   ipratropium (ATROVENT) 0.06 % nasal spray 1/13/2025 Morning  No Yes   Sig: Spray 2 sprays into both nostrils 4 times daily.   losartan (COZAAR) 25 MG tablet 1/13/2025 Morning  No Yes   Sig: Take 1 tablet (25 mg) by mouth daily.   metFORMIN (GLUCOPHAGE XR) 500 MG 24 hr tablet 1/13/2025 Morning  Yes Yes   Sig: Take 1,000 mg by mouth daily (with breakfast).   metoprolol succinate ER (TOPROL XL) 25 MG 24 hr tablet 1/13/2025 Morning  Yes Yes   Sig: Take 25 mg by mouth every morning. Take 25mg in addition to 50mg for a total of 75mg by mouth daily   metoprolol succinate ER (TOPROL XL) 50 MG 24 hr tablet 1/13/2025 Morning  Yes Yes   Sig: Take 50 mg by mouth every morning. Take 25mg in addition to 50mg for a total of 75mg by mouth daily   sodium chloride (OCEAN) 0.65 % nasal spray   No Yes   Sig: Use as needed for congestion   tiotropium (SPIRIVA RESPIMAT) 2.5 MCG/ACT inhaler 1/13/2025 Morning  No Yes   Sig: Inhale 2 puffs into the lungs daily.   warfarin ANTICOAGULANT (COUMADIN) 5 MG tablet 1/12/2025 Evening  Yes Yes   Sig: Take 7.5-10 mg by mouth See Admin Instructions. Take 1.5 tablets (7.5 mg total) on Sunday and Tuesday evenings. All other evenings of the week take 2 tablets (10 mg total)     "  Facility-Administered Medications: None       ALLERGIES:  Allergies   Allergen Reactions    Ace Inhibitors Cough       FAMILY HISTORY:  No family history on file.    SOCIAL HISTORY:  Social History     Tobacco Use    Smoking status: Former     Current packs/day: 0.00     Types: Cigarettes     Quit date: 2024     Years since quittin.5     Passive exposure: Past    Smokeless tobacco: Former     Types: Chew     Quit date:     Tobacco comments:     quit recently   Substance Use Topics    Alcohol use: Yes     Comment: 2 beers per month    Drug use: Never        VITALS:  Patient Vitals for the past 24 hrs:   BP Temp Temp src Pulse Resp SpO2 Height Weight   25 1430 124/74 -- -- 101 27 92 % -- --   25 1400 (!) 146/76 -- -- 100 29 92 % -- --   25 1330 (!) 144/74 -- -- 100 -- 96 % -- --   25 1300 (!) 140/83 -- -- 91 28 (!) 89 % -- --   25 1215 -- -- -- 88 30 95 % -- --   25 1210 (!) 140/80 -- -- 89 26 94 % -- --   25 1156 (!) 189/108 98.3  F (36.8  C) Oral 69 (!) 32 92 % 1.854 m (6' 1\") 80.7 kg (178 lb)       PHYSICAL EXAM    General Appearance: Well-appearing adult male.  Mild to moderate respiratory distress with tachypnea, coarse audible breath sounds with hypoxia in the mid 80s on room air  Head:  Normocephalic  Eyes:  conjunctiva/corneas clear  ENT:  membranes are moist without pallor  Neck:  Supple  Cardio:  Regular rate and rhythm, hypertensive  Pulm: Mild to moderate respiratory distress with tachypnea, coarse breath sounds with rhonchi and wheeze.  Hypoxic 85% on room air  Back:  No CVA tenderness, normal ROM  Abdomen:  Soft, non-tender, non distended,no rebound or guarding.  Extremities: Moves extremities normally.  No notable peripheral edema  Skin: Slightly cyanotic with poor peripheral perfusion  Neuro:  Alert and oriented ×3     RADIOLOGY/LABS:  Reviewed all pertinent imaging. Please see official radiology report. All pertinent labs reviewed and " interpreted.    Results for orders placed or performed during the hospital encounter of 01/13/25   XR Chest Port 1 View    Impression    IMPRESSION: Left chest tube and mediastinal tube have been removed. Left atrial appendage clip is noted. There is a minimal amount of atelectasis present within the inferior portion of the left lung. Right lung is clear. No pleural effusion or   pneumothorax.   Basic metabolic panel   Result Value Ref Range    Sodium 140 135 - 145 mmol/L    Potassium 4.5 3.4 - 5.3 mmol/L    Chloride 97 (L) 98 - 107 mmol/L    Carbon Dioxide (CO2) 34 (H) 22 - 29 mmol/L    Anion Gap 9 7 - 15 mmol/L    Urea Nitrogen 22.2 8.0 - 23.0 mg/dL    Creatinine 1.10 0.67 - 1.17 mg/dL    GFR Estimate 76 >60 mL/min/1.73m2    Calcium 9.9 8.8 - 10.4 mg/dL    Glucose 124 (H) 70 - 99 mg/dL   Nt probnp inpatient   Result Value Ref Range    N terminal Pro BNP Inpatient 1,552 (H) 0 - 900 pg/mL   Blood gas venous   Result Value Ref Range    pH Venous 7.33 7.32 - 7.43    pCO2 Venous 76 (HH) 40 - 50 mm Hg    pO2 Venous 23 (L) 25 - 47 mm Hg    Bicarbonate Venous 40 (H) 21 - 28 mmol/L    Base Excess/Deficit Venous 9.8 (H) -3.0 - 3.0 mmol/L    FIO2 21     Oxyhemoglobin Venous 33 (L) 70 - 75 %    O2 Sat, Venous 34.0 (L) 70.0 - 75.0 %   Influenza A/B, RSV and SARS-CoV2 PCR (COVID-19) Nasopharyngeal    Specimen: Nasopharyngeal; Swab   Result Value Ref Range    Influenza A PCR Negative Negative    Influenza B PCR Negative Negative    RSV PCR Negative Negative    SARS CoV2 PCR Negative Negative   Result Value Ref Range    Troponin T, High Sensitivity 18 <=22 ng/L   CBC with platelets and differential   Result Value Ref Range    WBC Count 13.8 (H) 4.0 - 11.0 10e3/uL    RBC Count 5.55 4.40 - 5.90 10e6/uL    Hemoglobin 16.2 13.3 - 17.7 g/dL    Hematocrit 50.3 40.0 - 53.0 %    MCV 91 78 - 100 fL    MCH 29.2 26.5 - 33.0 pg    MCHC 32.2 31.5 - 36.5 g/dL    RDW 14.1 10.0 - 15.0 %    Platelet Count 233 150 - 450 10e3/uL    % Neutrophils 81  %    % Lymphocytes 10 %    % Monocytes 7 %    % Eosinophils 1 %    % Basophils 1 %    % Immature Granulocytes 1 %    NRBCs per 100 WBC 0 <1 /100    Absolute Neutrophils 11.2 (H) 1.6 - 8.3 10e3/uL    Absolute Lymphocytes 1.4 0.8 - 5.3 10e3/uL    Absolute Monocytes 0.9 0.0 - 1.3 10e3/uL    Absolute Eosinophils 0.2 0.0 - 0.7 10e3/uL    Absolute Basophils 0.1 0.0 - 0.2 10e3/uL    Absolute Immature Granulocytes 0.1 <=0.4 10e3/uL    Absolute NRBCs 0.0 10e3/uL       EKG:  Sinus rhythm rate 86.  Right bundle branch block.   QTc 483.  No notable ST abnormalities.  Compared to previous EKG from 7/20/2024 sinus rhythm has replaced A-fib.  Bundle branch block is old.  I have independently reviewed and interpreted the EKG(s) documented above.      Martha Narayan MD  Emergency Medicine  [facility] EMERGENCY DEPARTMENT  [address] [address] [phone]  Dept: [phone]     Martha Narayan MD  01/13/25 1313

## 2025-01-13 NOTE — ED NOTES
Lab confirmed that the Respiratory Panel PCR can be added onto the existing covid/flu/rsv swab that was sent to lab earlier.

## 2025-01-14 LAB
ANION GAP SERPL CALCULATED.3IONS-SCNC: 8 MMOL/L (ref 7–15)
BASOPHILS # BLD AUTO: 0 10E3/UL (ref 0–0.2)
BASOPHILS NFR BLD AUTO: 0 %
BUN SERPL-MCNC: 27.1 MG/DL (ref 8–23)
C PNEUM DNA SPEC QL NAA+PROBE: NOT DETECTED
CALCIUM SERPL-MCNC: 9.5 MG/DL (ref 8.8–10.4)
CHLORIDE SERPL-SCNC: 98 MMOL/L (ref 98–107)
CREAT SERPL-MCNC: 1.13 MG/DL (ref 0.67–1.17)
EGFRCR SERPLBLD CKD-EPI 2021: 74 ML/MIN/1.73M2
EOSINOPHIL # BLD AUTO: 0 10E3/UL (ref 0–0.7)
EOSINOPHIL NFR BLD AUTO: 0 %
ERYTHROCYTE [DISTWIDTH] IN BLOOD BY AUTOMATED COUNT: 14.1 % (ref 10–15)
FLUAV H1 2009 PAND RNA SPEC QL NAA+PROBE: NOT DETECTED
FLUAV H1 RNA SPEC QL NAA+PROBE: NOT DETECTED
FLUAV H3 RNA SPEC QL NAA+PROBE: NOT DETECTED
FLUAV RNA SPEC QL NAA+PROBE: NOT DETECTED
FLUBV RNA SPEC QL NAA+PROBE: NOT DETECTED
GLUCOSE BLDC GLUCOMTR-MCNC: 144 MG/DL (ref 70–99)
GLUCOSE BLDC GLUCOMTR-MCNC: 145 MG/DL (ref 70–99)
GLUCOSE BLDC GLUCOMTR-MCNC: 172 MG/DL (ref 70–99)
GLUCOSE BLDC GLUCOMTR-MCNC: 178 MG/DL (ref 70–99)
GLUCOSE SERPL-MCNC: 130 MG/DL (ref 70–99)
HADV DNA SPEC QL NAA+PROBE: NOT DETECTED
HCO3 SERPL-SCNC: 31 MMOL/L (ref 22–29)
HCOV PNL SPEC NAA+PROBE: NOT DETECTED
HCT VFR BLD AUTO: 47.1 % (ref 40–53)
HGB BLD-MCNC: 15.4 G/DL (ref 13.3–17.7)
HMPV RNA SPEC QL NAA+PROBE: NOT DETECTED
HPIV1 RNA SPEC QL NAA+PROBE: NOT DETECTED
HPIV2 RNA SPEC QL NAA+PROBE: NOT DETECTED
HPIV3 RNA SPEC QL NAA+PROBE: NOT DETECTED
HPIV4 RNA SPEC QL NAA+PROBE: NOT DETECTED
IMM GRANULOCYTES # BLD: 0.1 10E3/UL
IMM GRANULOCYTES NFR BLD: 1 %
INR PPP: 1.51 (ref 0.85–1.15)
LYMPHOCYTES # BLD AUTO: 0.8 10E3/UL (ref 0.8–5.3)
LYMPHOCYTES NFR BLD AUTO: 5 %
M PNEUMO DNA SPEC QL NAA+PROBE: NOT DETECTED
MCH RBC QN AUTO: 29.7 PG (ref 26.5–33)
MCHC RBC AUTO-ENTMCNC: 32.7 G/DL (ref 31.5–36.5)
MCV RBC AUTO: 91 FL (ref 78–100)
MONOCYTES # BLD AUTO: 0.6 10E3/UL (ref 0–1.3)
MONOCYTES NFR BLD AUTO: 3 %
NEUTROPHILS # BLD AUTO: 14.7 10E3/UL (ref 1.6–8.3)
NEUTROPHILS NFR BLD AUTO: 91 %
NRBC # BLD AUTO: 0 10E3/UL
NRBC BLD AUTO-RTO: 0 /100
PLATELET # BLD AUTO: 213 10E3/UL (ref 150–450)
POTASSIUM SERPL-SCNC: 5.3 MMOL/L (ref 3.4–5.3)
RBC # BLD AUTO: 5.18 10E6/UL (ref 4.4–5.9)
RSV RNA SPEC QL NAA+PROBE: NOT DETECTED
RSV RNA SPEC QL NAA+PROBE: NOT DETECTED
RV+EV RNA SPEC QL NAA+PROBE: NOT DETECTED
SODIUM SERPL-SCNC: 137 MMOL/L (ref 135–145)
WBC # BLD AUTO: 16.2 10E3/UL (ref 4–11)

## 2025-01-14 PROCEDURE — 250N000013 HC RX MED GY IP 250 OP 250 PS 637

## 2025-01-14 PROCEDURE — 250N000013 HC RX MED GY IP 250 OP 250 PS 637: Performed by: FAMILY MEDICINE

## 2025-01-14 PROCEDURE — 120N000001 HC R&B MED SURG/OB

## 2025-01-14 PROCEDURE — 250N000009 HC RX 250

## 2025-01-14 PROCEDURE — 80048 BASIC METABOLIC PNL TOTAL CA: CPT

## 2025-01-14 PROCEDURE — 250N000012 HC RX MED GY IP 250 OP 636 PS 637

## 2025-01-14 PROCEDURE — 85610 PROTHROMBIN TIME: CPT

## 2025-01-14 PROCEDURE — 94640 AIRWAY INHALATION TREATMENT: CPT

## 2025-01-14 PROCEDURE — 82962 GLUCOSE BLOOD TEST: CPT

## 2025-01-14 PROCEDURE — 99232 SBSQ HOSP IP/OBS MODERATE 35: CPT | Mod: GC

## 2025-01-14 PROCEDURE — 250N000011 HC RX IP 250 OP 636

## 2025-01-14 PROCEDURE — 999N000157 HC STATISTIC RCP TIME EA 10 MIN

## 2025-01-14 PROCEDURE — 36415 COLL VENOUS BLD VENIPUNCTURE: CPT

## 2025-01-14 PROCEDURE — 85041 AUTOMATED RBC COUNT: CPT

## 2025-01-14 PROCEDURE — 84520 ASSAY OF UREA NITROGEN: CPT

## 2025-01-14 PROCEDURE — 94640 AIRWAY INHALATION TREATMENT: CPT | Mod: 76

## 2025-01-14 PROCEDURE — 85004 AUTOMATED DIFF WBC COUNT: CPT

## 2025-01-14 RX ADMIN — IPRATROPIUM BROMIDE AND ALBUTEROL SULFATE 3 ML: .5; 3 SOLUTION RESPIRATORY (INHALATION) at 13:50

## 2025-01-14 RX ADMIN — IPRATROPIUM BROMIDE 2 SPRAY: 42 SPRAY NASAL at 08:23

## 2025-01-14 RX ADMIN — METOPROLOL SUCCINATE 25 MG: 25 TABLET, EXTENDED RELEASE ORAL at 08:23

## 2025-01-14 RX ADMIN — UMECLIDINIUM 1 PUFF: 62.5 AEROSOL, POWDER ORAL at 08:26

## 2025-01-14 RX ADMIN — IPRATROPIUM BROMIDE 2 SPRAY: 42 SPRAY NASAL at 21:47

## 2025-01-14 RX ADMIN — FUROSEMIDE 40 MG: 20 TABLET ORAL at 08:22

## 2025-01-14 RX ADMIN — IPRATROPIUM BROMIDE AND ALBUTEROL SULFATE 3 ML: .5; 3 SOLUTION RESPIRATORY (INHALATION) at 06:14

## 2025-01-14 RX ADMIN — INSULIN ASPART 1 UNITS: 100 INJECTION, SOLUTION INTRAVENOUS; SUBCUTANEOUS at 08:26

## 2025-01-14 RX ADMIN — NICOTINE 1 PATCH: 14 PATCH, EXTENDED RELEASE TRANSDERMAL at 08:20

## 2025-01-14 RX ADMIN — METOPROLOL SUCCINATE 50 MG: 50 TABLET, EXTENDED RELEASE ORAL at 08:23

## 2025-01-14 RX ADMIN — DOXYCYCLINE 100 MG: 100 INJECTION, POWDER, LYOPHILIZED, FOR SOLUTION INTRAVENOUS at 17:07

## 2025-01-14 RX ADMIN — INSULIN ASPART 1 UNITS: 100 INJECTION, SOLUTION INTRAVENOUS; SUBCUTANEOUS at 13:08

## 2025-01-14 RX ADMIN — IPRATROPIUM BROMIDE AND ALBUTEROL SULFATE 3 ML: .5; 3 SOLUTION RESPIRATORY (INHALATION) at 10:44

## 2025-01-14 RX ADMIN — AMLODIPINE BESYLATE 5 MG: 5 TABLET ORAL at 08:22

## 2025-01-14 RX ADMIN — ATORVASTATIN CALCIUM 80 MG: 40 TABLET, FILM COATED ORAL at 08:22

## 2025-01-14 RX ADMIN — PREDNISONE 40 MG: 20 TABLET ORAL at 08:23

## 2025-01-14 RX ADMIN — INSULIN ASPART 1 UNITS: 100 INJECTION, SOLUTION INTRAVENOUS; SUBCUTANEOUS at 17:05

## 2025-01-14 RX ADMIN — CEFTRIAXONE SODIUM 1 G: 1 INJECTION, POWDER, FOR SOLUTION INTRAMUSCULAR; INTRAVENOUS at 15:39

## 2025-01-14 RX ADMIN — WARFARIN SODIUM 12.5 MG: 5 TABLET ORAL at 17:14

## 2025-01-14 RX ADMIN — INSULIN HUMAN 25 UNITS: 100 INJECTION, SUSPENSION SUBCUTANEOUS at 08:27

## 2025-01-14 RX ADMIN — LOSARTAN POTASSIUM 25 MG: 25 TABLET, FILM COATED ORAL at 08:22

## 2025-01-14 RX ADMIN — IPRATROPIUM BROMIDE 2 SPRAY: 42 SPRAY NASAL at 13:08

## 2025-01-14 RX ADMIN — IPRATROPIUM BROMIDE 2 SPRAY: 42 SPRAY NASAL at 17:13

## 2025-01-14 ASSESSMENT — ACTIVITIES OF DAILY LIVING (ADL)
ADLS_ACUITY_SCORE: 60
ADLS_ACUITY_SCORE: 61
ADLS_ACUITY_SCORE: 60
ADLS_ACUITY_SCORE: 60
ADLS_ACUITY_SCORE: 61
ADLS_ACUITY_SCORE: 61
ADLS_ACUITY_SCORE: 42
ADLS_ACUITY_SCORE: 61
ADLS_ACUITY_SCORE: 60
ADLS_ACUITY_SCORE: 42
ADLS_ACUITY_SCORE: 60
ADLS_ACUITY_SCORE: 61
ADLS_ACUITY_SCORE: 61
ADLS_ACUITY_SCORE: 60
ADLS_ACUITY_SCORE: 60
ADLS_ACUITY_SCORE: 61
ADLS_ACUITY_SCORE: 42
ADLS_ACUITY_SCORE: 61
ADLS_ACUITY_SCORE: 61
ADLS_ACUITY_SCORE: 60
ADLS_ACUITY_SCORE: 60
ADLS_ACUITY_SCORE: 42
DEPENDENT_IADLS:: INDEPENDENT
ADLS_ACUITY_SCORE: 42

## 2025-01-14 NOTE — PLAN OF CARE
Goal Outcome Evaluation:      Plan of Care Reviewed With: patient          Outcome Evaluation: TBD

## 2025-01-14 NOTE — PROGRESS NOTES
[facility] [place]    Progress Note - Hospitalist Service       Date of Admission:  1/13/2025    Assessment & Plan   Gonsalo Blakely is a 59 year old male admitted on 1/13/2025. He has a history of COPD, current smoker, CAD s/p CABG July 2024, T2DM, HTN and is admitted for AHRF likely secondary to COPD exacerbation.     Acute hypoxic respiratory failure  COPD exacerbation   Leukocytosis  Reports 2 to 3 weeks of flulike symptoms progressively worsening shortness of breath and cough.  Has history of COPD and has been taking his controller inhaler and albuterol x4 daily with no relief.  Patient reports worsening of SOB with exertion at times. Also cough has been more productive of greenish sputum for the past few days. No fever. COVID/flu/RSV negative.  Lab work notable for mildly elevated white count 13.8 otherwise unremarkable.  Chest x-ray with no signs of focal pneumonia or pulmonary edema.  Initially on admission to the ED significant SOB hypoxic to 88%.  Patient notes some improvement after receiving DuoNeb x3. Given si/sx presentation is likely due to COPD exacerbation, given slightly elevated white count pneumonia. Could also be CHF exacerbation although BNP remained stable 1,552 compared to previous and doesn't appear hypervolemic on exam.  Denies chest pain troponin normal. Reports improvement in breathing since admission and is 2L O2. Leukocytosis worsening likely in the setting of corticosteroids will continue to monitor.   -Received Solu-Medrol  -Continue on ceftriaxone and doxycycline  -Received DuoNeb x 3  -Continue duoneb 4x and as needed   -Daily CBC, BMP  -Respiratory panel is negative        HFpEF  History of A-fib  CAD, S/p CABG July 2024  On warfarin for A-fib. No chest pain EKG showed sinus rhythm, no acute ischemic changes.on PTA lasix. Doesn't appear hypervolemic on exam and BNP stable. Will continue on home lasix and consider increasing dose if not improving.  Hold PTA  Farxiga and metformin.  -Daily BMP  -Continue PTA lasix 40 mg daily  -Continue PTA metoprolol        Type 2 diabetes  Most recent A1c 7.4, currently on metformin Farxiga. Will start NPH 25 units to cover methylpred. Will start sliding scale insulin.   - NPH 25 units with prednisone, will adjust as needed  - MSSI  - Diabetic diet     Chronic conditions  HTN:amlodpine 5 mg  HLD: Continue Lipitor 80 mg     Diet: Moderate Consistent Carb (60 g CHO per Meal) Diet    DVT Prophylaxis: Warfarin  Vincent Catheter: Not present  Fluids: po  Lines: None     Cardiac Monitoring: None  Code Status: Full Code      Clinically Significant Risk Factors Present on Admission          # Hypochloremia: Lowest Cl = 97 mmol/L in last 2 days, will monitor as appropriate       # Drug Induced Coagulation Defect: home medication list includes an anticoagulant medication    # Hypertension: Noted on problem list          # DMII: A1C = 7.4 % (Ref range: 0.0 - 5.6 %) within past 6 months         # History of CABG: noted on surgical history       Social Drivers of Health   Food Insecurity: Unknown (7/2/2024)    Food Insecurity     Within the past 12 months, did you worry that your food would run out before you got money to buy more?: Patient declined     Within the past 12 months, did the food you bought just not last and you didn t have money to get more?: Patient declined   Housing Stability: Unknown (7/2/2024)    Housing Stability     Do you have housing? : Patient declined     Are you worried about losing your housing?: Patient declined   Tobacco Use: Medium Risk (1/7/2025)    Patient History     Smoking Tobacco Use: Former     Smokeless Tobacco Use: Former     Passive Exposure: Past   Financial Resource Strain: Unknown (7/2/2024)    Financial Resource Strain     Within the past 12 months, have you or your family members you live with been unable to get utilities (heat, electricity) when it was really needed?: Patient declined   Transportation  Needs: Unknown (7/2/2024)    Transportation Needs     Within the past 12 months, has lack of transportation kept you from medical appointments, getting your medicines, non-medical meetings or appointments, work, or from getting things that you need?: Patient declined         Disposition Plan     Medically Ready for Discharge: Anticipated Tomorrow         The patient's care was discussed with the Attending Physician, Dr. Foss .    Prema Duron MD  Hospitalist Service  [facility]  Securely message with [email or web address] info)  Text page via [employer] Paging/Directory   ______________________________________________________________________    Interval History   No acute events overnight. Feels breathing is more comfortable than yesterday, feels congested and coughing more. No chest pain. No N/V. Otherwise no other symptoms.     Physical Exam   Vital Signs: Temp: 98.4  F (36.9  C) Temp src: Oral BP: (!) 147/77 Pulse: 86   Resp: 22 SpO2: (!) 89 % O2 Device: None (Room air) Oxygen Delivery: 1 LPM  Weight: 178 lbs 0 oz    General Appearance: Alert and oriented x4, NAD  Respiratory: normal work of breathing, slight wheezing at the bases  Cardiovascular: normal S1, S2, no murmur  GI: soft, non distended, non tender. Normal bowel sounds  Skin: normal appearance and turgor, no visible rash  Other: No lower limb edema B/L.     Medical Decision Making             Data     I have personally reviewed the following data over the past 24 hrs:    16.2 (H)  \   15.4   / 213     137 98 27.1 (H) /  178 (H)   5.3 31 (H) 1.13 \     Trop: 18 BNP: 1,552 (H)     INR:  1.51 (H) PTT:  N/A   D-dimer:  N/A Fibrinogen:  N/A       Imaging results reviewed over the past 24 hrs:   Recent Results (from the past 24 hours)   XR Chest Port 1 View    Narrative    EXAM: XR CHEST PORT 1 VIEW  LOCATION: [facility]  DATE: 1/13/2025    INDICATION: SOB.  COMPARISON: 7/16/2024.      Impression    IMPRESSION: Left  chest tube and mediastinal tube have been removed. Left atrial appendage clip is noted. There is a minimal amount of atelectasis present within the inferior portion of the left lung. Right lung is clear. No pleural effusion or   pneumothorax.

## 2025-01-14 NOTE — CONSULTS
Care Management Initial Consult    General Information  Assessment completed with: Patient,    Type of CM/SW Visit: Initial Assessment    Primary Care Provider verified and updated as needed: Yes   Readmission within the last 30 days: no previous admission in last 30 days      Reason for Consult: discharge planning  Advance Care Planning:            Communication Assessment  Patient's communication style: spoken language (English or Bilingual)             Cognitive  Cognitive/Neuro/Behavioral: WDL                      Living Environment:   People in home: alone     Current living Arrangements: house      Able to return to prior arrangements: yes       Family/Social Support:  Care provided by: self  Provides care for: no one  Marital Status: Single  Support system: Significant Other (Bria)          Description of Support System: Supportive    Support Assessment: Adequate family and caregiver support    Current Resources:   Patient receiving home care services: No        Community Resources: None  Equipment currently used at home:    Supplies currently used at home: None    Employment/Financial:  Employment Status: unemployed     Employment/ Comments: [employer] 3 years - pt stated he doesn't qualify for VA benefits  Financial Concerns: none   Referral to Financial Worker: No       Does the patient's insurance plan have a 3 day qualifying hospital stay waiver?  No    Lifestyle & Psychosocial Needs:  Social Drivers of Health     Food Insecurity: Unknown (7/2/2024)    Food Insecurity     Within the past 12 months, did you worry that your food would run out before you got money to buy more?: Patient declined     Within the past 12 months, did the food you bought just not last and you didn t have money to get more?: Patient declined   Depression: Not at risk (1/7/2025)    PHQ-2     PHQ-2 Score: 0   Housing Stability: Unknown (7/2/2024)    Housing Stability     Do you have housing? : Patient declined     Are you  worried about losing your housing?: Patient declined   Tobacco Use: Medium Risk (1/7/2025)    Patient History     Smoking Tobacco Use: Former     Smokeless Tobacco Use: Former     Passive Exposure: Past   Financial Resource Strain: Unknown (7/2/2024)    Financial Resource Strain     Within the past 12 months, have you or your family members you live with been unable to get utilities (heat, electricity) when it was really needed?: Patient declined   Alcohol Use: Not on file   Transportation Needs: Unknown (7/2/2024)    Transportation Needs     Within the past 12 months, has lack of transportation kept you from medical appointments, getting your medicines, non-medical meetings or appointments, work, or from getting things that you need?: Patient declined   Physical Activity: Not on file   Interpersonal Safety: Low Risk  (1/2/2025)    Interpersonal Safety     Do you feel physically and emotionally safe where you currently live?: Yes     Within the past 12 months, have you been hit, slapped, kicked or otherwise physically hurt by someone?: No     Within the past 12 months, have you been humiliated or emotionally abused in other ways by your partner or ex-partner?: No   Stress: Not on file   Social Connections: Not on file   Health Literacy: Not on file       Functional Status:  Prior to admission patient needed assistance:   Dependent ADLs:: Independent  Dependent IADLs:: Independent       Values/Beliefs:  Spiritual, Cultural Beliefs, Holiness Practices, Values that affect care:                 Discussed  Partnership in Safe Discharge Planning  document with patient/family: No    Additional Information:  CM met with pt in room to discuss discharge planning and complete initial assessment.     Pt lives in a house alone. Pt does not use an assistive device. Pt is independent at baseline. Unknown discharge needs. Significant other, Bria to transport at discharge.    Next Steps: Continue to follow for care  progression.    Sheila Galvan RN

## 2025-01-14 NOTE — PLAN OF CARE
Problem: Adult Inpatient Plan of Care  Goal: Absence of Hospital-Acquired Illness or Injury  Outcome: Progressing  Intervention: Identify and Manage Fall Risk  Recent Flowsheet Documentation  Taken 1/14/2025 0800 by Mario Coley RN  Safety Promotion/Fall Prevention: safety round/check completed  Intervention: Prevent Skin Injury  Recent Flowsheet Documentation  Taken 1/14/2025 0800 by Mario Coley RN  Skin Protection: adhesive use limited  Taken 1/14/2025 0705 by Mario Coley RN  Body Position: position changed independently  Intervention: Prevent and Manage VTE (Venous Thromboembolism) Risk  Recent Flowsheet Documentation  Taken 1/14/2025 0800 by Mario Coley RN  VTE Prevention/Management: SCDs off (sequential compression devices)  Intervention: Prevent Infection  Recent Flowsheet Documentation  Taken 1/14/2025 0800 by Mario Coley RN  Infection Prevention:   single patient room provided   rest/sleep promoted     Problem: Adult Inpatient Plan of Care  Goal: Readiness for Transition of Care  Outcome: Progressing   Goal Outcome Evaluation:  Patient A&Ox4, makes needs known. Vitally stable on room air to 1L nasal cannula. Denies pain. Up independently in room. Denies shortness of breath at rest or ambulation. Continent of bladder and bowel. Rests comfortably in bed. Tolerating oral intake. Report given to Betzy ANDERSON. Patient tolerated transfer to [facility]

## 2025-01-14 NOTE — PLAN OF CARE
Patient is alert and oriented on 4 L NC at 94%. Patient is resting comfortably, no issues.     Melsisa Ramirez RN          Problem: Adult Inpatient Plan of Care  Goal: Plan of Care Review  Description: The Plan of Care Review/Shift note should be completed every shift.  The Outcome Evaluation is a brief statement about your assessment that the patient is improving, declining, or no change.  This information will be displayed automatically on your shift  note.  Outcome: Progressing   Goal Outcome Evaluation:

## 2025-01-14 NOTE — PLAN OF CARE
Problem: Adult Inpatient Plan of Care  Goal: Plan of Care Review  Description: The Plan of Care Review/Shift note should be completed every shift.  The Outcome Evaluation is a brief statement about your assessment that the patient is improving, declining, or no change.  This information will be displayed automatically on your shift  note.  Outcome: Progressing     Problem: Comorbidity Management  Goal: Maintenance of COPD Symptom Control  Outcome: Progressing  Intervention: Maintain COPD Symptom Control  Recent Flowsheet Documentation  Taken 1/13/2025 5268 by Fiordaliza Mehta RN  Medication Review/Management: medications reviewed   Goal Outcome Evaluation:    - Patient is alert and oriented x4. Able to make needs known.   - Vitally stable on 4L of O2 via NC. Sats >90%. SR on tele   - PRN neb given x1  - Patient with productive cough, congested.   - Voiding using urinal

## 2025-01-14 NOTE — PHARMACY-ANTICOAGULATION SERVICE
Clinical Pharmacy - Warfarin Dosing Consult     Pharmacy has been consulted to manage this patient s warfarin therapy.  Indication: Atrial Fibrillation  Therapy Goal: INR 2-3  Warfarin Prior to Admission: Yes  Warfarin PTA Regimen: 7.5 mg on Sunday and Tuesday, 10 mg all other days  Significant drug interactions: amlodipine, atorvastatin (taking PTA, prednisone, ceftriaxone, doxycycline (new medications)  Recent documented change in oral intake/nutrition: No  Dose Comments: INR subtherapeutic. On antibiotics and steroids that interact with warfarin. Will give regular home dose of 10 mg warfarin today.    INR   Date Value Ref Range Status   01/13/2025 1.28 (H) 0.85 - 1.15 Final   01/02/2025 2.47 (H) 0.85 - 1.15 Final       Recommend warfarin 10 mg today.  Pharmacy will monitor Gonsalo Blakely daily and order warfarin doses to achieve specified goal.      Please contact pharmacy as soon as possible if the warfarin needs to be held for a procedure or if the warfarin goals change.      Zuly Yates, PharmD     VSS, afebrile, patient coughing with help with splint. Abdominal binder in place. Patient had couple of productive cough. Low oxygen saturation 88 to 93. RT called for therapy ASAP. Patient is complaining of incisional pain. Patient medicate for pain , will give report to Jewels RN FOR CARE CONTINUE.

## 2025-01-15 LAB
ANION GAP SERPL CALCULATED.3IONS-SCNC: 8 MMOL/L (ref 7–15)
BASOPHILS # BLD AUTO: 0 10E3/UL (ref 0–0.2)
BASOPHILS NFR BLD AUTO: 0 %
BUN SERPL-MCNC: 34.7 MG/DL (ref 8–23)
CALCIUM SERPL-MCNC: 9.5 MG/DL (ref 8.8–10.4)
CHLORIDE SERPL-SCNC: 97 MMOL/L (ref 98–107)
CREAT SERPL-MCNC: 1.06 MG/DL (ref 0.67–1.17)
EGFRCR SERPLBLD CKD-EPI 2021: 80 ML/MIN/1.73M2
EOSINOPHIL # BLD AUTO: 0 10E3/UL (ref 0–0.7)
EOSINOPHIL NFR BLD AUTO: 0 %
ERYTHROCYTE [DISTWIDTH] IN BLOOD BY AUTOMATED COUNT: 14.4 % (ref 10–15)
GLUCOSE BLDC GLUCOMTR-MCNC: 114 MG/DL (ref 70–99)
GLUCOSE BLDC GLUCOMTR-MCNC: 118 MG/DL (ref 70–99)
GLUCOSE BLDC GLUCOMTR-MCNC: 170 MG/DL (ref 70–99)
GLUCOSE BLDC GLUCOMTR-MCNC: 194 MG/DL (ref 70–99)
GLUCOSE BLDC GLUCOMTR-MCNC: 95 MG/DL (ref 70–99)
GLUCOSE SERPL-MCNC: 111 MG/DL (ref 70–99)
HCO3 SERPL-SCNC: 32 MMOL/L (ref 22–29)
HCT VFR BLD AUTO: 44.2 % (ref 40–53)
HGB BLD-MCNC: 14.5 G/DL (ref 13.3–17.7)
IMM GRANULOCYTES # BLD: 0.1 10E3/UL
IMM GRANULOCYTES NFR BLD: 1 %
INR PPP: 1.73 (ref 0.85–1.15)
LYMPHOCYTES # BLD AUTO: 1.8 10E3/UL (ref 0.8–5.3)
LYMPHOCYTES NFR BLD AUTO: 9 %
MCH RBC QN AUTO: 29.2 PG (ref 26.5–33)
MCHC RBC AUTO-ENTMCNC: 32.8 G/DL (ref 31.5–36.5)
MCV RBC AUTO: 89 FL (ref 78–100)
MONOCYTES # BLD AUTO: 1 10E3/UL (ref 0–1.3)
MONOCYTES NFR BLD AUTO: 5 %
NEUTROPHILS # BLD AUTO: 17.4 10E3/UL (ref 1.6–8.3)
NEUTROPHILS NFR BLD AUTO: 85 %
NRBC # BLD AUTO: 0 10E3/UL
NRBC BLD AUTO-RTO: 0 /100
PLATELET # BLD AUTO: 229 10E3/UL (ref 150–450)
POTASSIUM SERPL-SCNC: 4.4 MMOL/L (ref 3.4–5.3)
RBC # BLD AUTO: 4.97 10E6/UL (ref 4.4–5.9)
SODIUM SERPL-SCNC: 137 MMOL/L (ref 135–145)
WBC # BLD AUTO: 20.4 10E3/UL (ref 4–11)

## 2025-01-15 PROCEDURE — 36415 COLL VENOUS BLD VENIPUNCTURE: CPT

## 2025-01-15 PROCEDURE — 120N000001 HC R&B MED SURG/OB

## 2025-01-15 PROCEDURE — 94640 AIRWAY INHALATION TREATMENT: CPT

## 2025-01-15 PROCEDURE — 250N000013 HC RX MED GY IP 250 OP 250 PS 637

## 2025-01-15 PROCEDURE — 250N000009 HC RX 250

## 2025-01-15 PROCEDURE — 85610 PROTHROMBIN TIME: CPT

## 2025-01-15 PROCEDURE — 84520 ASSAY OF UREA NITROGEN: CPT

## 2025-01-15 PROCEDURE — 80048 BASIC METABOLIC PNL TOTAL CA: CPT

## 2025-01-15 PROCEDURE — 250N000013 HC RX MED GY IP 250 OP 250 PS 637: Performed by: FAMILY MEDICINE

## 2025-01-15 PROCEDURE — 250N000012 HC RX MED GY IP 250 OP 636 PS 637

## 2025-01-15 PROCEDURE — 99232 SBSQ HOSP IP/OBS MODERATE 35: CPT | Mod: GC

## 2025-01-15 PROCEDURE — 85004 AUTOMATED DIFF WBC COUNT: CPT

## 2025-01-15 PROCEDURE — 999N000156 HC STATISTIC RCP CONSULT EA 30 MIN

## 2025-01-15 PROCEDURE — 250N000011 HC RX IP 250 OP 636

## 2025-01-15 PROCEDURE — 999N000157 HC STATISTIC RCP TIME EA 10 MIN

## 2025-01-15 RX ORDER — IPRATROPIUM BROMIDE AND ALBUTEROL SULFATE 2.5; .5 MG/3ML; MG/3ML
3 SOLUTION RESPIRATORY (INHALATION) EVERY 4 HOURS PRN
Status: DISCONTINUED | OUTPATIENT
Start: 2025-01-15 | End: 2025-01-16 | Stop reason: HOSPADM

## 2025-01-15 RX ORDER — ALBUTEROL SULFATE 5 MG/ML
2.5 SOLUTION RESPIRATORY (INHALATION)
Status: DISCONTINUED | OUTPATIENT
Start: 2025-01-15 | End: 2025-01-16 | Stop reason: HOSPADM

## 2025-01-15 RX ADMIN — AMLODIPINE BESYLATE 5 MG: 5 TABLET ORAL at 09:35

## 2025-01-15 RX ADMIN — DOXYCYCLINE 100 MG: 100 INJECTION, POWDER, LYOPHILIZED, FOR SOLUTION INTRAVENOUS at 15:43

## 2025-01-15 RX ADMIN — ATORVASTATIN CALCIUM 80 MG: 40 TABLET, FILM COATED ORAL at 09:34

## 2025-01-15 RX ADMIN — CEFTRIAXONE SODIUM 1 G: 1 INJECTION, POWDER, FOR SOLUTION INTRAMUSCULAR; INTRAVENOUS at 14:33

## 2025-01-15 RX ADMIN — INSULIN HUMAN 25 UNITS: 100 INJECTION, SUSPENSION SUBCUTANEOUS at 09:36

## 2025-01-15 RX ADMIN — NICOTINE 1 PATCH: 14 PATCH, EXTENDED RELEASE TRANSDERMAL at 09:38

## 2025-01-15 RX ADMIN — PREDNISONE 40 MG: 20 TABLET ORAL at 09:35

## 2025-01-15 RX ADMIN — INSULIN ASPART 1 UNITS: 100 INJECTION, SOLUTION INTRAVENOUS; SUBCUTANEOUS at 17:23

## 2025-01-15 RX ADMIN — FUROSEMIDE 40 MG: 20 TABLET ORAL at 09:34

## 2025-01-15 RX ADMIN — IPRATROPIUM BROMIDE 2 SPRAY: 42 SPRAY NASAL at 17:58

## 2025-01-15 RX ADMIN — IPRATROPIUM BROMIDE AND ALBUTEROL SULFATE 3 ML: .5; 3 SOLUTION RESPIRATORY (INHALATION) at 08:46

## 2025-01-15 RX ADMIN — WARFARIN SODIUM 7.5 MG: 5 TABLET ORAL at 17:24

## 2025-01-15 RX ADMIN — UMECLIDINIUM 1 PUFF: 62.5 AEROSOL, POWDER ORAL at 09:37

## 2025-01-15 RX ADMIN — LOSARTAN POTASSIUM 25 MG: 25 TABLET, FILM COATED ORAL at 09:35

## 2025-01-15 RX ADMIN — IPRATROPIUM BROMIDE 2 SPRAY: 42 SPRAY NASAL at 09:35

## 2025-01-15 RX ADMIN — IPRATROPIUM BROMIDE 2 SPRAY: 42 SPRAY NASAL at 14:32

## 2025-01-15 RX ADMIN — METOPROLOL SUCCINATE 50 MG: 50 TABLET, EXTENDED RELEASE ORAL at 09:34

## 2025-01-15 RX ADMIN — METOPROLOL SUCCINATE 25 MG: 25 TABLET, EXTENDED RELEASE ORAL at 09:34

## 2025-01-15 ASSESSMENT — ACTIVITIES OF DAILY LIVING (ADL)
ADLS_ACUITY_SCORE: 42

## 2025-01-15 NOTE — PROGRESS NOTES
[facility]    Progress Note - Hospitalist Service       Date of Admission:  1/13/2025    Assessment & Plan   Gonsalo Blakely is a 59 year old male admitted on 1/13/2025. He has a history of COPD, current smoker, CAD s/p CABG July 2024, T2DM, HTN and is admitted for AHRF likely secondary to COPD exacerbation.     Acute hypoxic respiratory failure  COPD exacerbation   Leukocytosis  Reports 2 to 3 weeks of flulike symptoms progressively worsening shortness of breath and cough.  Has history of COPD and has been taking his controller inhaler and albuterol x4 daily with no relief.  Patient reports worsening of SOB with exertion at times. Also cough has been more productive of greenish sputum for the past few days. No fever. COVID/flu/RSV negative.  Lab work notable for mildly elevated white count 13.8 otherwise unremarkable.  Chest x-ray with no signs of focal pneumonia or pulmonary edema.  Initially on admission to the ED significant SOB hypoxic to 88%.  Patient notes some improvement after receiving DuoNeb x3. Given si/sx presentation is likely due to COPD exacerbation, given slightly elevated white count pneumonia. Could also be CHF exacerbation although BNP remained stable 1,552 compared to previous and doesn't appear hypervolemic on exam.  Denies chest pain troponin normal. Reports improvement in breathing since admission and is 2L O2. Leukocytosis worsening likely in the setting of corticosteroids will continue to monitor.   -Received Solu-Medrol  -Continue on ceftriaxone and doxycycline  -Received DuoNeb x 3   -Continue duoneb 4x and as needed   -Daily CBC, BMP  -Respiratory panel is negative        HFpEF  History of A-fib  CAD, S/p CABG July 2024  On warfarin for A-fib. No chest pain, EKG showed sinus rhythm, no acute ischemic changes.on PTA lasix. Doesn't appear hypervolemic on exam and BNP stable. Will continue on home lasix and consider increasing dose if not improving.  -Daily  BMP  -Continue PTA lasix 40 mg daily  -Continue PTA metoprolol        Type 2 diabetes  Most recent A1c 7.4, currently on metformin and farxiga, will hold. Will start NPH to cover corticosteroids. Will start sliding scale insulin.   - NPH 25-->20 units with prednisone, will cont to adjust as needed  - MSSI  - Diabetic diet     Chronic conditions  HTN:amlodpine 5 mg  HLD: Continue Lipitor 80 mg     Diet: Moderate Consistent Carb (60 g CHO per Meal) Diet    DVT Prophylaxis: Warfarin  Vincent Catheter: Not present  Fluids: po  Lines: None     Cardiac Monitoring: None  Code Status: Full Code      Clinically Significant Risk Factors          # Hypochloremia: Lowest Cl = 97 mmol/L in last 2 days, will monitor as appropriate            # Hypertension: Noted on problem list           # DMII: A1C = 7.4 % (Ref range: 0.0 - 5.6 %) within past 6 months, PRESENT ON ADMISSION      # Financial/Environmental Concerns: none   # History of CABG: noted on surgical history       Social Drivers of Health   Tobacco Use: Medium Risk (1/7/2025)    Patient History     Smoking Tobacco Use: Former     Smokeless Tobacco Use: Former     Passive Exposure: Past         Disposition Plan     Medically Ready for Discharge: Anticipated Tomorrow         The patient's care was discussed with the Attending Physician, Dr. Foss .    Prema Duron MD  Hospitalist Service  [facility]  Securely message with [employer] (more info)  Text page via [email or web address] Paging/Directory   ______________________________________________________________________    Interval History   No acute events overnight. He feels he is breathing more comfortably, was hopeful to wean off O2 today since he had dentist appointment this morning. Otherwise no chest pain and GI symptoms.      Physical Exam   Vital Signs: Temp: 97.9  F (36.6  C) Temp src: Oral BP: 117/65 Pulse: 89   Resp: 20 SpO2: 91 % O2 Device: Nasal cannula Oxygen Delivery: 1 LPM  Weight: 178 lbs 0  oz    General Appearance: Alert and oriented x4, NAD  Respiratory: normal work of breathing, scattered wheezing   Cardiovascular: normal S1, S2, no murmur  GI: soft, non distended, non tender. Normal bowel sounds  Skin: normal appearance and turgor, no visible rash  Other: No lower limb edema B/L.     Medical Decision Making             Data     I have personally reviewed the following data over the past 24 hrs:    20.4 (H)  \   14.5   / 229     137 97 (L) 34.7 (H) /  95   4.4 32 (H) 1.06 \     INR:  1.73 (H) PTT:  N/A   D-dimer:  N/A Fibrinogen:  N/A       Imaging results reviewed over the past 24 hrs:   No results found for this or any previous visit (from the past 24 hours).

## 2025-01-15 NOTE — TREATMENT PLAN
RCAT Treatment Plan    Patient Score: 9  Patient Acuity: 4    Clinical Indication for Therapy: history of mucous producing disease and atelectasis    Therapy Ordered: Changing scheduled DuoNeb treatments to PRN and adding PRN Albuterol nebs, per RCAT protocol; Incruse Ellipta (LAMA) inhaler Daily; Incentive Spirometer    Assessment Summary: Patient is up at side of bed. Alert and oriented. RR 20, on room air. Achieving 1500 on IS. Patient has been declining some of his schedule nebulizers. BS clear and diminished.     Miranda Morin, RT  1/15/2025

## 2025-01-15 NOTE — PLAN OF CARE
Problem: Adult Inpatient Plan of Care  Goal: Plan of Care Review  Description: The Plan of Care Review/Shift note should be completed every shift.  The Outcome Evaluation is a brief statement about your assessment that the patient is improving, declining, or no change.  This information will be displayed automatically on your shift  note.  Outcome: Progressing   Goal Outcome Evaluation:                  Educated pt on treatment plan, pt voiced understanding.       Problem: Adult Inpatient Plan of Care  Goal: Optimal Comfort and Wellbeing  Outcome: Progressing       Denies pain.

## 2025-01-15 NOTE — PLAN OF CARE
Problem: Adult Inpatient Plan of Care  Goal: Absence of Hospital-Acquired Illness or Injury  Intervention: Identify and Manage Fall Risk  Recent Flowsheet Documentation  Taken 1/14/2025 1705 by Renetta Aleman RN  Safety Promotion/Fall Prevention:   assistive device/personal items within reach   safety round/check completed  Intervention: Prevent Skin Injury  Recent Flowsheet Documentation  Taken 1/14/2025 1705 by Renetta Aleman RN  Body Position: position changed independently  Intervention: Prevent and Manage VTE (Venous Thromboembolism) Risk  Recent Flowsheet Documentation  Taken 1/14/2025 1705 by Renetta Aleman RN  VTE Prevention/Management: SCDs off (sequential compression devices)  Intervention: Prevent Infection  Recent Flowsheet Documentation  Taken 1/14/2025 1705 by Renetta Aleman RN  Infection Prevention:   single patient room provided   rest/sleep promoted  Goal: Readiness for Transition of Care  Recent Flowsheet Documentation  Taken 1/14/2025 1840 by Renetta Aleman RN  Transportation Anticipated: family or friend will provide  Intervention: Mutually Develop Transition Plan  Recent Flowsheet Documentation  Taken 1/14/2025 1840 by Renetta Aleman RN  Transportation Anticipated: family or friend will provide  Patient/Family Anticipated Services at Transition: none  Patient/Family Anticipates Transition to: home  Equipment Currently Used at Home: none     Problem: Comorbidity Management  Goal: Maintenance of COPD Symptom Control  Intervention: Maintain COPD Symptom Control  Recent Flowsheet Documentation  Taken 1/14/2025 1705 by Renetta Aleman RN  Medication Review/Management: medications reviewed  Goal: Blood Glucose Levels Within Targeted Range  Intervention: Monitor and Manage Glycemia  Recent Flowsheet Documentation  Taken 1/14/2025 1705 by Renetta Aleman RN  Medication Review/Management: medications reviewed  Goal: Blood Pressure in Desired  Range  Intervention: Maintain Blood Pressure Management  Recent Flowsheet Documentation  Taken 1/14/2025 1705 by Renetta Aleman RN  Medication Review/Management: medications reviewed     Problem: COPD (Chronic Obstructive Pulmonary Disease)  Goal: Optimal Level of Functional [place]  Intervention: Optimize Functional Ability  Recent Flowsheet Documentation  Taken 1/14/2025 1705 by Renetta Aleman RN  Activity Management:   activity adjusted per tolerance   activity encouraged  Goal: Effective Oxygenation and Ventilation  Intervention: Promote Airway Secretion Clearance  Recent Flowsheet Documentation  Taken 1/14/2025 1705 by Renetta Aleman RN  Cough And Deep Breathing: done independently per patient  Activity Management:   activity adjusted per tolerance   activity encouraged  Intervention: Optimize Oxygenation and Ventilation  Recent Flowsheet Documentation  Taken 1/14/2025 1705 by Renetta Aleman RN  Head of Bed (HOB) Positioning: HOB at 20-30 degrees   Goal Outcome Evaluation:       Pt alert & oriented x4. VS stable on 1 L NS. No pain stated throughout shift. Independent in room. Total 1120 mL urine output during shift. Denies difficulty breathing.  at dinner & 145 at bedtime. No bedtime coverage needed. Lung sounds clear, diminished. Infrequent dry cough. Ready for discharge in the AM.

## 2025-01-15 NOTE — PLAN OF CARE
Problem: Adult Inpatient Plan of Care  Goal: Optimal Comfort and Wellbeing  Outcome: Progressing     Problem: COPD (Chronic Obstructive Pulmonary Disease)  Goal: Optimal Chronic Illness Coping  Outcome: Progressing  Goal: Optimal Level of Functional [place]  Outcome: Progressing  Intervention: Optimize Functional Ability  Recent Flowsheet Documentation  Taken 1/15/2025 0008 by Raymon Guzman RN  Activity Management:   activity adjusted per tolerance   activity encouraged  Goal: Absence of Infection Signs and Symptoms  Outcome: Progressing  Goal: Improved Oral Intake  Outcome: Progressing  Goal: Effective Oxygenation and Ventilation  Outcome: Progressing  Intervention: Promote Airway Secretion Clearance  Recent Flowsheet Documentation  Taken 1/15/2025 0008 by Raymon Guzman RN  Activity Management:   activity adjusted per tolerance   activity encouraged  Intervention: Optimize Oxygenation and Ventilation  Recent Flowsheet Documentation  Taken 1/15/2025 0008 by Raymon Guzman RN  Head of Bed (HOB) Positioning: HOB at 20-30 degrees   Goal Outcome Evaluation:      Plan of Care Reviewed With: patient    Overall Patient Progress: improving       A&O x4.  Pt denies any pain or SOB.  Independent in room. 1 LNC.   Plan of care ongoing.

## 2025-01-16 VITALS
OXYGEN SATURATION: 90 % | RESPIRATION RATE: 20 BRPM | SYSTOLIC BLOOD PRESSURE: 137 MMHG | BODY MASS INDEX: 23.59 KG/M2 | WEIGHT: 178 LBS | HEIGHT: 73 IN | TEMPERATURE: 98.1 F | HEART RATE: 81 BPM | DIASTOLIC BLOOD PRESSURE: 80 MMHG

## 2025-01-16 LAB
ANION GAP SERPL CALCULATED.3IONS-SCNC: 8 MMOL/L (ref 7–15)
BASOPHILS # BLD AUTO: 0 10E3/UL (ref 0–0.2)
BASOPHILS NFR BLD AUTO: 0 %
BUN SERPL-MCNC: 30.7 MG/DL (ref 8–23)
CALCIUM SERPL-MCNC: 9.4 MG/DL (ref 8.8–10.4)
CHLORIDE SERPL-SCNC: 99 MMOL/L (ref 98–107)
CREAT SERPL-MCNC: 0.98 MG/DL (ref 0.67–1.17)
EGFRCR SERPLBLD CKD-EPI 2021: 88 ML/MIN/1.73M2
EOSINOPHIL # BLD AUTO: 0 10E3/UL (ref 0–0.7)
EOSINOPHIL NFR BLD AUTO: 0 %
ERYTHROCYTE [DISTWIDTH] IN BLOOD BY AUTOMATED COUNT: 14.4 % (ref 10–15)
GLUCOSE BLDC GLUCOMTR-MCNC: 114 MG/DL (ref 70–99)
GLUCOSE BLDC GLUCOMTR-MCNC: 90 MG/DL (ref 70–99)
GLUCOSE SERPL-MCNC: 90 MG/DL (ref 70–99)
HCO3 SERPL-SCNC: 31 MMOL/L (ref 22–29)
HCT VFR BLD AUTO: 46.8 % (ref 40–53)
HGB BLD-MCNC: 15.2 G/DL (ref 13.3–17.7)
IMM GRANULOCYTES # BLD: 0.1 10E3/UL
IMM GRANULOCYTES NFR BLD: 1 %
INR PPP: 1.76 (ref 0.85–1.15)
LYMPHOCYTES # BLD AUTO: 1.9 10E3/UL (ref 0.8–5.3)
LYMPHOCYTES NFR BLD AUTO: 11 %
MCH RBC QN AUTO: 29.1 PG (ref 26.5–33)
MCHC RBC AUTO-ENTMCNC: 32.5 G/DL (ref 31.5–36.5)
MCV RBC AUTO: 90 FL (ref 78–100)
MONOCYTES # BLD AUTO: 0.7 10E3/UL (ref 0–1.3)
MONOCYTES NFR BLD AUTO: 4 %
NEUTROPHILS # BLD AUTO: 14 10E3/UL (ref 1.6–8.3)
NEUTROPHILS NFR BLD AUTO: 83 %
NRBC # BLD AUTO: 0 10E3/UL
NRBC BLD AUTO-RTO: 0 /100
PLATELET # BLD AUTO: 237 10E3/UL (ref 150–450)
POTASSIUM SERPL-SCNC: 4.4 MMOL/L (ref 3.4–5.3)
RBC # BLD AUTO: 5.23 10E6/UL (ref 4.4–5.9)
SODIUM SERPL-SCNC: 138 MMOL/L (ref 135–145)
WBC # BLD AUTO: 16.8 10E3/UL (ref 4–11)

## 2025-01-16 PROCEDURE — 36415 COLL VENOUS BLD VENIPUNCTURE: CPT

## 2025-01-16 PROCEDURE — 85004 AUTOMATED DIFF WBC COUNT: CPT

## 2025-01-16 PROCEDURE — 250N000013 HC RX MED GY IP 250 OP 250 PS 637

## 2025-01-16 PROCEDURE — 84520 ASSAY OF UREA NITROGEN: CPT

## 2025-01-16 PROCEDURE — 99238 HOSP IP/OBS DSCHRG MGMT 30/<: CPT | Mod: GC

## 2025-01-16 PROCEDURE — 85610 PROTHROMBIN TIME: CPT

## 2025-01-16 PROCEDURE — 80048 BASIC METABOLIC PNL TOTAL CA: CPT

## 2025-01-16 PROCEDURE — 250N000012 HC RX MED GY IP 250 OP 636 PS 637

## 2025-01-16 RX ORDER — PREDNISONE 20 MG/1
40 TABLET ORAL DAILY
Qty: 2 TABLET | Refills: 0 | Status: SHIPPED | OUTPATIENT
Start: 2025-01-17 | End: 2025-01-18

## 2025-01-16 RX ORDER — DOXYCYCLINE 100 MG/1
100 CAPSULE ORAL 2 TIMES DAILY
Qty: 6 CAPSULE | Refills: 0 | Status: SHIPPED | OUTPATIENT
Start: 2025-01-16 | End: 2025-01-19

## 2025-01-16 RX ADMIN — METOPROLOL SUCCINATE 50 MG: 50 TABLET, EXTENDED RELEASE ORAL at 09:43

## 2025-01-16 RX ADMIN — LOSARTAN POTASSIUM 25 MG: 25 TABLET, FILM COATED ORAL at 09:44

## 2025-01-16 RX ADMIN — UMECLIDINIUM 1 PUFF: 62.5 AEROSOL, POWDER ORAL at 09:40

## 2025-01-16 RX ADMIN — METOPROLOL SUCCINATE 25 MG: 25 TABLET, EXTENDED RELEASE ORAL at 09:44

## 2025-01-16 RX ADMIN — AMLODIPINE BESYLATE 5 MG: 5 TABLET ORAL at 09:44

## 2025-01-16 RX ADMIN — NICOTINE 1 PATCH: 14 PATCH, EXTENDED RELEASE TRANSDERMAL at 09:46

## 2025-01-16 RX ADMIN — ATORVASTATIN CALCIUM 80 MG: 40 TABLET, FILM COATED ORAL at 09:43

## 2025-01-16 RX ADMIN — FUROSEMIDE 40 MG: 20 TABLET ORAL at 09:44

## 2025-01-16 RX ADMIN — IPRATROPIUM BROMIDE 2 SPRAY: 42 SPRAY NASAL at 09:40

## 2025-01-16 RX ADMIN — PREDNISONE 40 MG: 20 TABLET ORAL at 09:44

## 2025-01-16 ASSESSMENT — ACTIVITIES OF DAILY LIVING (ADL)
ADLS_ACUITY_SCORE: 42

## 2025-01-16 NOTE — PLAN OF CARE
Problem: Adult Inpatient Plan of Care  Goal: Plan of Care Review  Description: The Plan of Care Review/Shift note should be completed every shift.  The Outcome Evaluation is a brief statement about your assessment that the patient is improving, declining, or no change.  This information will be displayed automatically on your shift  note.  Outcome: Progressing     Problem: COPD (Chronic Obstructive Pulmonary Disease)  Goal: Improved Oral Intake  Outcome: Progressing     Problem: COPD (Chronic Obstructive Pulmonary Disease)  Goal: Effective Oxygenation and Ventilation  Outcome: Progressing  Intervention: Promote Airway Secretion Clearance  Recent Flowsheet Documentation  Taken 1/15/2025 0575 by Lavell Prado, RN  Cough And Deep Breathing: done independently per patient  Activity Management:   up ad sil   activity adjusted per tolerance     Problem: Cardiac Impairment  Goal: Optimal Activity Tolerance  Outcome: Progressing    Patient alert and orientated, able to make needs known. Denies pain throughout shift. Up independently. Ambulated in hallway. Remains on supplemental oxygen. Appetite good.     Lavell Prado, RN

## 2025-01-16 NOTE — PLAN OF CARE
Problem: Adult Inpatient Plan of Care  Goal: Optimal Comfort and Wellbeing  Outcome: Progressing     Problem: Comorbidity Management  Goal: Maintenance of COPD Symptom Control  Outcome: Progressing  Intervention: Maintain COPD Symptom Control  Recent Flowsheet Documentation  Taken 1/15/2025 3078 by Raymon Guzman RN  Medication Review/Management: medications reviewed   Goal Outcome Evaluation:      Plan of Care Reviewed With: patient    Overall Patient Progress: improving       A&O x4.  Pt denies pain or SOB. Currently on 2 LNC.  Independent in room.  Plan of care ongoing.

## 2025-01-16 NOTE — PROGRESS NOTES
Writer attempting to wean pt to RA. Dr. Au present at bedside & ambulated pt personally. Charge RN removed IV from pt & obtained discharge orders. Pt adament on discharging at this time due to appt. Outpatient. Charge RN went over paperwork with pt. Per charge RN, pt ambulated to hospital entrance where ride for transport was awaiting. Personal belongings sent with pt.

## 2025-01-16 NOTE — PROGRESS NOTES
Pt discharge instruction's given to patient.  Medication's sent to [facility]'s per/pt request. Pt ambulated to hospital door.

## 2025-01-16 NOTE — PROGRESS NOTES
SPIRITUAL HEALTH SERVICES Note     Saw pt Gonsalo Blakely and administered Gnosticist sacrament of anointing for the healing of the sick.    Fr. Yves Gambino

## 2025-01-16 NOTE — DISCHARGE SUMMARY
[facility]  Discharge Summary - Medicine & Pediatrics       Date of Admission:  1/13/2025  Date of Discharge:  1/16/2025  Discharging Provider: ZACK Hoang  Discharge Service: Hospitalist Service    Discharge Diagnoses   Acute hypoxemic respiratory failure (H)  Acute bronchitis, unspecified organism  COPD exacerbation (H)      Clinically Significant Risk Factors     # DMII: A1C = 7.4 % (Ref range: 0.0 - 5.6 %) within past 6 months       Follow-ups Needed After Discharge   Follow-up Appointments       Hospital Follow-up with Existing Primary Care Provider (PCP)      Please see details below         Schedule Primary Care visit within: 7 Days               Discharge Disposition   Discharged to home  Condition at discharge: Stable    Hospital Course   Gonsalo Blakely was admitted on 1/13/2025 for COPD exacerbation.The following problems were addressed during his hospitalization:     Acute hypoxic respiratory failure-resolved  COPD exacerbation   Leukocytosis  Reports 2 to 3 weeks of flulike symptoms progressively worsening shortness of breath and cough.  Has history of COPD and has been taking his controller inhaler and albuterol x4 daily with no relief.  Patient reports worsening of SOB with exertion at times. Also cough has been more productive of greenish sputum for the past few days. No fever. COVID/flu/RSV negative.  Lab work notable for mildly elevated white count 13.8 otherwise unremarkable.  Chest x-ray with no signs of focal pneumonia or pulmonary edema.  Initially on admission to the ED significant SOB hypoxic to 88%.  Patient notes some improvement after receiving DuoNeb x3. Given si/sx presentation is likely due to COPD exacerbation, given slightly elevated white count pneumonia. Could also be CHF exacerbation although BNP remained stable 1,552 compared to previous and doesn't appear hypervolemic on exam. Denies chest pain troponin normal. Reports improvement in breathing  since admission and is 2L O2. Leukocytosis worsening likely in the setting of corticosteroids will continue to monitor. Received Solu-Medrol and continue on ceftriaxone and doxycycline 1/13-1/15 and discharged with oral antibiotics to complete the course. Respiratory panel returned negative.   - Respiratory panel is negative  - predniSONE (DELTASONE) 20 MG tablet; Take 2 tablets (40 mg) by mouth daily for 1 day.  - amoxicillin-clavulanate (AUGMENTIN) 875-125 MG tablet; Take 1 tablet by mouth 2 times daily for 3 days.  - doxycycline hyclate (VIBRAMYCIN) 100 MG capsule; Take 1 capsule (100 mg) by mouth 2 times daily for 3 days.     HFpEF  Paroxysmal A-fib  CAD, S/p CABG July 2024  On warfarin for A-fib. No chest pain, EKG showed sinus rhythm, no acute ischemic changes. History of HFpEF on PTA lasix. Doesn't appear hypervolemic on exam and BNP stable. Will continue on home lasix and metoprolol.        Type 2 diabetes  Most recent A1c 7.4, currently on metformin and farxiga, will hold. Will start NPH to cover corticosteroids. Will start sliding scale insulin t     Chronic conditions  HTN:amlodpine 5 mg  HLD: Continue Lipitor 80 mg    Consultations This Hospital Stay   PHARMACY TO DOSE WARFARIN  CARE MANAGEMENT / SOCIAL WORK IP CONSULT    Code Status   Full Code       The patient was discussed with Dr. Pipo Duron MD  [facility] [address] [place] [address]  Phone: [phone]  Fax: [phone]  ______________________________________________________________________    Physical Exam   Vital Signs: Temp: 98.1  F (36.7  C) Temp src: Oral BP: 137/80 Pulse: 81   Resp: 20 SpO2: 90 % O2 Device: None (Room air) Oxygen Delivery: 1 LPM  Weight: 178 lbs 0 oz  General Appearance: Alert, oriented, NAD  Respiratory: clear breath sounds B/L, no wheezing  Cardiovascular: normal S1, S2, no murmur  GI: soft, non distended, non tender, normal bowel sounds  Skin:  normal appearance and turgor, no visible rash  Other: No lower limb edema B/L       Primary Care Physician   Amelia He    Discharge Orders      Reason for your hospital stay    Admitted for COPD exacerbation improved and off of oxygen support. Will discharge with antibiotics and prednisone.     Activity    Your activity upon discharge: activity as tolerated     Diet    Follow this diet upon discharge: Current Diet:Orders Placed This Encounter      Moderate Consistent Carb (60 g CHO per Meal) Diet     Hospital Follow-up with Existing Primary Care Provider (PCP)    Please see details below            Significant Results and Procedures   Most Recent 3 CBC's:  Recent Labs   Lab Test 01/16/25  0524 01/15/25  0500 01/14/25  0738   WBC 16.8* 20.4* 16.2*   HGB 15.2 14.5 15.4   MCV 90 89 91    229 213     Most Recent 3 BMP's:  Recent Labs   Lab Test 01/16/25  0812 01/16/25  0524 01/16/25  0218 01/15/25  0815 01/15/25  0500 01/14/25  1120 01/14/25  0738   NA  --  138  --   --  137  --  137   POTASSIUM  --  4.4  --   --  4.4  --  5.3   CHLORIDE  --  99  --   --  97*  --  98   CO2  --  31*  --   --  32*  --  31*   BUN  --  30.7*  --   --  34.7*  --  27.1*   CR  --  0.98  --   --  1.06  --  1.13   ANIONGAP  --  8  --   --  8  --  8   JAIME  --  9.4  --   --  9.5  --  9.5   * 90 90   < > 111*   < > 130*    < > = values in this interval not displayed.   ,   Results for orders placed or performed during the hospital encounter of 01/13/25   XR Chest Port 1 View    Narrative    EXAM: XR CHEST PORT 1 VIEW  LOCATION: [facility]  DATE: 1/13/2025    INDICATION: SOB.  COMPARISON: 7/16/2024.      Impression    IMPRESSION: Left chest tube and mediastinal tube have been removed. Left atrial appendage clip is noted. There is a minimal amount of atelectasis present within the inferior portion of the left lung. Right lung is clear. No pleural effusion or   pneumothorax.       Discharge Medications    Discharge Medication List as of 1/16/2025 12:32 PM        START taking these medications    Details   amoxicillin-clavulanate (AUGMENTIN) 875-125 MG tablet Take 1 tablet by mouth 2 times daily for 3 days., Disp-6 tablet, R-0, E-Prescribe      doxycycline hyclate (VIBRAMYCIN) 100 MG capsule Take 1 capsule (100 mg) by mouth 2 times daily for 3 days., Disp-6 capsule, R-0, E-Prescribe      predniSONE (DELTASONE) 20 MG tablet Take 2 tablets (40 mg) by mouth daily for 1 day., Disp-2 tablet, R-0, E-Prescribe           CONTINUE these medications which have NOT CHANGED    Details   acetaminophen (TYLENOL) 325 MG tablet Take 2 tablets (650 mg) by mouth every 4 hours as needed for other (For optimal non-opioid multimodal pain management to improve pain control.)., Disp-60 tablet, R-0, E-Prescribe      albuterol (PROAIR HFA/PROVENTIL HFA/VENTOLIN HFA) 108 (90 Base) MCG/ACT inhaler Inhale 1-2 puffs into the lungs every 6 hours as needed for shortness of breath, wheezing or cough, Disp-18 g, R-1, E-PrescribePharmacy may dispense brand covered by insurance (Proair, or proventil or ventolin or generic albuterol inhaler)      amLODIPine (NORVASC) 5 MG tablet Take 1 tablet (5 mg) by mouth daily., Disp-30 tablet, R-1, E-Prescribe      atorvastatin (LIPITOR) 80 MG tablet Take 1 tablet (80 mg) by mouth daily., Disp-90 tablet, R-3, E-Prescribe      carbamide peroxide (DEBROX) 6.5 % otic solution Place 5 drops into both ears 2 times daily., Disp-15 mL, R-1, E-Prescribe      dapagliflozin (FARXIGA) 10 MG TABS tablet Take 1 tablet (10 mg) by mouth daily., 10 mg, Oral, DAILY Starting Wed 12/18/2024, Disp-90 tablet, R-1, E-Prescribe      furosemide (LASIX) 40 MG tablet Take 1 tablet (40 mg) by mouth daily., Disp-90 tablet, R-3, E-Prescribe      ipratropium (ATROVENT) 0.06 % nasal spray Spray 2 sprays into both nostrils 4 times daily., Disp-15 mL, R-1, E-Prescribe      losartan (COZAAR) 25 MG tablet Take 1 tablet (25 mg) by mouth daily.,  Disp-90 tablet, R-1, E-Prescribe      metFORMIN (GLUCOPHAGE XR) 500 MG 24 hr tablet Take 1,000 mg by mouth daily (with breakfast)., Historical      !! metoprolol succinate ER (TOPROL XL) 25 MG 24 hr tablet Take 25 mg by mouth every morning. Take 25mg in addition to 50mg for a total of 75mg by mouth daily, Historical      !! metoprolol succinate ER (TOPROL XL) 50 MG 24 hr tablet Take 50 mg by mouth every morning. Take 25mg in addition to 50mg for a total of 75mg by mouth daily, Historical      sodium chloride (OCEAN) 0.65 % nasal spray Use as needed for congestion, Disp-88 mL, R-1, E-Prescribe      tiotropium (SPIRIVA RESPIMAT) 2.5 MCG/ACT inhaler Inhale 2 puffs into the lungs daily., Disp-4 g, R-2, E-Prescribe      warfarin ANTICOAGULANT (COUMADIN) 5 MG tablet Take 7.5-10 mg by mouth See Admin Instructions. Take 1.5 tablets (7.5 mg total) on Sunday and Tuesday evenings. All other evenings of the week take 2 tablets (10 mg total), Historical       !! - Potential duplicate medications found. Please discuss with provider.        Allergies   Allergies   Allergen Reactions    Ace Inhibitors Cough

## 2025-01-16 NOTE — PROGRESS NOTES
Care Management Discharge Note    Discharge Date: 01/16/2025     Discharge Disposition: Home    Discharge Services: None    Discharge DME:  (Patient stated, he would like to discuss receiving a nebulizer)    Discharge Transportation: family or friend will provide    Private pay costs discussed: Not applicable    Does the patient's insurance plan have a 3 day qualifying hospital stay waiver?  No    PAS Confirmation Code:    Patient/family educated on Medicare website which has current facility and service quality ratings: no    Education Provided on the Discharge Plan: Yes  Persons Notified of Discharge Plans: Nursing;   Patient/Family in Agreement with the Plan: yes    Handoff Referral Completed: Yes, non-[facility] PCP: External handoff communication completed    Additional Information:  Patient discharged. Discharge orders noted and reviewed. No CM needs identified.     Meg Victoria RN

## 2025-01-16 NOTE — PROGRESS NOTES
Care Management Follow Up    Length of Stay (days): 3    Expected Discharge Date: 01/16/2025    Anticipated Discharge Plan:  Home    Transportation: Anticipate Family/friend    PT Recommendations:  NA (up independently in room)  OT Recommendations:   NA     Barriers to Discharge: medical stability IV antibiotics; O2 needs, (not on O2 at baseline).    Prior Living Situation: house with alone    Discussed  Partnership in Safe Discharge Planning  document with patient/family: No     Handoff Completed: No, handoff not indicated or clinically appropriate    Patient/Spokesperson Updated: No    Additional Information:  Patient is independent with activities of daily living at baseline.  No care management needs identified at this time but CM will continue to monitor progression of care, review team recommendations and provide discharge planning assist as needed.      Next Steps: Watch for team recommendations.     Meg Victoria RN

## 2025-01-23 LAB
ATRIAL RATE - MUSE: 86 BPM
DIASTOLIC BLOOD PRESSURE - MUSE: 80 MMHG
INTERPRETATION ECG - MUSE: NORMAL
P AXIS - MUSE: 80 DEGREES
PR INTERVAL - MUSE: 146 MS
QRS DURATION - MUSE: 130 MS
QT - MUSE: 404 MS
QTC - MUSE: 483 MS
R AXIS - MUSE: 73 DEGREES
SYSTOLIC BLOOD PRESSURE - MUSE: 140 MMHG
T AXIS - MUSE: 81 DEGREES
VENTRICULAR RATE- MUSE: 86 BPM

## 2025-02-04 ENCOUNTER — OFFICE VISIT (OUTPATIENT)
Dept: FAMILY MEDICINE | Facility: CLINIC | Age: 60
End: 2025-02-04
Payer: COMMERCIAL

## 2025-02-04 VITALS
HEART RATE: 80 BPM | BODY MASS INDEX: 23.57 KG/M2 | RESPIRATION RATE: 18 BRPM | TEMPERATURE: 97.8 F | OXYGEN SATURATION: 96 % | WEIGHT: 174 LBS | SYSTOLIC BLOOD PRESSURE: 131 MMHG | DIASTOLIC BLOOD PRESSURE: 78 MMHG | HEIGHT: 72 IN

## 2025-02-04 DIAGNOSIS — I97.89 POSTOPERATIVE ATRIAL FIBRILLATION (H): ICD-10-CM

## 2025-02-04 DIAGNOSIS — I10 BENIGN ESSENTIAL HYPERTENSION: Primary | ICD-10-CM

## 2025-02-04 DIAGNOSIS — I48.91 POSTOPERATIVE ATRIAL FIBRILLATION (H): ICD-10-CM

## 2025-02-04 DIAGNOSIS — I50.21 ACUTE SYSTOLIC CONGESTIVE HEART FAILURE (H): ICD-10-CM

## 2025-02-04 DIAGNOSIS — Z79.01 ON WARFARIN THERAPY: ICD-10-CM

## 2025-02-04 DIAGNOSIS — E11.9 TYPE 2 DIABETES MELLITUS WITHOUT COMPLICATION, WITHOUT LONG-TERM CURRENT USE OF INSULIN (H): ICD-10-CM

## 2025-02-04 DIAGNOSIS — I25.5 ISCHEMIC CARDIOMYOPATHY: ICD-10-CM

## 2025-02-04 RX ORDER — METOPROLOL SUCCINATE 25 MG/1
25 TABLET, EXTENDED RELEASE ORAL EVERY MORNING
Qty: 90 TABLET | Refills: 3 | Status: SHIPPED | OUTPATIENT
Start: 2025-02-04

## 2025-02-04 RX ORDER — LOSARTAN POTASSIUM 25 MG/1
25 TABLET ORAL DAILY
Qty: 90 TABLET | Refills: 1 | Status: SHIPPED | OUTPATIENT
Start: 2025-02-04

## 2025-02-04 RX ORDER — AMLODIPINE BESYLATE 5 MG/1
5 TABLET ORAL DAILY
Qty: 90 TABLET | Refills: 3 | Status: SHIPPED | OUTPATIENT
Start: 2025-02-04

## 2025-02-04 RX ORDER — METFORMIN HYDROCHLORIDE 500 MG/1
1000 TABLET, EXTENDED RELEASE ORAL
Qty: 90 TABLET | Refills: 3 | Status: SHIPPED | OUTPATIENT
Start: 2025-02-04

## 2025-02-04 RX ORDER — METOPROLOL SUCCINATE 50 MG/1
50 TABLET, EXTENDED RELEASE ORAL EVERY MORNING
Qty: 90 TABLET | Refills: 3 | Status: SHIPPED | OUTPATIENT
Start: 2025-02-04

## 2025-02-04 RX ORDER — WARFARIN SODIUM 5 MG/1
7.5-1 TABLET ORAL SEE ADMIN INSTRUCTIONS
Qty: 90 TABLET | Refills: 3 | Status: SHIPPED | OUTPATIENT
Start: 2025-02-04

## 2025-02-04 RX ORDER — DAPAGLIFLOZIN 10 MG/1
10 TABLET, FILM COATED ORAL DAILY
Qty: 90 TABLET | Refills: 1 | Status: SHIPPED | OUTPATIENT
Start: 2025-02-04

## 2025-02-04 NOTE — PROGRESS NOTES
Assessment & Plan     (I10) Benign essential hypertension  (primary encounter diagnosis)  Comment: Blood pressure at goal today.  Taking medications regularly including amlodipine, losartan and metoprolol.  No hypertensive symptoms.  No hypotensive symptoms.  Normal BMP 2 weeks ago  Plan: amLODIPine (NORVASC) 5 MG tablet, losartan         (COZAAR) 25 MG tablet         -Continue medications as directed   -Normal BMP 2 weeks ago    (I25.5) Ischemic cardiomyopathy  Comment: History of ischemic cardiomyopathy.  On SGLT2, metoprolol.  Refills provided for these.  Plan: metoprolol succinate ER (TOPROL XL) 50 MG 24 hr        tablet, dapagliflozin (FARXIGA) 10 MG TABS         tablet, metoprolol succinate ER (TOPROL XL) 25         MG 24 hr tablet            (I50.21) Acute systolic congestive heart failure (H)  Comment: History of heart failure.  Taking metoprolol SGLT2 and Lasix daily.  Patient is euvolemic today with clear lungs and no lower extremity edema.  Plan: metoprolol succinate ER (TOPROL XL) 50 MG 24 hr        tablet, dapagliflozin (FARXIGA) 10 MG TABS         tablet, metoprolol succinate ER (TOPROL XL) 25         MG 24 hr tablet         -Continue with medications as previously directed, refills provided    (I97.89,  I48.91) Postoperative atrial fibrillation (H)  Comment: On rate control with metoprolol and warfarin.  Recent supratherapeutic INR, being managed by anticoagulation clinic.  Plan:    -Continue with warfarin schedule and to follow with anticoagulation clinic    (Z79.01) On warfarin therapy  Comment: As above  Plan: warfarin ANTICOAGULANT (COUMADIN) 5 MG tablet        As above for A-fib    (E11.9) Type 2 diabetes mellitus without complication, without long-term current use of insulin (H)  Comment: Refills provided for metformin today otherwise diabetes not addressed.  Plan: metFORMIN (GLUCOPHAGE XR) 500 MG 24 hr tablet                No follow-ups on file.    Marty Brush is a 59 year old,  "presenting for the following health issues:  Hypertension, follow up cold fingers on left hand, and Refill Request      2/4/2025     8:45 AM   Additional Questions   Roomed by prince   Accompanied by self         2/4/2025    Information    services provided? No     HPI       Hypertension Follow up     Current med regimen: Amlodipine, losartan, metoprolol   Side effects of meds: None  How often does patient forget to take meds: Everyday, but ran out a few months ago.   Chest Pain: no  Headache/changes in vision: n    BP Readings from Last 6 Encounters:   02/04/25 131/78   01/17/25 (!) 156/96   01/16/25 137/80   01/07/25 134/79   01/02/25 (!) 158/103   12/18/24 (!) 153/92           Objective    /78   Pulse 80   Temp 97.8  F (36.6  C) (Oral)   Resp 18   Ht 1.835 m (6' 0.24\")   Wt 78.9 kg (174 lb)   SpO2 96%   BMI 23.44 kg/m    Body mass index is 23.44 kg/m .  Physical Exam   GENERAL: alert and no distress  EYES: Eyes grossly normal to inspection, PERRL and conjunctivae and sclerae normal  RESP: lungs clear to auscultation - no rales, rhonchi or wheezes  CV: regular rate and rhythm, normal S1 S2, no S3 or S4, no murmur, click or rub, no peripheral edema  MS: no gross musculoskeletal defects noted, no edema  SKIN: no suspicious lesions or rashes  NEURO: Normal strength and tone, mentation intact and speech normal  PSYCH: mentation appears normal, affect normal/bright    BMP normal 2 weeks ago. INR supra therapeutic         Signed Electronically by: Roland Hare MD    "

## 2025-02-06 NOTE — PROGRESS NOTES
[facility]   Physical Therapy Treatment Note      Name: Farzad Moore  [facility] Number: [id number]    Therapy Diagnosis:   Encounter Diagnosis   Name Primary?    Acute pain of left shoulder Yes         Physician: Clay Pierson PA-C    Visit Date: 3/11/2024    Physician Orders: PT Eval and Treat   Medical Diagnosis from Referral:   S43.432D (ICD-10-CM) - Superior glenoid labrum lesion of left shoulder, subsequent encounter   M75.22 (ICD-10-CM) - Bicipital tendinitis of left shoulder      Evaluation Date: 1/12/2024  Authorization Period Expiration: 1/3/2025  Plan of Care Expiration: 5/31/2024  Progress Note Due: 2/22/2024  Visit # / Visits authorized: 13/ 20  FOTO: 1/3     Precautions: Standard,      OPERATION: 1/10/2024  Left  1. Shoulder arthroscopic rotator cuff repair (CPT 52460)  2. Shoulder open subpectoral biceps tenodesis (CPT 76780)  3. Shoulder arthroscopic extensive debridement (CPT 09054)    4. Shoulder arthroscopic subacromial decompression      Physical Therapy: Follow the < 3 cm cuff repair rehab protocol. PROM starts immediately given the patient's increased risk in this case for postoperative stiffness.. AROM starts after 6 weeks. No cuff resistive activity x 12 weeks. No biceps resistive activity x 8 weeks. Sling and pillow immobilization for 6 weeks.             PTA Visit #: 0/5     Time In: 0905  Time Out: 1002  Total Billable Time: 32 minutes     Subjective     Pt reports: Returned to work this week and did some lifting activities which caused him to be sore. Had to stay in his sling all weekend and ice. No pain today.    He was compliant with home exercise program.  Response to previous treatment: Improved mobility  Functional change: NA    Pain: 1/10  Location: left shoulder      Objective      Objective Measures updated at progress report unless specified.     DOS: 1/10/2024  POW 7 weeks 6 day as of 3/4/2024    Passive Range of Motion:   Shoulder Left   Flexion  Preceptor Attestation:   Patient seen, evaluated and discussed with the resident. I have verified the content of the note, which accurately reflects my assessment of the patient and the plan of care.    Supervising Physician:Gillian Roy MD    [facility]   135   Abduction 90-NT   ER at 20 70   IR 20-NT         Treatment     Farzad received the treatments listed below:      therapeutic exercises to develop ROM, strength, flexibility, endurance for 8' minutes including:  Self mobilizations to improve ER at 0, 45, 90, 30x ea      manual therapy techniques: joint mobilizations and/or soft tissue mobilizations were applied to the: shoulder for 10 minutes, including:  Shoulder PROM to protocol  GH joint mobilizations gr I/II-np  Scapular mobilizations IV, upward rotation and posterior tilt-np  Wound/ROM assessment    neuromuscular re-education activities to improve: motor control, balance, muscle activation, proprioception, posture for 15 minutes. The following activities were included:  Prone T 3 x 10  Prone row 4 lbs 3 x 10  Walk outs arm at neutral, OTB 3 x burn        therapeutic activities to improve functional performance for 8  minutes, including:  Wall slide 2 x 10 to for functional reach training.      Patient Education and Home Exercises       Education provided:   Sling weaning      Written Home Exercises Provided: yes. Exercises were reviewed and Farzad was able to demonstrate them prior to the end of the session.  Farzad demonstrated good  understanding of the education provided. See EMR under Patient Instructions for exercises provided during therapy sessions.    Assessment   Progressing with increasing loads in clinic but needs to self manage activities at work better. Educated on appropriate loads for the shoulder. Overall he is progressing and should continue to do so, may have a longer rehabilitation due to stiffness.    Farzad Is progressing well towards his goals.   Pt prognosis is Good.     Pt will continue to benefit from skilled outpatient physical therapy to address the deficits listed in the problem list box on initial evaluation, provide pt/family education and to maximize pt's level of independence in the home and community environment.      Pt's spiritual, cultural and educational needs considered and pt agreeable to plan of care and goals.     Anticipated barriers to physical therapy: None    GOALS: Short Term Goals: 6 weeks (met)  1.Report decreased shoulder pain < / =  1/10  to increase tolerance for exercise  2. Increase PROM full ROM  4. Pt to tolerate HEP to improve ROM and independence with ADL's     Long Term Goals: 12 to 24 weeks  1.Report decreased shoulder pain  < / =  0 /10  to increase tolerance for ADLs (in progress, not met)  2.Increase AROM to equal to the contralateral limb (in progress, not met)  3.Increase strength to >/= 4/5 in shoulder girdle to increase tolerance for ADL and work activities. (in progress, not met)  4. Pt goal: restore full function (in progress, not met)  5. Pt will have improved gcode of CJ (20-40% limited) on FOTO shoulder in order to demonstrate true functional improvement. (in progress, not met)    Plan     Plan of care Certification: 1/12/2024 to 5/31/2024.    Focus on improving ROM and normalizing cuff function. No strengthening until 12 weeks post op.     Outpatient Physical Therapy 2 times weekly for 10 weeks to include the following interventions: manual therapy, therapeutic exercise, therapeutic activities, and neuromuscular re-education.     Otoniel Prasad, PT, DPT

## 2025-02-19 ENCOUNTER — MYC MEDICAL ADVICE (OUTPATIENT)
Dept: ANTICOAGULATION | Facility: CLINIC | Age: 60
End: 2025-02-19
Payer: COMMERCIAL

## 2025-03-03 ENCOUNTER — TELEPHONE (OUTPATIENT)
Dept: ANTICOAGULATION | Facility: CLINIC | Age: 60
End: 2025-03-03
Payer: COMMERCIAL

## 2025-03-03 NOTE — TELEPHONE ENCOUNTER
ANTICOAGULATION     Gonsalo Blakely is overdue for an INR check.     Spoke with Gonsalo and scheduled lab appointment on 3/5/25    Kaur Burks, RN  3/3/2025  Anticoagulation Clinic  [facility] for routing messages: [facility] HEART [facility] patient phone line: [phone]

## 2025-03-10 ENCOUNTER — LAB (OUTPATIENT)
Dept: LAB | Facility: CLINIC | Age: 60
End: 2025-03-10
Payer: COMMERCIAL

## 2025-03-10 ENCOUNTER — ANTICOAGULATION THERAPY VISIT (OUTPATIENT)
Dept: ANTICOAGULATION | Facility: CLINIC | Age: 60
End: 2025-03-10

## 2025-03-10 DIAGNOSIS — Z95.1 S/P CABG (CORONARY ARTERY BYPASS GRAFT): ICD-10-CM

## 2025-03-10 DIAGNOSIS — Z95.1 S/P CABG (CORONARY ARTERY BYPASS GRAFT): Primary | ICD-10-CM

## 2025-03-10 LAB — INR BLD: 2.6 (ref 0.9–1.1)

## 2025-03-10 NOTE — PROGRESS NOTES
ANTICOAGULATION MANAGEMENT     Gonsalo Blakely 59 year old male is on warfarin with therapeutic INR result. (Goal INR 2.0-3.0)    Recent labs: (last 7 days)     03/10/25  0902   INR 2.6*       ASSESSMENT     Source(s): Chart Review  Previous INR was Supratherapeutic  Medication, diet, health changes since last INR chart reviewed; none identified  Overdue for INR         PLAN     Recommended plan for no diet, medication or health factor changes affecting INR     Dosing Instructions: Continue your current warfarin dose with next INR in 4 weeks       Summary  As of 3/10/2025      Full warfarin instructions:  7.5 mg every Sun, Tue; 10 mg all other days   Next INR check:  4/7/2025               Detailed voice message left for Gonsalo with dosing instructions and follow up date.     Contact [phone] to schedule and with any changes, questions or concerns.     Education provided: Please call back if any changes to your diet, medications or how you've been taking warfarin    Plan made per [facility] anticoagulation protocol    Deborah Forrest RN  3/10/2025  Anticoagulation Clinic  [facility] for routing messages: p [facility] patient phone line: [phone]        _______________________________________________________________________     Anticoagulation Episode Summary       Current INR goal:  2.0-3.0   TTR:  37.8% (6.5 mo)   Target end date:  Indefinite   Send INR reminders to:  [facility]    Indications    S/P CABG (coronary artery bypass graft) [Z95.1]             Comments:  --             Anticoagulation Care Providers       Provider Role Specialty Phone number    Joyce Alaniz PA-C Referring Radiation Oncology [phone]

## 2025-03-17 ENCOUNTER — OFFICE VISIT (OUTPATIENT)
Dept: FAMILY MEDICINE | Facility: CLINIC | Age: 60
End: 2025-03-17
Payer: COMMERCIAL

## 2025-03-17 VITALS
SYSTOLIC BLOOD PRESSURE: 127 MMHG | TEMPERATURE: 97.6 F | RESPIRATION RATE: 20 BRPM | WEIGHT: 177 LBS | HEIGHT: 72 IN | BODY MASS INDEX: 23.98 KG/M2 | HEART RATE: 91 BPM | OXYGEN SATURATION: 95 % | DIASTOLIC BLOOD PRESSURE: 79 MMHG

## 2025-03-17 DIAGNOSIS — J44.9 CHRONIC OBSTRUCTIVE PULMONARY DISEASE, UNSPECIFIED COPD TYPE (H): ICD-10-CM

## 2025-03-17 DIAGNOSIS — Z95.1 S/P CABG (CORONARY ARTERY BYPASS GRAFT): ICD-10-CM

## 2025-03-17 DIAGNOSIS — Z87.891 HISTORY OF TOBACCO USE, PRESENTING HAZARDS TO HEALTH: ICD-10-CM

## 2025-03-17 DIAGNOSIS — Z71.6 ENCOUNTER FOR SMOKING CESSATION COUNSELING: ICD-10-CM

## 2025-03-17 DIAGNOSIS — I50.21 ACUTE SYSTOLIC CONGESTIVE HEART FAILURE (H): ICD-10-CM

## 2025-03-17 DIAGNOSIS — Z00.00 ROUTINE GENERAL MEDICAL EXAMINATION AT A HEALTH CARE FACILITY: Primary | ICD-10-CM

## 2025-03-17 DIAGNOSIS — I25.5 ISCHEMIC CARDIOMYOPATHY: ICD-10-CM

## 2025-03-17 DIAGNOSIS — R52 PAIN: ICD-10-CM

## 2025-03-17 PROBLEM — J20.9 ACUTE BRONCHITIS, UNSPECIFIED ORGANISM: Status: RESOLVED | Noted: 2025-01-13 | Resolved: 2025-03-17

## 2025-03-17 PROBLEM — I50.9 ACUTE DECOMPENSATED HEART FAILURE (H): Status: RESOLVED | Noted: 2024-06-16 | Resolved: 2025-03-17

## 2025-03-17 PROBLEM — J96.01 ACUTE HYPOXEMIC RESPIRATORY FAILURE (H): Status: RESOLVED | Noted: 2025-01-13 | Resolved: 2025-03-17

## 2025-03-17 RX ORDER — ACETAMINOPHEN 325 MG/1
650 TABLET ORAL EVERY 4 HOURS PRN
Qty: 60 TABLET | Refills: 0 | Status: SHIPPED | OUTPATIENT
Start: 2025-03-17

## 2025-03-17 RX ORDER — DAPAGLIFLOZIN 10 MG/1
10 TABLET, FILM COATED ORAL DAILY
Qty: 90 TABLET | Refills: 1 | Status: SHIPPED | OUTPATIENT
Start: 2025-03-17

## 2025-03-17 RX ORDER — NICOTINE 21 MG/24HR
1 PATCH, TRANSDERMAL 24 HOURS TRANSDERMAL EVERY 24 HOURS
Qty: 28 PATCH | Refills: 3 | Status: SHIPPED | OUTPATIENT
Start: 2025-03-17

## 2025-03-17 SDOH — HEALTH STABILITY: PHYSICAL HEALTH: ON AVERAGE, HOW MANY DAYS PER WEEK DO YOU ENGAGE IN MODERATE TO STRENUOUS EXERCISE (LIKE A BRISK WALK)?: 5 DAYS

## 2025-03-17 SDOH — HEALTH STABILITY: PHYSICAL HEALTH: ON AVERAGE, HOW MANY MINUTES DO YOU ENGAGE IN EXERCISE AT THIS LEVEL?: 40 MIN

## 2025-03-17 ASSESSMENT — SOCIAL DETERMINANTS OF HEALTH (SDOH): HOW OFTEN DO YOU GET TOGETHER WITH FRIENDS OR RELATIVES?: ONCE A WEEK

## 2025-03-17 NOTE — PROGRESS NOTES
Preventive Care Visit  [facility]  Amelia He DO, Family Medicine  Mar 17, 2025      Assessment & Plan     Routine general medical examination at a health care facility  Patient here today for annual preventative exam.  Overall, doing well.  No current concerns.  Updated health history, has been stable on current medications.  Discussed indication for lung cancer screening today.  Patient amenable to, placed order today. Due for immunizations today. Open to immunizations, but wanting to wait until follow up visit as he doesn't want to have a sore arm today.    S/P CABG (coronary artery bypass graft)  Pain  Requesting refill today.  Notes that he is increased his level of physical activity, and has incorporated some weight training which is resulted in some more pain.  Will refill today  - acetaminophen (TYLENOL) 325 MG tablet; Take 2 tablets (650 mg) by mouth every 4 hours as needed for other (For optimal non-opioid multimodal pain management to improve pain control.).    Ischemic cardiomyopathy  Acute systolic congestive heart failure (H)  History of heart failure, now with improvement in EF to 50 to 55%.  Has been stable on current regimen.  Recommended follow-up with cardiology as it has been approximately 6 months.  Recommended further discussion regarding anticoagulation given patient had clip placed during CABG.  Weights have been stable.  Updated heart failure action plan today with patient.  - dapagliflozin (FARXIGA) 10 MG TABS tablet; Take 1 tablet (10 mg) by mouth daily.      Encounter for smoking cessation counseling  Patient with history of tobacco use.  Has been abstaining from any cigarette use.  Utilizing nicotine patches with has helped with his cravings.  Will refill today.  - nicotine (NICODERM CQ) 14 MG/24HR 24 hr patch; Place 1 patch over 24 hours onto the skin every 24 hours.    History of tobacco use, presenting hazards to health  Patient with history of tobacco  use.  Reports approximately 25 years of tobacco use.  Has abstained from use in the past year.  With greater than 20 pack history, discussed indication for lung cancer screening.  Patient amenable to screening.  Placed order today.  - CT Chest Lung Cancer Screen Low Dose Without; Future    COPD  Unclear COPD vs. Asthma. Had prior PFTs, but that was soon after CABG. Planning to have repeat PFTs in next month to reassess. Was initiated on Spiriva with great improvement of symptoms. 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 Also with history of tobacco use. Updated COPD action plan today. Advised to continue current inhalers and plan for repeat PFTs soon.       This note was created with the aid of dictation software. Any mistakes are unintentional.       Counseling  Appropriate preventive services were addressed with this patient via screening, questionnaire, or discussion as appropriate for fall prevention, nutrition, physical activity, Tobacco-use cessation, social engagement, weight loss and cognition.  Checklist reviewing preventive services available has been given to the patient.  Reviewed patient's diet, addressing concerns and/or questions.   The patient was instructed to see the dentist every 6 months.         Return in about 3 months (around 6/17/2025) for DM.    Marty Brush is a 60 year old, presenting for the following:  Physical        3/17/2025    10:48 AM   Additional Questions   Roomed by chelly ayers   Accompanied by self         3/17/2025    Information    services provided? No          HPI  59 y/o here today for annual preventative    [birth year]'s his birthday today!!    Overall, doing well. No recent changes to meds or to health history.     Advance Care Planning  Patient does not have a Health Care Directive: Discussed advance care planning with patient; information given to patient to review.      3/17/2025   General Health   How would you  "rate your overall physical health? Good   Feel stress (tense, anxious, or unable to sleep) Only a little         3/17/2025   Nutrition   Three or more servings of calcium each day? Yes   Diet: Low salt   How many servings of fruit and vegetables per day? (!) 0-1   How many sweetened beverages each day? 0-1         3/17/2025   Exercise   Days per week of moderate/strenous exercise 5 days   Average minutes spent exercising at this level 40 min         3/17/2025   Social Factors   Frequency of gathering with friends or relatives Once a week         3/17/2025   Dental   Dentist two times every year? (!) NO             Today's PHQ-2 Score:       2025    11:27 AM   PHQ-2 (  [employer])   Q1: Little interest or pleasure in doing things 0   Q2: Feeling down, depressed or hopeless 0   PHQ-2 Score 0         3/17/2025   Substance Use   Alcohol more than 3/day or more than 7/wk No   Do you use any other substances recreationally? No     Social History     Tobacco Use    Smoking status: Some Days     Current packs/day: 0.00     Types: Cigarettes     Last attempt to quit: 2024     Years since quittin.7     Passive exposure: Past    Smokeless tobacco: Former     Types: Chew     Quit date:     Tobacco comments:     quit recently   Substance Use Topics    Alcohol use: Yes     Comment: 2 beers per month    Drug use: Never       Last PSA: No results found for: \"PSA\"  ASCVD Risk   The 10-year ASCVD risk score (Ronda RAMIREZ, et al., 2019) is: 20.4%    Values used to calculate the score:      Age: 60 years      Sex: Male      Is Non- : No      Diabetic: Yes      Tobacco smoker: Yes      Systolic Blood Pressure: 127 mmHg      Is BP treated: Yes      HDL Cholesterol: 62 mg/dL      Total Cholesterol: 137 mg/dL           Reviewed and updated as needed this visit by Provider   Tobacco  Allergies  Meds  Problems  Med Hx  Surg Hx  Fam Hx              Review of Systems  Constitutional, neuro, " "ENT, endocrine, pulmonary, cardiac, gastrointestinal, genitourinary, musculoskeletal, integument and psychiatric systems are negative, except as otherwise noted.     Objective    Exam  /79 (BP Location: Right arm, Patient Position: Sitting, Cuff Size: Adult Regular)   Pulse 91   Temp 97.6  F (36.4  C) (Tympanic)   Resp 20   Ht 1.833 m (6' 0.17\")   Wt 80.3 kg (177 lb)   SpO2 95%   BMI 23.90 kg/m     Estimated body mass index is 23.9 kg/m  as calculated from the following:    Height as of this encounter: 1.833 m (6' 0.17\").    Weight as of this encounter: 80.3 kg (177 lb).    Physical Exam  GEN: Pleasant male, in no acute distress.   HEENT: Normocephalic, atraumatic.  NECK: Supple. No cervical or supraclavicular adenopathy.   PULM: Non-labored breathing. No use of accessory muscles. Clear to ausculation bilaterally.  CV: Regular rate and rhythm.   ABDOMEN: Normoactive bowel sounds. Non-tender to palpation. Non-distended.    EXTREMITES:  No clubbing, cyanosis, or edema.    NEURO:  Awake. Moving all extremities.    PSYCH: Calm. Appropriate affect, insight, judgment.           Signed Electronically by: Amelia He DO    "

## 2025-03-17 NOTE — LETTER
My COPD Action Plan     Name: Gonsalo Blakely    YOB: 1965   Date: 3/13/2025    My doctor: Amelia He, DO   My clinic: [facility] [address]  [place] [id number] [phone]  My Controller Medicine: Tiotropium (Spiriva)   Dose: 2.5 mcg, 2 puffs daily      My Rescue Medicine: Albuterol (Proair/Ventolin/Proventil) inhaler   Dose: 108 mcg, 1-2 puffs, every 6 hrs as needed     My Flare Up Medicine:  NA        My COPD Severity: TBD      Use of Oxygen: Oxygen Not Prescribed      Make sure you've had your pneumonia   vaccines.          GREEN ZONE       Doing well today    Usual level of activity and exercise  Usual amount of cough and mucus  No shortness of breath  Usual level of health (thinking clearly, sleeping well, feel like eating) Actions:    Take daily medicines  Use oxygen as prescribed  Follow regular exercise and diet plan  Avoid cigarette smoke and other irritants that harm the lungs           YELLOW ZONE          Having a bad day or flare up    Short of breath more than usual  A lot more sputum (mucus) than usual  Sputum looks yellow, green, tan, brown or bloody  More coughing or wheezing  Fever or chills  Less energy; trouble completing activities  Trouble thinking or focusing  Using quick relief inhaler or nebulizer more often  Poor sleep; symptoms wake me up  Do not feel like eating Actions:    Get plenty of rest  Take daily medicines  Use quick relief inhaler every 4-6 hours  If you use oxygen, call you doctor to see if you should adjust your oxygen  Do breathing exercises or other things to help you relax  Let a loved one, friend or neighbor know you are feeling worse  Call your care team if you have 2 or more symptoms.  Start taking steroids or antibiotics if directed by your care team           RED ZONE       Need medical care now    Severe shortness of breath (feel you can't breathe)  Fever, chills  Not enough breath to do any  activity  Trouble coughing up mucus, walking or talking  Blood in mucus  Frequent coughing Rescue medicines are not working  Not able to sleep because of breathing  Feel confused or drowsy  Chest pain    Actions:    Call your health care team.  If you cannot reach your care team, call 911 or go to the emergency room.        Annual Reminders:  Meet with Care Team, Flu Shot every Fall  Pharmacy:    [facility] [address] [id number] [place] [address] AT [place] [address]

## 2025-03-17 NOTE — LETTER
My Heart Failure Action Plan  Name: Gonsalo Blakely   YOB: 1965  Date: 3/13/2025   My doctor:   Amelia He [facility] [address]  [place] [id number] [phone] My Diagnosis: HF-pEF (EF > 40%)  My Ejection Fraction:   Lab Results   Component Value Date    LVEF 50-55% (borderline) 10/23/2024     Over 50%  My Exercise Goal: Start exercise slowly - to begin, do a few minutes of exercise, several times a day. Increase your time and speed [id number] to build tolerance, with a goal of 30 minutes of exercise daily. Steady, slow, and consistent exercise is both safe and healthy. Stop and rest when you feel tired or become short of breath. Do not push yourself on days when you don t feel well.       My Weight Plan:   Wt Readings from Last 2 Encounters:   02/04/25 78.9 kg (174 lb)   01/17/25 80.3 kg (177 lb 0.6 oz)     Weigh yourself daily using the same scale. If you gain more than 2 pounds in 24 hours or 5 pounds in 7 days. call the clinic    My Diet Goal:  low salt diet    Emergency Room Visits:    Our goal is to improve your quality of life and help you avoid a visit to the emergency room or hospital.  If we work together, we can achieve this goal. But, if you feel you need to call 911 or go to the emergency room, please do so.  If you go to the emergency room, please bring your list of medicines and your daily weight chart with you.    Each day ask yourself:  Is my weight up?  Do I have swelling?  Do I have trouble breathing?  How did I sleep?  Other problems?       GREEN ZONE     Weight gained is no more than 2 pounds a day or 5 pounds a in 7 days.  No swelling in feet, ankles, legs or stomach.  No more swelling than usual.  No more trouble breathing than usual.  No change in my sleep.  No other problems. What should I do?  I am doing fine. I will take my medicine, follow my diet, see my doctor, exercise, and watch for symptoms.            YELLOW ZONE         Weight gain of more than 2 pounds in one day or 5 pounds in 7 days.  New swelling in ankle, leg, knee or thigh.  Bloating in belly, pants feel tighter.  Swelling in hands or face.  Coughing or trouble breathing while walking or talking.  Harder to breathe last night.  Have trouble sleeping, wake up short of breath.  Unusually tired.  Not eating.  Nausea, vomiting, or diarrhea  Pain in my chest or bad  leg cramps.  Feel weak or dizzy. What should I do?  I need to take action and call my doctor or nurse today.                 RED ZONE         Weight gain of 5 pounds overnight.  Chest pain or pressure that does not go away.  Feel less alert.  Wheezing or have trouble breathing when at rest.  Cannot sleep lying down.  Cannot take my medicines.  Pass out or faint. What should I do?  I need to call my doctor or nurse now!  Call 911 if I have chest pain or cannot breathe.

## 2025-03-17 NOTE — PATIENT INSTRUCTIONS
Patient Education   Preventive Care Advice   This is general advice given by our system to help you stay healthy. However, your care team may have specific advice just for you. Please talk to your care team about your preventive care needs.  Nutrition  Eat 5 or more servings of fruits and vegetables each day.  Try wheat bread, brown rice and whole grain pasta (instead of white bread, rice, and pasta).  Get enough calcium and vitamin D. Check the label on foods and aim for 100% of the RDA (recommended daily allowance).  Lifestyle  Exercise at least 150 minutes each week  (30 minutes a day, 5 days a week).  Do muscle strengthening activities 2 days a week. These help control your weight and prevent disease.  No smoking.  Wear sunscreen to prevent skin cancer.  Have a dental exam and cleaning every 6 months.  Yearly exams  See your health care team every year to talk about:  Any changes in your health.  Any medicines your care team has prescribed.  Preventive care, family planning, and ways to prevent chronic diseases.  Shots (vaccines)   HPV shots (up to age 26), if you've never had them before.  Hepatitis B shots (up to age 59), if you've never had them before.  COVID-19 shot: Get this shot when it's due.  Flu shot: Get a flu shot every year.  Tetanus shot: Get a tetanus shot every 10 years.  Pneumococcal, hepatitis A, and RSV shots: Ask your care team if you need these based on your risk.  Shingles shot (for age 50 and up)  General health tests  Diabetes screening:  Starting at age 35, Get screened for diabetes at least every 3 years.  If you are younger than age 35, ask your care team if you should be screened for diabetes.  Cholesterol test: At age 39, start having a cholesterol test every 5 years, or more often if advised.  Bone density scan (DEXA): At age 50, ask your care team if you should have this scan for osteoporosis (brittle bones).  Hepatitis C: Get tested at least once in your life.  STIs (sexually  transmitted infections)  Before age 24: Ask your care team if you should be screened for STIs.  After age 24: Get screened for STIs if you're at risk. You are at risk for STIs (including HIV) if:  You are sexually active with more than one person.  You don't use condoms every time.  You or a partner was diagnosed with a sexually transmitted infection.  If you are at risk for HIV, ask about PrEP medicine to prevent HIV.  Get tested for HIV at least once in your life, whether you are at risk for HIV or not.  Cancer screening tests  Cervical cancer screening: If you have a cervix, begin getting regular cervical cancer screening tests starting at age 21.  Breast cancer scan (mammogram): If you've ever had breasts, begin having regular mammograms starting at age 40. This is a scan to check for breast cancer.  Colon cancer screening: It is important to start screening for colon cancer at age 45.  Have a colonoscopy test every 10 years (or more often if you're at risk) Or, ask your provider about stool tests like a FIT test every year or Cologuard test every 3 years.  To learn more about your testing options, visit:   .  For help making a decision, visit:   https://bit.ly/dw39707.  Prostate cancer screening test: If you have a prostate, ask your care team if a prostate cancer screening test (PSA) at age 55 is right for you.  Lung cancer screening: If you are a current or former smoker ages 50 to 80, ask your care team if ongoing lung cancer screenings are right for you.  For informational purposes only. Not to replace the advice of your health care provider. Copyright   2023 [facility] [employer]. All rights reserved. Clinically reviewed by the [facility] Transitions Program. [employer] [id number] - REV 01/24.       As we discussed today the lung cancer screening is not a perfect test, does not  every lung cancer, or early sign of lung cancer but is currently our best protocol for trying to detect lung cancer.  A risk of undergoing the screening process is that a nodule or other finding may show up that requires further testing including biopsies and other interventions that carry their own risks such as bleeding infection pain, whether or not a lung cancer is detected. We discussed this today in clinic and you understand the risks and elected to continue with the lung cancer screening process despite the risks. We also discussed that this screening needs to be done every year for it to be effective.       Please schedule a follow up with your cardiologist. Their number is:  [phone]

## 2025-03-28 ENCOUNTER — HOSPITAL ENCOUNTER (OUTPATIENT)
Dept: CT IMAGING | Facility: HOSPITAL | Age: 60
Discharge: HOME | End: 2025-03-28
Attending: FAMILY MEDICINE | Admitting: FAMILY MEDICINE
Payer: COMMERCIAL

## 2025-03-28 DIAGNOSIS — Z87.891 HISTORY OF TOBACCO USE, PRESENTING HAZARDS TO HEALTH: ICD-10-CM

## 2025-03-28 PROCEDURE — 71271 CT THORAX LUNG CANCER SCR C-: CPT

## 2025-04-05 ENCOUNTER — HEALTH MAINTENANCE LETTER (OUTPATIENT)
Age: 60
End: 2025-04-05

## 2025-04-14 ENCOUNTER — TELEPHONE (OUTPATIENT)
Dept: ANTICOAGULATION | Facility: CLINIC | Age: 60
End: 2025-04-14
Payer: COMMERCIAL

## 2025-04-14 NOTE — TELEPHONE ENCOUNTER
ANTICOAGULATION     Gonsalo Blakely is overdue for an INR check.     Left message for patient to call and schedule lab appointment as soon as possible. If returning call, please schedule.     Gracie Deal, RN  4/14/2025  Anticoagulation Clinic  [facility] for routing messages: reina [facility] HEART [facility]  ACC patient phone line: [phone]

## 2025-04-22 ENCOUNTER — MYC MEDICAL ADVICE (OUTPATIENT)
Dept: ANTICOAGULATION | Facility: CLINIC | Age: 60
End: 2025-04-22
Payer: COMMERCIAL

## 2025-04-29 ENCOUNTER — DOCUMENTATION ONLY (OUTPATIENT)
Dept: ANTICOAGULATION | Facility: CLINIC | Age: 60
End: 2025-04-29
Payer: COMMERCIAL

## 2025-04-29 NOTE — LETTER
"     [facility] ANTICOAGULATION CLINIC  [address] [place] [address]  Phone: [phone]  Fax: [phone]   April 29, 2025        Gonsalo Blakely  [address] [facility] [place] [address]            Dear Gonsalo,    You are currently under the care of [facility] Anticoagulation [facility] for your warfarin (Coumadin , Jantoven ) therapy.  We are contacting you because our records show you were due for an INR on 4/7/25.    There are potentially serious risks when taking warfarin without careful monitoring and we want to make sure you are safely managed.  Routine lab monitoring is required for warfarin refills.     Please schedule a lab appointment as soon as possible either by calling [phone] or scheduling directly in [employer]. To schedule in [employer] open the \"schedule an appointment\" section then select \"lab only\" as the reason you are coming in and \"INR\" as the lab test. If it is difficult for you to get to lab, please call us to discuss options.  If there has been a change in your care or other concerns, please let us know so we can help and/or update our records.         Sincerely,       [facility] Anticoagulation [facility]   "

## 2025-04-29 NOTE — PROGRESS NOTES
ANTICOAGULATION     Gonsalo Blakely is overdue for an INR check.     Reminder letter sent    Kaur Burks RN  4/29/2025  Anticoagulation Clinic  [facility] for routing messages: reina [facility] HEART [facility] patient phone line: [phone]

## 2025-05-22 ENCOUNTER — OFFICE VISIT (OUTPATIENT)
Dept: CARDIOLOGY | Facility: CLINIC | Age: 60
End: 2025-05-22
Payer: COMMERCIAL

## 2025-05-22 VITALS
WEIGHT: 180.8 LBS | RESPIRATION RATE: 16 BRPM | HEIGHT: 72 IN | HEART RATE: 93 BPM | SYSTOLIC BLOOD PRESSURE: 126 MMHG | BODY MASS INDEX: 24.49 KG/M2 | DIASTOLIC BLOOD PRESSURE: 72 MMHG

## 2025-05-22 DIAGNOSIS — Z98.890 S/P LEFT ATRIAL APPENDAGE LIGATION: ICD-10-CM

## 2025-05-22 DIAGNOSIS — I25.810 CORONARY ARTERY DISEASE INVOLVING CORONARY BYPASS GRAFT OF NATIVE HEART WITHOUT ANGINA PECTORIS: Primary | ICD-10-CM

## 2025-05-22 DIAGNOSIS — I25.5 ISCHEMIC CARDIOMYOPATHY: ICD-10-CM

## 2025-05-22 DIAGNOSIS — E78.5 DYSLIPIDEMIA, GOAL LDL BELOW 70: ICD-10-CM

## 2025-05-22 DIAGNOSIS — I10 BENIGN ESSENTIAL HYPERTENSION: ICD-10-CM

## 2025-05-22 DIAGNOSIS — I50.22 CHRONIC SYSTOLIC CONGESTIVE HEART FAILURE (H): ICD-10-CM

## 2025-05-22 DIAGNOSIS — I48.91 POSTOPERATIVE ATRIAL FIBRILLATION (H): ICD-10-CM

## 2025-05-22 DIAGNOSIS — I97.89 POSTOPERATIVE ATRIAL FIBRILLATION (H): ICD-10-CM

## 2025-05-22 LAB
ALBUMIN SERPL BCG-MCNC: 4.5 G/DL (ref 3.5–5.2)
ALP SERPL-CCNC: 125 U/L (ref 40–150)
ALT SERPL W P-5'-P-CCNC: 29 U/L (ref 0–70)
ANION GAP SERPL CALCULATED.3IONS-SCNC: 6 MMOL/L (ref 7–15)
AST SERPL W P-5'-P-CCNC: 20 U/L (ref 0–45)
BILIRUB SERPL-MCNC: 0.5 MG/DL
BUN SERPL-MCNC: 27.1 MG/DL (ref 8–23)
CALCIUM SERPL-MCNC: 9.6 MG/DL (ref 8.8–10.4)
CHLORIDE SERPL-SCNC: 100 MMOL/L (ref 98–107)
CHOLEST SERPL-MCNC: 123 MG/DL
CREAT SERPL-MCNC: 1.5 MG/DL (ref 0.67–1.17)
EGFRCR SERPLBLD CKD-EPI 2021: 52 ML/MIN/1.73M2
ERYTHROCYTE [DISTWIDTH] IN BLOOD BY AUTOMATED COUNT: 14.5 % (ref 10–15)
FASTING STATUS PATIENT QL REPORTED: NO
FASTING STATUS PATIENT QL REPORTED: NO
GLUCOSE SERPL-MCNC: 133 MG/DL (ref 70–99)
HCO3 SERPL-SCNC: 35 MMOL/L (ref 22–29)
HCT VFR BLD AUTO: 47.2 % (ref 40–53)
HDLC SERPL-MCNC: 59 MG/DL
HGB BLD-MCNC: 15.6 G/DL (ref 13.3–17.7)
LDLC SERPL CALC-MCNC: 48 MG/DL
MCH RBC QN AUTO: 29.7 PG (ref 26.5–33)
MCHC RBC AUTO-ENTMCNC: 33.1 G/DL (ref 31.5–36.5)
MCV RBC AUTO: 90 FL (ref 78–100)
NONHDLC SERPL-MCNC: 64 MG/DL
PLATELET # BLD AUTO: 172 10E3/UL (ref 150–450)
POTASSIUM SERPL-SCNC: 4.5 MMOL/L (ref 3.4–5.3)
PROT SERPL-MCNC: 6.8 G/DL (ref 6.4–8.3)
RBC # BLD AUTO: 5.25 10E6/UL (ref 4.4–5.9)
SODIUM SERPL-SCNC: 141 MMOL/L (ref 135–145)
TRIGL SERPL-MCNC: 80 MG/DL
WBC # BLD AUTO: 6.8 10E3/UL (ref 4–11)

## 2025-05-22 NOTE — PROGRESS NOTES
Assessment/Plan:   Ischemic cardiomyopathy, LVEF improved to 50 to 55% from 30 to 35%, chronic systolic congestive heart failure: The patient is compensated Aviral valve.  His LV systolic function is much improved after he had the CABG.  He has some mild shortness of breath on exertion which most likely secondary to COPD secondary to smoking history.  Continue losartan 25 mg daily, metoprolol succinate 75 mg daily.  Continue Lasix 40 mg daily and spironolactone 25 mg daily.  CBC and BMP today.  May consider to reduce the Lasix based on lab report.   Multiple vessel coronary artery disease status post 4V CABG LIMA to LAD, SVG to D1, SVG to ramus, SVG to right RCA on July 15, 2024: No chest pain. Continue aspirin, metoprolol and atorvastatin.  Discussed lifestyle modification including diet control, regular exercise and weight loss.  Postop atrial fibrillation: The patient is in sinus rhythm today.  He had no palpitations.  Continue metoprolol succinate 75 mg daily.  Continue warfarin for chronic anticoagulation.  CT cardiac NING is requested today.   Depending on his CT findings, if no thrombus formation in left atrium, closed NING well, warfarin will be discontinued.    Benign essential hypertension: His blood pressure has been controlled well.  Continue current medications.  Dyslipidemia: Continue atorvastatin 80 mg at bedtime.  Recent LDL was 60..  Type 2 diabetes, COPD: No change in treatment today.    We will follow-up CT NING and laboratory test the reports, discuss the findings with the patient.    Total 40 minutes were spent in this visit for face to face visit, physical exam, review of current labs/imaging studies, plan for ongoing treatment.    Thank you for the opportunity to be involved in the care of Gonsalo Blakely. If you have any questions, please feel free to contact me.  I will see the patient again in 6 months and as needed.    Much or all of the text in this note was generated through the  use of Dragon Dictate voice-to-text software. Errors in spelling or words which seem out of context are unintentional. Sound alike errors, in particular, may have escaped editing.       History of Present Illness:   It is my pleasure to see Gonsalo Blakely at the [facility] clinic for routine cardiology follow up.  Gonsalo Blakely is a 60 year old male with a medical history of multiple vessel coronary artery disease status post 4V CABG LIMA to LAD, SVG to D1, SVG to ramus, SVG to right RCA, status post placement of a Flex V AtriClip on the left atrial appendage on July 15, 2024, ischemic cardiomyopathy LVEF 30 to 35%, chronic systolic congestive heart failure, postop atrial fibrillation, benign essential hypertension, dyslipidemia, type 2 diabetes.    The patient states that he has been doing much better over the last several months.  He denies chest pain, palpitations, dizziness, leg edema.  He has mild shortness of breath when he was very exerted.  He had no orthopnea, PND or leg edema.  His weight has been stable.  His blood pressure and heart rate are controlled well.  He has been doing lifestyle modification including diet control and regular exercise.  He has been taking his medication regularly.  He quit smoking.    Past Medical History:     Patient Active Problem List   Diagnosis    SOB (shortness of breath)    Ischemic cardiomyopathy    Coronary artery disease involving coronary bypass graft of native heart without angina pectoris    S/P CABG (coronary artery bypass graft)    Postoperative atrial fibrillation (H)    Diabetes mellitus, type 2 (H)    Primary hypertension    Dyslipidemia, goal LDL below 70       Past Surgical History:     Past Surgical History:   Procedure Laterality Date    CORONARY ARTERY BYPASS GRAFT, WITH ENDOSCOPIC VESSEL PROCUREMENT N/A 7/15/2024    Procedure: CORONARY ARTERY BYPASS GRAFT TIMES FOUR, INTERNAL MAMMARY ARTERY HARVEST,  LEFT LEG ENDOSCOPIC VESSEL  PROCUREMENT , EPIAORTIC ULTRASOUND,;  Surgeon: Shannan Virk MD;  Location: [facility] [place] OR    CV CORONARY ANGIOGRAM N/A 06/18/2024    Procedure: CV CORONARY ANGIOGRAM;  Surgeon: Roman Florence MD;  Location: [facility] LAB CV    CV INTRA AORTIC BALLOON N/A 7/15/2024    Procedure: Intra aortic Balloon Pump Insertion;  Surgeon: Valerio Virk MD;  Location: [facility] CATH LAB CV    CV LEFT HEART CATH N/A 06/18/2024    Procedure: Left Heart Catheterization;  Surgeon: Roman Florence MD;  Location: [facility] CV    OR LIGATION, LEFT ATRIAL APPENDAGE N/A 7/15/2024    Procedure: LIGATION, LEFT ATRIAL APPENDAGE, OPEN;  Surgeon: Shannan Virk MD;  Location: [facility] [place] OR    SHOULDER SURGERY Right     TRANSESOPHAGEAL ECHOCARDIOGRAM INTRAOPERATIVE  7/15/2024    Procedure: ANESTHEIA TRANSESOPHAGEAL ECHOCARDIOGRAM.;  Surgeon: Shannan Virk MD;  Location: [facility] [place]       Family History:   No family history on file.     Social History:    reports that he has been smoking cigarettes. He has been exposed to tobacco smoke. He quit smokeless tobacco use about 45 years ago.  His smokeless tobacco use included chew. He reports current alcohol use. He reports that he does not use drugs.    Review of Systems:   12 systems are reviewed negative except for in HPI.    Meds:     Current Outpatient Medications:     acetaminophen (TYLENOL) 325 MG tablet, Take 2 tablets (650 mg) by mouth every 4 hours as needed for other (For optimal non-opioid multimodal pain management to improve pain control.)., Disp: 60 tablet, Rfl: 0    albuterol (PROAIR HFA/PROVENTIL HFA/VENTOLIN HFA) 108 (90 Base) MCG/ACT inhaler, Inhale 1-2 puffs into the lungs every 6 hours as needed for shortness of breath, wheezing or cough, Disp: 18 g, Rfl: 1    albuterol (PROVENTIL) (2.5 MG/3ML) 0.083% neb solution, Take 1 vial (2.5 mg) by nebulization every 6 hours as needed for shortness of breath, wheezing or cough., Disp: 90 mL,  Rfl: 1    amLODIPine (NORVASC) 5 MG tablet, Take 1 tablet (5 mg) by mouth daily., Disp: 90 tablet, Rfl: 3    atorvastatin (LIPITOR) 80 MG tablet, Take 1 tablet (80 mg) by mouth daily., Disp: 90 tablet, Rfl: 3    carbamide peroxide (DEBROX) 6.5 % otic solution, Place 5 drops into both ears 2 times daily., Disp: 15 mL, Rfl: 1    dapagliflozin (FARXIGA) 10 MG TABS tablet, Take 1 tablet (10 mg) by mouth daily., Disp: 90 tablet, Rfl: 1    furosemide (LASIX) 40 MG tablet, Take 1 tablet (40 mg) by mouth daily., Disp: 90 tablet, Rfl: 3    losartan (COZAAR) 25 MG tablet, Take 1 tablet (25 mg) by mouth daily., Disp: 90 tablet, Rfl: 1    metFORMIN (GLUCOPHAGE XR) 500 MG 24 hr tablet, Take 2 tablets (1,000 mg) by mouth daily (with breakfast)., Disp: 90 tablet, Rfl: 3    metoprolol succinate ER (TOPROL XL) 25 MG 24 hr tablet, Take 1 tablet (25 mg) by mouth every morning. Take 25mg in addition to 50mg for a total of 75mg by mouth daily, Disp: 90 tablet, Rfl: 3    metoprolol succinate ER (TOPROL XL) 50 MG 24 hr tablet, Take 1 tablet (50 mg) by mouth every morning. Take 25mg in addition to 50mg for a total of 75mg by mouth daily, Disp: 90 tablet, Rfl: 3    nicotine (NICODERM CQ) 14 MG/24HR 24 hr patch, Place 1 patch over 24 hours onto the skin every 24 hours., Disp: 28 patch, Rfl: 3    tiotropium (SPIRIVA RESPIMAT) 2.5 MCG/ACT inhaler, Inhale 2 puffs into the lungs daily., Disp: 4 g, Rfl: 3    warfarin ANTICOAGULANT (COUMADIN) 5 MG tablet, Take 1.5-2 tablets (7.5-10 mg) by mouth See Admin Instructions. Take 1.5 tablets (7.5 mg total) on Sunday and Tuesday evenings. All other evenings of the week take 2 tablets (10 mg total), Disp: 90 tablet, Rfl: 3    ipratropium (ATROVENT) 0.06 % nasal spray, Spray 2 sprays into both nostrils 4 times daily. (Patient not taking: Reported on 5/22/2025), Disp: 15 mL, Rfl: 1    sodium chloride (OCEAN) 0.65 % nasal spray, Use as needed for congestion (Patient not taking: Reported on 5/22/2025), Disp:  "88 mL, Rfl: 1    Allergies:   Ace inhibitors      Objective:      Physical Exam  82 kg (180 lb 12.8 oz)  1.835 m (6' 0.25\")  Body mass index is 24.35 kg/m .  /72 (BP Location: Right arm, Patient Position: Sitting, Cuff Size: Adult Large)   Pulse 93   Resp 16   Ht 1.835 m (6' 0.25\")   Wt 82 kg (180 lb 12.8 oz)   BMI 24.35 kg/m      General Appearance:   Awake, Alert, No acute distress.   HEENT:  Pupil equal and reactive to light. No scleral icterus; the mucous membranes were moist.   Neck: No cervical bruits. No JVD. No thyromegaly.     Chest: The spine was straight. The chest was symmetric.   Lungs:   Diminished breathing sounds bilaterally. No crackles. No wheezing.   Cardiovascular:   Regular rhythm and rate, normal first and second heart sounds with no murmurs. No rubs or gallops.    Abdomen:  Soft. No tenderness. Non-distended. Bowels sounds are present   Extremities: Equal tibial pulses. No leg edema.   Skin: No rashes or ulcers. Warm, Dry.   Musculoskeletal: No tenderness. No deformity.   Neurologic: Mood and affect are appropriate. No focal deficits.         EKG: Personally reviewed  Atrial fibrillation   Right bundle branch block   Anterior infarct , age undetermined   T wave abnormality, consider inferolateral ischemia   Abnormal ECG   When compared with ECG of 16-JUL-2024 04:49,   Atrial fibrillation has replaced Sinus rhythm   Anterior infarct is now Present   T wave inversion now evident in Inferior leads   T wave inversion more evident in Lateral leads     Cardiac Imaging Studies  Echocardiogram on July 19, 2024:  1. The left ventricle is normal in size with mild concentric left ventricular hypertrophy.  2. Left ventricular function is decreased. The ejection fraction is 50-55% (borderline).  3. The right ventricle is normal size with mildly decreased right ventricular systolic function  4. The right atrium is mildly dilated.  5. No hemodynamically significant valvular abnormalities on 2D or " "color flow imaging.  6. Ascending Aorta dilatation is present (4.0 cm).  7. Compared to the prior study of 7/19/24 there has been an interval  improvement in left ventricular function.     When compared to the prior real-time echocardiogram dated 17 June 2024, a  small posterior pericardial effusion is now appreciated. The findings are  otherwise felt to be fairly similar on both studies.    Coronary angiogram on June 18, 2024:  LM:no obstruction  LAD:proximal 50% narrowing, 75% mid-distal, large D1 and D2 w/ 80-90% narrowing  RI: large vessel, 85% proximal narrowing  Lcx:40-50% narrowing  RCA:dominant, rPLV w/ a 90% narrowing    Lab Review   Lab Results   Component Value Date     07/20/2024    CO2 34 07/20/2024    CO2 26 01/12/2019    BUN 24.2 07/20/2024    BUN 14 01/12/2019     Lab Results   Component Value Date    WBC 12.5 07/16/2024    HGB 15.0 08/16/2024    HGB 12.4 07/16/2024    HCT 38.6 07/16/2024    MCV 89 07/16/2024     07/19/2024     Lab Results   Component Value Date    CHOL 137 10/04/2024    CHOL 217 (H) 06/17/2024     Lab Results   Component Value Date    HDL 62 10/04/2024    HDL 72 06/17/2024     No components found for: \"LDLCALC\"  Lab Results   Component Value Date    TRIG 73 10/04/2024    TRIG 61 06/17/2024     No results found for: \"CHOLHDL\"  Lab Results   Component Value Date    TROPONINI <0.01 01/12/2019     No results found for: \"BNP\"  Lab Results   Component Value Date    TSH 1.92 09/27/2024             "

## 2025-05-22 NOTE — LETTER
5/22/2025    Amelia IRIZARRY , DO  [address]  [place] [address]    RE: Gonsalo KANG Reddy       Dear Colleague,     I had the pleasure of seeing Gonsalo Delgadoue in the [facility].            Assessment/Plan:   Ischemic cardiomyopathy, LVEF improved to 50 to 55% from 30 to 35%, chronic systolic congestive heart failure: The patient is compensated Aviral valve.  His LV systolic function is much improved after he had the CABG.  He has some mild shortness of breath on exertion which most likely secondary to COPD secondary to smoking history.  Continue losartan 25 mg daily, metoprolol succinate 75 mg daily.  Continue Lasix 40 mg daily and spironolactone 25 mg daily.  CBC and BMP today.  May consider to reduce the Lasix based on lab report.   Multiple vessel coronary artery disease status post 4V CABG LIMA to LAD, SVG to D1, SVG to ramus, SVG to right RCA on July 15, 2024: No chest pain. Continue aspirin, metoprolol and atorvastatin.  Discussed lifestyle modification including diet control, regular exercise and weight loss.  Postop atrial fibrillation: The patient is in sinus rhythm today.  He had no palpitations.  Continue metoprolol succinate 75 mg daily.  Continue warfarin for chronic anticoagulation.  CT cardiac NING is requested today.   Depending on his CT findings, if no thrombus formation in left atrium, closed NING well, warfarin will be discontinued.    Benign essential hypertension: His blood pressure has been controlled well.  Continue current medications.  Dyslipidemia: Continue atorvastatin 80 mg at bedtime.  Recent LDL was 60..  Type 2 diabetes, COPD: No change in treatment today.    We will follow-up CT NING and laboratory test the reports, discuss the findings with the patient.    Total 40 minutes were spent in this visit for face to face visit, physical exam, review of current labs/imaging studies, plan for ongoing treatment.    Thank you for the opportunity to be involved in  the care of Gonsalo Blakely. If you have any questions, please feel free to contact me.  I will see the patient again in 6 months and as needed.    Much or all of the text in this note was generated through the use of Dragon Dictate voice-to-text software. Errors in spelling or words which seem out of context are unintentional. Sound alike errors, in particular, may have escaped editing.       History of Present Illness:   It is my pleasure to see Gonsalo Blakely at the [facility] clinic for routine cardiology follow up.  Gonsalo Blakely is a 60 year old male with a medical history of multiple vessel coronary artery disease status post 4V CABG LIMA to LAD, SVG to D1, SVG to ramus, SVG to right RCA, status post placement of a Flex V AtriClip on the left atrial appendage on July 15, 2024, ischemic cardiomyopathy LVEF 30 to 35%, chronic systolic congestive heart failure, postop atrial fibrillation, benign essential hypertension, dyslipidemia, type 2 diabetes.    The patient states that he has been doing much better over the last several months.  He denies chest pain, palpitations, dizziness, leg edema.  He has mild shortness of breath when he was very exerted.  He had no orthopnea, PND or leg edema.  His weight has been stable.  His blood pressure and heart rate are controlled well.  He has been doing lifestyle modification including diet control and regular exercise.  He has been taking his medication regularly.  He quit smoking.    Past Medical History:     Patient Active Problem List   Diagnosis     SOB (shortness of breath)     Ischemic cardiomyopathy     Coronary artery disease involving coronary bypass graft of native heart without angina pectoris     S/P CABG (coronary artery bypass graft)     Postoperative atrial fibrillation (H)     Diabetes mellitus, type 2 (H)     Primary hypertension     Dyslipidemia, goal LDL below 70       Past Surgical History:     Past Surgical History:    Procedure Laterality Date     CORONARY ARTERY BYPASS GRAFT, WITH ENDOSCOPIC VESSEL PROCUREMENT N/A 7/15/2024    Procedure: CORONARY ARTERY BYPASS GRAFT TIMES FOUR, INTERNAL MAMMARY ARTERY HARVEST,  LEFT LEG ENDOSCOPIC VESSEL PROCUREMENT , EPIAORTIC ULTRASOUND,;  Surgeon: Shannan Virk MD;  Location: [facility] [place] OR     CV CORONARY ANGIOGRAM N/A 06/18/2024    Procedure: CV CORONARY ANGIOGRAM;  Surgeon: Roman Florence MD;  Location: [facility] LAB CV     CV INTRA AORTIC BALLOON N/A 7/15/2024    Procedure: Intra aortic Balloon Pump Insertion;  Surgeon: Valerio Virk MD;  Location: [facility] CATH LAB CV     CV LEFT HEART CATH N/A 06/18/2024    Procedure: Left Heart Catheterization;  Surgeon: Roman Florence MD;  Location: [facility] CV     OR LIGATION, LEFT ATRIAL APPENDAGE N/A 7/15/2024    Procedure: LIGATION, LEFT ATRIAL APPENDAGE, OPEN;  Surgeon: Shannan Virk MD;  Location: [facility] [place] OR     SHOULDER SURGERY Right      TRANSESOPHAGEAL ECHOCARDIOGRAM INTRAOPERATIVE  7/15/2024    Procedure: ANESTHEIA TRANSESOPHAGEAL ECHOCARDIOGRAM.;  Surgeon: Shannan Virk MD;  Location: [facility] [place] OR       Family History:   No family history on file.     Social History:    reports that he has been smoking cigarettes. He has been exposed to tobacco smoke. He quit smokeless tobacco use about 45 years ago.  His smokeless tobacco use included chew. He reports current alcohol use. He reports that he does not use drugs.    Review of Systems:   12 systems are reviewed negative except for in HPI.    Meds:     Current Outpatient Medications:      acetaminophen (TYLENOL) 325 MG tablet, Take 2 tablets (650 mg) by mouth every 4 hours as needed for other (For optimal non-opioid multimodal pain management to improve pain control.)., Disp: 60 tablet, Rfl: 0     albuterol (PROAIR HFA/PROVENTIL HFA/VENTOLIN HFA) 108 (90 Base) MCG/ACT inhaler, Inhale 1-2 puffs into the lungs every 6 hours as needed for  shortness of breath, wheezing or cough, Disp: 18 g, Rfl: 1     albuterol (PROVENTIL) (2.5 MG/3ML) 0.083% neb solution, Take 1 vial (2.5 mg) by nebulization every 6 hours as needed for shortness of breath, wheezing or cough., Disp: 90 mL, Rfl: 1     amLODIPine (NORVASC) 5 MG tablet, Take 1 tablet (5 mg) by mouth daily., Disp: 90 tablet, Rfl: 3     atorvastatin (LIPITOR) 80 MG tablet, Take 1 tablet (80 mg) by mouth daily., Disp: 90 tablet, Rfl: 3     carbamide peroxide (DEBROX) 6.5 % otic solution, Place 5 drops into both ears 2 times daily., Disp: 15 mL, Rfl: 1     dapagliflozin (FARXIGA) 10 MG TABS tablet, Take 1 tablet (10 mg) by mouth daily., Disp: 90 tablet, Rfl: 1     furosemide (LASIX) 40 MG tablet, Take 1 tablet (40 mg) by mouth daily., Disp: 90 tablet, Rfl: 3     losartan (COZAAR) 25 MG tablet, Take 1 tablet (25 mg) by mouth daily., Disp: 90 tablet, Rfl: 1     metFORMIN (GLUCOPHAGE XR) 500 MG 24 hr tablet, Take 2 tablets (1,000 mg) by mouth daily (with breakfast)., Disp: 90 tablet, Rfl: 3     metoprolol succinate ER (TOPROL XL) 25 MG 24 hr tablet, Take 1 tablet (25 mg) by mouth every morning. Take 25mg in addition to 50mg for a total of 75mg by mouth daily, Disp: 90 tablet, Rfl: 3     metoprolol succinate ER (TOPROL XL) 50 MG 24 hr tablet, Take 1 tablet (50 mg) by mouth every morning. Take 25mg in addition to 50mg for a total of 75mg by mouth daily, Disp: 90 tablet, Rfl: 3     nicotine (NICODERM CQ) 14 MG/24HR 24 hr patch, Place 1 patch over 24 hours onto the skin every 24 hours., Disp: 28 patch, Rfl: 3     tiotropium (SPIRIVA RESPIMAT) 2.5 MCG/ACT inhaler, Inhale 2 puffs into the lungs daily., Disp: 4 g, Rfl: 3     warfarin ANTICOAGULANT (COUMADIN) 5 MG tablet, Take 1.5-2 tablets (7.5-10 mg) by mouth See Admin Instructions. Take 1.5 tablets (7.5 mg total) on Sunday and Tuesday evenings. All other evenings of the week take 2 tablets (10 mg total), Disp: 90 tablet, Rfl: 3     ipratropium (ATROVENT) 0.06 %  "nasal spray, Spray 2 sprays into both nostrils 4 times daily. (Patient not taking: Reported on 5/22/2025), Disp: 15 mL, Rfl: 1     sodium chloride (OCEAN) 0.65 % nasal spray, Use as needed for congestion (Patient not taking: Reported on 5/22/2025), Disp: 88 mL, Rfl: 1    Allergies:   Ace inhibitors      Objective:      Physical Exam  82 kg (180 lb 12.8 oz)  1.835 m (6' 0.25\")  Body mass index is 24.35 kg/m .  /72 (BP Location: Right arm, Patient Position: Sitting, Cuff Size: Adult Large)   Pulse 93   Resp 16   Ht 1.835 m (6' 0.25\")   Wt 82 kg (180 lb 12.8 oz)   BMI 24.35 kg/m      General Appearance:   Awake, Alert, No acute distress.   HEENT:  Pupil equal and reactive to light. No scleral icterus; the mucous membranes were moist.   Neck: No cervical bruits. No JVD. No thyromegaly.     Chest: The spine was straight. The chest was symmetric.   Lungs:   Diminished breathing sounds bilaterally. No crackles. No wheezing.   Cardiovascular:   Regular rhythm and rate, normal first and second heart sounds with no murmurs. No rubs or gallops.    Abdomen:  Soft. No tenderness. Non-distended. Bowels sounds are present   Extremities: Equal tibial pulses. No leg edema.   Skin: No rashes or ulcers. Warm, Dry.   Musculoskeletal: No tenderness. No deformity.   Neurologic: Mood and affect are appropriate. No focal deficits.         EKG: Personally reviewed  Atrial fibrillation   Right bundle branch block   Anterior infarct , age undetermined   T wave abnormality, consider inferolateral ischemia   Abnormal ECG   When compared with ECG of 16-JUL-2024 04:49,   Atrial fibrillation has replaced Sinus rhythm   Anterior infarct is now Present   T wave inversion now evident in Inferior leads   T wave inversion more evident in Lateral leads     Cardiac Imaging Studies  Echocardiogram on July 19, 2024:  1. The left ventricle is normal in size with mild concentric left ventricular hypertrophy.  2. Left ventricular function is " "decreased. The ejection fraction is 50-55% (borderline).  3. The right ventricle is normal size with mildly decreased right ventricular systolic function  4. The right atrium is mildly dilated.  5. No hemodynamically significant valvular abnormalities on 2D or color flow imaging.  6. Ascending Aorta dilatation is present (4.0 cm).  7. Compared to the prior study of 7/19/24 there has been an interval  improvement in left ventricular function.     When compared to the prior real-time echocardiogram dated 17 June 2024, a  small posterior pericardial effusion is now appreciated. The findings are  otherwise felt to be fairly similar on both studies.    Coronary angiogram on June 18, 2024:  LM:no obstruction  LAD:proximal 50% narrowing, 75% mid-distal, large D1 and D2 w/ 80-90% narrowing  RI: large vessel, 85% proximal narrowing  Lcx:40-50% narrowing  RCA:dominant, rPLV w/ a 90% narrowing    Lab Review   Lab Results   Component Value Date     07/20/2024    CO2 34 07/20/2024    CO2 26 01/12/2019    BUN 24.2 07/20/2024    BUN 14 01/12/2019     Lab Results   Component Value Date    WBC 12.5 07/16/2024    HGB 15.0 08/16/2024    HGB 12.4 07/16/2024    HCT 38.6 07/16/2024    MCV 89 07/16/2024     07/19/2024     Lab Results   Component Value Date    CHOL 137 10/04/2024    CHOL 217 (H) 06/17/2024     Lab Results   Component Value Date    HDL 62 10/04/2024    HDL 72 06/17/2024     No components found for: \"LDLCALC\"  Lab Results   Component Value Date    TRIG 73 10/04/2024    TRIG 61 06/17/2024     No results found for: \"CHOLHDL\"  Lab Results   Component Value Date    TROPONINI <0.01 01/12/2019     No results found for: \"BNP\"  Lab Results   Component Value Date    TSH 1.92 09/27/2024               Thank you for allowing me to participate in the care of your patient.      Sincerely,     Lorna Samaniego MD     [facility]  cc:   Lorna Samainego MD  [address]  [place] [address]

## 2025-05-30 ENCOUNTER — RESULTS FOLLOW-UP (OUTPATIENT)
Dept: ANTICOAGULATION | Facility: CLINIC | Age: 60
End: 2025-05-30

## 2025-06-02 ENCOUNTER — RESULTS FOLLOW-UP (OUTPATIENT)
Dept: CARDIOLOGY | Facility: CLINIC | Age: 60
End: 2025-06-02

## 2025-06-03 ENCOUNTER — RESULTS FOLLOW-UP (OUTPATIENT)
Dept: CARDIOLOGY | Facility: CLINIC | Age: 60
End: 2025-06-03

## 2025-06-03 ENCOUNTER — HOSPITAL ENCOUNTER (OUTPATIENT)
Dept: CT IMAGING | Facility: HOSPITAL | Age: 60
Discharge: HOME | End: 2025-06-03
Attending: INTERNAL MEDICINE
Payer: COMMERCIAL

## 2025-06-03 DIAGNOSIS — I48.91 POSTOPERATIVE ATRIAL FIBRILLATION (H): ICD-10-CM

## 2025-06-03 DIAGNOSIS — Z98.890 S/P LEFT ATRIAL APPENDAGE LIGATION: ICD-10-CM

## 2025-06-03 DIAGNOSIS — I97.89 POSTOPERATIVE ATRIAL FIBRILLATION (H): ICD-10-CM

## 2025-06-03 PROCEDURE — 75572 CT HRT W/3D IMAGE: CPT

## 2025-06-03 PROCEDURE — 250N000011 HC RX IP 250 OP 636: Performed by: INTERNAL MEDICINE

## 2025-06-03 PROCEDURE — 75572 CT HRT W/3D IMAGE: CPT | Mod: 26 | Performed by: GENERAL ACUTE CARE HOSPITAL

## 2025-06-03 RX ORDER — IOPAMIDOL 755 MG/ML
120 INJECTION, SOLUTION INTRAVASCULAR ONCE
Status: COMPLETED | OUTPATIENT
Start: 2025-06-03 | End: 2025-06-03

## 2025-06-03 RX ADMIN — IOPAMIDOL 120 ML: 755 INJECTION, SOLUTION INTRAVENOUS at 15:19

## 2025-06-10 ENCOUNTER — TELEPHONE (OUTPATIENT)
Dept: FAMILY MEDICINE | Facility: CLINIC | Age: 60
End: 2025-06-10
Payer: COMMERCIAL

## 2025-06-12 NOTE — TELEPHONE ENCOUNTER
Spoke with patient and got him scheduled with Dr. Jack for a sooner appointment to discuss a refill on prednisone.     Eryn Mccormack CMA

## 2025-06-13 ENCOUNTER — HOSPITAL ENCOUNTER (EMERGENCY)
Facility: CLINIC | Age: 60
Discharge: HOME | End: 2025-06-13
Attending: EMERGENCY MEDICINE | Admitting: EMERGENCY MEDICINE
Payer: COMMERCIAL

## 2025-06-13 ENCOUNTER — APPOINTMENT (OUTPATIENT)
Dept: RADIOLOGY | Facility: HOSPITAL | Age: 60
End: 2025-06-13
Attending: STUDENT IN AN ORGANIZED HEALTH CARE EDUCATION/TRAINING PROGRAM
Payer: COMMERCIAL

## 2025-06-13 ENCOUNTER — RESULTS FOLLOW-UP (OUTPATIENT)
Dept: FAMILY MEDICINE | Facility: CLINIC | Age: 60
End: 2025-06-13

## 2025-06-13 ENCOUNTER — HOSPITAL ENCOUNTER (EMERGENCY)
Facility: HOSPITAL | Age: 60
Discharge: HOME | End: 2025-06-13
Admitting: STUDENT IN AN ORGANIZED HEALTH CARE EDUCATION/TRAINING PROGRAM
Payer: COMMERCIAL

## 2025-06-13 VITALS
TEMPERATURE: 96.8 F | HEART RATE: 105 BPM | WEIGHT: 184.3 LBS | OXYGEN SATURATION: 94 % | SYSTOLIC BLOOD PRESSURE: 137 MMHG | BODY MASS INDEX: 24.43 KG/M2 | DIASTOLIC BLOOD PRESSURE: 87 MMHG | HEIGHT: 73 IN | RESPIRATION RATE: 26 BRPM

## 2025-06-13 VITALS
BODY MASS INDEX: 24.52 KG/M2 | WEIGHT: 185 LBS | TEMPERATURE: 98 F | HEART RATE: 101 BPM | OXYGEN SATURATION: 94 % | RESPIRATION RATE: 29 BRPM | DIASTOLIC BLOOD PRESSURE: 82 MMHG | SYSTOLIC BLOOD PRESSURE: 154 MMHG | HEIGHT: 73 IN

## 2025-06-13 DIAGNOSIS — E87.5 HYPERKALEMIA: ICD-10-CM

## 2025-06-13 DIAGNOSIS — R89.9 ABNORMAL LABORATORY TEST: ICD-10-CM

## 2025-06-13 LAB
ANION GAP SERPL CALCULATED.3IONS-SCNC: 12 MMOL/L (ref 7–15)
ANION GAP SERPL CALCULATED.3IONS-SCNC: 15 MMOL/L (ref 3–14)
ATRIAL RATE - MUSE: 101 BPM
BASOPHILS # BLD AUTO: 0 10E3/UL (ref 0–0.2)
BASOPHILS NFR BLD AUTO: 0 %
BUN SERPL-MCNC: 20 MG/DL (ref 7–30)
BUN SERPL-MCNC: 28.5 MG/DL (ref 8–23)
CALCIUM SERPL-MCNC: 10.2 MG/DL (ref 8.5–10.1)
CALCIUM SERPL-MCNC: 9.6 MG/DL (ref 8.8–10.4)
CHLORIDE BLD-SCNC: 99 MMOL/L (ref 94–109)
CHLORIDE SERPL-SCNC: 98 MMOL/L (ref 98–107)
CO2 SERPL-SCNC: 29 MMOL/L (ref 20–32)
CREAT SERPL-MCNC: 1.39 MG/DL (ref 0.67–1.17)
CREAT SERPL-MCNC: 1.6 MG/DL (ref 0.66–1.25)
DIASTOLIC BLOOD PRESSURE - MUSE: NORMAL MMHG
EGFRCR SERPLBLD CKD-EPI 2021: 48 ML/MIN/1.73M2
EGFRCR SERPLBLD CKD-EPI 2021: 57 ML/MIN/1.73M2
EOSINOPHIL # BLD AUTO: 0 10E3/UL (ref 0–0.7)
EOSINOPHIL NFR BLD AUTO: 0 %
ERYTHROCYTE [DISTWIDTH] IN BLOOD BY AUTOMATED COUNT: 14.6 % (ref 10–15)
GLUCOSE BLD-MCNC: 257 MG/DL (ref 70–99)
GLUCOSE SERPL-MCNC: 340 MG/DL (ref 70–99)
HCO3 SERPL-SCNC: 25 MMOL/L (ref 22–29)
HCT VFR BLD AUTO: 47.4 % (ref 40–53)
HGB BLD-MCNC: 15.5 G/DL (ref 13.3–17.7)
IMM GRANULOCYTES # BLD: 0 10E3/UL
IMM GRANULOCYTES NFR BLD: 0 %
INTERPRETATION ECG - MUSE: NORMAL
LYMPHOCYTES # BLD AUTO: 0.7 10E3/UL (ref 0.8–5.3)
LYMPHOCYTES NFR BLD AUTO: 8 %
MCH RBC QN AUTO: 29.6 PG (ref 26.5–33)
MCHC RBC AUTO-ENTMCNC: 32.7 G/DL (ref 31.5–36.5)
MCV RBC AUTO: 91 FL (ref 78–100)
MONOCYTES # BLD AUTO: 0.3 10E3/UL (ref 0–1.3)
MONOCYTES NFR BLD AUTO: 3 %
NEUTROPHILS # BLD AUTO: 8.1 10E3/UL (ref 1.6–8.3)
NEUTROPHILS NFR BLD AUTO: 88 %
NRBC # BLD AUTO: 0 10E3/UL
NRBC BLD AUTO-RTO: 0 /100
P AXIS - MUSE: 62 DEGREES
PLATELET # BLD AUTO: 223 10E3/UL (ref 150–450)
POTASSIUM BLD-SCNC: 6.1 MMOL/L (ref 3.4–5.3)
POTASSIUM SERPL-SCNC: 5 MMOL/L (ref 3.4–5.3)
PR INTERVAL - MUSE: 174 MS
QRS DURATION - MUSE: 144 MS
QT - MUSE: 388 MS
QTC - MUSE: 503 MS
R AXIS - MUSE: 57 DEGREES
RBC # BLD AUTO: 5.23 10E6/UL (ref 4.4–5.9)
SODIUM SERPL-SCNC: 135 MMOL/L (ref 135–145)
SODIUM SERPL-SCNC: 143 MMOL/L (ref 135–145)
SYSTOLIC BLOOD PRESSURE - MUSE: NORMAL MMHG
T AXIS - MUSE: 34 DEGREES
VENTRICULAR RATE- MUSE: 101 BPM
WBC # BLD AUTO: 9.2 10E3/UL (ref 4–11)

## 2025-06-13 PROCEDURE — 80048 BASIC METABOLIC PNL TOTAL CA: CPT

## 2025-06-13 PROCEDURE — 93005 ELECTROCARDIOGRAM TRACING: CPT | Performed by: EMERGENCY MEDICINE

## 2025-06-13 PROCEDURE — 93005 ELECTROCARDIOGRAM TRACING: CPT | Performed by: STUDENT IN AN ORGANIZED HEALTH CARE EDUCATION/TRAINING PROGRAM

## 2025-06-13 PROCEDURE — 71046 X-RAY EXAM CHEST 2 VIEWS: CPT

## 2025-06-13 PROCEDURE — 99284 EMERGENCY DEPT VISIT MOD MDM: CPT

## 2025-06-13 PROCEDURE — 85025 COMPLETE CBC W/AUTO DIFF WBC: CPT | Performed by: EMERGENCY MEDICINE

## 2025-06-13 PROCEDURE — 80048 BASIC METABOLIC PNL TOTAL CA: CPT | Performed by: EMERGENCY MEDICINE

## 2025-06-13 PROCEDURE — 36415 COLL VENOUS BLD VENIPUNCTURE: CPT | Performed by: EMERGENCY MEDICINE

## 2025-06-13 PROCEDURE — 99281 EMR DPT VST MAYX REQ PHY/QHP: CPT

## 2025-06-13 RX ORDER — DEXTROSE MONOHYDRATE 25 G/50ML
25 INJECTION, SOLUTION INTRAVENOUS ONCE
Status: DISCONTINUED | OUTPATIENT
Start: 2025-06-13 | End: 2025-06-13

## 2025-06-13 RX ORDER — CALCIUM GLUCONATE 98 MG/ML
1 INJECTION, SOLUTION INTRAVENOUS ONCE
Status: DISCONTINUED | OUTPATIENT
Start: 2025-06-13 | End: 2025-06-13

## 2025-06-13 RX ORDER — DEXTROSE MONOHYDRATE 25 G/50ML
25-50 INJECTION, SOLUTION INTRAVENOUS
Status: DISCONTINUED | OUTPATIENT
Start: 2025-06-13 | End: 2025-06-13

## 2025-06-13 RX ORDER — NICOTINE POLACRILEX 4 MG
15-30 LOZENGE BUCCAL
Status: DISCONTINUED | OUTPATIENT
Start: 2025-06-13 | End: 2025-06-13

## 2025-06-13 ASSESSMENT — COLUMBIA-SUICIDE SEVERITY RATING SCALE - C-SSRS

## 2025-06-13 ASSESSMENT — ACTIVITIES OF DAILY LIVING (ADL): ADLS_ACUITY_SCORE: 58

## 2025-06-13 NOTE — ED TRIAGE NOTES
Pt presents for lab testing and treatment if needed. Pt was called by PCP today to come to ED due to a potassium of 6.1. Pt has hx of CHF and had bypass surgery. He is short of breath with conversation.     Triage Assessment (Adult)       Row Name 06/13/25 2690          Triage Assessment    Airway WDL WDL        Respiratory WDL    Respiratory WDL X;rhythm/pattern     Rhythm/Pattern, Respiratory dyspnea upon exertion;shortness of breath        Skin Circulation/Temperature WDL    Skin Circulation/Temperature WDL WDL        Cardiac WDL    Cardiac WDL X;rhythm     Pulse Rate & Regularity tachycardic        Peripheral/Neurovascular WDL    Peripheral Neurovascular WDL WDL        Cognitive/Neuro/Behavioral WDL    Cognitive/Neuro/Behavioral WDL WDL

## 2025-06-14 NOTE — ED PROVIDER NOTES
EMERGENCY DEPARTMENT ENCOUNTER      NAME: Gonsalo Blakely  AGE: 60 year old male  YOB: 1965  MRN: [id number]  EVALUATION DATE & TIME: No admission date for patient encounter.    PCP: Amelia He    ED PROVIDER: Franco Grove D.O.      Chief Complaint   Patient presents with    Abnormal Labs     Potassium : 6.1       FINAL IMPRESSION:  1. Abnormal laboratory test        ED COURSE & MEDICAL DECISION MAKIN:45 PM I met with the patient to gather history and to perform my initial exam. I discussed the plan for care while in the Emergency Department.  10:48 PM I rechecked and updated patient regarding his lab result. Patient is feeling happy.   10:49 PM Discussed discharged plans.        Pertinent Labs & Imaging studies reviewed. (See chart for details)  60 year old male presents to the Emergency Department for evaluation of abnormal laboratory tests at an outpatient clinic today.  Patient had 2 separate blood draws today at his primary care clinic was shown signs of hyperkalemia.  Patient does have an elevated creatinine on these labs as well, and has had an elevated creatinine for several weeks.  EKG was performed tonight, which does not show any evidence of acute pathology including no evidence of peaked T waves.  However with the 2 blood tests that were showing signs of hyperkalemia, I was concerned that this test would be positive, therefore I was about to start treatment.  However her BMP came back prior to starting the actual treatment of the patient, we were able to cancel this as the patient's potassium is now 5.0.  Therefore at this time, he does not require any treatment, nor do I believe he requires admission.  Believe he can be safely discharged home with plan for outpatient follow-up with his primary care provider.  Return precautions were discussed.    Medical Decision Making    Discharge. No recommendations on prescription strength medication(s). I considered admission, but  discharged the patient after share decision making conversation.    MIPS (CTPE, Dental pain, Vincent, Sinusitis, Asthma/COPD, Head Trauma): Not Applicable    SEPSIS: None          At the conclusion of the encounter I discussed the results of all of the tests and the disposition. The questions were answered. The patient or family acknowledged understanding and was agreeable with the care plan.        HPI    Patient information was obtained from: Patient     Use of : N/A     Gonsalo Blakely is a 60 year old male who presents to the ED for elevated level of potassium.     Per Chart Review:  Patient was seen at [facility] on 06/13/25 for shortness of breath with exertion when laying down. Patient has been taking 20 mg of lasix daily instead of 40 mg. He has been off of warfarin since June 3rd. Patient was instructed to take prednisone 40 mg daily for 5 days. He was also instructed to go back to lasix 40 mg daily.     Patient reports high level of potassium all day today. He went to his clinic earlier today and was referred to the ED due to the elevated potassium level. Patient mentioned having open heart surgery last year (quadruple bypass). He also mentioned that he has been walking everyday for the past 6 months (2.5 miles morning and night). Patient reports a decreased in his lasix for the past couple of weeks. No other complaints at this time.       PAST MEDICAL HISTORY:  Past Medical History:   Diagnosis Date    Acute decompensated heart failure (H) 06/16/2024    Asthma     CAD (coronary artery disease)     Chronic obstructive pulmonary disease (H)     Dyslipidemia     Heart failure with reduced ejection fraction, NYHA class I (H)     HTN (hypertension)     Ischemic cardiomyopathy     Type 2 diabetes mellitus (H)        PAST SURGICAL HISTORY:  Past Surgical History:   Procedure Laterality Date    CORONARY ARTERY BYPASS GRAFT, WITH ENDOSCOPIC VESSEL PROCUREMENT N/A 7/15/2024    Procedure:  CORONARY ARTERY BYPASS GRAFT TIMES FOUR, INTERNAL MAMMARY ARTERY HARVEST,  LEFT LEG ENDOSCOPIC VESSEL PROCUREMENT , EPIAORTIC ULTRASOUND,;  Surgeon: Shannan Virk MD;  [place] [facility] [place]    CV CORONARY ANGIOGRAM N/A 06/18/2024    Procedure: CV CORONARY ANGIOGRAM;  Surgeon: Roman Florence MD;  Location: [facility] LAB CV    CV INTRA AORTIC BALLOON N/A 7/15/2024    Procedure: Intra aortic Balloon Pump Insertion;  Surgeon: Valerio Virk MD;  Location: [facility] CATH LAB CV    CV LEFT HEART CATH N/A 06/18/2024    Procedure: Left Heart Catheterization;  Surgeon: Roman Florence MD;  Location: [facility] LAB CV    OR LIGATION, LEFT ATRIAL APPENDAGE N/A 7/15/2024    Procedure: LIGATION, LEFT ATRIAL APPENDAGE, OPEN;  Surgeon: Shannan Virk MD;  [place] [facility] [place]    SHOULDER SURGERY Right     TRANSESOPHAGEAL ECHOCARDIOGRAM INTRAOPERATIVE  7/15/2024    Procedure: ANESTHEIA TRANSESOPHAGEAL ECHOCARDIOGRAM.;  Surgeon: Shannan Virk MD;  Location: [facility] [place]         CURRENT MEDICATIONS:    No current facility-administered medications for this encounter.     Current Outpatient Medications   Medication Sig Dispense Refill    acetaminophen (TYLENOL) 325 MG tablet Take 2 tablets (650 mg) by mouth every 4 hours as needed for other (For optimal non-opioid multimodal pain management to improve pain control.). 60 tablet 0    albuterol (PROAIR HFA/PROVENTIL HFA/VENTOLIN HFA) 108 (90 Base) MCG/ACT inhaler Inhale 1-2 puffs into the lungs every 6 hours as needed for shortness of breath, wheezing or cough 18 g 1    albuterol (PROVENTIL) (2.5 MG/3ML) 0.083% neb solution Take 1 vial (2.5 mg) by nebulization every 6 hours as needed for shortness of breath, wheezing or cough. 90 mL 1    amLODIPine (NORVASC) 5 MG tablet Take 1 tablet (5 mg) by mouth daily. 90 tablet 3    atorvastatin (LIPITOR) 80 MG tablet Take 1 tablet (80 mg) by mouth daily. 90 tablet 3    carbamide peroxide (DEBROX) 6.5 %  otic solution Place 5 drops into both ears 2 times daily. 15 mL 1    dapagliflozin (FARXIGA) 10 MG TABS tablet Take 1 tablet (10 mg) by mouth daily. 90 tablet 1    furosemide (LASIX) 40 MG tablet Take 1 tablet (40 mg) by mouth daily. 90 tablet 3    losartan (COZAAR) 25 MG tablet Take 1 tablet (25 mg) by mouth daily. 90 tablet 1    metFORMIN (GLUCOPHAGE XR) 500 MG 24 hr tablet Take 2 tablets (1,000 mg) by mouth daily (with breakfast). 90 tablet 3    metoprolol succinate ER (TOPROL XL) 25 MG 24 hr tablet Take 1 tablet (25 mg) by mouth every morning. Take 25mg in addition to 50mg for a total of 75mg by mouth daily 90 tablet 3    metoprolol succinate ER (TOPROL XL) 50 MG 24 hr tablet Take 1 tablet (50 mg) by mouth every morning. Take 25mg in addition to 50mg for a total of 75mg by mouth daily 90 tablet 3    nicotine (NICODERM CQ) 14 MG/24HR 24 hr patch Place 1 patch over 24 hours onto the skin every 24 hours. 28 patch 3    predniSONE (DELTASONE) 20 MG tablet Take 2 tablets (40 mg) by mouth daily for 5 days. 10 tablet 0    tiotropium (SPIRIVA RESPIMAT) 2.5 MCG/ACT inhaler Inhale 2 puffs into the lungs daily. 4 g 3         ALLERGIES:  Allergies   Allergen Reactions    Ace Inhibitors Cough       FAMILY HISTORY:  No family history on file.    SOCIAL HISTORY:  Social History     Socioeconomic History    Marital status: Single   Occupational History    Occupation: UNEMPLOYED   Tobacco Use    Smoking status: Some Days     Current packs/day: 0.00     Types: Cigarettes     Last attempt to quit: 2024     Years since quittin.9     Passive exposure: Past    Smokeless tobacco: Former     Types: Chew     Quit date:     Tobacco comments:     quit recently   Substance and Sexual Activity    Alcohol use: Yes     Comment: 2 beers per month    Drug use: Never     Social Drivers of Health     Financial Resource Strain: Low Risk  (3/17/2025)    Financial Resource Strain     Within the past 12 months, have you or your family  "members you live with been unable to get utilities (heat, electricity) when it was really needed?: No   Food Insecurity: Low Risk  (3/17/2025)    Food Insecurity     Within the past 12 months, did you worry that your food would run out before you got money to buy more?: No     Within the past 12 months, did the food you bought just not last and you didn t have money to get more?: No   Transportation Needs: Low Risk  (3/17/2025)    Transportation Needs     Within the past 12 months, has lack of transportation kept you from medical appointments, getting your medicines, non-medical meetings or appointments, work, or from getting things that you need?: No   Physical Activity: Sufficiently Active (3/17/2025)    Exercise Vital Sign     Days of Exercise per Week: 5 days     Minutes of Exercise per Session: 40 min   Stress: No Stress Concern Present (3/17/2025)    [employer] - Occupational Stress Questionnaire     Feeling of Stress : Only a little   Social Connections: Unknown (3/17/2025)    Social Connection and Isolation Panel [NHANES]     Frequency of Social Gatherings with Friends and Family: Once a week   Interpersonal Safety: Low Risk  (3/17/2025)    Interpersonal Safety     Do you feel physically and emotionally safe where you currently live?: Yes     Within the past 12 months, have you been hit, slapped, kicked or otherwise physically hurt by someone?: No     Within the past 12 months, have you been humiliated or emotionally abused in other ways by your partner or ex-partner?: No   Housing Stability: Low Risk  (3/17/2025)    Housing Stability     Do you have housing? : Yes     Are you worried about losing your housing?: No       VITALS:  Patient Vitals for the past 24 hrs:   BP Temp Temp src Pulse Resp SpO2 Height Weight   06/13/25 2245 (!) 154/82 -- -- 101 29 94 % -- --   06/13/25 2213 126/67 -- -- 100 -- 95 % -- --   06/13/25 2135 137/80 98  F (36.7  C) Oral 96 16 97 % 1.854 m (6' 1\") " "83.9 kg (185 lb)       PHYSICAL EXAM    VITAL SIGNS: BP (!) 154/82   Pulse 101   Temp 98  F (36.7  C) (Oral)   Resp 29   Ht 1.854 m (6' 1\")   Wt 83.9 kg (185 lb)   SpO2 94%   BMI 24.41 kg/m      General Appearance: Well-appearing, well-nourished, no acute distress   Head:  Normocephalic, without obvious abnormality, atraumatic  Eyes:  PERRL, conjunctiva/corneas clear, EOM's intact,  ENT:  Lips, mucosa, and tongue normal, membranes are moist without pallor  Neck:  Normal ROM, symmetrical, trachea midline    Cardio:  Regular rate and rhythm, no murmur, rub or gallop, 2+ pulses symmetric in all extremities  Pulm:  Clear to auscultation bilaterally, respirations unlabored,  Abdomen:  Soft, non-tender, no rebound or guarding.  Musculoskeletal: Full ROM, no edema, no cyanosis, good ROM of major joints  Integument:  Warm, Dry, No erythema, No rash.    Neurologic:  Alert & oriented.  No focal deficits appreciated.    Psychiatric:  Affect normal, Judgment normal, Mood normal.      LABS  Results for orders placed or performed during the hospital encounter of 06/13/25 (from the past 24 hours)   ECG 12-LEAD WITH MUSE ([facility])   Result Value Ref Range    Systolic Blood Pressure  mmHg    Diastolic Blood Pressure  mmHg    Ventricular Rate 101 BPM    Atrial Rate 101 BPM    DE Interval 174 ms    QRS Duration 144 ms     ms    QTc 503 ms    P Axis 62 degrees    R AXIS 57 degrees    T Axis 34 degrees    Interpretation ECG       Sinus tachycardia  Right bundle branch block  Abnormal ECG  When compared with ECG of 13-Jun-2025 18:05,  Inverted T waves have replaced nonspecific T wave abnormality in Anterior leads  Confirmed by SEE ED PROVIDER NOTE FOR, ECG INTERPRETATION ([id number]),  Charlie Melgar ([id number]) on 6/13/2025 10:09:29 PM     CBC with platelets differential    Narrative    The following orders were created for panel order CBC with platelets differential.  Procedure                               Abnormality         " Status                     ---------                               -----------         ------                     CBC with platelets and ...[[id number]]  Abnormal            Final result                 Please view results for these tests on the individual orders.   Basic metabolic panel   Result Value Ref Range    Sodium 135 135 - 145 mmol/L    Potassium 5.0 3.4 - 5.3 mmol/L    Chloride 98 98 - 107 mmol/L    Carbon Dioxide (CO2) 25 22 - 29 mmol/L    Anion Gap 12 7 - 15 mmol/L    Urea Nitrogen 28.5 (H) 8.0 - 23.0 mg/dL    Creatinine 1.39 (H) 0.67 - 1.17 mg/dL    GFR Estimate 57 (L) >60 mL/min/1.73m2    Calcium 9.6 8.8 - 10.4 mg/dL    Glucose 340 (H) 70 - 99 mg/dL   CBC with platelets and differential   Result Value Ref Range    WBC Count 9.2 4.0 - 11.0 10e3/uL    RBC Count 5.23 4.40 - 5.90 10e6/uL    Hemoglobin 15.5 13.3 - 17.7 g/dL    Hematocrit 47.4 40.0 - 53.0 %    MCV 91 78 - 100 fL    MCH 29.6 26.5 - 33.0 pg    MCHC 32.7 31.5 - 36.5 g/dL    RDW 14.6 10.0 - 15.0 %    Platelet Count 223 150 - 450 10e3/uL    % Neutrophils 88 %    % Lymphocytes 8 %    % Monocytes 3 %    % Eosinophils 0 %    % Basophils 0 %    % Immature Granulocytes 0 %    NRBCs per 100 WBC 0 <1 /100    Absolute Neutrophils 8.1 1.6 - 8.3 10e3/uL    Absolute Lymphocytes 0.7 (L) 0.8 - 5.3 10e3/uL    Absolute Monocytes 0.3 0.0 - 1.3 10e3/uL    Absolute Eosinophils 0.0 0.0 - 0.7 10e3/uL    Absolute Basophils 0.0 0.0 - 0.2 10e3/uL    Absolute Immature Granulocytes 0.0 <=0.4 10e3/uL    Absolute NRBCs 0.0 10e3/uL         RADIOLOGY  No orders to display          EKG:    Rate: 101 bpm  Rhythm: Normal Sinus Rhythm  Axis: Normal  Interval: Normal  Conduction: RBBB  QRS: Wide  ST: Normal  T-wave: Normal  QT: Not prolonged  Comparison EKG: No significant change compared to previous today  Impression:  No acute ischemic change   I have independently reviewed and interpreted today's EKG, pending Cardiologist read        MEDICATIONS GIVEN IN THE  EMERGENCY:  Medications - No data to display      NEW PRESCRIPTIONS STARTED AT TODAY'S ER VISIT  Discharge Medication List as of 6/13/2025 10:50 PM           I, Bonnie Nicole, am serving as a scribe to document services personally performed by Franco Grove D.O., based on my observations and the provider's statements to me.  I, Franco Grove D.O., attest that Bonnie Nicole is acting in a scribe capacity, has observed my performance of the services and has documented them in accordance with my direction.     Franco Grove D.O.  Emergency Medicine  [facility] EMERGENCY ROOM  [address] [address]  [phone]  Dept: [phone]       Franco Grove,   06/13/25 7119

## 2025-06-14 NOTE — ED TRIAGE NOTES
Pt was at his clinic today and was later called to be informed that his potassium was elevated. Pt went to [facility] where an EKG was done and labs were drawn. Pt's K+ is @ 6.1  Pt denies any chest pain or rapid heart rate. Chose to leave [facility] due to the wait. Came to [facility] for further eval.     Triage Assessment (Adult)       Row Name 06/13/25 9649          Triage Assessment    Airway WDL WDL        Respiratory WDL    Respiratory WDL WDL        Skin Circulation/Temperature WDL    Skin Circulation/Temperature WDL WDL        Cardiac WDL    Cardiac WDL WDL        Peripheral/Neurovascular WDL    Peripheral Neurovascular WDL WDL        Cognitive/Neuro/Behavioral WDL    Cognitive/Neuro/Behavioral WDL WDL        Selena Coma Scale    Best Eye Response 4-->(E4) spontaneous     Best Motor Response 6-->(M6) obeys commands     Best Verbal Response 5-->(V5) oriented     Selena Coma Scale Score 15

## 2025-06-14 NOTE — ED NOTES
Expected Patient Referral to ED  4:49 PM    Referring Clinic/Provider:  [facility]    Reason for referral/Clinical facts:  Hx of CHF, A Fib.  Dyspnea today, volume overloaded.  K 6.4, 6.1 on repeat.  No EKG today. Felling well.     Recommendations provided:  Send to ED for further evaluation    Caller was informed that this institution does possess the capabilities and/or resources to provide for patient and should be transferred to our facility.    Discussed that if direct admit is sought and any hurdles are encountered, this ED would be happy to see the patient and evaluate.    Informed caller that recommendations provided are recommendations based only on the facts provided and that they responsible to accept or reject the advice, or to seek a formal in person consultation as needed and that this ED will see/treat patient should they arrive.      Thomas Soto DO  [facility] EMERGENCY DEPARTMENT  [address] [place] [address]  [phone]       Thomas Soto DO  06/13/25 9152    
Patient told staff they were leaving but did not sign paperwork.   
Home

## 2025-06-16 ENCOUNTER — RESULTS FOLLOW-UP (OUTPATIENT)
Dept: FAMILY MEDICINE | Facility: CLINIC | Age: 60
End: 2025-06-16

## 2025-06-16 ENCOUNTER — DOCUMENTATION ONLY (OUTPATIENT)
Dept: ANTICOAGULATION | Facility: CLINIC | Age: 60
End: 2025-06-16
Payer: COMMERCIAL

## 2025-06-16 DIAGNOSIS — Z95.1 S/P CABG (CORONARY ARTERY BYPASS GRAFT): Primary | ICD-10-CM

## 2025-06-16 NOTE — PROGRESS NOTES
"ANTICOAGULATION  MANAGEMENT: Discharge Review    Gonsalo Blakely chart reviewed for anticoagulation continuity of care    Emergency room visit on 6/13/25 for elevated potassium level (6.4, 6.1, 5.0).    Discharge disposition: Home    Results:    No results for input(s): \"INR\", \"[id number]\", \"F2\", \"ALMWH\", \"AAUFH\" in the last 168 hours.  Anticoagulation inpatient management:     not applicable     Anticoagulation discharge instructions:     Warfarin dosing: home regimen continued   Bridging: No   INR goal change: No      Medication changes affecting anticoagulation: Yes: Office visit on 6/13: Prednisone for 5 days, increase Lasix    Additional factors affecting anticoagulation: COPD flare up     PLAN     Recommend to check INR on 6/17/25 at office visit due to Prednisone 6/13-6/18.    Patient not contacted.  INR scheduled 6/17    Anticoagulation Calendar updated    Cyndy Bello, RN  6/16/2025  Anticoagulation Clinic  [facility] for routing messages: reina [facility] HEART [facility] patient phone line: [phone]   "

## 2025-06-17 DIAGNOSIS — Z95.1 S/P CABG (CORONARY ARTERY BYPASS GRAFT): ICD-10-CM

## 2025-06-17 RX ORDER — ALBUTEROL SULFATE 90 UG/1
1-2 INHALANT RESPIRATORY (INHALATION) EVERY 6 HOURS PRN
Qty: 18 G | Refills: 1 | Status: SHIPPED | OUTPATIENT
Start: 2025-06-17

## 2025-06-18 LAB
ATRIAL RATE - MUSE: 101 BPM
DIASTOLIC BLOOD PRESSURE - MUSE: NORMAL MMHG
INTERPRETATION ECG - MUSE: NORMAL
P AXIS - MUSE: 68 DEGREES
PR INTERVAL - MUSE: 162 MS
QRS DURATION - MUSE: 136 MS
QT - MUSE: 376 MS
QTC - MUSE: 487 MS
R AXIS - MUSE: 69 DEGREES
SYSTOLIC BLOOD PRESSURE - MUSE: NORMAL MMHG
T AXIS - MUSE: 39 DEGREES
VENTRICULAR RATE- MUSE: 101 BPM

## 2025-07-08 ENCOUNTER — DOCUMENTATION ONLY (OUTPATIENT)
Dept: ANTICOAGULATION | Facility: CLINIC | Age: 60
End: 2025-07-08
Payer: COMMERCIAL

## 2025-07-08 NOTE — PROGRESS NOTES
ANTICOAGULATION     Gonsalo Blakely is overdue for an INR check.     Care Everywhere updated: yes    Reminder letter sent    Deborah Forrest, RN  7/8/2025  Anticoagulation Clinic  [facility] for routing messages: reina [facility] HEART [facility] patient phone line: [phone]

## 2025-07-08 NOTE — LETTER
"     [facility] ANTICOAGULATION CLINIC  [address] [facility] [address]  Phone: [phone]  Fax: [phone]   July 8, 2025        Gonsalo Blakely  [address] [facility] [place] [address]            Dear Gonsalo,    You are currently under the care of [facility] for your warfarin (Coumadin , Jantoven ) therapy.  We are contacting you because our records show you were due for an INR on 6/17/25.    There are potentially serious risks when taking warfarin without careful monitoring and we want to make sure you are safely managed.  Routine lab monitoring is required for warfarin refills.     Please schedule a lab appointment as soon as possible either by calling [phone] or scheduling directly in [employer]. To schedule in [employer] open the \"schedule an appointment\" section then select \"lab only\" as the reason you are coming in and \"INR\" as the lab test. If it is difficult for you to get to lab, please call us to discuss options.  If there has been a change in your care or other concerns, please let us know so we can help and/or update our records.         Sincerely,       [facility]    "

## 2025-07-09 ENCOUNTER — TELEPHONE (OUTPATIENT)
Dept: FAMILY MEDICINE | Facility: CLINIC | Age: 60
End: 2025-07-09
Payer: COMMERCIAL

## 2025-07-09 NOTE — TELEPHONE ENCOUNTER
----- Message from Amelia He sent at 7/7/2025  7:58 AM CDT -----  Could we please reach out to schedule a follow up for patient for COPD and heart failure? He missed his prior appointment and needs labs.     Thanks,   Amelia He, DO

## 2025-07-23 ENCOUNTER — DOCUMENTATION ONLY (OUTPATIENT)
Dept: ANTICOAGULATION | Facility: CLINIC | Age: 60
End: 2025-07-23
Payer: COMMERCIAL

## 2025-07-23 NOTE — PROGRESS NOTES
Anticoagulation Clinic Notification    Gonsalo, is past due for an INR. Their last result was 2.3 on 5/30/25 and was due to come back on 6/17/25.    he received phone calls and letters over the last several weeks in attempt to arrange follow up labs. Gonsalo KANG [place] will be contacted again today.     Please contact patient directly to discuss compliance with monitoring or schedule visit to review ongoing anticoagulation therapy.    Thank you,     [facility]

## 2025-07-23 NOTE — LETTER
"     [facility] ANTICOAGULATION CLINIC  [address] [facility] [address]  Phone: [phone]  Fax: [phone]   July 23, 2025        Gonsalo Blakely  [address] [facility] [place] [address]            Dear Gonsalo,    You are currently under the care of [facility] for your warfarin (Coumadin , Jantoven ) therapy.  We are contacting you because our records show you were due for an INR on 6/17/25.    There are potentially serious risks when taking warfarin without careful monitoring and we want to make sure you are safely managed.  Routine lab monitoring is required for warfarin refills.     Please schedule a lab appointment as soon as possible either by calling [phone] or scheduling directly in [employer]. To schedule in [employer] open the \"schedule an appointment\" section then select \"lab only\" as the reason you are coming in and \"INR\" as the lab test. If it is difficult for you to get to lab, please call us to discuss options.  If there has been a change in your care or other concerns, please let us know so we can help and/or update our records.         Sincerely,       [facility]   "

## 2025-08-05 ENCOUNTER — HOSPITAL ENCOUNTER (OUTPATIENT)
Dept: GENERAL RADIOLOGY | Facility: HOSPITAL | Age: 60
Discharge: HOME | End: 2025-08-05
Attending: PHYSICIAN ASSISTANT
Payer: COMMERCIAL

## 2025-08-05 ENCOUNTER — OFFICE VISIT (OUTPATIENT)
Dept: URGENT CARE | Facility: URGENT CARE | Age: 60
End: 2025-08-05
Payer: COMMERCIAL

## 2025-08-05 VITALS
BODY MASS INDEX: 22.53 KG/M2 | TEMPERATURE: 97.7 F | RESPIRATION RATE: 24 BRPM | HEIGHT: 73 IN | SYSTOLIC BLOOD PRESSURE: 120 MMHG | WEIGHT: 170 LBS | DIASTOLIC BLOOD PRESSURE: 71 MMHG | HEART RATE: 102 BPM | OXYGEN SATURATION: 95 %

## 2025-08-05 DIAGNOSIS — R06.2 WHEEZING: ICD-10-CM

## 2025-08-05 DIAGNOSIS — J45.21 RAD (REACTIVE AIRWAY DISEASE) WITH WHEEZING, MILD INTERMITTENT, WITH ACUTE EXACERBATION: ICD-10-CM

## 2025-08-05 DIAGNOSIS — R06.2 WHEEZING: Primary | ICD-10-CM

## 2025-08-05 PROCEDURE — 71046 X-RAY EXAM CHEST 2 VIEWS: CPT

## 2025-08-05 RX ORDER — DOXYCYCLINE 100 MG/1
100 CAPSULE ORAL 2 TIMES DAILY
Qty: 20 CAPSULE | Refills: 0 | Status: SHIPPED | OUTPATIENT
Start: 2025-08-05 | End: 2025-08-15

## 2025-08-05 RX ORDER — IPRATROPIUM BROMIDE AND ALBUTEROL SULFATE 2.5; .5 MG/3ML; MG/3ML
1 SOLUTION RESPIRATORY (INHALATION) EVERY 6 HOURS PRN
Qty: 90 ML | Refills: 0 | Status: SHIPPED | OUTPATIENT
Start: 2025-08-05

## 2025-08-05 RX ORDER — PREDNISONE 20 MG/1
20 TABLET ORAL DAILY
Qty: 5 TABLET | Refills: 0 | Status: SHIPPED | OUTPATIENT
Start: 2025-08-05 | End: 2025-08-10

## 2025-08-05 ASSESSMENT — ENCOUNTER SYMPTOMS
EYES NEGATIVE: 1
COUGH: 1
CARDIOVASCULAR NEGATIVE: 1
GASTROINTESTINAL NEGATIVE: 1
WHEEZING: 1
CONSTITUTIONAL NEGATIVE: 1
RHINORRHEA: 1

## 2025-08-06 ENCOUNTER — OFFICE VISIT (OUTPATIENT)
Dept: FAMILY MEDICINE | Facility: CLINIC | Age: 60
End: 2025-08-06
Payer: COMMERCIAL

## 2025-08-06 VITALS
OXYGEN SATURATION: 98 % | BODY MASS INDEX: 22.8 KG/M2 | SYSTOLIC BLOOD PRESSURE: 148 MMHG | DIASTOLIC BLOOD PRESSURE: 88 MMHG | WEIGHT: 172 LBS | RESPIRATION RATE: 22 BRPM | HEART RATE: 107 BPM | TEMPERATURE: 97.6 F | HEIGHT: 73 IN

## 2025-08-06 DIAGNOSIS — E11.69 TYPE 2 DIABETES MELLITUS WITH OTHER SPECIFIED COMPLICATION, WITHOUT LONG-TERM CURRENT USE OF INSULIN (H): ICD-10-CM

## 2025-08-06 DIAGNOSIS — J44.1 CHRONIC OBSTRUCTIVE PULMONARY DISEASE WITH ACUTE EXACERBATION (H): Primary | ICD-10-CM

## 2025-08-06 PROCEDURE — 99214 OFFICE O/P EST MOD 30 MIN: CPT | Mod: GC

## 2025-08-06 PROCEDURE — 3077F SYST BP >= 140 MM HG: CPT

## 2025-08-06 PROCEDURE — 3079F DIAST BP 80-89 MM HG: CPT

## 2025-08-19 ENCOUNTER — TELEPHONE (OUTPATIENT)
Dept: FAMILY MEDICINE | Facility: CLINIC | Age: 60
End: 2025-08-19
Payer: COMMERCIAL

## 2025-08-19 DIAGNOSIS — I10 BENIGN ESSENTIAL HYPERTENSION: ICD-10-CM

## 2025-08-20 RX ORDER — LOSARTAN POTASSIUM 25 MG/1
25 TABLET ORAL DAILY
Qty: 90 TABLET | Refills: 1 | Status: SHIPPED | OUTPATIENT
Start: 2025-08-20

## (undated) DEVICE — GLOVE GAMMEX NEOPRENE ULTRA SZ 7 LF 8514

## (undated) DEVICE — SU VICRYL+ 4-0 UNDYED PS-2 VCP496ZH

## (undated) DEVICE — BANDAGE ESMARK 4 X 3 YARDS STL 23578-143

## (undated) DEVICE — PROTECTOR ARM STANDARD ONE STEP

## (undated) DEVICE — LIGACLIP LARGE AESCULAP O4120-1

## (undated) DEVICE — SU PROLENE 3-0 SHDA 36" 8522H

## (undated) DEVICE — RX VISTASEAL FIBRIN SEALANT W/THROMBIN 10ML VST10

## (undated) DEVICE — ELECTRODE DEFIB CADENCE 22550R

## (undated) DEVICE — Device

## (undated) DEVICE — PREP CHLORAPREP 26ML TINTED HI-LITE ORANGE 930815

## (undated) DEVICE — DRSG ABD TNDRSRB WET PRUF 8IN X 10IN STRL  9194A

## (undated) DEVICE — LEAD PACER MYOCARDIAL BIPOLAR TEMPORARY 53CM 6495F

## (undated) DEVICE — HEMOSTASIS BONE OSTENE 2.5G SYNTHETIC 1503832

## (undated) DEVICE — SYR ANGIOGRAPHY MULTIUSE KIT ACIST 014612

## (undated) DEVICE — SU STERNAL WIRE SINGLE #6 046-032

## (undated) DEVICE — DEFIB PRO-PADZ LVP LQD GEL ADULT 8900-2105-01

## (undated) DEVICE — GLOVE BIOGEL PI SZ 6.5 40865

## (undated) DEVICE — PACK MINOR SINGLE BASIN SSK3001

## (undated) DEVICE — DRAPE IOBAN INCISE 23X17" 6650EZ

## (undated) DEVICE — PACK MAJOR BASIN 673

## (undated) DEVICE — CUSTOM PACK CAB SCV5BCBHEA

## (undated) DEVICE — SU ETHIBOND 3-0 BBDA 36" X588H

## (undated) DEVICE — SLEEVE TR BAND RADIAL COMPRESSION DEVICE 24CM TRB24-REG

## (undated) DEVICE — ESU PENCIL SMOKE EVAC W/ROCKER SWITCH 0703-047-000

## (undated) DEVICE — PITCHER STERILE 1000ML  SSK9004A

## (undated) DEVICE — CATH SUCTION 14FR W/O CTRL DYND41962

## (undated) DEVICE — SYR 10ML LL W/O NDL 302995

## (undated) DEVICE — SU PLEDGET SOFT TFE 3/8"X3/26"X1/16" PCP40

## (undated) DEVICE — CLIP HORIZON SM RED WIDE SLOT 001201

## (undated) DEVICE — BLADE KNIFE SURG 10 371110

## (undated) DEVICE — BLANKET BAIR ADLT PLYMR 60X36IN 63000

## (undated) DEVICE — CLIP SPRING FOGARTY SOFTJAW CSOFT6

## (undated) DEVICE — TUBING INSUFFLATION W/FILTER 28-0207

## (undated) DEVICE — SU PROLENE 7-0 BV175-6 24' M8737

## (undated) DEVICE — CUSTOM PACK CV ST JOES SCV5BCVHEA

## (undated) DEVICE — DRSG DRAIN 4X4" 7086

## (undated) DEVICE — COUNTER NEEDLE ADH & FOAM 20CT 9106

## (undated) DEVICE — TRAY CATH SURESTEP OD14 FR INTERMITTENT STER LTX INTS14

## (undated) DEVICE — GOWN LG DISP 9515

## (undated) DEVICE — CABLE MYO/LEAD PACING WHITE DISP 019-530

## (undated) DEVICE — CATH DIAG RADIAL 5FR TIG 4.5 100CM

## (undated) DEVICE — ESU ELEC BLADE 2.75" COATED/INSULATED E1455

## (undated) DEVICE — CLIP HORIZON MED BLUE 002200

## (undated) DEVICE — GLOVE BIOGEL PI MICRO INDICATOR UNDERGLOVE SZ 8.0 48980

## (undated) DEVICE — SU MYOSTERNAL WIRE  046-267

## (undated) DEVICE — INTRO MICRO MINI STICK 4FR STIFF NITINOL 45-753

## (undated) DEVICE — SU PROLENE 4-0 SHDA 36" 8521H

## (undated) DEVICE — ESU GROUND PAD ADULT REM W/15' CORD E7507DB

## (undated) DEVICE — SUCTION CANISTER MEDIVAC LINER 3000ML W/LID 65651-530

## (undated) DEVICE — PREP DURAPREP 26ML APL 8630

## (undated) DEVICE — SU PROLENE 6-0 C-1DA 30" 8706H

## (undated) DEVICE — CATH BALLOON IABP 7.5FRX50ML INTRA-AORTIC 0684-00-0576-01U

## (undated) DEVICE — GLOVE BIOGEL PI SZ 8.0 40880

## (undated) DEVICE — SUCTION DRY CHEST DRAIN OASIS 3600-100

## (undated) DEVICE — SU DERMABOND ADVANCED .7ML DNX12

## (undated) DEVICE — CABLE MYO/LEAD PACING BLUE DISP 019-535

## (undated) DEVICE — SET CANNULATION TOUNIQUET TS-10061

## (undated) DEVICE — CUSTOM PACK CORONARY SAN5BCRHEA

## (undated) DEVICE — CELLSAVER

## (undated) DEVICE — SHTH INTRO 0.021IN ID 6FR DIA

## (undated) DEVICE — CUFF TOURN 18IN STRL DISP

## (undated) DEVICE — DRSG TEGADERM 8X12" 1629

## (undated) DEVICE — SURGICEL POWDER ABSORBABLE HEMOSTAT 3GM 3013SP

## (undated) DEVICE — SUCTION MANIFOLD NEPTUNE 2 SYS 4 PORT 0702-020-000

## (undated) DEVICE — GEL ULTRASOUND AQUASONIC 20GM 01-01

## (undated) DEVICE — CATH THORACIC STRAIGHT CLOTSTOP 28FR

## (undated) DEVICE — MANIFOLD KIT ANGIO AUTOMATED 014613

## (undated) DEVICE — CATH THORACIC STRAIGHT CLOTSTOP 32FR

## (undated) DEVICE — SOL WATER IRRIG 1000ML BOTTLE 2F7114

## (undated) DEVICE — GLOVE LINER COTTON MED 32-2076

## (undated) DEVICE — DRAPE STOCKINETTE 4" 8544

## (undated) DEVICE — TAPE UMBILICAL COTTON 30X1/16IN 2 STRAND 8619-03A 8886861903

## (undated) DEVICE — GOWN IMPERVIOUS BREATHABLE SMART LG 89015

## (undated) DEVICE — CONNECTOR BLAKE DRAIN SGL BCC1

## (undated) DEVICE — ESU GROUND PAD ADULT W/CORD E7507

## (undated) DEVICE — VESSEL LOOP BLUE MAXI 30-723

## (undated) DEVICE — KIT ENDO VASOVIEW HEMOPRO 2 VH-4000

## (undated) DEVICE — SPONGE RAY-TEC 4X8" 7318

## (undated) DEVICE — TRAY PREP DRY SKIN SCRUB 067

## (undated) DEVICE — RX SURGIFLO HEMOSTATIC MATRIX W/THROMBIN 8ML 2994

## (undated) DEVICE — SOL NACL 0.9% IRRIG 1000ML BOTTLE 2F7124

## (undated) DEVICE — PREP DYNA-HEX 4% CHG SCRUB 4OZ BOTTLE MDS098710

## (undated) DEVICE — KIT HAND CONTROL ACIST 014644 AR-P54

## (undated) DEVICE — SU PROLENE 7-0 BV175-6 24" 8735H

## (undated) RX ORDER — FENTANYL CITRATE 50 UG/ML
INJECTION, SOLUTION INTRAMUSCULAR; INTRAVENOUS
Status: DISPENSED
Start: 2024-06-18

## (undated) RX ORDER — ASPIRIN 325 MG
TABLET ORAL
Status: DISPENSED
Start: 2024-06-18

## (undated) RX ORDER — FENTANYL CITRATE 50 UG/ML
INJECTION, SOLUTION INTRAMUSCULAR; INTRAVENOUS
Status: DISPENSED
Start: 2024-07-15

## (undated) RX ORDER — FENTANYL CITRATE 50 UG/ML
INJECTION, SOLUTION INTRAMUSCULAR; INTRAVENOUS
Status: DISPENSED
Start: 2024-06-27

## (undated) RX ORDER — LIDOCAINE HYDROCHLORIDE 10 MG/ML
INJECTION, SOLUTION EPIDURAL; INFILTRATION; INTRACAUDAL; PERINEURAL
Status: DISPENSED
Start: 2024-06-27

## (undated) RX ORDER — CEFAZOLIN SODIUM 1 G/3ML
INJECTION, POWDER, FOR SOLUTION INTRAMUSCULAR; INTRAVENOUS
Status: DISPENSED
Start: 2024-07-15

## (undated) RX ORDER — DIAZEPAM 10 MG
TABLET ORAL
Status: DISPENSED
Start: 2024-06-18

## (undated) RX ORDER — PROPOFOL 10 MG/ML
INJECTION, EMULSION INTRAVENOUS
Status: DISPENSED
Start: 2024-06-27

## (undated) RX ORDER — EPHEDRINE SULFATE 50 MG/ML
INJECTION, SOLUTION INTRAMUSCULAR; INTRAVENOUS; SUBCUTANEOUS
Status: DISPENSED
Start: 2024-07-15